# Patient Record
Sex: FEMALE | Race: WHITE | NOT HISPANIC OR LATINO
[De-identification: names, ages, dates, MRNs, and addresses within clinical notes are randomized per-mention and may not be internally consistent; named-entity substitution may affect disease eponyms.]

---

## 2018-09-28 ENCOUNTER — APPOINTMENT (OUTPATIENT)
Dept: GERIATRICS | Facility: CLINIC | Age: 83
End: 2018-09-28

## 2019-09-27 ENCOUNTER — TRANSCRIPTION ENCOUNTER (OUTPATIENT)
Age: 84
End: 2019-09-27

## 2019-09-28 ENCOUNTER — INPATIENT (INPATIENT)
Facility: HOSPITAL | Age: 84
LOS: 3 days | Discharge: NURSING, REHAB OR LONG-TERM CARE | DRG: 661 | End: 2019-10-02
Attending: UROLOGY | Admitting: UROLOGY
Payer: MEDICARE

## 2019-09-28 VITALS
TEMPERATURE: 98 F | RESPIRATION RATE: 19 BRPM | HEIGHT: 59 IN | SYSTOLIC BLOOD PRESSURE: 137 MMHG | OXYGEN SATURATION: 97 % | DIASTOLIC BLOOD PRESSURE: 60 MMHG | WEIGHT: 104.94 LBS | HEART RATE: 82 BPM

## 2019-09-28 DIAGNOSIS — N20.0 CALCULUS OF KIDNEY: ICD-10-CM

## 2019-09-28 DIAGNOSIS — Z90.710 ACQUIRED ABSENCE OF BOTH CERVIX AND UTERUS: Chronic | ICD-10-CM

## 2019-09-28 LAB
ALBUMIN SERPL ELPH-MCNC: 4 G/DL — SIGNIFICANT CHANGE UP (ref 3.3–5)
ALP SERPL-CCNC: 104 U/L — SIGNIFICANT CHANGE UP (ref 40–120)
ALT FLD-CCNC: 11 U/L — SIGNIFICANT CHANGE UP (ref 10–45)
ANION GAP SERPL CALC-SCNC: 15 MMOL/L — SIGNIFICANT CHANGE UP (ref 5–17)
APPEARANCE UR: ABNORMAL
AST SERPL-CCNC: 19 U/L — SIGNIFICANT CHANGE UP (ref 10–40)
BASOPHILS # BLD AUTO: 0 K/UL — SIGNIFICANT CHANGE UP (ref 0–0.2)
BASOPHILS NFR BLD AUTO: 0.1 % — SIGNIFICANT CHANGE UP (ref 0–2)
BILIRUB SERPL-MCNC: 1.3 MG/DL — HIGH (ref 0.2–1.2)
BILIRUB UR-MCNC: NEGATIVE — SIGNIFICANT CHANGE UP
BUN SERPL-MCNC: 20 MG/DL — SIGNIFICANT CHANGE UP (ref 7–23)
CALCIUM SERPL-MCNC: 9.7 MG/DL — SIGNIFICANT CHANGE UP (ref 8.4–10.5)
CHLORIDE SERPL-SCNC: 103 MMOL/L — SIGNIFICANT CHANGE UP (ref 96–108)
CO2 SERPL-SCNC: 23 MMOL/L — SIGNIFICANT CHANGE UP (ref 22–31)
COLOR SPEC: YELLOW — SIGNIFICANT CHANGE UP
CREAT SERPL-MCNC: 1.42 MG/DL — HIGH (ref 0.5–1.3)
DIFF PNL FLD: ABNORMAL
EOSINOPHIL # BLD AUTO: 0.1 K/UL — SIGNIFICANT CHANGE UP (ref 0–0.5)
EOSINOPHIL NFR BLD AUTO: 0.3 % — SIGNIFICANT CHANGE UP (ref 0–6)
GLUCOSE SERPL-MCNC: 126 MG/DL — HIGH (ref 70–99)
GLUCOSE UR QL: NEGATIVE — SIGNIFICANT CHANGE UP
HCT VFR BLD CALC: 39 % — SIGNIFICANT CHANGE UP (ref 34.5–45)
HGB BLD-MCNC: 12 G/DL — SIGNIFICANT CHANGE UP (ref 11.5–15.5)
KETONES UR-MCNC: ABNORMAL
LEUKOCYTE ESTERASE UR-ACNC: ABNORMAL
LYMPHOCYTES # BLD AUTO: 1.1 K/UL — SIGNIFICANT CHANGE UP (ref 1–3.3)
LYMPHOCYTES # BLD AUTO: 5.1 % — LOW (ref 13–44)
MCHC RBC-ENTMCNC: 28.6 PG — SIGNIFICANT CHANGE UP (ref 27–34)
MCHC RBC-ENTMCNC: 30.7 GM/DL — LOW (ref 32–36)
MCV RBC AUTO: 93.1 FL — SIGNIFICANT CHANGE UP (ref 80–100)
MONOCYTES # BLD AUTO: 1.3 K/UL — HIGH (ref 0–0.9)
MONOCYTES NFR BLD AUTO: 5.7 % — SIGNIFICANT CHANGE UP (ref 2–14)
NEUTROPHILS # BLD AUTO: 19.5 K/UL — HIGH (ref 1.8–7.4)
NEUTROPHILS NFR BLD AUTO: 88.8 % — HIGH (ref 43–77)
NITRITE UR-MCNC: NEGATIVE — SIGNIFICANT CHANGE UP
PH UR: 6 — SIGNIFICANT CHANGE UP (ref 5–8)
PLATELET # BLD AUTO: 297 K/UL — SIGNIFICANT CHANGE UP (ref 150–400)
POTASSIUM SERPL-MCNC: 4 MMOL/L — SIGNIFICANT CHANGE UP (ref 3.5–5.3)
POTASSIUM SERPL-SCNC: 4 MMOL/L — SIGNIFICANT CHANGE UP (ref 3.5–5.3)
PROT SERPL-MCNC: 7.5 G/DL — SIGNIFICANT CHANGE UP (ref 6–8.3)
PROT UR-MCNC: ABNORMAL
RBC # BLD: 4.19 M/UL — SIGNIFICANT CHANGE UP (ref 3.8–5.2)
RBC # FLD: 11.9 % — SIGNIFICANT CHANGE UP (ref 10.3–14.5)
SODIUM SERPL-SCNC: 141 MMOL/L — SIGNIFICANT CHANGE UP (ref 135–145)
SP GR SPEC: 1.02 — SIGNIFICANT CHANGE UP (ref 1.01–1.02)
UROBILINOGEN FLD QL: NEGATIVE — SIGNIFICANT CHANGE UP
WBC # BLD: 22 K/UL — HIGH (ref 3.8–10.5)
WBC # FLD AUTO: 22 K/UL — HIGH (ref 3.8–10.5)

## 2019-09-28 PROCEDURE — 99285 EMERGENCY DEPT VISIT HI MDM: CPT | Mod: GC

## 2019-09-28 PROCEDURE — 52332 CYSTOSCOPY AND TREATMENT: CPT | Mod: LT

## 2019-09-28 PROCEDURE — 71046 X-RAY EXAM CHEST 2 VIEWS: CPT | Mod: 26

## 2019-09-28 PROCEDURE — 74177 CT ABD & PELVIS W/CONTRAST: CPT | Mod: 26

## 2019-09-28 RX ORDER — SODIUM CHLORIDE 9 MG/ML
1000 INJECTION INTRAMUSCULAR; INTRAVENOUS; SUBCUTANEOUS
Refills: 0 | Status: DISCONTINUED | OUTPATIENT
Start: 2019-09-28 | End: 2019-09-30

## 2019-09-28 RX ORDER — ACETAMINOPHEN 500 MG
650 TABLET ORAL EVERY 6 HOURS
Refills: 0 | Status: DISCONTINUED | OUTPATIENT
Start: 2019-09-28 | End: 2019-09-28

## 2019-09-28 RX ORDER — SODIUM CHLORIDE 9 MG/ML
1500 INJECTION, SOLUTION INTRAVENOUS ONCE
Refills: 0 | Status: COMPLETED | OUTPATIENT
Start: 2019-09-28 | End: 2019-09-28

## 2019-09-28 RX ORDER — HYDROMORPHONE HYDROCHLORIDE 2 MG/ML
0.25 INJECTION INTRAMUSCULAR; INTRAVENOUS; SUBCUTANEOUS
Refills: 0 | Status: DISCONTINUED | OUTPATIENT
Start: 2019-09-28 | End: 2019-09-29

## 2019-09-28 RX ORDER — LOSARTAN POTASSIUM 100 MG/1
100 TABLET, FILM COATED ORAL DAILY
Refills: 0 | Status: DISCONTINUED | OUTPATIENT
Start: 2019-09-28 | End: 2019-09-29

## 2019-09-28 RX ORDER — MORPHINE SULFATE 50 MG/1
2 CAPSULE, EXTENDED RELEASE ORAL EVERY 6 HOURS
Refills: 0 | Status: DISCONTINUED | OUTPATIENT
Start: 2019-09-28 | End: 2019-09-28

## 2019-09-28 RX ORDER — HEPARIN SODIUM 5000 [USP'U]/ML
5000 INJECTION INTRAVENOUS; SUBCUTANEOUS EVERY 8 HOURS
Refills: 0 | Status: DISCONTINUED | OUTPATIENT
Start: 2019-09-28 | End: 2019-10-02

## 2019-09-28 RX ORDER — TAMSULOSIN HYDROCHLORIDE 0.4 MG/1
0.4 CAPSULE ORAL DAILY
Refills: 0 | Status: DISCONTINUED | OUTPATIENT
Start: 2019-09-28 | End: 2019-10-02

## 2019-09-28 RX ORDER — SERTRALINE 25 MG/1
50 TABLET, FILM COATED ORAL DAILY
Refills: 0 | Status: DISCONTINUED | OUTPATIENT
Start: 2019-09-28 | End: 2019-10-02

## 2019-09-28 RX ORDER — NIFEDIPINE 30 MG
30 TABLET, EXTENDED RELEASE 24 HR ORAL DAILY
Refills: 0 | Status: DISCONTINUED | OUTPATIENT
Start: 2019-09-28 | End: 2019-09-29

## 2019-09-28 RX ORDER — PIPERACILLIN AND TAZOBACTAM 4; .5 G/20ML; G/20ML
3.38 INJECTION, POWDER, LYOPHILIZED, FOR SOLUTION INTRAVENOUS ONCE
Refills: 0 | Status: DISCONTINUED | OUTPATIENT
Start: 2019-09-28 | End: 2019-09-28

## 2019-09-28 RX ORDER — PIPERACILLIN AND TAZOBACTAM 4; .5 G/20ML; G/20ML
3.38 INJECTION, POWDER, LYOPHILIZED, FOR SOLUTION INTRAVENOUS EVERY 8 HOURS
Refills: 0 | Status: DISCONTINUED | OUTPATIENT
Start: 2019-09-28 | End: 2019-10-01

## 2019-09-28 RX ORDER — ACETAMINOPHEN 500 MG
650 TABLET ORAL EVERY 6 HOURS
Refills: 0 | Status: DISCONTINUED | OUTPATIENT
Start: 2019-09-28 | End: 2019-10-02

## 2019-09-28 RX ORDER — SENNA PLUS 8.6 MG/1
2 TABLET ORAL AT BEDTIME
Refills: 0 | Status: DISCONTINUED | OUTPATIENT
Start: 2019-09-28 | End: 2019-10-01

## 2019-09-28 RX ORDER — PIPERACILLIN AND TAZOBACTAM 4; .5 G/20ML; G/20ML
3.38 INJECTION, POWDER, LYOPHILIZED, FOR SOLUTION INTRAVENOUS EVERY 8 HOURS
Refills: 0 | Status: DISCONTINUED | OUTPATIENT
Start: 2019-09-28 | End: 2019-09-28

## 2019-09-28 RX ORDER — SENNA PLUS 8.6 MG/1
2 TABLET ORAL AT BEDTIME
Refills: 0 | Status: DISCONTINUED | OUTPATIENT
Start: 2019-09-28 | End: 2019-09-28

## 2019-09-28 RX ORDER — HEPARIN SODIUM 5000 [USP'U]/ML
5000 INJECTION INTRAVENOUS; SUBCUTANEOUS EVERY 8 HOURS
Refills: 0 | Status: DISCONTINUED | OUTPATIENT
Start: 2019-09-28 | End: 2019-09-28

## 2019-09-28 RX ORDER — PIPERACILLIN AND TAZOBACTAM 4; .5 G/20ML; G/20ML
3.38 INJECTION, POWDER, LYOPHILIZED, FOR SOLUTION INTRAVENOUS ONCE
Refills: 0 | Status: COMPLETED | OUTPATIENT
Start: 2019-09-28 | End: 2019-09-28

## 2019-09-28 RX ORDER — PHENAZOPYRIDINE HCL 100 MG
100 TABLET ORAL EVERY 8 HOURS
Refills: 0 | Status: DISCONTINUED | OUTPATIENT
Start: 2019-09-28 | End: 2019-10-02

## 2019-09-28 RX ORDER — SODIUM CHLORIDE 9 MG/ML
1000 INJECTION INTRAMUSCULAR; INTRAVENOUS; SUBCUTANEOUS
Refills: 0 | Status: DISCONTINUED | OUTPATIENT
Start: 2019-09-28 | End: 2019-09-28

## 2019-09-28 RX ORDER — MORPHINE SULFATE 50 MG/1
4 CAPSULE, EXTENDED RELEASE ORAL ONCE
Refills: 0 | Status: DISCONTINUED | OUTPATIENT
Start: 2019-09-28 | End: 2019-09-28

## 2019-09-28 RX ORDER — ALPRAZOLAM 0.25 MG
0.5 TABLET ORAL
Refills: 0 | Status: DISCONTINUED | OUTPATIENT
Start: 2019-09-28 | End: 2019-10-02

## 2019-09-28 RX ORDER — ONDANSETRON 8 MG/1
4 TABLET, FILM COATED ORAL ONCE
Refills: 0 | Status: DISCONTINUED | OUTPATIENT
Start: 2019-09-28 | End: 2019-09-29

## 2019-09-28 RX ORDER — DOCUSATE SODIUM 100 MG
100 CAPSULE ORAL
Refills: 0 | Status: DISCONTINUED | OUTPATIENT
Start: 2019-09-28 | End: 2019-10-01

## 2019-09-28 RX ORDER — ACETAMINOPHEN 500 MG
750 TABLET ORAL ONCE
Refills: 0 | Status: DISCONTINUED | OUTPATIENT
Start: 2019-09-28 | End: 2019-09-30

## 2019-09-28 RX ORDER — LIDOCAINE 4 G/100G
1 CREAM TOPICAL EVERY 6 HOURS
Refills: 0 | Status: DISCONTINUED | OUTPATIENT
Start: 2019-09-28 | End: 2019-10-02

## 2019-09-28 RX ADMIN — SODIUM CHLORIDE 1500 MILLILITER(S): 9 INJECTION, SOLUTION INTRAVENOUS at 15:45

## 2019-09-28 RX ADMIN — SODIUM CHLORIDE 75 MILLILITER(S): 9 INJECTION INTRAMUSCULAR; INTRAVENOUS; SUBCUTANEOUS at 23:17

## 2019-09-28 RX ADMIN — LIDOCAINE 1 APPLICATION(S): 4 CREAM TOPICAL at 23:54

## 2019-09-28 RX ADMIN — HYDROMORPHONE HYDROCHLORIDE 0.25 MILLIGRAM(S): 2 INJECTION INTRAMUSCULAR; INTRAVENOUS; SUBCUTANEOUS at 22:50

## 2019-09-28 RX ADMIN — PIPERACILLIN AND TAZOBACTAM 25 GRAM(S): 4; .5 INJECTION, POWDER, LYOPHILIZED, FOR SOLUTION INTRAVENOUS at 13:52

## 2019-09-28 RX ADMIN — Medication 100 MILLIGRAM(S): at 23:54

## 2019-09-28 RX ADMIN — HYDROMORPHONE HYDROCHLORIDE 0.25 MILLIGRAM(S): 2 INJECTION INTRAMUSCULAR; INTRAVENOUS; SUBCUTANEOUS at 23:00

## 2019-09-28 RX ADMIN — PIPERACILLIN AND TAZOBACTAM 3.38 GRAM(S): 4; .5 INJECTION, POWDER, LYOPHILIZED, FOR SOLUTION INTRAVENOUS at 14:22

## 2019-09-28 RX ADMIN — MORPHINE SULFATE 4 MILLIGRAM(S): 50 CAPSULE, EXTENDED RELEASE ORAL at 15:32

## 2019-09-28 NOTE — H&P ADULT - ASSESSMENT
83F with hx of HTN and UTIs presenting with pain and dysuria in the setting of a left 4mm and 2mm distal ureteral stones with likely concurrent UTI  WBC 22. Afebrile    - Admit to ; Dr. Ryan Núñez  - NPO  - Continue zosyn  - [place] urine culture  - OR for left ureteral stent placement; added on to schedule

## 2019-09-28 NOTE — H&P ADULT - NSHPPHYSICALEXAM_GEN_ALL_CORE
NAD, awake and alert  Respirations nonlabored  Abdomen soft, nontender, nondistended  Mild left CVAT

## 2019-09-28 NOTE — ED PROVIDER NOTE - ATTENDING CONTRIBUTION TO CARE
83F urinary symptoms, left CVAT. Rectal temp. Concern for infected stone vs pyelo. CT. Labs. Urine. IVF. Abx.

## 2019-09-28 NOTE — ED PROVIDER NOTE - CLINICAL SUMMARY MEDICAL DECISION MAKING FREE TEXT BOX
82yo F w/ recurrent UTIs presenting with left flank pain and hematuria. Concern for possible pyelonephritis vs. nephrolithiasis. Will obtain CT scan and labs and determine treatment.

## 2019-09-28 NOTE — PRE-ANESTHESIA EVALUATION ADULT - NSANTHPMHFT_GEN_ALL_CORE
83 year old female with history of HTN and recurrent UTIs presenting with left flank pain and dysuria.  UTI cipro 2 days ago 83 year old female with history of HTN and recurrent UTIs presenting with left flank pain and dysuria.  UTI cipro 2 days ago. CT abdomen: Moderate left hydroureteronephrosis to the level of the left ureterovesicular junction secondary to 4 mm and 2 mm calculi.

## 2019-09-28 NOTE — ED ADULT NURSE REASSESSMENT NOTE - NS ED NURSE REASSESS COMMENT FT1
Report taken from Rakan DINERO in [facility]. Pt resting in stretcher in NAD and VSS with family at bedside. Urology reportedly saw pt. Pending dispo.

## 2019-09-28 NOTE — H&P ADULT - ATTENDING COMMENTS
Left sided distla ureteral stone with leukocytosis to 22. Plan for L ureteral stent placement, possible URS.

## 2019-09-28 NOTE — ED PROVIDER NOTE - MUSCULOSKELETAL, MLM
Spine appears normal, range of motion is not limited, no muscle or joint tenderness +Left CVAT Spine appears normal, range of motion is not limited, no muscle or joint tenderness

## 2019-09-28 NOTE — ED PROVIDER NOTE - CARE PLAN
Principal Discharge DX:	Nephrolithiasis Principal Discharge DX:	Nephrolithiasis  Secondary Diagnosis:	UTI (urinary tract infection)

## 2019-09-28 NOTE — ED PROVIDER NOTE - OBJECTIVE STATEMENT
82yo F w/ PMHx of HTN, LBBB, and previous UTIs p/w dysuria for several days. She states that the dysuria started a few days ago, but is unable to pinpoint exactly when. 2 days ago she saw her PCP and was started on ciprofloxacin. Shortly afterwards she developed hematuria; which has been improving since started the ciprofloxacin. She also endorses left flank pain. Endorses nausea, denies diarrhea or recent travel/trauma. 82yo F w/ PMHx of HTN, LBBB, and previous UTIs p/w dysuria for several days. She states that the dysuria started a few days ago, but is unable to pinpoint exactly when. 2 days ago she saw her PCP and was started on ciprofloxacin. Shortly afterwards she developed hematuria; which has been improving since started the ciprofloxacin. She also endorses left flank pain x 2d. Endorses nausea, denies diarrhea or recent travel/trauma, fever, vomiting.

## 2019-09-28 NOTE — BRIEF OPERATIVE NOTE - OPERATION/FINDINGS
1. Purulent urine in bladder and proximal to stone  2. 6F x 22 cm DJ stent in place    Dictation: [id number]

## 2019-09-28 NOTE — H&P ADULT - HISTORY OF PRESENT ILLNESS
Ms Ram is an 83 year old female with history of HTN and recurrent UTIs presenting with left flank pain and dysuria. She was recently seen by her PCP and was found to have a UTI. She was started on cipro 2 days ago. She denies any fevers or chills.     She has been seen in the past by urologists for UTIs but has never had any stones.

## 2019-09-28 NOTE — PRE-ANESTHESIA EVALUATION ADULT - NSANTHOSAYNRD_GEN_A_CORE
No. CONCHITA screening performed.  STOP BANG Legend: 0-2 = LOW Risk; 3-4 = INTERMEDIATE Risk; 5-8 = HIGH Risk

## 2019-09-28 NOTE — ED ADULT TRIAGE NOTE - CHIEF COMPLAINT QUOTE
L flank pain (seen by PMD x 3 days ago -> stared on cipro for UTI, +"blood in urine"), +hx chronic UTI

## 2019-09-28 NOTE — H&P ADULT - NSHPLABSRESULTS_GEN_ALL_CORE
CBC Full  -  ( 28 Sep 2019 10:59 )  WBC Count : 22.0 K/uL  RBC Count : 4.19 M/uL  Hemoglobin : 12.0 g/dL  Hematocrit : 39.0 %  Platelet Count - Automated : 297 K/uL  Mean Cell Volume : 93.1 fl  Mean Cell Hemoglobin : 28.6 pg  Mean Cell Hemoglobin Concentration : 30.7 gm/dL  Auto Neutrophil # : 19.5 K/uL  Auto Lymphocyte # : 1.1 K/uL  Auto Monocyte # : 1.3 K/uL  Auto Eosinophil # : 0.1 K/uL  Auto Basophil # : 0.0 K/uL  Auto Neutrophil % : 88.8 %  Auto Lymphocyte % : 5.1 %  Auto Monocyte % : 5.7 %  Auto Eosinophil % : 0.3 %  Auto Basophil % : 0.1 %    09-28    141  |  103  |  20  ----------------------------<  126<H>  4.0   |  23  |  1.42<H>    Ca    9.7      28 Sep 2019 10:59    TPro  7.5  /  Alb  4.0  /  TBili  1.3<H>  /  DBili  x   /  AST  19  /  ALT  11  /  AlkPhos  104  09-28

## 2019-09-28 NOTE — ED ADULT NURSE NOTE - OBJECTIVE STATEMENT
Pt came in c/o left flank pain and dysuria. Pt stated " I have hx: urinary tract infection". No distress Breathing easy and non labored. Pt ambulatory. Pt able to move all extremities. Pt anxious but cooperative. Emotional support offered. Pt's daughter at bedside.

## 2019-09-29 LAB
ANION GAP SERPL CALC-SCNC: 12 MMOL/L — SIGNIFICANT CHANGE UP (ref 5–17)
BUN SERPL-MCNC: 20 MG/DL — SIGNIFICANT CHANGE UP (ref 7–23)
CALCIUM SERPL-MCNC: 9.3 MG/DL — SIGNIFICANT CHANGE UP (ref 8.4–10.5)
CHLORIDE SERPL-SCNC: 103 MMOL/L — SIGNIFICANT CHANGE UP (ref 96–108)
CO2 SERPL-SCNC: 25 MMOL/L — SIGNIFICANT CHANGE UP (ref 22–31)
CREAT SERPL-MCNC: 1.37 MG/DL — HIGH (ref 0.5–1.3)
GLUCOSE SERPL-MCNC: 177 MG/DL — HIGH (ref 70–99)
HCT VFR BLD CALC: 33.6 % — LOW (ref 34.5–45)
HGB BLD-MCNC: 10.9 G/DL — LOW (ref 11.5–15.5)
MCHC RBC-ENTMCNC: 30.2 PG — SIGNIFICANT CHANGE UP (ref 27–34)
MCHC RBC-ENTMCNC: 32.5 GM/DL — SIGNIFICANT CHANGE UP (ref 32–36)
MCV RBC AUTO: 93 FL — SIGNIFICANT CHANGE UP (ref 80–100)
PLATELET # BLD AUTO: 262 K/UL — SIGNIFICANT CHANGE UP (ref 150–400)
POTASSIUM SERPL-MCNC: 4 MMOL/L — SIGNIFICANT CHANGE UP (ref 3.5–5.3)
POTASSIUM SERPL-SCNC: 4 MMOL/L — SIGNIFICANT CHANGE UP (ref 3.5–5.3)
RBC # BLD: 3.61 M/UL — LOW (ref 3.8–5.2)
RBC # FLD: 12.1 % — SIGNIFICANT CHANGE UP (ref 10.3–14.5)
SODIUM SERPL-SCNC: 140 MMOL/L — SIGNIFICANT CHANGE UP (ref 135–145)
WBC # BLD: 21.5 K/UL — HIGH (ref 3.8–10.5)
WBC # FLD AUTO: 21.5 K/UL — HIGH (ref 3.8–10.5)

## 2019-09-29 RX ORDER — SODIUM CHLORIDE 9 MG/ML
500 INJECTION INTRAMUSCULAR; INTRAVENOUS; SUBCUTANEOUS ONCE
Refills: 0 | Status: COMPLETED | OUTPATIENT
Start: 2019-09-29 | End: 2019-09-29

## 2019-09-29 RX ORDER — BENZOCAINE AND MENTHOL 5; 1 G/100ML; G/100ML
1 LIQUID ORAL EVERY 4 HOURS
Refills: 0 | Status: DISCONTINUED | OUTPATIENT
Start: 2019-09-29 | End: 2019-10-02

## 2019-09-29 RX ORDER — SODIUM CHLORIDE 9 MG/ML
500 INJECTION, SOLUTION INTRAVENOUS ONCE
Refills: 0 | Status: COMPLETED | OUTPATIENT
Start: 2019-09-29 | End: 2019-09-29

## 2019-09-29 RX ORDER — LOSARTAN POTASSIUM 100 MG/1
100 TABLET, FILM COATED ORAL DAILY
Refills: 0 | Status: DISCONTINUED | OUTPATIENT
Start: 2019-09-29 | End: 2019-10-02

## 2019-09-29 RX ORDER — INFLUENZA VIRUS VACCINE 15; 15; 15; 15 UG/.5ML; UG/.5ML; UG/.5ML; UG/.5ML
0.5 SUSPENSION INTRAMUSCULAR ONCE
Refills: 0 | Status: DISCONTINUED | OUTPATIENT
Start: 2019-09-29 | End: 2019-10-02

## 2019-09-29 RX ORDER — ATROPA BELLADONNA AND OPIUM 16.2; 6 MG/1; MG/1
1 SUPPOSITORY RECTAL EVERY 8 HOURS
Refills: 0 | Status: DISCONTINUED | OUTPATIENT
Start: 2019-09-29 | End: 2019-10-02

## 2019-09-29 RX ORDER — SODIUM CHLORIDE 9 MG/ML
500 INJECTION, SOLUTION INTRAVENOUS ONCE
Refills: 0 | Status: DISCONTINUED | OUTPATIENT
Start: 2019-09-29 | End: 2019-09-29

## 2019-09-29 RX ORDER — NIFEDIPINE 30 MG
30 TABLET, EXTENDED RELEASE 24 HR ORAL DAILY
Refills: 0 | Status: DISCONTINUED | OUTPATIENT
Start: 2019-09-29 | End: 2019-10-02

## 2019-09-29 RX ADMIN — Medication 30 MILLIGRAM(S): at 05:18

## 2019-09-29 RX ADMIN — TAMSULOSIN HYDROCHLORIDE 0.4 MILLIGRAM(S): 0.4 CAPSULE ORAL at 11:13

## 2019-09-29 RX ADMIN — HEPARIN SODIUM 5000 UNIT(S): 5000 INJECTION INTRAVENOUS; SUBCUTANEOUS at 05:18

## 2019-09-29 RX ADMIN — Medication 100 MILLIGRAM(S): at 13:02

## 2019-09-29 RX ADMIN — Medication 0.5 MILLIGRAM(S): at 05:17

## 2019-09-29 RX ADMIN — SODIUM CHLORIDE 75 MILLILITER(S): 9 INJECTION INTRAMUSCULAR; INTRAVENOUS; SUBCUTANEOUS at 11:13

## 2019-09-29 RX ADMIN — PIPERACILLIN AND TAZOBACTAM 25 GRAM(S): 4; .5 INJECTION, POWDER, LYOPHILIZED, FOR SOLUTION INTRAVENOUS at 13:02

## 2019-09-29 RX ADMIN — HEPARIN SODIUM 5000 UNIT(S): 5000 INJECTION INTRAVENOUS; SUBCUTANEOUS at 13:01

## 2019-09-29 RX ADMIN — PIPERACILLIN AND TAZOBACTAM 25 GRAM(S): 4; .5 INJECTION, POWDER, LYOPHILIZED, FOR SOLUTION INTRAVENOUS at 21:39

## 2019-09-29 RX ADMIN — SODIUM CHLORIDE 75 MILLILITER(S): 9 INJECTION INTRAMUSCULAR; INTRAVENOUS; SUBCUTANEOUS at 17:12

## 2019-09-29 RX ADMIN — SODIUM CHLORIDE 500 MILLILITER(S): 9 INJECTION, SOLUTION INTRAVENOUS at 22:58

## 2019-09-29 RX ADMIN — SERTRALINE 50 MILLIGRAM(S): 25 TABLET, FILM COATED ORAL at 11:13

## 2019-09-29 RX ADMIN — Medication 100 MILLIGRAM(S): at 21:40

## 2019-09-29 RX ADMIN — Medication 0.5 MILLIGRAM(S): at 21:40

## 2019-09-29 RX ADMIN — HEPARIN SODIUM 5000 UNIT(S): 5000 INJECTION INTRAVENOUS; SUBCUTANEOUS at 21:40

## 2019-09-29 RX ADMIN — PIPERACILLIN AND TAZOBACTAM 25 GRAM(S): 4; .5 INJECTION, POWDER, LYOPHILIZED, FOR SOLUTION INTRAVENOUS at 05:18

## 2019-09-29 RX ADMIN — Medication 100 MILLIGRAM(S): at 05:18

## 2019-09-29 RX ADMIN — BENZOCAINE AND MENTHOL 1 LOZENGE: 5; 1 LIQUID ORAL at 13:22

## 2019-09-29 RX ADMIN — LOSARTAN POTASSIUM 100 MILLIGRAM(S): 100 TABLET, FILM COATED ORAL at 05:18

## 2019-09-29 RX ADMIN — SODIUM CHLORIDE 500 MILLILITER(S): 9 INJECTION INTRAMUSCULAR; INTRAVENOUS; SUBCUTANEOUS at 17:15

## 2019-09-29 NOTE — PROGRESS NOTE ADULT - ASSESSMENT
83y Female s/p L stent placement      -DVT prophylaxis/OOB  -Incentive spirometry  -Analgesia as needed  -Follow up cultures  -Continue zosyn 83y Female s/p L stent placement      -DVT prophylaxis/OOB  -Incentive spirometry  -Analgesia as needed  -Follow up cultures and trend labs  -Continue zosyn

## 2019-09-29 NOTE — PROGRESS NOTE ADULT - SUBJECTIVE AND OBJECTIVE BOX
UROLOGY DAILY PROGRESS NOTE:     Subjective: Patient seen and examined at bedside. Complaining of urgency but otherwise feeling well.     Objective:  Vital signs  T(F): , Max: 99.9 (09-28-19 @ 11:11)  HR: 68 (09-29-19 @ 05:20)  BP: 121/67 (09-29-19 @ 05:20)  SpO2: 93% (09-29-19 @ 05:20)  Wt(kg): --    I&O's Summary    28 Sep 2019 07:01  -  29 Sep 2019 07:00  --------------------------------------------------------  IN: 675 mL / OUT: 200 mL / NET: 475 mL    Gen: NAD  Abd: Soft, NT, ND    Labs:  09-29  21.5  / 33.6  /x      09-29  x     / x     /1.37                           10.9   21.5  )-----------( 262      ( 29 Sep 2019 05:48 )             33.6     09-29    140  |  103  |  20  ----------------------------<  177<H>  4.0   |  25  |  1.37<H>    Ca    9.3      29 Sep 2019 05:46    TPro  7.5  /  Alb  4.0  /  TBili  1.3<H>  /  DBili  x   /  AST  19  /  ALT  11  /  AlkPhos  104  09-28    Urine Cx: pending

## 2019-09-29 NOTE — PROGRESS NOTE ADULT - ASSESSMENT
A/P: 83y Female s/p L stent placement     plan  pain control  DVT prophylaxis/OOB  Incentive spirometry  Strict I&O's  Analgesia and antiemetics as needed  regular Diet  AM labs

## 2019-09-29 NOTE — PROVIDER CONTACT NOTE (OTHER) - ASSESSMENT
pt A&Ox4. BP 90/40 manually, other vss. pt complains of no pain at this time, some lightheadedness reported from pt. pt up in chair/ambulating during day returned to bed at this time. ivf in place. pt voiding, with urgency/incont @ times.

## 2019-09-29 NOTE — PROGRESS NOTE ADULT - SUBJECTIVE AND OBJECTIVE BOX
Post op Check    Pt 84y/o F hx of ureter stone, s/p L stent placement. seen and examined c/o some pain around urethra.  Denies SOB/CP/N/V.     Vital Signs Last 24 Hrs  T(C): 36.4 (29 Sep 2019 01:32), Max: 37.7 (28 Sep 2019 11:11)  T(F): 97.5 (29 Sep 2019 01:32), Max: 99.9 (28 Sep 2019 11:11)  HR: 73 (29 Sep 2019 01:32) (71 - 96)  BP: 127/68 (29 Sep 2019 01:32) (119/58 - 156/67)  BP(mean): 88 (29 Sep 2019 00:30) (81 - 101)  RR: 18 (29 Sep 2019 01:32) (15 - 20)  SpO2: 95% (29 Sep 2019 01:32) (95% - 100%)    I&O's Summary    28 Sep 2019 07:01  -  29 Sep 2019 04:42  --------------------------------------------------------  IN: 150 mL / OUT: 200 mL / NET: -50 mL        Physical Exam  Gen: NAD, A&Ox3  Pulm: No respiratory distress, no subcostal retractions  CV: RRR, no JVD  Abd: Soft, NT, ND  : voiding free                          12.0   22.0  )-----------( 297      ( 28 Sep 2019 10:59 )             39.0       09-28    141  |  103  |  20  ----------------------------<  126<H>  4.0   |  23  |  1.42<H>    Ca    9.7      28 Sep 2019 10:59    TPro  7.5  /  Alb  4.0  /  TBili  1.3<H>  /  DBili  x   /  AST  19  /  ALT  11  /  AlkPhos  104  09-28

## 2019-09-30 ENCOUNTER — TRANSCRIPTION ENCOUNTER (OUTPATIENT)
Age: 84
End: 2019-09-30

## 2019-09-30 LAB
-  AMIKACIN: SIGNIFICANT CHANGE UP
-  AMPICILLIN/SULBACTAM: SIGNIFICANT CHANGE UP
-  AMPICILLIN: SIGNIFICANT CHANGE UP
-  AZTREONAM: SIGNIFICANT CHANGE UP
-  CEFAZOLIN: SIGNIFICANT CHANGE UP
-  CEFEPIME: SIGNIFICANT CHANGE UP
-  CEFOXITIN: SIGNIFICANT CHANGE UP
-  CEFTRIAXONE: SIGNIFICANT CHANGE UP
-  CIPROFLOXACIN: SIGNIFICANT CHANGE UP
-  GENTAMICIN: SIGNIFICANT CHANGE UP
-  IMIPENEM: SIGNIFICANT CHANGE UP
-  LEVOFLOXACIN: SIGNIFICANT CHANGE UP
-  MEROPENEM: SIGNIFICANT CHANGE UP
-  NITROFURANTOIN: SIGNIFICANT CHANGE UP
-  PIPERACILLIN/TAZOBACTAM: SIGNIFICANT CHANGE UP
-  TIGECYCLINE: SIGNIFICANT CHANGE UP
-  TOBRAMYCIN: SIGNIFICANT CHANGE UP
-  TRIMETHOPRIM/SULFAMETHOXAZOLE: SIGNIFICANT CHANGE UP
ANION GAP SERPL CALC-SCNC: 12 MMOL/L — SIGNIFICANT CHANGE UP (ref 5–17)
BUN SERPL-MCNC: 29 MG/DL — HIGH (ref 7–23)
CALCIUM SERPL-MCNC: 8.6 MG/DL — SIGNIFICANT CHANGE UP (ref 8.4–10.5)
CHLORIDE SERPL-SCNC: 108 MMOL/L — SIGNIFICANT CHANGE UP (ref 96–108)
CO2 SERPL-SCNC: 23 MMOL/L — SIGNIFICANT CHANGE UP (ref 22–31)
CREAT SERPL-MCNC: 1.28 MG/DL — SIGNIFICANT CHANGE UP (ref 0.5–1.3)
CULTURE RESULTS: SIGNIFICANT CHANGE UP
GLUCOSE SERPL-MCNC: 125 MG/DL — HIGH (ref 70–99)
HCT VFR BLD CALC: 32.9 % — LOW (ref 34.5–45)
HGB BLD-MCNC: 10.2 G/DL — LOW (ref 11.5–15.5)
MCHC RBC-ENTMCNC: 29.1 PG — SIGNIFICANT CHANGE UP (ref 27–34)
MCHC RBC-ENTMCNC: 31 GM/DL — LOW (ref 32–36)
MCV RBC AUTO: 94 FL — SIGNIFICANT CHANGE UP (ref 80–100)
METHOD TYPE: SIGNIFICANT CHANGE UP
ORGANISM # SPEC MICROSCOPIC CNT: SIGNIFICANT CHANGE UP
ORGANISM # SPEC MICROSCOPIC CNT: SIGNIFICANT CHANGE UP
PLATELET # BLD AUTO: 253 K/UL — SIGNIFICANT CHANGE UP (ref 150–400)
POTASSIUM SERPL-MCNC: 3.3 MMOL/L — LOW (ref 3.5–5.3)
POTASSIUM SERPL-SCNC: 3.3 MMOL/L — LOW (ref 3.5–5.3)
RBC # BLD: 3.5 M/UL — LOW (ref 3.8–5.2)
RBC # FLD: 12.2 % — SIGNIFICANT CHANGE UP (ref 10.3–14.5)
SODIUM SERPL-SCNC: 143 MMOL/L — SIGNIFICANT CHANGE UP (ref 135–145)
SPECIMEN SOURCE: SIGNIFICANT CHANGE UP
WBC # BLD: 15.9 K/UL — HIGH (ref 3.8–10.5)
WBC # FLD AUTO: 15.9 K/UL — HIGH (ref 3.8–10.5)

## 2019-09-30 PROCEDURE — 74018 RADEX ABDOMEN 1 VIEW: CPT | Mod: 26

## 2019-09-30 RX ORDER — ACETAMINOPHEN 500 MG
2 TABLET ORAL
Qty: 0 | Refills: 0 | DISCHARGE
Start: 2019-09-30

## 2019-09-30 RX ORDER — DOCUSATE SODIUM 100 MG
1 CAPSULE ORAL
Qty: 0 | Refills: 0 | DISCHARGE
Start: 2019-09-30

## 2019-09-30 RX ORDER — LOPERAMIDE HCL 2 MG
2 TABLET ORAL ONCE
Refills: 0 | Status: COMPLETED | OUTPATIENT
Start: 2019-09-30 | End: 2019-09-30

## 2019-09-30 RX ORDER — POTASSIUM CHLORIDE 20 MEQ
40 PACKET (EA) ORAL ONCE
Refills: 0 | Status: COMPLETED | OUTPATIENT
Start: 2019-09-30 | End: 2019-09-30

## 2019-09-30 RX ORDER — CEPHALEXIN 500 MG
1 CAPSULE ORAL
Qty: 21 | Refills: 0
Start: 2019-09-30 | End: 2019-10-06

## 2019-09-30 RX ORDER — CIPROFLOXACIN LACTATE 400MG/40ML
1 VIAL (ML) INTRAVENOUS
Qty: 0 | Refills: 0 | DISCHARGE

## 2019-09-30 RX ORDER — TAMSULOSIN HYDROCHLORIDE 0.4 MG/1
1 CAPSULE ORAL
Qty: 14 | Refills: 0
Start: 2019-09-30

## 2019-09-30 RX ORDER — LACTOBACILLUS ACIDOPHILUS 100MM CELL
1 CAPSULE ORAL DAILY
Refills: 0 | Status: DISCONTINUED | OUTPATIENT
Start: 2019-09-30 | End: 2019-10-02

## 2019-09-30 RX ORDER — SENNA PLUS 8.6 MG/1
2 TABLET ORAL
Qty: 0 | Refills: 0 | DISCHARGE
Start: 2019-09-30

## 2019-09-30 RX ADMIN — Medication 650 MILLIGRAM(S): at 06:25

## 2019-09-30 RX ADMIN — Medication 0.5 MILLIGRAM(S): at 05:56

## 2019-09-30 RX ADMIN — HEPARIN SODIUM 5000 UNIT(S): 5000 INJECTION INTRAVENOUS; SUBCUTANEOUS at 21:41

## 2019-09-30 RX ADMIN — Medication 100 MILLIGRAM(S): at 05:55

## 2019-09-30 RX ADMIN — HEPARIN SODIUM 5000 UNIT(S): 5000 INJECTION INTRAVENOUS; SUBCUTANEOUS at 13:36

## 2019-09-30 RX ADMIN — Medication 100 MILLIGRAM(S): at 21:41

## 2019-09-30 RX ADMIN — Medication 0.5 MILLIGRAM(S): at 17:58

## 2019-09-30 RX ADMIN — TAMSULOSIN HYDROCHLORIDE 0.4 MILLIGRAM(S): 0.4 CAPSULE ORAL at 11:43

## 2019-09-30 RX ADMIN — Medication 650 MILLIGRAM(S): at 05:55

## 2019-09-30 RX ADMIN — SERTRALINE 50 MILLIGRAM(S): 25 TABLET, FILM COATED ORAL at 11:43

## 2019-09-30 RX ADMIN — PIPERACILLIN AND TAZOBACTAM 25 GRAM(S): 4; .5 INJECTION, POWDER, LYOPHILIZED, FOR SOLUTION INTRAVENOUS at 13:41

## 2019-09-30 RX ADMIN — PIPERACILLIN AND TAZOBACTAM 25 GRAM(S): 4; .5 INJECTION, POWDER, LYOPHILIZED, FOR SOLUTION INTRAVENOUS at 21:41

## 2019-09-30 RX ADMIN — Medication 100 MILLIGRAM(S): at 05:56

## 2019-09-30 RX ADMIN — Medication 2 MILLIGRAM(S): at 21:41

## 2019-09-30 RX ADMIN — Medication 40 MILLIEQUIVALENT(S): at 08:19

## 2019-09-30 RX ADMIN — PIPERACILLIN AND TAZOBACTAM 25 GRAM(S): 4; .5 INJECTION, POWDER, LYOPHILIZED, FOR SOLUTION INTRAVENOUS at 05:55

## 2019-09-30 RX ADMIN — Medication 1 TABLET(S): at 17:58

## 2019-09-30 RX ADMIN — Medication 100 MILLIGRAM(S): at 13:37

## 2019-09-30 RX ADMIN — HEPARIN SODIUM 5000 UNIT(S): 5000 INJECTION INTRAVENOUS; SUBCUTANEOUS at 05:56

## 2019-09-30 NOTE — PROGRESS NOTE ADULT - ASSESSMENT
83y Female s/p L stent placement for UTI, left distal ureteral stones     -Trend WBC  -DVT prophylaxis/OOB  -Incentive spirometry  -Analgesia as needed  -Follow up cultures and trend labs  -Continue zosyn   -KUB to assess stent placement

## 2019-09-30 NOTE — DISCHARGE NOTE PROVIDER - NSDCCPCAREPLAN_GEN_ALL_CORE_FT
PRINCIPAL DISCHARGE DIAGNOSIS  Diagnosis: Nephrolithiasis  Assessment and Plan of Treatment: You had a left ureteral stent placed.  Follow up with Dr Núñez in the office [phone] to set up left ureteroscopy.      SECONDARY DISCHARGE DIAGNOSES  Diagnosis: HTN (hypertension)  Assessment and Plan of Treatment:     Diagnosis: UTI (urinary tract infection)  Assessment and Plan of Treatment: Start taking keflex for 7 days

## 2019-09-30 NOTE — DISCHARGE NOTE PROVIDER - HOSPITAL COURSE
This is an 83 year old female who was admitted for left ureteral calculi and underwent a left ureteral stent that night.  She developed soft blood pressures requiring 500cc boluses, which she tolerated.  Otherwise, she remained afebrile, and the rest of her vitals remained stable.  Patient was also pain controlled and tolerating regular diet.  POD#2 her WBC downtrended, KUB showed left stent in proper position, and she was discharged home with appropriate prescriptions and instructions for follow up. This is an 83 year old female who was admitted for left ureteral calculi and underwent a left ureteral stent that night.  She developed soft blood pressures requiring 500cc boluses, which she tolerated.  Otherwise, she remained afebrile, and the rest of her vitals remained stable.  Patient was also pain controlled and tolerating regular diet.  POD#2 her WBC downtrended, KUB showed left stent in proper position, and she was discharged to Rehab with appropriate prescriptions and instructions for follow up.  She has surgery scheduled for 10/8/19 to remove stones.  She will continue Keflex until surgery.

## 2019-09-30 NOTE — CONSULT NOTE ADULT - SUBJECTIVE AND OBJECTIVE BOX
CHIEF COMPLAINT: abd pain    HISTORY OF PRESENT ILLNESS:  83 year old female with history of HTN, mild AI, moderate TR, moderate pulm HTN,  and recurrent UTIs presenting with left flank pain and dysuria. She was recently seen by her PCP and was found to have a UTI. She was started on cipro 2 days ago. She denies any fevers or chills.     She has been seen in the past by urologists for UTIs but has never had any stones.  s/p ureteral stent. still c/o ongoing flank pain. no cp/sob/palps/dizziness      Allergies  No Known Allergies      	    MEDICATIONS:  heparin  Injectable 5000 Unit(s) SubCutaneous every 8 hours  losartan 100 milliGRAM(s) Oral daily  NIFEdipine XL 30 milliGRAM(s) Oral daily  tamsulosin 0.4 milliGRAM(s) Oral daily  piperacillin/tazobactam IVPB.. 3.375 Gram(s) IV Intermittent every 8 hours  acetaminophen   Tablet .. 650 milliGRAM(s) Oral every 6 hours PRN  acetaminophen  IVPB .. 750 milliGRAM(s) IV Intermittent once PRN  ALPRAZolam 0.5 milliGRAM(s) Oral two times a day  belladonna 16.2 mG/opium 30 mg Suppository 1 Suppository(s) Rectal every 8 hours PRN  sertraline 50 milliGRAM(s) Oral daily  docusate sodium 100 milliGRAM(s) Oral two times a day  senna 2 Tablet(s) Oral at bedtime PRN  benzocaine 15 mG/menthol 3.6 mG Lozenge 1 Lozenge Oral every 4 hours PRN  influenza   Vaccine 0.5 milliLiter(s) IntraMuscular once  lidocaine 2% Gel 1 Application(s) Topical every 6 hours PRN  phenazopyridine 100 milliGRAM(s) Oral every 8 hours  sodium chloride 0.9%. 1000 milliLiter(s) IV Continuous <Continuous>      PAST MEDICAL & SURGICAL HISTORY:  UTI (urinary tract infection)  HTN (hypertension)  H/O abdominal hysterectomy      FAMILY HISTORY:  NC    SOCIAL HISTORY:    non smoker. independent in adl      REVIEW OF SYSTEMS:  See HPI, otherwise complete 10 point review of systems negative    [ ] All others negative	    PHYSICAL EXAM:  T(C): 36.4 (09-30-19 @ 05:11), Max: 36.9 (09-30-19 @ 01:15)  HR: 60 (09-30-19 @ 05:11) (60 - 72)  BP: 101/55 (09-30-19 @ 05:11) (90/40 - 104/60)  RR: 18 (09-30-19 @ 05:11) (16 - 18)  SpO2: 92% (09-30-19 @ 05:11) (92% - 95%)  Wt(kg): --  I&O's Summary    29 Sep 2019 07:01  -  30 Sep 2019 07:00  --------------------------------------------------------  IN: 2830 mL / OUT: 700 mL / NET: 2130 mL        Appearance: No Acute Distress	  HEENT:  Normal oral mucosa, PERRL, EOMI	  Cardiovascular: Normal S1 S2, No JVD, 2/6 sky  Respiratory: Lungs clear to auscultation bilaterally  Gastrointestinal:  Soft, Non-tender, + BS	  Skin: No rashes, No ecchymoses, No cyanosis	  Neurologic: Non-focal  Extremities: No clubbing, cyanosis or edema  Vascular: Peripheral pulses palpable 2+ bilaterally  Psychiatry: A & O x 3, Mood & affect appropriate    Laboratory Data:	 	    CBC Full  -  ( 30 Sep 2019 06:53 )  WBC Count : 15.9 K/uL  Hemoglobin : 10.2 g/dL  Hematocrit : 32.9 %  Platelet Count - Automated : 253 K/uL  Mean Cell Volume : 94.0 fl  Mean Cell Hemoglobin : 29.1 pg  Mean Cell Hemoglobin Concentration : 31.0 gm/dL  Auto Neutrophil # : x  Auto Lymphocyte # : x  Auto Monocyte # : x  Auto Eosinophil # : x  Auto Basophil # : x  Auto Neutrophil % : x  Auto Lymphocyte % : x  Auto Monocyte % : x  Auto Eosinophil % : x  Auto Basophil % : x    09-30    143  |  108  |  29<H>  ----------------------------<  125<H>  3.3<L>   |  23  |  1.28  09-29    140  |  103  |  20  ----------------------------<  177<H>  4.0   |  25  |  1.37<H>    Ca    8.6      30 Sep 2019 06:55  Ca    9.3      29 Sep 2019 05:46    TPro  7.5  /  Alb  4.0  /  TBili  1.3<H>  /  DBili  x   /  AST  19  /  ALT  11  /  AlkPhos  104  09-28        Assessment:  -cardiac clearance  -mod pHTN. mild-mod valvular disease  -recurrent UTI  -obstructing stone s/p stent  -abd pain  -urinary incontinence  -UTI    Recs:  appreciate  care  f/u KUB  will likely need definitive tx giving ongoing abd pain s/p stent  pain control  abd for UTI  IV fluids prn. appears euvolemic  no ischemic or hf sx. adequate ET. can proceed with  procedures without further cardiac workup      Greater than 50 minutes spent on total encounter; more than 50% of the visit was spent counseling and/or coordinating care by the attending physician.   	  Ousmane Sandoval MD   Cardiovascular Diseases  [phone]

## 2019-09-30 NOTE — PROGRESS NOTE ADULT - SUBJECTIVE AND OBJECTIVE BOX
UROLOGY DAILY PROGRESS NOTE:     Subjective:    Patient complaining of lower abdominal pain as well as urinary incontinence. Doing well otherwise. Afebrile.    Objective:    NAD, awake and alert  Respirations nonlabored  Abdomen soft, nontender, nondistended      MEDICATIONS  (STANDING):  ALPRAZolam 0.5 milliGRAM(s) Oral two times a day  docusate sodium 100 milliGRAM(s) Oral two times a day  heparin  Injectable 5000 Unit(s) SubCutaneous every 8 hours  influenza   Vaccine 0.5 milliLiter(s) IntraMuscular once  losartan 100 milliGRAM(s) Oral daily  NIFEdipine XL 30 milliGRAM(s) Oral daily  phenazopyridine 100 milliGRAM(s) Oral every 8 hours  piperacillin/tazobactam IVPB.. 3.375 Gram(s) IV Intermittent every 8 hours  sertraline 50 milliGRAM(s) Oral daily  sodium chloride 0.9%. 1000 milliLiter(s) (75 mL/Hr) IV Continuous <Continuous>  tamsulosin 0.4 milliGRAM(s) Oral daily    MEDICATIONS  (PRN):  acetaminophen   Tablet .. 650 milliGRAM(s) Oral every 6 hours PRN Mild Pain (1 - 3)  acetaminophen  IVPB .. 750 milliGRAM(s) IV Intermittent once PRN Temp greater or equal to 38C (100.4F), Mild Pain (1 - 3)  belladonna 16.2 mG/opium 30 mg Suppository 1 Suppository(s) Rectal every 8 hours PRN bladder spasm and/or urgency  benzocaine 15 mG/menthol 3.6 mG Lozenge 1 Lozenge Oral every 4 hours PRN Sore Throat  lidocaine 2% Gel 1 Application(s) Topical every 6 hours PRN pain  senna 2 Tablet(s) Oral at bedtime PRN Constipation      Vital Signs Last 24 Hrs  T(C): 36.4 (30 Sep 2019 05:11), Max: 36.9 (30 Sep 2019 01:15)  T(F): 97.5 (30 Sep 2019 05:11), Max: 98.5 (30 Sep 2019 01:15)  HR: 60 (30 Sep 2019 05:11) (60 - 72)  BP: 101/55 (30 Sep 2019 05:11) (90/40 - 104/60)  BP(mean): --  RR: 18 (30 Sep 2019 05:11) (16 - 18)  SpO2: 92% (30 Sep 2019 05:11) (92% - 95%)    I&O's Detail    29 Sep 2019 07:01  -  30 Sep 2019 07:00  --------------------------------------------------------  IN:    IV PiggyBack: 100 mL    Oral Fluid: 1080 mL    sodium chloride 0.9%.: 1650 mL  Total IN: 2830 mL    OUT:    Voided: 700 mL  Total OUT: 700 mL    Total NET: 2130 mL          Daily     Daily     LABS:                        10.2   15.9  )-----------( 253      ( 30 Sep 2019 06:53 )             32.9         143  |  108  |  29<H>  ----------------------------<  125<H>  3.3<L>   |  23  |  1.28    Ca    8.6      30 Sep 2019 06:55    TPro  7.5  /  Alb  4.0  /  TBili  1.3<H>  /  DBili  x   /  AST  19  /  ALT  11  /  AlkPhos  104        Urinalysis Basic - ( 28 Sep 2019 10:59 )    Color: Yellow / Appearance: Turbid / S.022 / pH: x  Gluc: x / Ketone: Small  / Bili: Negative / Urobili: Negative   Blood: x / Protein: 30 mg/dL / Nitrite: Negative   Leuk Esterase: Large / RBC: 10 /hpf /  /HPF   Sq Epi: x / Non Sq Epi: 2 /hpf / Bacteria: Many

## 2019-09-30 NOTE — DISCHARGE NOTE PROVIDER - CARE PROVIDER_API CALL
Ryan Núñez)  Administration  [facility]  Dept of Urology, [address] [place] [address]  Phone: [phone]  Fax: [phone]  Follow Up Time:

## 2019-10-01 LAB
-  AMIKACIN: SIGNIFICANT CHANGE UP
-  AMIKACIN: SIGNIFICANT CHANGE UP
-  AMPICILLIN/SULBACTAM: SIGNIFICANT CHANGE UP
-  AMPICILLIN/SULBACTAM: SIGNIFICANT CHANGE UP
-  AMPICILLIN: SIGNIFICANT CHANGE UP
-  AMPICILLIN: SIGNIFICANT CHANGE UP
-  AZTREONAM: SIGNIFICANT CHANGE UP
-  AZTREONAM: SIGNIFICANT CHANGE UP
-  CEFAZOLIN: SIGNIFICANT CHANGE UP
-  CEFAZOLIN: SIGNIFICANT CHANGE UP
-  CEFEPIME: SIGNIFICANT CHANGE UP
-  CEFEPIME: SIGNIFICANT CHANGE UP
-  CEFOXITIN: SIGNIFICANT CHANGE UP
-  CEFOXITIN: SIGNIFICANT CHANGE UP
-  CEFTRIAXONE: SIGNIFICANT CHANGE UP
-  CEFTRIAXONE: SIGNIFICANT CHANGE UP
-  CIPROFLOXACIN: SIGNIFICANT CHANGE UP
-  CIPROFLOXACIN: SIGNIFICANT CHANGE UP
-  GENTAMICIN: SIGNIFICANT CHANGE UP
-  GENTAMICIN: SIGNIFICANT CHANGE UP
-  IMIPENEM: SIGNIFICANT CHANGE UP
-  IMIPENEM: SIGNIFICANT CHANGE UP
-  LEVOFLOXACIN: SIGNIFICANT CHANGE UP
-  LEVOFLOXACIN: SIGNIFICANT CHANGE UP
-  MEROPENEM: SIGNIFICANT CHANGE UP
-  MEROPENEM: SIGNIFICANT CHANGE UP
-  NITROFURANTOIN: SIGNIFICANT CHANGE UP
-  NITROFURANTOIN: SIGNIFICANT CHANGE UP
-  PIPERACILLIN/TAZOBACTAM: SIGNIFICANT CHANGE UP
-  PIPERACILLIN/TAZOBACTAM: SIGNIFICANT CHANGE UP
-  TIGECYCLINE: SIGNIFICANT CHANGE UP
-  TIGECYCLINE: SIGNIFICANT CHANGE UP
-  TOBRAMYCIN: SIGNIFICANT CHANGE UP
-  TOBRAMYCIN: SIGNIFICANT CHANGE UP
-  TRIMETHOPRIM/SULFAMETHOXAZOLE: SIGNIFICANT CHANGE UP
-  TRIMETHOPRIM/SULFAMETHOXAZOLE: SIGNIFICANT CHANGE UP
ANION GAP SERPL CALC-SCNC: 11 MMOL/L — SIGNIFICANT CHANGE UP (ref 5–17)
BUN SERPL-MCNC: 27 MG/DL — HIGH (ref 7–23)
CALCIUM SERPL-MCNC: 8.8 MG/DL — SIGNIFICANT CHANGE UP (ref 8.4–10.5)
CHLORIDE SERPL-SCNC: 110 MMOL/L — HIGH (ref 96–108)
CO2 SERPL-SCNC: 23 MMOL/L — SIGNIFICANT CHANGE UP (ref 22–31)
CREAT SERPL-MCNC: 1.06 MG/DL — SIGNIFICANT CHANGE UP (ref 0.5–1.3)
CULTURE RESULTS: SIGNIFICANT CHANGE UP
CULTURE RESULTS: SIGNIFICANT CHANGE UP
GLUCOSE SERPL-MCNC: 100 MG/DL — HIGH (ref 70–99)
HCT VFR BLD CALC: 33.9 % — LOW (ref 34.5–45)
HGB BLD-MCNC: 10.4 G/DL — LOW (ref 11.5–15.5)
MCHC RBC-ENTMCNC: 29 PG — SIGNIFICANT CHANGE UP (ref 27–34)
MCHC RBC-ENTMCNC: 30.8 GM/DL — LOW (ref 32–36)
MCV RBC AUTO: 94.3 FL — SIGNIFICANT CHANGE UP (ref 80–100)
METHOD TYPE: SIGNIFICANT CHANGE UP
METHOD TYPE: SIGNIFICANT CHANGE UP
ORGANISM # SPEC MICROSCOPIC CNT: SIGNIFICANT CHANGE UP
PLATELET # BLD AUTO: 277 K/UL — SIGNIFICANT CHANGE UP (ref 150–400)
POTASSIUM SERPL-MCNC: 3.7 MMOL/L — SIGNIFICANT CHANGE UP (ref 3.5–5.3)
POTASSIUM SERPL-SCNC: 3.7 MMOL/L — SIGNIFICANT CHANGE UP (ref 3.5–5.3)
RBC # BLD: 3.59 M/UL — LOW (ref 3.8–5.2)
RBC # FLD: 12.1 % — SIGNIFICANT CHANGE UP (ref 10.3–14.5)
SODIUM SERPL-SCNC: 144 MMOL/L — SIGNIFICANT CHANGE UP (ref 135–145)
SPECIMEN SOURCE: SIGNIFICANT CHANGE UP
SPECIMEN SOURCE: SIGNIFICANT CHANGE UP
WBC # BLD: 11.3 K/UL — HIGH (ref 3.8–10.5)
WBC # FLD AUTO: 11.3 K/UL — HIGH (ref 3.8–10.5)

## 2019-10-01 RX ORDER — CEPHALEXIN 500 MG
500 CAPSULE ORAL THREE TIMES A DAY
Refills: 0 | Status: DISCONTINUED | OUTPATIENT
Start: 2019-10-01 | End: 2019-10-02

## 2019-10-01 RX ORDER — DIPHENOXYLATE HCL/ATROPINE 2.5-.025MG
2 TABLET ORAL ONCE
Refills: 0 | Status: DISCONTINUED | OUTPATIENT
Start: 2019-10-01 | End: 2019-10-01

## 2019-10-01 RX ORDER — LACTOBACILLUS ACIDOPHILUS 100MM CELL
0 CAPSULE ORAL
Qty: 0 | Refills: 0 | DISCHARGE
Start: 2019-10-01

## 2019-10-01 RX ADMIN — Medication 100 MILLIGRAM(S): at 06:54

## 2019-10-01 RX ADMIN — Medication 500 MILLIGRAM(S): at 21:16

## 2019-10-01 RX ADMIN — TAMSULOSIN HYDROCHLORIDE 0.4 MILLIGRAM(S): 0.4 CAPSULE ORAL at 13:19

## 2019-10-01 RX ADMIN — HEPARIN SODIUM 5000 UNIT(S): 5000 INJECTION INTRAVENOUS; SUBCUTANEOUS at 06:54

## 2019-10-01 RX ADMIN — Medication 2 TABLET(S): at 00:41

## 2019-10-01 RX ADMIN — Medication 1 TABLET(S): at 13:19

## 2019-10-01 RX ADMIN — Medication 650 MILLIGRAM(S): at 01:10

## 2019-10-01 RX ADMIN — Medication 0.5 MILLIGRAM(S): at 18:01

## 2019-10-01 RX ADMIN — Medication 650 MILLIGRAM(S): at 01:40

## 2019-10-01 RX ADMIN — Medication 0.5 MILLIGRAM(S): at 06:54

## 2019-10-01 RX ADMIN — Medication 500 MILLIGRAM(S): at 13:19

## 2019-10-01 RX ADMIN — Medication 100 MILLIGRAM(S): at 13:19

## 2019-10-01 RX ADMIN — HEPARIN SODIUM 5000 UNIT(S): 5000 INJECTION INTRAVENOUS; SUBCUTANEOUS at 13:19

## 2019-10-01 RX ADMIN — Medication 30 MILLIGRAM(S): at 06:54

## 2019-10-01 RX ADMIN — Medication 100 MILLIGRAM(S): at 21:16

## 2019-10-01 RX ADMIN — Medication 500 MILLIGRAM(S): at 06:54

## 2019-10-01 RX ADMIN — SERTRALINE 50 MILLIGRAM(S): 25 TABLET, FILM COATED ORAL at 13:19

## 2019-10-01 RX ADMIN — HEPARIN SODIUM 5000 UNIT(S): 5000 INJECTION INTRAVENOUS; SUBCUTANEOUS at 21:16

## 2019-10-01 RX ADMIN — LOSARTAN POTASSIUM 100 MILLIGRAM(S): 100 TABLET, FILM COATED ORAL at 06:54

## 2019-10-01 NOTE — PROGRESS NOTE ADULT - SUBJECTIVE AND OBJECTIVE BOX
UROLOGY PA PROGRESS NOTE:     Subjective:  no acute events overnight. complaining of dysuria and urinary incontinence. Had 3 BMs overnight, described as watery.     Objective:  Vital signs  T(F): , Max: 98.2 (09-30-19 @ 23:41)  HR: 70 (10-01-19 @ 05:55)  BP: 132/70 (10-01-19 @ 05:55)  SpO2: 93% (10-01-19 @ 05:55)  Wt(kg): --    Output     void- 300cc overnight       Physical Exam:  Gen: NAD  Abd: soft, NT/ND  : voiding     Labs:  09-30  x     / x     /1.28   09-30  15.9  / 32.9  /x                              10.2   15.9  )-----------( 253      ( 30 Sep 2019 06:53 )             32.9     09-30    143  |  108  |  29<H>  ----------------------------<  125<H>  3.3<L>   |  23  |  1.28    Ca    8.6      30 Sep 2019 06:55            Urine Cx:    Culture - Urine (collected 29 Sep 2019 05:15)  Source: .Urine  Preliminary Report (30 Sep 2019 09:51):    Moderate Gram Negative Rods    Culture - Urine (collected 29 Sep 2019 05:15)  Source: .Urine  Preliminary Report (30 Sep 2019 09:50):    Moderate Gram Negative Rods    Culture - Urine (collected 28 Sep 2019 15:12)  Source: .Urine  Final Report (30 Sep 2019 11:08):    >100,000 CFU/ml Escherichia coli  Organism: Escherichia coli (30 Sep 2019 11:08)  Organism: Escherichia coli (30 Sep 2019 11:08)

## 2019-10-01 NOTE — PHYSICAL THERAPY INITIAL EVALUATION ADULT - ADDITIONAL COMMENTS
9/28/19 CT ABDOMEN AND PELVIS: Moderate left hydroureteronephrosis to the level of the left   ureterovesicular junction secondary to 4 mm and 2 mm calculi.

## 2019-10-01 NOTE — PROGRESS NOTE ADULT - ASSESSMENT
83y Female s/p L stent placement for UTI, left distal ureteral stones     - pain control  - am cbc/bmp  - PT  - keflex  - f/u final cx  results  - d/c planning 83y Female s/p L stent placement for UTI, left distal ureteral stones   - stool softeners stopped  - f/u cdiff  - pain control  - am cbc/bmp  - PT  - keflex  - f/u final cx  results  - d/c planning

## 2019-10-01 NOTE — PHYSICAL THERAPY INITIAL EVALUATION ADULT - CRITERIA FOR SKILLED THERAPEUTIC INTERVENTIONS
risk reduction/prevention/anticipated discharge recommendation/therapy frequency/functional limitations in following categories/predicted duration of therapy intervention/rehab potential/impairments found

## 2019-10-01 NOTE — PROVIDER CONTACT NOTE (OTHER) - ACTION/TREATMENT ORDERED:
as per MARLON Contreras, will order 1x dose of lomotil. no further intervention necessary. will continue to monitor.

## 2019-10-01 NOTE — PROVIDER CONTACT NOTE (OTHER) - ASSESSMENT
pt is A&Ox4, able to verbalize understanding. pt has no complaints of chest pain, SOB, lightheadedness, dizziness, n/v at this time. pt noted to have multiple episodes of diarrhea. immodium given at 22:00 with no relief in symptoms. pt refusing her IV abx (zosyn) because pt states that is the reason why she is having diarrhea.

## 2019-10-01 NOTE — PROVIDER CONTACT NOTE (OTHER) - BACKGROUND
pt admitted on 9/28 w/ left flank pain. dx w/ hydronephrosis and kidney stones and UTI. pt had a cysto and left ureteral stent placed on the 28th.

## 2019-10-01 NOTE — PROGRESS NOTE ADULT - SUBJECTIVE AND OBJECTIVE BOX
Cardiovascular Disease Progress Note    Overnight events: No acute events overnight.  c/o diarrhea and urinary incontinence. no cp/sob/palps/dizziness  Otherwise review of systems negative    Objective Findings:  T(C): 36.4 (10-01-19 @ 05:55), Max: 36.8 (09-30-19 @ 23:41)  HR: 70 (10-01-19 @ 05:55) (63 - 78)  BP: 132/70 (10-01-19 @ 05:55) (113/62 - 133/73)  RR: 16 (10-01-19 @ 05:55) (16 - 18)  SpO2: 93% (10-01-19 @ 05:55) (91% - 96%)  Wt(kg): --  Daily     Daily       Physical Exam:  Gen: NAD  HEENT: EOMI  CV: RRR, normal S1 + S2, no m/r/g  Lungs: CTAB  Abd: soft, non-tender  Ext: No edema      Laboratory Data:                        10.4   11.3  )-----------( 277      ( 01 Oct 2019 07:07 )             33.9     09-30    143  |  108  |  29<H>  ----------------------------<  125<H>  3.3<L>   |  23  |  1.28    Ca    8.6      30 Sep 2019 06:55                Inpatient Medications:  MEDICATIONS  (STANDING):  ALPRAZolam 0.5 milliGRAM(s) Oral two times a day  cephalexin 500 milliGRAM(s) Oral three times a day  docusate sodium 100 milliGRAM(s) Oral two times a day  heparin  Injectable 5000 Unit(s) SubCutaneous every 8 hours  influenza   Vaccine 0.5 milliLiter(s) IntraMuscular once  lactobacillus acidophilus 1 Tablet(s) Oral daily  losartan 100 milliGRAM(s) Oral daily  NIFEdipine XL 30 milliGRAM(s) Oral daily  phenazopyridine 100 milliGRAM(s) Oral every 8 hours  sertraline 50 milliGRAM(s) Oral daily  tamsulosin 0.4 milliGRAM(s) Oral daily      Assessment:  -cardiac clearance  -mod pHTN. mild-mod valvular disease  -recurrent UTI  -obstructing stone s/p stent  -abd pain  -urinary incontinence  -UTI    Recs:  appreciate  care  workup of urinary incontinence per   diarrhea on zosyn noticed. consider sending stool for infectious studies if sx persist  will likely need definitive tx giving ongoing abd pain s/p stent  pain control  abx for UTI. f/u cx  appears euvolemic. c/w antihypertensives          Over 25 minutes spent on total encounter; more than 50% of the visit was spent counseling and/or coordinating care by the attending physician.      Ousmane Sandoval MD   Cardiovascular Disease  [phone]

## 2019-10-02 ENCOUNTER — TRANSCRIPTION ENCOUNTER (OUTPATIENT)
Age: 84
End: 2019-10-02

## 2019-10-02 VITALS
HEART RATE: 77 BPM | DIASTOLIC BLOOD PRESSURE: 68 MMHG | OXYGEN SATURATION: 94 % | TEMPERATURE: 97 F | SYSTOLIC BLOOD PRESSURE: 112 MMHG | RESPIRATION RATE: 18 BRPM

## 2019-10-02 PROBLEM — N39.0 URINARY TRACT INFECTION, SITE NOT SPECIFIED: Chronic | Status: ACTIVE | Noted: 2019-09-28

## 2019-10-02 PROCEDURE — 74018 RADEX ABDOMEN 1 VIEW: CPT

## 2019-10-02 PROCEDURE — 97162 PT EVAL MOD COMPLEX 30 MIN: CPT

## 2019-10-02 PROCEDURE — 74177 CT ABD & PELVIS W/CONTRAST: CPT

## 2019-10-02 PROCEDURE — 87086 URINE CULTURE/COLONY COUNT: CPT

## 2019-10-02 PROCEDURE — 71046 X-RAY EXAM CHEST 2 VIEWS: CPT

## 2019-10-02 PROCEDURE — 80053 COMPREHEN METABOLIC PANEL: CPT

## 2019-10-02 PROCEDURE — 99285 EMERGENCY DEPT VISIT HI MDM: CPT | Mod: 25

## 2019-10-02 PROCEDURE — 85027 COMPLETE CBC AUTOMATED: CPT

## 2019-10-02 PROCEDURE — 76000 FLUOROSCOPY <1 HR PHYS/QHP: CPT

## 2019-10-02 PROCEDURE — C1769: CPT

## 2019-10-02 PROCEDURE — 87186 SC STD MICRODIL/AGAR DIL: CPT

## 2019-10-02 PROCEDURE — 80048 BASIC METABOLIC PNL TOTAL CA: CPT

## 2019-10-02 PROCEDURE — C2617: CPT

## 2019-10-02 PROCEDURE — 96375 TX/PRO/DX INJ NEW DRUG ADDON: CPT | Mod: XU

## 2019-10-02 PROCEDURE — 81001 URINALYSIS AUTO W/SCOPE: CPT

## 2019-10-02 PROCEDURE — 96374 THER/PROPH/DIAG INJ IV PUSH: CPT | Mod: XU

## 2019-10-02 PROCEDURE — C1758: CPT

## 2019-10-02 RX ADMIN — Medication 100 MILLIGRAM(S): at 05:49

## 2019-10-02 RX ADMIN — HEPARIN SODIUM 5000 UNIT(S): 5000 INJECTION INTRAVENOUS; SUBCUTANEOUS at 05:49

## 2019-10-02 RX ADMIN — Medication 1 TABLET(S): at 13:10

## 2019-10-02 RX ADMIN — TAMSULOSIN HYDROCHLORIDE 0.4 MILLIGRAM(S): 0.4 CAPSULE ORAL at 13:10

## 2019-10-02 RX ADMIN — LOSARTAN POTASSIUM 100 MILLIGRAM(S): 100 TABLET, FILM COATED ORAL at 05:49

## 2019-10-02 RX ADMIN — Medication 30 MILLIGRAM(S): at 05:49

## 2019-10-02 RX ADMIN — SERTRALINE 50 MILLIGRAM(S): 25 TABLET, FILM COATED ORAL at 13:11

## 2019-10-02 RX ADMIN — HEPARIN SODIUM 5000 UNIT(S): 5000 INJECTION INTRAVENOUS; SUBCUTANEOUS at 13:11

## 2019-10-02 RX ADMIN — Medication 100 MILLIGRAM(S): at 13:10

## 2019-10-02 RX ADMIN — Medication 650 MILLIGRAM(S): at 03:20

## 2019-10-02 RX ADMIN — Medication 0.5 MILLIGRAM(S): at 05:49

## 2019-10-02 RX ADMIN — Medication 500 MILLIGRAM(S): at 13:10

## 2019-10-02 RX ADMIN — Medication 500 MILLIGRAM(S): at 05:49

## 2019-10-02 RX ADMIN — Medication 650 MILLIGRAM(S): at 02:49

## 2019-10-02 NOTE — PROGRESS NOTE ADULT - ASSESSMENT
83y Female s/p L stent placement for UTI, left distal ureteral stones   - stool softeners stopped diarrhea resolving   - pain control  - PT  - keflex  - f/u final cx  results  - d/c planning [facility]

## 2019-10-02 NOTE — DISCHARGE NOTE NURSING/CASE MANAGEMENT/SOCIAL WORK - NSDCPNINST_GEN_ALL_CORE
NOTIFY YOUR SURGEON/DOCTOR IF: You have any bleeding that does not stop, any fever (over 100.4 F) or chills, persistent nausea/vomiting, persistent diarrhea, or if your pain is not controlled on your discharge pain medications.

## 2019-10-02 NOTE — DISCHARGE NOTE NURSING/CASE MANAGEMENT/SOCIAL WORK - PATIENT PORTAL LINK FT
You can access the FollowMyHealth Patient Portal offered by [facility] by registering at the following website: [email or web address]. By joining [employer]’s FollowMyHealth portal, you will also be able to view your health information using other applications (apps) compatible with our system.

## 2019-10-02 NOTE — PROGRESS NOTE ADULT - SUBJECTIVE AND OBJECTIVE BOX
UROLOGY DAILY PROGRESS NOTE:     Subjective: Patient seen and examined at bedside.   Reports No more diarrhea has  formed stool     Objective:  Vital signs  T(F): , Max: 98.4 (10-01-19 @ 21:41)  HR: 69 (10-02-19 @ 05:48)  BP: 124/74 (10-02-19 @ 05:48)  SpO2: 93% (10-02-19 @ 05:48)  Wt(kg): --    Output     I&O's Detail    01 Oct 2019 07:01  -  02 Oct 2019 07:00  --------------------------------------------------------  IN:    Oral Fluid: 680 mL  Total IN: 680 mL    OUT:    Voided: 1400 mL  Total OUT: 1400 mL    Total NET: -720 mL          Physical Exam:  Gen: NAD  Abd: soft NTND  Back: NO CVAT  : voiding     Labs:  10-01  11.3  / 33.9  /1.06                           10.4   11.3  )-----------( 277      ( 01 Oct 2019 07:07 )             33.9     10-    144  |  110<H>  |  27<H>  ----------------------------<  100<H>  3.7   |  23  |  1.06    Ca    8.8      01 Oct 2019 07:07    LABS/RADIOLOGY RESULTS:                          10.4   11.3  )-----------( 277      ( 01 Oct 2019 07:07 )             33.9   10-    144  |  110<H>  |  27<H>  ----------------------------<  100<H>  3.7   |  23  |  1.06    Ca    8.8      01 Oct 2019 07:07    Blood Cultures    Urinalysis Basic - ( 28 Sep 2019 10:59 )    Color: Yellow / Appearance: Turbid / S.022 / pH:   Gluc:  / Ketone: Small  / Bili: Negative / Urobili: Negative   Blood:  / Protein: 30 mg/dL / Nitrite: Negative   Leuk Esterase: Large / RBC: 10 /hpf /  /HPF   Sq Epi:  / Non Sq Epi: 2 /hpf / Bacteria: Many                Urine Cx:

## 2019-10-02 NOTE — PROGRESS NOTE ADULT - SUBJECTIVE AND OBJECTIVE BOX
Cardiovascular Disease Progress Note    Overnight events: No acute events overnight.  no cp/sob/palps/dizziness. still c/o urinary incontinence. diarrhea resolved  Otherwise review of systems negative    Objective Findings:  T(C): 36.8 (10-02-19 @ 05:48), Max: 36.9 (10-01-19 @ 21:41)  HR: 69 (10-02-19 @ 05:48) (66 - 84)  BP: 124/74 (10-02-19 @ 05:48) (124/74 - 146/84)  RR: 16 (10-02-19 @ 05:48) (16 - 18)  SpO2: 93% (10-02-19 @ 05:48) (90% - 97%)  Wt(kg): --  Daily     Daily       Physical Exam:  Gen: NAD  HEENT: EOMI  CV: RRR, normal S1 + S2, no m/r/g  Lungs: CTAB  Abd: soft, non-tender  Ext: No edema      Laboratory Data:                        10.4   11.3  )-----------( 277      ( 01 Oct 2019 07:07 )             33.9     10-01    144  |  110<H>  |  27<H>  ----------------------------<  100<H>  3.7   |  23  |  1.06    Ca    8.8      01 Oct 2019 07:07                Inpatient Medications:  MEDICATIONS  (STANDING):  ALPRAZolam 0.5 milliGRAM(s) Oral two times a day  cephalexin 500 milliGRAM(s) Oral three times a day  heparin  Injectable 5000 Unit(s) SubCutaneous every 8 hours  influenza   Vaccine 0.5 milliLiter(s) IntraMuscular once  lactobacillus acidophilus 1 Tablet(s) Oral daily  losartan 100 milliGRAM(s) Oral daily  NIFEdipine XL 30 milliGRAM(s) Oral daily  phenazopyridine 100 milliGRAM(s) Oral every 8 hours  sertraline 50 milliGRAM(s) Oral daily  tamsulosin 0.4 milliGRAM(s) Oral daily      Assessment:  -cardiac clearance  -mod pHTN. mild-mod valvular disease  -recurrent UTI  -obstructing stone s/p stent  -abd pain  -urinary incontinence  -UTI    Recs:  appreciate  care  workup of urinary incontinence per   diarrhea on zosyn, resolved. cdiff pending  pain control  abx for UTI. f/u cx  appears euvolemic. c/w antihypertensives    Over 25 minutes spent on total encounter; more than 50% of the visit was spent counseling and/or coordinating care by the attending physician.      Ousmane Sandoval MD   Cardiovascular Disease  [phone]

## 2019-10-07 RX ORDER — CIPROFLOXACIN LACTATE 400MG/40ML
400 VIAL (ML) INTRAVENOUS ONCE
Refills: 0 | Status: DISCONTINUED | OUTPATIENT
Start: 2019-10-11 | End: 2019-10-28

## 2019-10-10 ENCOUNTER — TRANSCRIPTION ENCOUNTER (OUTPATIENT)
Age: 84
End: 2019-10-10

## 2019-10-11 ENCOUNTER — RESULT REVIEW (OUTPATIENT)
Age: 84
End: 2019-10-11

## 2019-10-11 ENCOUNTER — OUTPATIENT (OUTPATIENT)
Dept: OUTPATIENT SERVICES | Facility: HOSPITAL | Age: 84
LOS: 1 days | End: 2019-10-11
Payer: MEDICARE

## 2019-10-11 VITALS
HEART RATE: 82 BPM | RESPIRATION RATE: 18 BRPM | OXYGEN SATURATION: 100 % | SYSTOLIC BLOOD PRESSURE: 162 MMHG | DIASTOLIC BLOOD PRESSURE: 74 MMHG | WEIGHT: 104.94 LBS | HEIGHT: 60 IN | TEMPERATURE: 99 F

## 2019-10-11 VITALS
OXYGEN SATURATION: 100 % | DIASTOLIC BLOOD PRESSURE: 74 MMHG | SYSTOLIC BLOOD PRESSURE: 135 MMHG | HEART RATE: 80 BPM | RESPIRATION RATE: 16 BRPM | TEMPERATURE: 98 F

## 2019-10-11 DIAGNOSIS — N20.0 CALCULUS OF KIDNEY: ICD-10-CM

## 2019-10-11 DIAGNOSIS — Z90.710 ACQUIRED ABSENCE OF BOTH CERVIX AND UTERUS: Chronic | ICD-10-CM

## 2019-10-11 DIAGNOSIS — N20.1 CALCULUS OF URETER: ICD-10-CM

## 2019-10-11 PROCEDURE — 88300 SURGICAL PATH GROSS: CPT | Mod: 26

## 2019-10-11 PROCEDURE — 52332 CYSTOSCOPY AND TREATMENT: CPT | Mod: LT

## 2019-10-11 PROCEDURE — 52352 CYSTOURETERO W/STONE REMOVE: CPT | Mod: LT

## 2019-10-11 RX ORDER — SODIUM CHLORIDE 9 MG/ML
3 INJECTION INTRAMUSCULAR; INTRAVENOUS; SUBCUTANEOUS EVERY 8 HOURS
Refills: 0 | Status: DISCONTINUED | OUTPATIENT
Start: 2019-10-11 | End: 2019-10-11

## 2019-10-11 RX ORDER — LIDOCAINE HCL 20 MG/ML
0.2 VIAL (ML) INJECTION ONCE
Refills: 0 | Status: DISCONTINUED | OUTPATIENT
Start: 2019-10-11 | End: 2019-10-11

## 2019-10-11 RX ORDER — ACETAMINOPHEN 500 MG
1000 TABLET ORAL ONCE
Refills: 0 | Status: COMPLETED | OUTPATIENT
Start: 2019-10-11 | End: 2019-10-11

## 2019-10-11 RX ORDER — AZTREONAM 2 G
1 VIAL (EA) INJECTION
Qty: 10 | Refills: 0
Start: 2019-10-11 | End: 2019-10-15

## 2019-10-11 RX ORDER — DOCUSATE SODIUM 100 MG
1 CAPSULE ORAL
Qty: 10 | Refills: 0
Start: 2019-10-11 | End: 2019-10-15

## 2019-10-11 RX ORDER — SODIUM CHLORIDE 9 MG/ML
1000 INJECTION, SOLUTION INTRAVENOUS
Refills: 0 | Status: DISCONTINUED | OUTPATIENT
Start: 2019-10-11 | End: 2019-10-11

## 2019-10-11 RX ORDER — ACETAMINOPHEN WITH CODEINE 300MG-30MG
1 TABLET ORAL
Qty: 15 | Refills: 0
Start: 2019-10-11 | End: 2019-10-15

## 2019-10-11 RX ADMIN — Medication 1000 MILLIGRAM(S): at 14:40

## 2019-10-11 RX ADMIN — Medication 400 MILLIGRAM(S): at 13:50

## 2019-10-11 NOTE — ASU DISCHARGE PLAN (ADULT/PEDIATRIC) - "IF YOU OR YOUR GUARDIAN/FAMILY IS A SMOKER, IT IS IMPORTANT FOR YOUR HEALTH TO STOP SMOKING. PLEASE BE AWARE THAT SECOND HAND SMOKE IS ALSO HARMFUL."
Called back, left voice message to return call with concerns she may have.   
Called patient and relayed most recent lab results.   A1C of 8.8 much improved than anticipated given POC in clinic was \"undetectable\". Will continue with current plan as discussed yesterday. She has scheduled with diabetic education already.  LFTS, trended down significantly, no follow up anticipated. Kidney function is within normal limits.  CA 19-9 still elevated though improved some. Patient does have appointment scheduled with GI in June. Will attempt to contact them to determine if she needs to be seen sooner based on her CA 19-9 level.  Patient verbalized understanding and all questions answered.    Jodi Moreau DO  Family Medicine Resident, PGY-2  4/27/2017        
Caller would like to discuss an/a Return call for . Writer advised caller of callback within 24-72 hours.    Patient Name: Lyly Jaramillo  Caller Name: Patient   Name of Facility: [facility]  Fax Number: [facility]  Callback Number: [phone]  Additional Info: Patient stated that she was speaking with  a few minutes ago and she would like a call back from her.      Thank you,  Callinegin Wei  
Statement Selected

## 2019-10-11 NOTE — H&P PST ADULT - HISTORY OF PRESENT ILLNESS
This is an 83 year old female who is s/p a left ureteral stent placement 9/28/19 for an obstructing calculus.  She is here today to undergo a left ureteroscopy with stone extraction.

## 2019-10-11 NOTE — BRIEF OPERATIVE NOTE - OPERATION/FINDINGS
1. L sided DJ stent removed, no visual evidence of stones in distal ureter  2. L kidney inspected, mid and lower pole matrix debris noted and extracted; 6F x 22 cm DJ stent in place  3. Stones sent for analysis  Dictation: [id number]

## 2019-10-11 NOTE — ASU DISCHARGE PLAN (ADULT/PEDIATRIC) - CARE PROVIDER_API CALL
Ryan Núñez)  Administration  [facility]  Dept of Urology, [address] [place] [address]  Phone: [phone]  Fax: [phone]  Follow Up Time: 1 week

## 2019-10-11 NOTE — H&P PST ADULT - ASSESSMENT
83 year old female s/p left ureteral stent placement 9/28, here to undergo left ureteroscopy and stone extraction.

## 2019-10-11 NOTE — H&P PST ADULT - ATTENDING COMMENTS
Agree with plan as outlined. L distal ureteral stones, plan for Left ureteroscopy, laser lithotripsy and stone extraction.

## 2019-10-11 NOTE — H&P PST ADULT - NSICDXPROBLEM_GEN_ALL_CORE_FT
PROBLEM DIAGNOSES  Problem: Nephrolithiasis  Assessment and Plan: to undergo left ureteroscopy and stone extraction

## 2019-10-11 NOTE — BRIEF OPERATIVE NOTE - NSICDXBRIEFPROCEDURE_GEN_ALL_CORE_FT
PROCEDURES:  Cystoscopy with left ureteroscopy with extraction of calculus using stone retrieval basket 11-Oct-2019 13:06:02  Ryan Núñez

## 2019-10-13 ENCOUNTER — EMERGENCY (EMERGENCY)
Facility: HOSPITAL | Age: 84
LOS: 1 days | Discharge: HOME | End: 2019-10-13
Attending: EMERGENCY MEDICINE
Payer: MEDICARE

## 2019-10-13 VITALS
HEIGHT: 61 IN | HEART RATE: 72 BPM | DIASTOLIC BLOOD PRESSURE: 69 MMHG | TEMPERATURE: 98 F | OXYGEN SATURATION: 98 % | WEIGHT: 104.94 LBS | RESPIRATION RATE: 18 BRPM | SYSTOLIC BLOOD PRESSURE: 160 MMHG

## 2019-10-13 VITALS
TEMPERATURE: 99 F | DIASTOLIC BLOOD PRESSURE: 75 MMHG | HEART RATE: 65 BPM | SYSTOLIC BLOOD PRESSURE: 169 MMHG | RESPIRATION RATE: 18 BRPM | OXYGEN SATURATION: 96 %

## 2019-10-13 DIAGNOSIS — Z90.710 ACQUIRED ABSENCE OF BOTH CERVIX AND UTERUS: Chronic | ICD-10-CM

## 2019-10-13 LAB
ALBUMIN SERPL ELPH-MCNC: 3.6 G/DL — SIGNIFICANT CHANGE UP (ref 3.3–5)
ALP SERPL-CCNC: 87 U/L — SIGNIFICANT CHANGE UP (ref 40–120)
ALT FLD-CCNC: 14 U/L — SIGNIFICANT CHANGE UP (ref 10–45)
ANION GAP SERPL CALC-SCNC: 11 MMOL/L — SIGNIFICANT CHANGE UP (ref 5–17)
APPEARANCE UR: ABNORMAL
AST SERPL-CCNC: 13 U/L — SIGNIFICANT CHANGE UP (ref 10–40)
BACTERIA # UR AUTO: NEGATIVE — SIGNIFICANT CHANGE UP
BASE EXCESS BLDV CALC-SCNC: 3.9 MMOL/L — HIGH (ref -2–2)
BASOPHILS # BLD AUTO: 0.03 K/UL — SIGNIFICANT CHANGE UP (ref 0–0.2)
BASOPHILS NFR BLD AUTO: 0.5 % — SIGNIFICANT CHANGE UP (ref 0–2)
BILIRUB SERPL-MCNC: 0.2 MG/DL — SIGNIFICANT CHANGE UP (ref 0.2–1.2)
BILIRUB UR-MCNC: NEGATIVE — SIGNIFICANT CHANGE UP
BUN SERPL-MCNC: 14 MG/DL — SIGNIFICANT CHANGE UP (ref 7–23)
CA-I SERPL-SCNC: 1.21 MMOL/L — SIGNIFICANT CHANGE UP (ref 1.12–1.3)
CALCIUM SERPL-MCNC: 9.5 MG/DL — SIGNIFICANT CHANGE UP (ref 8.4–10.5)
CHLORIDE BLDV-SCNC: 109 MMOL/L — HIGH (ref 96–108)
CHLORIDE SERPL-SCNC: 103 MMOL/L — SIGNIFICANT CHANGE UP (ref 96–108)
CO2 BLDV-SCNC: 30 MMOL/L — SIGNIFICANT CHANGE UP (ref 22–30)
CO2 SERPL-SCNC: 25 MMOL/L — SIGNIFICANT CHANGE UP (ref 22–31)
COLOR SPEC: COLORLESS — SIGNIFICANT CHANGE UP
CREAT SERPL-MCNC: 1 MG/DL — SIGNIFICANT CHANGE UP (ref 0.5–1.3)
DIFF PNL FLD: ABNORMAL
EOSINOPHIL # BLD AUTO: 0.08 K/UL — SIGNIFICANT CHANGE UP (ref 0–0.5)
EOSINOPHIL NFR BLD AUTO: 1.2 % — SIGNIFICANT CHANGE UP (ref 0–6)
EPI CELLS # UR: 4 /HPF — SIGNIFICANT CHANGE UP
GAS PNL BLDV: 139 MMOL/L — SIGNIFICANT CHANGE UP (ref 135–145)
GAS PNL BLDV: SIGNIFICANT CHANGE UP
GLUCOSE BLDV-MCNC: 97 MG/DL — SIGNIFICANT CHANGE UP (ref 70–99)
GLUCOSE SERPL-MCNC: 102 MG/DL — HIGH (ref 70–99)
GLUCOSE UR QL: NEGATIVE — SIGNIFICANT CHANGE UP
HCO3 BLDV-SCNC: 28 MMOL/L — SIGNIFICANT CHANGE UP (ref 21–29)
HCT VFR BLD CALC: 35.1 % — SIGNIFICANT CHANGE UP (ref 34.5–45)
HCT VFR BLDA CALC: 35 % — LOW (ref 39–50)
HGB BLD CALC-MCNC: 11.3 G/DL — LOW (ref 11.5–15.5)
HGB BLD-MCNC: 10.6 G/DL — LOW (ref 11.5–15.5)
HYALINE CASTS # UR AUTO: 3 /LPF — HIGH (ref 0–2)
IMM GRANULOCYTES NFR BLD AUTO: 0.5 % — SIGNIFICANT CHANGE UP (ref 0–1.5)
KETONES UR-MCNC: SIGNIFICANT CHANGE UP
LACTATE BLDV-MCNC: 1.5 MMOL/L — SIGNIFICANT CHANGE UP (ref 0.7–2)
LEUKOCYTE ESTERASE UR-ACNC: ABNORMAL
LYMPHOCYTES # BLD AUTO: 0.84 K/UL — LOW (ref 1–3.3)
LYMPHOCYTES # BLD AUTO: 12.7 % — LOW (ref 13–44)
MCHC RBC-ENTMCNC: 28.2 PG — SIGNIFICANT CHANGE UP (ref 27–34)
MCHC RBC-ENTMCNC: 30.2 GM/DL — LOW (ref 32–36)
MCV RBC AUTO: 93.4 FL — SIGNIFICANT CHANGE UP (ref 80–100)
MONOCYTES # BLD AUTO: 0.56 K/UL — SIGNIFICANT CHANGE UP (ref 0–0.9)
MONOCYTES NFR BLD AUTO: 8.5 % — SIGNIFICANT CHANGE UP (ref 2–14)
NEUTROPHILS # BLD AUTO: 5.06 K/UL — SIGNIFICANT CHANGE UP (ref 1.8–7.4)
NEUTROPHILS NFR BLD AUTO: 76.6 % — SIGNIFICANT CHANGE UP (ref 43–77)
NITRITE UR-MCNC: NEGATIVE — SIGNIFICANT CHANGE UP
NRBC # BLD: 0 /100 WBCS — SIGNIFICANT CHANGE UP (ref 0–0)
PCO2 BLDV: 45 MMHG — SIGNIFICANT CHANGE UP (ref 35–50)
PH BLDV: 7.42 — SIGNIFICANT CHANGE UP (ref 7.35–7.45)
PH UR: 8 — SIGNIFICANT CHANGE UP (ref 5–8)
PLATELET # BLD AUTO: 459 K/UL — HIGH (ref 150–400)
PO2 BLDV: <20 MMHG — LOW (ref 25–45)
POTASSIUM BLDV-SCNC: 4 MMOL/L — SIGNIFICANT CHANGE UP (ref 3.5–5.3)
POTASSIUM SERPL-MCNC: 3.8 MMOL/L — SIGNIFICANT CHANGE UP (ref 3.5–5.3)
POTASSIUM SERPL-SCNC: 3.8 MMOL/L — SIGNIFICANT CHANGE UP (ref 3.5–5.3)
PROT SERPL-MCNC: 7.2 G/DL — SIGNIFICANT CHANGE UP (ref 6–8.3)
PROT UR-MCNC: ABNORMAL
RBC # BLD: 3.76 M/UL — LOW (ref 3.8–5.2)
RBC # FLD: 13.2 % — SIGNIFICANT CHANGE UP (ref 10.3–14.5)
RBC CASTS # UR COMP ASSIST: 566 /HPF — HIGH (ref 0–4)
SAO2 % BLDV: 29 % — LOW (ref 67–88)
SODIUM SERPL-SCNC: 139 MMOL/L — SIGNIFICANT CHANGE UP (ref 135–145)
SP GR SPEC: 1.01 — SIGNIFICANT CHANGE UP (ref 1.01–1.02)
UROBILINOGEN FLD QL: NEGATIVE — SIGNIFICANT CHANGE UP
WBC # BLD: 6.6 K/UL — SIGNIFICANT CHANGE UP (ref 3.8–10.5)
WBC # FLD AUTO: 6.6 K/UL — SIGNIFICANT CHANGE UP (ref 3.8–10.5)
WBC UR QL: 13 /HPF — HIGH (ref 0–5)

## 2019-10-13 PROCEDURE — 74018 RADEX ABDOMEN 1 VIEW: CPT | Mod: 26

## 2019-10-13 PROCEDURE — 99284 EMERGENCY DEPT VISIT MOD MDM: CPT

## 2019-10-13 RX ORDER — CEFTRIAXONE 500 MG/1
1000 INJECTION, POWDER, FOR SOLUTION INTRAMUSCULAR; INTRAVENOUS ONCE
Refills: 0 | Status: COMPLETED | OUTPATIENT
Start: 2019-10-13 | End: 2019-10-13

## 2019-10-13 RX ORDER — SODIUM CHLORIDE 9 MG/ML
1000 INJECTION INTRAMUSCULAR; INTRAVENOUS; SUBCUTANEOUS ONCE
Refills: 0 | Status: COMPLETED | OUTPATIENT
Start: 2019-10-13 | End: 2019-10-13

## 2019-10-13 RX ORDER — CEPHALEXIN 500 MG
1 CAPSULE ORAL
Qty: 10 | Refills: 0
Start: 2019-10-13 | End: 2019-10-17

## 2019-10-13 RX ADMIN — SODIUM CHLORIDE 1000 MILLILITER(S): 9 INJECTION INTRAMUSCULAR; INTRAVENOUS; SUBCUTANEOUS at 10:56

## 2019-10-13 RX ADMIN — CEFTRIAXONE 100 MILLIGRAM(S): 500 INJECTION, POWDER, FOR SOLUTION INTRAMUSCULAR; INTRAVENOUS at 11:39

## 2019-10-13 NOTE — ED ADULT NURSE NOTE - OBJECTIVE STATEMENT
84 yo female presents to ED from home c/o increased urinary output and burning with urination. Patient had recent urethral stent placed and had stone removal at the same time. Patient states she woke up "in a pool of urine" and was also sweaty. Patient states she cannot control urine. Patient on Bactrim for UTI, states she has had diarrhea after starting medication. Patient denies SOB, CP, n/v, sick contacts, falls/loc. Patient A&Ox3, breathing spontaneously, airway patent, bl clear lungs, abdomen nontender, +pulses, cap refill <2 seconds. Patient resting in bed, side rails up, plan of care explained.

## 2019-10-13 NOTE — ED PROVIDER NOTE - ATTENDING CONTRIBUTION TO CARE
****ATTENDING**** 82yo f hx listed s/p stone extraction and stent placement on 10/11 presents with urinary leak with incontinence. Denies any abd pain, n/v. No f/c. + mild weak.  on exam, Patient is awake,alert,oriented x 3. Patient is well appearing and in no acute distress. Patient's chest is clear to ausculation, +s1s2. Abdomen is soft nd/nt +BS. Extremity with no swelling or calf tenderness.   Pt well appearing, rectal temp 100.1. Urology consulted and stent readjusted.  Check labs repeat KUB. patient would prefer to go home. Pt stopped her bactrim because of diarrhea, will change abx per urology.

## 2019-10-13 NOTE — CONSULT NOTE ADULT - ASSESSMENT
84yo female with left stent displacement. s/p 10/11 L URS  - stent repositioned at bedside utilizing a wire to guide it back into bladder. Area sterilized prior to repositioning and griffith utilized to assist advancing stent into place. string taped down to pubic area with tegaderm.   - educated pt to avoid wiping area, pat dry as needed  - pt stopped bactrim last night, would give 1 dose ceftriaxone in ED and discharge with additional 3 days of keflex.   - f/u with Dr. Núñez or Dr. Ng in office Wednesday 10/16 as scheduled for stent removal.   - discussed with Dr. Núñez

## 2019-10-13 NOTE — ED PROVIDER NOTE - PATIENT PORTAL LINK FT
You can access the FollowMyHealth Patient Portal offered by [facility] by registering at the following website: [email or web address]. By joining [employer]’s FollowMyHealth portal, you will also be able to view your health information using other applications (apps) compatible with our system.

## 2019-10-13 NOTE — ED PROVIDER NOTE - OBJECTIVE STATEMENT
83 y female PMHx HTN, left ureteral stent placement on 9/28/19 for obstructing calculus, uteroscopy with stone extraction on 10/11 presenting with urinary incontinence described as constant since yesterday, chills and night sweats. Pt states the urine is coming out at a constant drip, with burning. + suprapubic discomfort consistent since the procedure. Pt recently completed a course of bactrim. Denies hematuria, syncope, abdominal pain, SOB, chest pain, nausea, vomiting, diarrhea.

## 2019-10-13 NOTE — CONSULT NOTE ADULT - SUBJECTIVE AND OBJECTIVE BOX
HPI: 84yo female h/o recent 10/11 left ureteroscopy, lithotripsy, stone removal and stent placement, presenting to ED now complaining of constant urinary incontinence. KUB confirmed stent displacement. Pt states she has been feeling well, but early this morning woke with wetness from below. States it is "like a faucet" with constant leakage. urine described as pink in color. Denies fever/chills, abdominal/flank pain, dysuria. Pt was on bactrim post op, but stopped last night because of diarrhea. Last episode of diarrhea was last night.     PAST MEDICAL & SURGICAL HISTORY:  UTI (urinary tract infection)  HTN (hypertension)  H/O abdominal hysterectomy    FAMILY HISTORY:  No pertinent family history in first degree relatives    SOCIAL HISTORY:   Tobacco hx:    MEDICATIONS  (STANDING):    MEDICATIONS  (PRN):    Allergies    No Known Allergies    Intolerances        REVIEW OF SYSTEMS: Pertinent positives and negatives as stated in HPI, otherwise negative    Vital signs  T(C): 37.8 (10-13-19 @ 10:08), Max: 37.8 (10-13-19 @ 10:08)  HR: 72 (10-13-19 @ 09:31)  BP: 160/69 (10-13-19 @ 09:31)  SpO2: 98% (10-13-19 @ 09:31)  Wt(kg): --    Output    UOP    Physical Exam  Gen: NAD  Pulm: No respiratory distress, no subcostal retractions  CV: RRR, no JVD  Abd: Soft, NT, ND, no CVAT  : distal curl of stent visualized outside urethra with string in place, +urine leakage    LABS:          10-13 @ 10:33    WBC 6.60  / Hct 35.1  / SCr 1.00     10-13    139  |  103  |  14  ----------------------------<  102<H>  3.8   |  25  |  1.00    Ca    9.5      13 Oct 2019 10:33    TPro  7.2  /  Alb  3.6  /  TBili  0.2  /  DBili  x   /  AST  13  /  ALT  14  /  AlkPhos  87  10-13      RADIOLOGY:  < from: Xray Kidney Ureter Bladder (10.13.19 @ 10:16) >  FINDINGS:     The left-sided stent is again identified with inferior migration of the   stent compared to prior exam. The superior pigtail is not well formed,   and the inferior pigtail is not seen on the current projection.   Nonobstructive bowel gas pattern without intraperitoneal free air.   Degenerative changes of the spine and hip are again identified.    IMPRESSION:    Inferior migration of a left-sided ureteral stent compared to prior exam   on 9/30/2019 with incomplete formation of the superior pigtail and   nonvisualized inferior pigtail.      < end of copied text >      REPEAT KUB  stent in good position

## 2019-10-13 NOTE — ED PROVIDER NOTE - NSFOLLOWUPINSTRUCTIONS_ED_ALL_ED_FT
1. Please follow up with urology on Wednesday, 10/13/19 for your urinary tract infection.    2. Take cephalexin 500mg every 12hours as prescribed for your urinary tract infection.    3. For continued pain, take tylenol 1000mg every 6 hours as needed.    4. Return to ED for worsening pain, hematuria, syncope, fever, chills, chest pain, shortness of breath, or any other concerning symptoms.

## 2019-10-13 NOTE — ED PROVIDER NOTE - CONSTITUTIONAL, MLM
normal... Well appearing, well nourished, awake, alert, oriented to person, place, time/situation and in no apparent distress. Pt feels warm

## 2019-10-14 LAB
CULTURE RESULTS: NO GROWTH — SIGNIFICANT CHANGE UP
SPECIMEN SOURCE: SIGNIFICANT CHANGE UP

## 2019-10-14 PROCEDURE — C1769: CPT

## 2019-10-14 PROCEDURE — C1758: CPT

## 2019-10-14 PROCEDURE — C1894: CPT

## 2019-10-14 PROCEDURE — 84132 ASSAY OF SERUM POTASSIUM: CPT

## 2019-10-14 PROCEDURE — 82803 BLOOD GASES ANY COMBINATION: CPT

## 2019-10-14 PROCEDURE — 85027 COMPLETE CBC AUTOMATED: CPT

## 2019-10-14 PROCEDURE — 85014 HEMATOCRIT: CPT

## 2019-10-14 PROCEDURE — 74018 RADEX ABDOMEN 1 VIEW: CPT

## 2019-10-14 PROCEDURE — 83605 ASSAY OF LACTIC ACID: CPT

## 2019-10-14 PROCEDURE — C2617: CPT

## 2019-10-14 PROCEDURE — 82947 ASSAY GLUCOSE BLOOD QUANT: CPT

## 2019-10-14 PROCEDURE — 99284 EMERGENCY DEPT VISIT MOD MDM: CPT | Mod: 25

## 2019-10-14 PROCEDURE — 96374 THER/PROPH/DIAG INJ IV PUSH: CPT

## 2019-10-14 PROCEDURE — 82435 ASSAY OF BLOOD CHLORIDE: CPT

## 2019-10-14 PROCEDURE — 81001 URINALYSIS AUTO W/SCOPE: CPT

## 2019-10-14 PROCEDURE — 80053 COMPREHEN METABOLIC PANEL: CPT

## 2019-10-14 PROCEDURE — 84295 ASSAY OF SERUM SODIUM: CPT

## 2019-10-14 PROCEDURE — 87086 URINE CULTURE/COLONY COUNT: CPT

## 2019-10-14 PROCEDURE — 52332 CYSTOSCOPY AND TREATMENT: CPT | Mod: LT

## 2019-10-14 PROCEDURE — 82330 ASSAY OF CALCIUM: CPT

## 2019-10-14 PROCEDURE — 82365 CALCULUS SPECTROSCOPY: CPT

## 2019-10-14 PROCEDURE — 88300 SURGICAL PATH GROSS: CPT

## 2019-10-14 PROCEDURE — 52352 CYSTOURETERO W/STONE REMOVE: CPT | Mod: LT

## 2019-10-14 PROCEDURE — C1889: CPT

## 2019-10-14 PROCEDURE — 87040 BLOOD CULTURE FOR BACTERIA: CPT

## 2019-10-16 ENCOUNTER — APPOINTMENT (OUTPATIENT)
Dept: UROLOGY | Facility: CLINIC | Age: 84
End: 2019-10-16

## 2019-10-17 ENCOUNTER — APPOINTMENT (OUTPATIENT)
Dept: UROLOGY | Facility: CLINIC | Age: 84
End: 2019-10-17
Payer: MEDICARE

## 2019-10-17 VITALS
RESPIRATION RATE: 17 BRPM | BODY MASS INDEX: 20.62 KG/M2 | SYSTOLIC BLOOD PRESSURE: 167 MMHG | TEMPERATURE: 98.1 F | HEIGHT: 60 IN | DIASTOLIC BLOOD PRESSURE: 75 MMHG | WEIGHT: 105 LBS | HEART RATE: 66 BPM

## 2019-10-17 PROCEDURE — 99213 OFFICE O/P EST LOW 20 MIN: CPT

## 2019-10-17 NOTE — HISTORY OF PRESENT ILLNESS
[FreeTextEntry1] : 83 year old female presents for UTI\par Pt developed pain in her flank and burning of her urine. She went to the ER and Dr. Núñez diagnosed her with a UTI. She was given antibiotics and had a stent placed. After the infection cleared, Dr. Núñez removed 3 kidney stones. The stent was moved out of place. It was readjusted on Sunday, but pt is still in pain and experiences hematuria.  \par Pt refuses to take Keflex due to discomfort \par \par Pt's stent was removed today and I have warned them of the possible risks that may arise from stent removal.\par If they develop severe pain or a high fever, they will report to [facility] ER. \par Pt was advised to increase fluid intake. \par Pt will call tomorrow morning and report on how she's feeling \par LL and RTO in 1 month for results.

## 2019-10-17 NOTE — ASSESSMENT
[Urinary Tract Infection (599.0\N39.0)] : qualitative ~C was positive [FreeTextEntry1] : Pt's stent was removed today and I have warned them of the possible risks that may arise from stent removal.\par If they develop severe pain or a high fever, they will report to [facility] ER. \par Pt was advised to increase fluid intake. \par Pt will call tomorrow morning and report on how she's feeling \par LL and RTO in 1 month for results.

## 2019-10-17 NOTE — REVIEW OF SYSTEMS
[Fever] : fever [Feeling Tired] : feeling tired [Chills] : chills [Recent Weight Loss (___ Lbs)] : recent [unfilled] ~Ulb weight loss [Dry Eyes] : dryness of the eyes [Vomiting] : vomiting [Diarrhea] : diarrhea [see HPI] : see HPI [Seen by urologist before (Name)  ___] : Previously seen by a urologist: [unfilled] [Urine Infection (bladder/kidney)] : bladder/kidney infection [Blood in urine that you can see] : blood visible in urine [Told you have blood in urine on a urine test] : told blood was present in a urine test [Leakage of urine with urgency] : leakage of urine with urgency [Dizziness] : dizziness [Limb Weakness] : limb weakness [Difficulty Walking] : difficulty walking [Anxiety] : anxiety [Depression] : depression [Negative] : Heme/Lymph

## 2019-10-17 NOTE — PHYSICAL EXAM
[General Appearance - Well Developed] : well developed [General Appearance - Well Nourished] : well nourished [Normal Appearance] : normal appearance [Well Groomed] : well groomed [General Appearance - In No Acute Distress] : no acute distress [Edema] : no peripheral edema [Abdomen Tenderness] : non-tender [Exaggerated Use Of Accessory Muscles For Inspiration] : no accessory muscle use [Abdomen Soft] : soft [Respiration, Rhythm And Depth] : normal respiratory rhythm and effort [Urinary Bladder Findings] : the bladder was normal on palpation [Normal Station and Gait] : the gait and station were normal for the patient's age [Costovertebral Angle Tenderness] : no ~M costovertebral angle tenderness [No Focal Deficits] : no focal deficits [] : no rash [Affect] : the affect was normal [Oriented To Time, Place, And Person] : oriented to person, place, and time [Not Anxious] : not anxious [Mood] : the mood was normal [No Palpable Adenopathy] : no palpable adenopathy

## 2019-10-17 NOTE — ADDENDUM
[FreeTextEntry1] :  I, Michelle Stuart, acted solely as a scribe for Dr. Vincent Alexander. The documentation recorded by the scribe accurately reflects the service I personally performed and the decision by me.\par

## 2019-10-18 LAB
CULTURE RESULTS: SIGNIFICANT CHANGE UP
NIDUS STONE QN: SIGNIFICANT CHANGE UP
SPECIMEN SOURCE: SIGNIFICANT CHANGE UP
SURGICAL PATHOLOGY STUDY: SIGNIFICANT CHANGE UP

## 2019-10-23 LAB
APPEARANCE: ABNORMAL
BACTERIA UR CULT: ABNORMAL
BACTERIA: NEGATIVE
BILIRUBIN URINE: ABNORMAL
BLOOD URINE: ABNORMAL
COLOR: ABNORMAL
GLUCOSE QUALITATIVE U: NEGATIVE
HYALINE CASTS: 2 /LPF
KETONES URINE: NEGATIVE
LEUKOCYTE ESTERASE URINE: ABNORMAL
MICROSCOPIC-UA: NORMAL
NITRITE URINE: NEGATIVE
PH URINE: 6
PROTEIN URINE: ABNORMAL
RED BLOOD CELLS URINE: >720 /HPF
SPECIFIC GRAVITY URINE: >=1.03
SQUAMOUS EPITHELIAL CELLS: 5 /HPF
UROBILINOGEN URINE: NORMAL
WHITE BLOOD CELLS URINE: 50 /HPF

## 2019-12-05 ENCOUNTER — APPOINTMENT (OUTPATIENT)
Dept: UROLOGY | Facility: CLINIC | Age: 84
End: 2019-12-05
Payer: MEDICARE

## 2019-12-05 PROCEDURE — 99213 OFFICE O/P EST LOW 20 MIN: CPT

## 2019-12-05 NOTE — HISTORY OF PRESENT ILLNESS
[FreeTextEntry1] : 83 year old female presents for stones\par Pt developed pain in her flank and burning of her urine. She went to the ER and Dr. Núñez diagnosed her with a UTI. She was given antibiotics and had a stent placed. After the infection cleared, Dr. Núñez removed 3 kidney stones. The stent was moved out of place. It was readjusted but pt experienced pain afterwards. Her stent was removed during her last appoint and the pain has since cleared. \par \par Pt will provide a urine sample today for culture and analysis. \par Referred pt to Dr. Hair Anguiano\par LL and RTO in 1 month for results.

## 2019-12-05 NOTE — REVIEW OF SYSTEMS
[Fever] : fever [Recent Weight Loss (___ Lbs)] : recent [unfilled] ~Ulb weight loss [Feeling Tired] : feeling tired [Chills] : chills [Dry Eyes] : dryness of the eyes [Vomiting] : vomiting [Diarrhea] : diarrhea [see HPI] : see HPI [Seen by urologist before (Name)  ___] : Previously seen by a urologist: [unfilled] [Urine Infection (bladder/kidney)] : bladder/kidney infection [Blood in urine that you can see] : blood visible in urine [Told you have blood in urine on a urine test] : told blood was present in a urine test [Leakage of urine with urgency] : leakage of urine with urgency [Dizziness] : dizziness [Limb Weakness] : limb weakness [Difficulty Walking] : difficulty walking [Anxiety] : anxiety [Depression] : depression [Negative] : Heme/Lymph

## 2019-12-05 NOTE — ASSESSMENT
[FreeTextEntry1] : Pt will provide a urine sample today for culture and analysis. \par Referred pt to Dr. Hair Anguiano\par LL and RTO in 1 month for results.

## 2019-12-10 LAB
APPEARANCE: CLEAR
BACTERIA: ABNORMAL
BILIRUBIN URINE: NEGATIVE
BLOOD URINE: NORMAL
COLOR: YELLOW
GLUCOSE QUALITATIVE U: NEGATIVE
HYALINE CASTS: 0 /LPF
KETONES URINE: NEGATIVE
LEUKOCYTE ESTERASE URINE: ABNORMAL
MICROSCOPIC-UA: NORMAL
NITRITE URINE: POSITIVE
PH URINE: 6
PROTEIN URINE: ABNORMAL
RED BLOOD CELLS URINE: 2 /HPF
SPECIFIC GRAVITY URINE: >=1.03
SQUAMOUS EPITHELIAL CELLS: 2 /HPF
UROBILINOGEN URINE: NORMAL
WHITE BLOOD CELLS URINE: 12 /HPF

## 2019-12-17 LAB — BACTERIA UR CULT: ABNORMAL

## 2019-12-17 RX ORDER — SULFAMETHOXAZOLE AND TRIMETHOPRIM 800; 160 MG/1; MG/1
800-160 TABLET ORAL TWICE DAILY
Qty: 10 | Refills: 0 | Status: ACTIVE | COMMUNITY
Start: 2019-12-10

## 2020-01-06 ENCOUNTER — APPOINTMENT (OUTPATIENT)
Dept: UROLOGY | Facility: CLINIC | Age: 85
End: 2020-01-06
Payer: MEDICARE

## 2020-01-06 ENCOUNTER — OUTPATIENT (OUTPATIENT)
Dept: OUTPATIENT SERVICES | Facility: HOSPITAL | Age: 85
LOS: 1 days | End: 2020-01-06
Payer: MEDICARE

## 2020-01-06 DIAGNOSIS — Z90.710 ACQUIRED ABSENCE OF BOTH CERVIX AND UTERUS: Chronic | ICD-10-CM

## 2020-01-06 DIAGNOSIS — R35.0 FREQUENCY OF MICTURITION: ICD-10-CM

## 2020-01-06 PROCEDURE — 76775 US EXAM ABDO BACK WALL LIM: CPT

## 2020-01-06 PROCEDURE — 76775 US EXAM ABDO BACK WALL LIM: CPT | Mod: 26

## 2020-01-06 PROCEDURE — 99213 OFFICE O/P EST LOW 20 MIN: CPT | Mod: 25

## 2020-01-06 NOTE — ADDENDUM
[FreeTextEntry1] :  I, Michelle Stuart, acted solely as a scribe for Dr. Vincent Alexander. The documentation recorded by the scribe accurately reflects the service I personally performed and the decision by me.\par   normal...

## 2020-01-06 NOTE — HISTORY OF PRESENT ILLNESS
[FreeTextEntry1] : 84 year old female presents for evaluation of test results. \par Pt had a UTI and was not able to take most of her Sulfamethoxazole- Trimethoprim prescription. She also experiences dizziness, but reports a response to them.\par Her urine not currently burning, but has in the past. \par \par US renal today shows there is a 4.7 mm echogenic focus with twinkle artifact in the right upper parenchyma likely parenchymal calcification. There are scattered cysts in the left kidney including the largest with a septation in the upper pole 1.3 x 2.0 cm all non vascular. Both kidneys are normal in size and echogenicity without stones, hydronephrosis or solid masses visualized. \par Pt was not able to do LL\par Pt will provide a urine sample today for culture and analysis. \par Pt does not have to take Sulfamethoxazole- Trimethoprim \par LL and RTO in 1 month for results.

## 2020-01-06 NOTE — REVIEW OF SYSTEMS
[Fever] : fever [Chills] : chills [Feeling Tired] : feeling tired [Recent Weight Loss (___ Lbs)] : recent [unfilled] ~Ulb weight loss [Dry Eyes] : dryness of the eyes [Vomiting] : vomiting [Diarrhea] : diarrhea [see HPI] : see HPI [Seen by urologist before (Name)  ___] : Previously seen by a urologist: [unfilled] [Urine Infection (bladder/kidney)] : bladder/kidney infection [Blood in urine that you can see] : blood visible in urine [Told you have blood in urine on a urine test] : told blood was present in a urine test [Dizziness] : dizziness [Leakage of urine with urgency] : leakage of urine with urgency [Limb Weakness] : limb weakness [Difficulty Walking] : difficulty walking [Depression] : depression [Anxiety] : anxiety [Negative] : Endocrine

## 2020-01-06 NOTE — PHYSICAL EXAM
[General Appearance - Well Nourished] : well nourished [General Appearance - Well Developed] : well developed [Well Groomed] : well groomed [Normal Appearance] : normal appearance [Abdomen Soft] : soft [General Appearance - In No Acute Distress] : no acute distress [Abdomen Tenderness] : non-tender [Urinary Bladder Findings] : the bladder was normal on palpation [Costovertebral Angle Tenderness] : no ~M costovertebral angle tenderness [] : no respiratory distress [Respiration, Rhythm And Depth] : normal respiratory rhythm and effort [Edema] : no peripheral edema [Exaggerated Use Of Accessory Muscles For Inspiration] : no accessory muscle use [Affect] : the affect was normal [Oriented To Time, Place, And Person] : oriented to person, place, and time [Mood] : the mood was normal [Not Anxious] : not anxious [Normal Station and Gait] : the gait and station were normal for the patient's age [No Palpable Adenopathy] : no palpable adenopathy [No Focal Deficits] : no focal deficits

## 2020-01-06 NOTE — ASSESSMENT
[FreeTextEntry1] : US renal today shows there is a 4.7 mm echogenic focus with twinkle artifact in the right upper parenchyma likely parenchymal calcification. There are scattered cysts in the left kidney including the largest with a septation in the upper pole 1.3 x 2.0 cm all non vascular. Both kidneys are normal in size and echogenicity without stones, hydronephrosis or solid masses visualized. \par Pt was not able to do LL\par Pt will provide a urine sample today for culture and analysis. \par Pt does not have to take Sulfamethoxazole- Trimethoprim \par LL and RTO in 1 month for results.

## 2020-01-08 DIAGNOSIS — N20.0 CALCULUS OF KIDNEY: ICD-10-CM

## 2020-01-08 LAB
APPEARANCE: CLEAR
BACTERIA UR CULT: NORMAL
BACTERIA: NEGATIVE
BILIRUBIN URINE: NEGATIVE
BLOOD URINE: NEGATIVE
COLOR: NORMAL
GLUCOSE QUALITATIVE U: NEGATIVE
HYALINE CASTS: 1 /LPF
KETONES URINE: NEGATIVE
LEUKOCYTE ESTERASE URINE: ABNORMAL
MICROSCOPIC-UA: NORMAL
NITRITE URINE: NEGATIVE
PH URINE: 6
PROTEIN URINE: NEGATIVE
RED BLOOD CELLS URINE: 7 /HPF
SPECIFIC GRAVITY URINE: 1.02
SQUAMOUS EPITHELIAL CELLS: 1 /HPF
UROBILINOGEN URINE: NORMAL
WHITE BLOOD CELLS URINE: 4 /HPF

## 2020-02-12 ENCOUNTER — APPOINTMENT (OUTPATIENT)
Dept: UROLOGY | Facility: CLINIC | Age: 85
End: 2020-02-12

## 2020-06-25 NOTE — BRIEF OPERATIVE NOTE - NSICDXBRIEFPROCEDURE_GEN_ALL_CORE_FT
What Type Of Note Output Would You Prefer (Optional)?: Standard Output
How Severe Is Your Rash?: moderate
Is This A New Presentation, Or A Follow-Up?: Rash
PROCEDURES:  Insertion, stent, ureter, cystoscopic 28-Sep-2019 21:30:57  Ryan Núñez

## 2020-09-18 ENCOUNTER — APPOINTMENT (OUTPATIENT)
Dept: UROLOGY | Facility: CLINIC | Age: 85
End: 2020-09-18
Payer: MEDICARE

## 2020-09-18 ENCOUNTER — OUTPATIENT (OUTPATIENT)
Dept: OUTPATIENT SERVICES | Facility: HOSPITAL | Age: 85
LOS: 1 days | End: 2020-09-18
Payer: MEDICARE

## 2020-09-18 VITALS
DIASTOLIC BLOOD PRESSURE: 76 MMHG | TEMPERATURE: 98 F | SYSTOLIC BLOOD PRESSURE: 142 MMHG | RESPIRATION RATE: 17 BRPM | HEART RATE: 64 BPM

## 2020-09-18 DIAGNOSIS — N20.0 CALCULUS OF KIDNEY: ICD-10-CM

## 2020-09-18 DIAGNOSIS — Z90.710 ACQUIRED ABSENCE OF BOTH CERVIX AND UTERUS: Chronic | ICD-10-CM

## 2020-09-18 PROCEDURE — 51701 INSERT BLADDER CATHETER: CPT

## 2020-09-18 PROCEDURE — 99213 OFFICE O/P EST LOW 20 MIN: CPT | Mod: 25

## 2020-09-18 NOTE — HISTORY OF PRESENT ILLNESS
[FreeTextEntry1] : patient seen by Dr Alexander in the past for renal stones\par now with sxs of uti: left flnak pain and dyusrja\par unable to give speciman but called PCP and wa sgiven Bactrim - taking once/daily for past week\par denies any fever , N/V or hematuria or bowel issuews\par soime INC but doesn't use liners\par lst MATT done ( Jan 2020) : Left UP complex cyst 1.3 cm and right UP parenchymal calcification \par urine with 7 rbc and neg cx \par states ix of uti since OWEN done for fibroids when she was 50

## 2020-09-18 NOTE — PHYSICAL EXAM
[General Appearance - Well Developed] : well developed [General Appearance - Well Nourished] : well nourished [Normal Appearance] : normal appearance [Well Groomed] : well groomed [General Appearance - In No Acute Distress] : no acute distress [Abdomen Soft] : soft [Abdomen Tenderness] : non-tender [] : no hepato-splenomegaly [Abdomen Mass (___ Cm)] : no abdominal mass palpated [Abdomen Hernia] : no hernia was discovered [Costovertebral Angle Tenderness] : no ~M costovertebral angle tenderness [Urethral Meatus] : normal urethra [External Female Genitalia] : normal external genitalia [Vagina] : normal vaginal exam [FreeTextEntry1] : soft uretha, atrophic vaginitis , urethr and bladder not tender , sc after sterile pre with 14 f cath and urine collected, catheter removed intact [Cervical Lymph Nodes Enlarged Posterior Bilaterally] : posterior cervical [Cervical Lymph Nodes Enlarged Anterior Bilaterally] : anterior cervical [Supraclavicular Lymph Nodes Enlarged Bilaterally] : supraclavicular

## 2020-09-18 NOTE — ASSESSMENT
[FreeTextEntry1] : RU for left complex cyst \par bladder sono for PVR check \par sc urine today for ua and culture \par prabha notify of results

## 2020-09-21 LAB
APPEARANCE: CLEAR
BACTERIA UR CULT: ABNORMAL
BACTERIA: NEGATIVE
BILIRUBIN URINE: NEGATIVE
BLOOD URINE: NEGATIVE
COLOR: YELLOW
GLUCOSE QUALITATIVE U: NEGATIVE
HYALINE CASTS: 1 /LPF
KETONES URINE: NEGATIVE
LEUKOCYTE ESTERASE URINE: NEGATIVE
MICROSCOPIC-UA: NORMAL
NITRITE URINE: NEGATIVE
PH URINE: 6
PROTEIN URINE: NORMAL
RED BLOOD CELLS URINE: 1 /HPF
SPECIFIC GRAVITY URINE: 1.02
SQUAMOUS EPITHELIAL CELLS: 0 /HPF
UROBILINOGEN URINE: NORMAL
WHITE BLOOD CELLS URINE: 1 /HPF

## 2020-09-21 RX ORDER — AMOXICILLIN AND CLAVULANATE POTASSIUM 500; 125 MG/1; MG/1
500-125 TABLET, FILM COATED ORAL
Qty: 10 | Refills: 1 | Status: ACTIVE | COMMUNITY
Start: 2020-09-21 | End: 1900-01-01

## 2020-09-28 DIAGNOSIS — N28.1 CYST OF KIDNEY, ACQUIRED: ICD-10-CM

## 2020-09-28 DIAGNOSIS — N20.0 CALCULUS OF KIDNEY: ICD-10-CM

## 2020-09-28 DIAGNOSIS — N39.0 URINARY TRACT INFECTION, SITE NOT SPECIFIED: ICD-10-CM

## 2020-10-16 ENCOUNTER — APPOINTMENT (OUTPATIENT)
Dept: UROLOGY | Facility: CLINIC | Age: 85
End: 2020-10-16
Payer: MEDICARE

## 2020-10-16 VITALS — TEMPERATURE: 97.2 F

## 2020-10-16 DIAGNOSIS — Z87.440 PERSONAL HISTORY OF URINARY (TRACT) INFECTIONS: ICD-10-CM

## 2020-10-16 DIAGNOSIS — N28.1 CYST OF KIDNEY, ACQUIRED: ICD-10-CM

## 2020-10-16 PROCEDURE — 99212 OFFICE O/P EST SF 10 MIN: CPT

## 2020-10-16 NOTE — HISTORY OF PRESENT ILLNESS
[FreeTextEntry1] : patient with revcent uti \par trated wit augmentin\par no sxs currenlty \par hx of Left UP complex cyst on fabiana in Jan 2020\par

## 2020-10-16 NOTE — ASSESSMENT
[FreeTextEntry1] : Devon for complex cystf/u \par bladder sono due to UTI hx \par repeat urine today

## 2020-10-19 LAB
APPEARANCE: ABNORMAL
BACTERIA UR CULT: NORMAL
BACTERIA: NEGATIVE
BILIRUBIN URINE: NEGATIVE
BLOOD URINE: NEGATIVE
COLOR: YELLOW
GLUCOSE QUALITATIVE U: NEGATIVE
HYALINE CASTS: 0 /LPF
KETONES URINE: NORMAL
LEUKOCYTE ESTERASE URINE: ABNORMAL
MICROSCOPIC-UA: NORMAL
NITRITE URINE: NEGATIVE
PH URINE: 6
PROTEIN URINE: NORMAL
RED BLOOD CELLS URINE: 4 /HPF
SPECIFIC GRAVITY URINE: 1.03
SQUAMOUS EPITHELIAL CELLS: 2 /HPF
UROBILINOGEN URINE: NORMAL
WHITE BLOOD CELLS URINE: 50 /HPF

## 2020-12-23 PROBLEM — Z87.440 HISTORY OF URINARY TRACT INFECTION: Status: RESOLVED | Noted: 2019-10-17 | Resolved: 2020-12-23

## 2021-10-14 ENCOUNTER — INPATIENT (INPATIENT)
Facility: HOSPITAL | Age: 86
LOS: 11 days | Discharge: NURSING, REHAB OR LONG-TERM CARE | DRG: 690 | End: 2021-10-26
Attending: INTERNAL MEDICINE | Admitting: INTERNAL MEDICINE
Payer: MEDICARE

## 2021-10-14 VITALS
WEIGHT: 149.91 LBS | HEART RATE: 65 BPM | SYSTOLIC BLOOD PRESSURE: 180 MMHG | HEIGHT: 61 IN | TEMPERATURE: 98 F | RESPIRATION RATE: 17 BRPM | OXYGEN SATURATION: 99 % | DIASTOLIC BLOOD PRESSURE: 80 MMHG

## 2021-10-14 DIAGNOSIS — N39.0 URINARY TRACT INFECTION, SITE NOT SPECIFIED: ICD-10-CM

## 2021-10-14 DIAGNOSIS — Z90.710 ACQUIRED ABSENCE OF BOTH CERVIX AND UTERUS: Chronic | ICD-10-CM

## 2021-10-14 LAB
APPEARANCE UR: ABNORMAL
APTT BLD: 29.7 SEC — SIGNIFICANT CHANGE UP (ref 27.5–35.5)
BACTERIA # UR AUTO: ABNORMAL
BASOPHILS # BLD AUTO: 0.03 K/UL — SIGNIFICANT CHANGE UP (ref 0–0.2)
BASOPHILS NFR BLD AUTO: 0.3 % — SIGNIFICANT CHANGE UP (ref 0–2)
BILIRUB UR-MCNC: NEGATIVE — SIGNIFICANT CHANGE UP
COLOR SPEC: SIGNIFICANT CHANGE UP
DIFF PNL FLD: ABNORMAL
EOSINOPHIL # BLD AUTO: 0.13 K/UL — SIGNIFICANT CHANGE UP (ref 0–0.5)
EOSINOPHIL NFR BLD AUTO: 1.3 % — SIGNIFICANT CHANGE UP (ref 0–6)
EPI CELLS # UR: 0 /HPF — SIGNIFICANT CHANGE UP
GLUCOSE UR QL: NEGATIVE — SIGNIFICANT CHANGE UP
HCT VFR BLD CALC: 42 % — SIGNIFICANT CHANGE UP (ref 34.5–45)
HGB BLD-MCNC: 12.5 G/DL — SIGNIFICANT CHANGE UP (ref 11.5–15.5)
HYALINE CASTS # UR AUTO: 1 /LPF — SIGNIFICANT CHANGE UP (ref 0–2)
IMM GRANULOCYTES NFR BLD AUTO: 0.4 % — SIGNIFICANT CHANGE UP (ref 0–1.5)
INR BLD: 1.04 RATIO — SIGNIFICANT CHANGE UP (ref 0.88–1.16)
KETONES UR-MCNC: ABNORMAL
LEUKOCYTE ESTERASE UR-ACNC: ABNORMAL
LYMPHOCYTES # BLD AUTO: 1.23 K/UL — SIGNIFICANT CHANGE UP (ref 1–3.3)
LYMPHOCYTES # BLD AUTO: 12.7 % — LOW (ref 13–44)
MCHC RBC-ENTMCNC: 27.7 PG — SIGNIFICANT CHANGE UP (ref 27–34)
MCHC RBC-ENTMCNC: 29.8 GM/DL — LOW (ref 32–36)
MCV RBC AUTO: 93.1 FL — SIGNIFICANT CHANGE UP (ref 80–100)
MONOCYTES # BLD AUTO: 0.75 K/UL — SIGNIFICANT CHANGE UP (ref 0–0.9)
MONOCYTES NFR BLD AUTO: 7.8 % — SIGNIFICANT CHANGE UP (ref 2–14)
NEUTROPHILS # BLD AUTO: 7.49 K/UL — HIGH (ref 1.8–7.4)
NEUTROPHILS NFR BLD AUTO: 77.5 % — HIGH (ref 43–77)
NITRITE UR-MCNC: POSITIVE
NRBC # BLD: 0 /100 WBCS — SIGNIFICANT CHANGE UP (ref 0–0)
PH UR: 6.5 — SIGNIFICANT CHANGE UP (ref 5–8)
PLATELET # BLD AUTO: 316 K/UL — SIGNIFICANT CHANGE UP (ref 150–400)
PROT UR-MCNC: ABNORMAL
PROTHROM AB SERPL-ACNC: 12.4 SEC — SIGNIFICANT CHANGE UP (ref 10.6–13.6)
RBC # BLD: 4.51 M/UL — SIGNIFICANT CHANGE UP (ref 3.8–5.2)
RBC # FLD: 13.8 % — SIGNIFICANT CHANGE UP (ref 10.3–14.5)
RBC CASTS # UR COMP ASSIST: 7 /HPF — HIGH (ref 0–4)
SARS-COV-2 RNA SPEC QL NAA+PROBE: SIGNIFICANT CHANGE UP
SP GR SPEC: 1.02 — SIGNIFICANT CHANGE UP (ref 1.01–1.02)
TROPONIN T, HIGH SENSITIVITY RESULT: 17 NG/L — SIGNIFICANT CHANGE UP (ref 0–51)
TROPONIN T, HIGH SENSITIVITY RESULT: 18 NG/L — SIGNIFICANT CHANGE UP (ref 0–51)
UROBILINOGEN FLD QL: NEGATIVE — SIGNIFICANT CHANGE UP
WBC # BLD: 9.67 K/UL — SIGNIFICANT CHANGE UP (ref 3.8–10.5)
WBC # FLD AUTO: 9.67 K/UL — SIGNIFICANT CHANGE UP (ref 3.8–10.5)
WBC UR QL: 163 /HPF — HIGH (ref 0–5)

## 2021-10-14 PROCEDURE — 73110 X-RAY EXAM OF WRIST: CPT | Mod: 26,RT

## 2021-10-14 PROCEDURE — 71045 X-RAY EXAM CHEST 1 VIEW: CPT | Mod: 26

## 2021-10-14 PROCEDURE — 29125 APPL SHORT ARM SPLINT STATIC: CPT | Mod: RT

## 2021-10-14 PROCEDURE — 93010 ELECTROCARDIOGRAM REPORT: CPT | Mod: 59

## 2021-10-14 PROCEDURE — 73562 X-RAY EXAM OF KNEE 3: CPT | Mod: 26,RT

## 2021-10-14 PROCEDURE — 99284 EMERGENCY DEPT VISIT MOD MDM: CPT | Mod: 25

## 2021-10-14 PROCEDURE — 70450 CT HEAD/BRAIN W/O DYE: CPT | Mod: 26,MA

## 2021-10-14 PROCEDURE — 73090 X-RAY EXAM OF FOREARM: CPT | Mod: 26,LT

## 2021-10-14 RX ORDER — ACETAMINOPHEN 500 MG
650 TABLET ORAL EVERY 6 HOURS
Refills: 0 | Status: DISCONTINUED | OUTPATIENT
Start: 2021-10-14 | End: 2021-10-26

## 2021-10-14 RX ORDER — OXYCODONE AND ACETAMINOPHEN 5; 325 MG/1; MG/1
1 TABLET ORAL EVERY 6 HOURS
Refills: 0 | Status: DISCONTINUED | OUTPATIENT
Start: 2021-10-14 | End: 2021-10-14

## 2021-10-14 RX ORDER — HYDRALAZINE HCL 50 MG
10 TABLET ORAL
Refills: 0 | Status: DISCONTINUED | OUTPATIENT
Start: 2021-10-14 | End: 2021-10-26

## 2021-10-14 RX ORDER — ENOXAPARIN SODIUM 100 MG/ML
40 INJECTION SUBCUTANEOUS DAILY
Refills: 0 | Status: DISCONTINUED | OUTPATIENT
Start: 2021-10-14 | End: 2021-10-26

## 2021-10-14 RX ORDER — LOSARTAN POTASSIUM 100 MG/1
1 TABLET, FILM COATED ORAL
Qty: 0 | Refills: 0 | DISCHARGE

## 2021-10-14 RX ORDER — SERTRALINE 25 MG/1
50 TABLET, FILM COATED ORAL DAILY
Refills: 0 | Status: DISCONTINUED | OUTPATIENT
Start: 2021-10-14 | End: 2021-10-26

## 2021-10-14 RX ORDER — SERTRALINE 25 MG/1
1 TABLET, FILM COATED ORAL
Qty: 0 | Refills: 0 | DISCHARGE

## 2021-10-14 RX ORDER — CEFTRIAXONE 500 MG/1
1000 INJECTION, POWDER, FOR SOLUTION INTRAMUSCULAR; INTRAVENOUS ONCE
Refills: 0 | Status: COMPLETED | OUTPATIENT
Start: 2021-10-14 | End: 2021-10-14

## 2021-10-14 RX ORDER — ASPIRIN/CALCIUM CARB/MAGNESIUM 324 MG
2 TABLET ORAL
Qty: 0 | Refills: 0 | DISCHARGE

## 2021-10-14 RX ORDER — NIFEDIPINE 30 MG
1 TABLET, EXTENDED RELEASE 24 HR ORAL
Qty: 0 | Refills: 0 | DISCHARGE

## 2021-10-14 RX ORDER — HYDRALAZINE HCL 50 MG
1 TABLET ORAL
Qty: 0 | Refills: 0 | DISCHARGE

## 2021-10-14 RX ORDER — ASPIRIN/CALCIUM CARB/MAGNESIUM 324 MG
81 TABLET ORAL DAILY
Refills: 0 | Status: DISCONTINUED | OUTPATIENT
Start: 2021-10-14 | End: 2021-10-26

## 2021-10-14 RX ORDER — ACETAMINOPHEN 500 MG
975 TABLET ORAL ONCE
Refills: 0 | Status: COMPLETED | OUTPATIENT
Start: 2021-10-14 | End: 2021-10-14

## 2021-10-14 RX ORDER — ACETAMINOPHEN 500 MG
2 TABLET ORAL
Qty: 0 | Refills: 0 | DISCHARGE

## 2021-10-14 RX ORDER — ALPRAZOLAM 0.25 MG
0.5 TABLET ORAL
Qty: 0 | Refills: 0 | DISCHARGE

## 2021-10-14 RX ORDER — CEFTRIAXONE 500 MG/1
1000 INJECTION, POWDER, FOR SOLUTION INTRAMUSCULAR; INTRAVENOUS EVERY 24 HOURS
Refills: 0 | Status: COMPLETED | OUTPATIENT
Start: 2021-10-14 | End: 2021-10-17

## 2021-10-14 RX ADMIN — CEFTRIAXONE 100 MILLIGRAM(S): 500 INJECTION, POWDER, FOR SOLUTION INTRAMUSCULAR; INTRAVENOUS at 16:00

## 2021-10-14 RX ADMIN — Medication 975 MILLIGRAM(S): at 12:20

## 2021-10-14 NOTE — ED PROVIDER NOTE - ATTENDING CONTRIBUTION TO CARE
Attending MD Mejia: I personally have seen and examined this patient.  Resident note reviewed and agree on plan of care and except where noted.  See below for details.    85F with PMH/PSH including HTN, recurrent UTIs, nephrolithiasis s/p previous L stent and stone extraction (10/2019) presents to the ED with     TO BE COMPLETED Attending MD Mejia: I personally have seen and examined this patient.  Resident note reviewed and agree on plan of care and except where noted.  See below for details.    85F with PMH/PSH including HTN, recurrent UTIs, nephrolithiasis s/p previous L stent and stone extraction (10/2019) presents to the Emergency Department s/p fall two days ago, loss of urinary continence.  Reports was bending to  something and fell forward.  Reports unknown head trauma, unknown LOC.  Reports was helped up by daughter and has had R wrist pain and R knee pain since fall.  Reports had loss of urinary continence yesterday, reports no previous episode.  Denies dysuria, frequency, hematuria. Denies abdominal pain, nausea, vomiting, diarrhea, bloody or black stools. Denies chest pain, shortness of breath, palpitations. Denies recent illness, fevers, chills. A ten (10) point review of systems was negative other than as stated in the HPI or elsewhere in the chart.    Exam:   General: NAD  HENT: head NCAT, airway patent with moist mucous membranes  Eyes: no conjunctival injection, EOMI  Lungs: lungs CTAB with good inspiratory effort, no wheezing, no rhonchi, no rales, no crepitus, no ecchymosis, no tenderness to palpation of chest  Cardiac: +S1S2, no m/r/g  GI: abdomen soft with +BS, NT, ND, chaperoned Dr. Amador' rectal, rectal as per resident documentation  : no CVAT  MSK: FROM at neck, no tenderness to midline palpation, no stepoffs along length of spine, +R snuffbox tenderness, pain with axial loading of R thumb  Neuro: moving all extremities spontaneously, sensory grossly intact, no gross neuro deficits, no saddle anesthesia  Skin: no breaks in skin noted    A/P: 85F with fall, ?syncope, R wrist pain, R knee pain, will obtain CT head for ICH, will obtain CXR, R forearm and R wrist XR, will need thumb spica even if XR neg, XR R knee, EKG, labs, UA, UrCx, ?incontinence etiology, ?occult infection, will needs labs for metabolic derangement, EKG

## 2021-10-14 NOTE — H&P ADULT - NSHPPHYSICALEXAM_GEN_ALL_CORE
PHYSICAL EXAMINATION:  Vital Signs Last 24 Hrs  T(C): 36.7 (14 Oct 2021 17:58), Max: 36.8 (14 Oct 2021 10:52)  T(F): 98.1 (14 Oct 2021 17:58), Max: 98.2 (14 Oct 2021 10:52)  HR: 74 (14 Oct 2021 17:58) (61 - 74)  BP: 169/93 (14 Oct 2021 17:58) (164/68 - 180/80)  BP(mean): --  RR: 18 (14 Oct 2021 17:58) (17 - 19)  SpO2: 95% (14 Oct 2021 17:58) (95% - 99%)  CAPILLARY BLOOD GLUCOSE          GENERAL: NAD, well-groomed, well-developed  HEAD:  atraumatic, normocephalic  EYES: sclera anicteric  ENMT: mucous membranes moist  NECK: supple, No JVD  CHEST/LUNG: clear to auscultation bilaterally; no rales, rhonchi, or wheezing b/l  HEART: normal S1, S2  ABDOMEN: BS+, soft, ND, NT   EXTREMITIES:  pulses palpable; no clubbing, cyanosis, or edema b/l LEs R wrist tender  NEURO: awake, alert, interactive; moves all extremities  SKIN: no rashes or lesions

## 2021-10-14 NOTE — ED ADULT NURSE NOTE - OBJECTIVE STATEMENT
Patient   is  alert  and  oriented x3.  Color is  good and  skin warm to touch.   She  tripped  and  fell  2  days  ago.  She  is  c/o  right  wrist and  knee  pain.  No  swelling,  deformity  or  abrasions  noted.

## 2021-10-14 NOTE — H&P ADULT - NSHPSOCIALHISTORY_GEN_ALL_CORE
Social History:    Marital Status:  (   )    (   ) Single    (   )    ( x )   Occupation:   Lives with: (  ) alone  ( x ) children   (  ) spouse   (  ) parents  (  ) other    Substance Use (street drugs): ( x ) never used  (  ) other:  Tobacco Usage:  ( x  ) never smoked   (   ) former smoker   (   ) current smoker  (     ) pack years  (        ) last cigarette date  Alcohol Usage: denies    (     ) Advanced Directives: (     ) None    (      ) DNR    (     ) DNI    (     ) Health Care Proxy:

## 2021-10-14 NOTE — CONSULT NOTE ADULT - SUBJECTIVE AND OBJECTIVE BOX
HPI: A 83 year female poor historian w/ PMHx HTN, left ureteral stent placement on 19 for obstructing calculus, uteroscopy with stone extraction on 10/11 presenting with urinary incontinence, episode was yesterday, full bladder emptying. Pt fell about 2 days ago in the morning when she bent over to  the garbage, no lightheadedness or dizziness prior to episodes, denies head trauma. Pt could not get up, daughter helped her up.  Admits to R wrist/arm pain and R knee pain due to the fall. Pt denies dysuria, headaches, numbness, tinging, weakness, changes in vision or hearing, fever, chills. No hx of strokes. Pt recently completed a course of bactrim. Denies hematuria, syncope, abdominal pain, SOB, chest pain, nausea, vomiting, diarrhea.    Per daughter, who lives with patient, pt has had about 3 falls in the past year, has had been increasingly worse unsteady, wobbly gait. Daughter denies any other episodes of urinary incontinence for patient. Pt saw Dr. Arceo about 3 weeks ago to rule out a possible "stroke" as the pt was thinking she may have had a stroke. Pt seems oriented to everything but seems to have some memory issues.     Daughter [phone]    ROS: A 10-system ROS was performed and is negative except for those items noted above and/or in the HPI.    PAST MEDICAL & SURGICAL HISTORY:  HTN (hypertension)    UTI (urinary tract infection)    H/O abdominal hysterectomy      FAMILY HISTORY:      SOCIAL HISTORY: SOCIAL HISTORY:     Marital Status: (  )   (  ) Single  (  )   (  )      Occupation:      Lives: (  ) alone  (  ) with children   (  ) with spouse  (  ) with parents  (  ) other     Illicit Drug Use: (  ) never used  (  ) other _____     Tobacco Use:  (  ) never smoked  (  ) former smoker  (  ) current smoker  (  ) pack year  (  ) last cigarette date     Alcohol Use:      Sexual History:        MEDICATIONS  Home Medications:  ALPRAZolam 0.5 mg oral tablet: 0.5 milligram(s) orally 2 times a day (11 Oct 2019 08:29)  docusate sodium 100 mg oral capsule: 1 cap(s) orally 2 times a day (11 Oct 2019 08:29)  lactobacillus acidophilus oral capsule:  (11 Oct 2019 08:29)  losartan 100 mg oral tablet: 1 tab(s) orally once a day (11 Oct 2019 08:29)  NIFEdipine 30 mg oral tablet, extended release: 1 tab(s) orally once a day (11 Oct 2019 08:29)  senna oral tablet: 2 tab(s) orally once a day (at bedtime), As needed, Constipation (11 Oct 2019 08:29)  sertraline 50 mg oral tablet: 1 tab(s) orally once a day (11 Oct 2019 08:29)  Tylenol 325 mg oral tablet: 2 tab(s) orally every 6 hours, As needed, Mild Pain (1 - 3) (11 Oct 2019 08:29)      MEDICATIONS  (STANDING):    MEDICATIONS  (PRN):      ALLERGIES/INTOLERANCES:  Allergies  No Known Allergies    Intolerances      OBJECTIVE:  VITALS   Vital Signs Last 24 Hrs  T(C): 36.6 (14 Oct 2021 14:14), Max: 36.8 (14 Oct 2021 10:52)  T(F): 97.9 (14 Oct 2021 14:14), Max: 98.2 (14 Oct 2021 10:52)  HR: 61 (14 Oct 2021 14:14) (61 - 65)  BP: 164/68 (14 Oct 2021 14:14) (164/68 - 180/80)  BP(mean): --  RR: 18 (14 Oct 2021 14:14) (17 - 18)  SpO2: 97% (14 Oct 2021 14:14) (97% - 99%)    PHYSICAL EXAM:  Neurological Exam:  Mental Status: Orientated to self, date and place.  Attention intact.  No dysarthria, aphasia or neglect.  Knowledge intact.  Registration intact.       Cranial Nerves: PERRL, EOMI, VFF, no nystagmus or diplopia.  CN V1-3 intact to light touch.  No facial asymmetry.  Hearing intact.  Tongue midline.  Sternocleidomastoid and Trapezius intact bilaterally.    Motor:   Tone: normal            Strength:     Upper extremity                             Delt       Bicep    Tricep                                               R         5/5        5/5        5/5       5/5                                            L          5/5        5/5        5/5       5/5  Lower extremity                             HF          KE          KF        DF         PF                                            R        5/5        5/5        5/5       5/5       5/5                                            L         5/5        5/5       5/5       5/5        5/5  Pronator drift: none                 Dysmetria: None to finger-nose-finger or heel-shin-heel     Tremor: No resting, postural or action tremor.  No myoclonus.    Sensation: intact to light touch in all extremities    Deep Tendon Reflexes: 2+ bilateral biceps, triceps, brachioradialis, knee and ankle  Toes flexor bilaterally    Gait: wobbly gait    LABORATORY:  CBC                       12.5   9.67  )-----------( 316      ( 14 Oct 2021 12:00 )             42.0     Chem 10-14    144  |  107  |  20  ----------------------------<  97  5.2   |  23  |  0.76    Ca    10.2      14 Oct 2021 12:00    TPro  7.8  /  Alb  4.2  /  TBili  0.7  /  DBili  x   /  AST  48<H>  /  ALT  17  /  AlkPhos  103  10-14    LFTs LIVER FUNCTIONS - ( 14 Oct 2021 12:00 )  Alb: 4.2 g/dL / Pro: 7.8 g/dL / ALK PHOS: 103 U/L / ALT: 17 U/L / AST: 48 U/L / GGT: x           Coagulopathy PT/INR - ( 14 Oct 2021 12:00 )   PT: 12.4 sec;   INR: 1.04 ratio         PTT - ( 14 Oct 2021 12:00 )  PTT:29.7 sec  Lipid Panel   A1c   Cardiac enzymes     U/A Urinalysis Basic - ( 14 Oct 2021 13:34 )    Color: Light Yellow / Appearance: Slightly Turbid / S.022 / pH: x  Gluc: x / Ketone: Small  / Bili: Negative / Urobili: Negative   Blood: x / Protein: 30 mg/dL / Nitrite: Positive   Leuk Esterase: Large / RBC: 7 /hpf /  /HPF   Sq Epi: x / Non Sq Epi: 0 /hpf / Bacteria: Many      CSF  Immunological  Other    STUDIES & IMAGING:  Studies (EKG, EEG, EMG, etc):     Radiology (XR, CT, MR, U/S, TTE/MARYELLEN):  CT head:   1)  chronic ischemic changes with volume loss. However ventricles are prominent suggesting underlying mild hydrocephalus. Further assessment and follow-up recommended..  2)  no intracerebral hemorrhage, contusion, or extracerebral collections are identified.       HPI: A 83 year female poor historian w/ PMHx HTN, left ureteral stent placement on 19 for obstructing calculus, uteroscopy with stone extraction on 10/11 presenting with urinary incontinence, episode was yesterday, full bladder emptying. Pt fell about 2 days ago in the morning when she bent over to  the garbage, no lightheadedness or dizziness prior to episodes, denies head trauma. Pt could not get up, daughter helped her up.  Admits to R wrist/arm pain and R knee pain due to the fall. Pt denies dysuria, headaches, numbness, tinging, weakness, changes in vision or hearing, fever, chills. No hx of strokes. Pt recently completed a course of bactrim. Denies hematuria, syncope, abdominal pain, SOB, chest pain, nausea, vomiting, diarrhea.    Per daughter, who lives with patient, pt has had about 3 falls in the past year, has had been increasingly worse unsteady, wobbly gait. Daughter denies any other episodes of urinary incontinence for patient. Pt saw Dr. Arceo about 3 weeks ago to rule out a possible "stroke" as the pt was thinking she may have had a stroke. Pt seems oriented to everything but seems to have some memory issues.     Daughter [phone]    ROS: A 10-system ROS was performed and is negative except for those items noted above and/or in the HPI.    PAST MEDICAL & SURGICAL HISTORY:  HTN (hypertension)    UTI (urinary tract infection)    H/O abdominal hysterectomy      FAMILY HISTORY:      SOCIAL HISTORY: SOCIAL HISTORY:     Marital Status: (  )   (  ) Single  (  )   (  )      Occupation:      Lives: (  ) alone  (  ) with children   (  ) with spouse  (  ) with parents  (  ) other     Illicit Drug Use: (  ) never used  (  ) other _____     Tobacco Use:  (  ) never smoked  (  ) former smoker  (  ) current smoker  (  ) pack year  (  ) last cigarette date     Alcohol Use:      Sexual History:        MEDICATIONS  Home Medications:  ALPRAZolam 0.5 mg oral tablet: 0.5 milligram(s) orally 2 times a day (11 Oct 2019 08:29)  docusate sodium 100 mg oral capsule: 1 cap(s) orally 2 times a day (11 Oct 2019 08:29)  lactobacillus acidophilus oral capsule:  (11 Oct 2019 08:29)  losartan 100 mg oral tablet: 1 tab(s) orally once a day (11 Oct 2019 08:29)  NIFEdipine 30 mg oral tablet, extended release: 1 tab(s) orally once a day (11 Oct 2019 08:29)  senna oral tablet: 2 tab(s) orally once a day (at bedtime), As needed, Constipation (11 Oct 2019 08:29)  sertraline 50 mg oral tablet: 1 tab(s) orally once a day (11 Oct 2019 08:29)  Tylenol 325 mg oral tablet: 2 tab(s) orally every 6 hours, As needed, Mild Pain (1 - 3) (11 Oct 2019 08:29)      MEDICATIONS  (STANDING):    MEDICATIONS  (PRN):      ALLERGIES/INTOLERANCES:  Allergies  No Known Allergies    Intolerances      OBJECTIVE:  VITALS   Vital Signs Last 24 Hrs  T(C): 36.6 (14 Oct 2021 14:14), Max: 36.8 (14 Oct 2021 10:52)  T(F): 97.9 (14 Oct 2021 14:14), Max: 98.2 (14 Oct 2021 10:52)  HR: 61 (14 Oct 2021 14:14) (61 - 65)  BP: 164/68 (14 Oct 2021 14:14) (164/68 - 180/80)  BP(mean): --  RR: 18 (14 Oct 2021 14:14) (17 - 18)  SpO2: 97% (14 Oct 2021 14:14) (97% - 99%)    PHYSICAL EXAM:  Neurological Exam:  Mental Status: Orientated to self, date and place.  Attention intact.  No dysarthria, aphasia or neglect.  Knowledge intact.  Registration intact.       Cranial Nerves: PERRL, EOMI, VFF, no nystagmus or diplopia.  CN V1-3 intact to light touch.  No facial asymmetry.  Hearing intact.  Tongue midline.  Sternocleidomastoid and Trapezius intact bilaterally.    Motor:   Tone: normal            Strength:     Upper extremity                             Delt       Bicep    Tricep                                               R         5/5        5/5        5/5       5/5                                            L          5/5        5/5        5/5       5/5  Lower extremity                             HF          KE          KF        DF         PF                                            R        5/5        5/5        5/5       5/5       5/5                                            L         5/5        5/5       5/5       5/5        5/5  Pronator drift: none                 Dysmetria: None to finger-nose-finger or heel-shin-heel     Tremor: No resting, postural or action tremor.  No myoclonus.    Sensation: intact to light touch in all extremities    Deep Tendon Reflexes: +3 left knee jerk, +3 R bicep reflex, 2 beats of clonus on left leg  Toes flexor bilaterally    Gait: wobbly gait    LABORATORY:  CBC                       12.5   9.67  )-----------( 316      ( 14 Oct 2021 12:00 )             42.0     Chem 10-14    144  |  107  |  20  ----------------------------<  97  5.2   |  23  |  0.76    Ca    10.2      14 Oct 2021 12:00    TPro  7.8  /  Alb  4.2  /  TBili  0.7  /  DBili  x   /  AST  48<H>  /  ALT  17  /  AlkPhos  103  10-14    LFTs LIVER FUNCTIONS - ( 14 Oct 2021 12:00 )  Alb: 4.2 g/dL / Pro: 7.8 g/dL / ALK PHOS: 103 U/L / ALT: 17 U/L / AST: 48 U/L / GGT: x           Coagulopathy PT/INR - ( 14 Oct 2021 12:00 )   PT: 12.4 sec;   INR: 1.04 ratio         PTT - ( 14 Oct 2021 12:00 )  PTT:29.7 sec  Lipid Panel   A1c   Cardiac enzymes     U/A Urinalysis Basic - ( 14 Oct 2021 13:34 )    Color: Light Yellow / Appearance: Slightly Turbid / S.022 / pH: x  Gluc: x / Ketone: Small  / Bili: Negative / Urobili: Negative   Blood: x / Protein: 30 mg/dL / Nitrite: Positive   Leuk Esterase: Large / RBC: 7 /hpf /  /HPF   Sq Epi: x / Non Sq Epi: 0 /hpf / Bacteria: Many      CSF  Immunological  Other    STUDIES & IMAGING:  Studies (EKG, EEG, EMG, etc):     Radiology (XR, CT, MR, U/S, TTE/MARYELLEN):  CT head:   1)  chronic ischemic changes with volume loss. However ventricles are prominent suggesting underlying mild hydrocephalus. Further assessment and follow-up recommended..  2)  no intracerebral hemorrhage, contusion, or extracerebral collections are identified.       HPI: A 83 year female poor historian w/ PMHx HTN, left ureteral stent placement on 19 for obstructing calculus, uteroscopy with stone extraction on 10/11 presenting with urinary incontinence, episode was yesterday, full bladder emptying. Pt fell about 2 days ago in the morning when she bent over to  the garbage, no lightheadedness or dizziness prior to episodes, denies head trauma. Pt could not get up, daughter helped her up.  Admits to R wrist/arm pain and R knee pain due to the fall. Pt denies dysuria, headaches, numbness, tinging, weakness, changes in vision or hearing, fever, chills. No hx of strokes. Pt recently completed a course of bactrim. Denies hematuria, syncope, abdominal pain, SOB, chest pain, nausea, vomiting, diarrhea.    Per daughter, who lives with patient, pt has had about 3 falls in the past year, has had been increasingly worse unsteady, wobbly gait. Daughter denies any other episodes of urinary incontinence for patient. Pt saw Dr. Arceo about 3 weeks ago to rule out a possible "stroke" as the pt was thinking she may have had a stroke. Pt seems oriented to everything but seems to have some memory issues.     Daughter [phone]    ROS: A 10-system ROS was performed and is negative except for those items noted above and/or in the HPI.    PAST MEDICAL & SURGICAL HISTORY:  HTN (hypertension)    UTI (urinary tract infection)    H/O abdominal hysterectomy      FAMILY HISTORY:      SOCIAL HISTORY: SOCIAL HISTORY:     Marital Status: (  )   (  ) Single  (  )   (  )      Occupation:      Lives: (  ) alone  (  ) with children   (  ) with spouse  (  ) with parents  (  ) other     Illicit Drug Use: (  ) never used  (  ) other _____     Tobacco Use:  (  ) never smoked  (  ) former smoker  (  ) current smoker  (  ) pack year  (  ) last cigarette date     Alcohol Use:      Sexual History:        MEDICATIONS  Home Medications:  ALPRAZolam 0.5 mg oral tablet: 0.5 milligram(s) orally 2 times a day (11 Oct 2019 08:29)  docusate sodium 100 mg oral capsule: 1 cap(s) orally 2 times a day (11 Oct 2019 08:29)  lactobacillus acidophilus oral capsule:  (11 Oct 2019 08:29)  losartan 100 mg oral tablet: 1 tab(s) orally once a day (11 Oct 2019 08:29)  NIFEdipine 30 mg oral tablet, extended release: 1 tab(s) orally once a day (11 Oct 2019 08:29)  senna oral tablet: 2 tab(s) orally once a day (at bedtime), As needed, Constipation (11 Oct 2019 08:29)  sertraline 50 mg oral tablet: 1 tab(s) orally once a day (11 Oct 2019 08:29)  Tylenol 325 mg oral tablet: 2 tab(s) orally every 6 hours, As needed, Mild Pain (1 - 3) (11 Oct 2019 08:29)      MEDICATIONS  (STANDING):    MEDICATIONS  (PRN):      ALLERGIES/INTOLERANCES:  Allergies  No Known Allergies    Intolerances      OBJECTIVE:  VITALS   Vital Signs Last 24 Hrs  T(C): 36.6 (14 Oct 2021 14:14), Max: 36.8 (14 Oct 2021 10:52)  T(F): 97.9 (14 Oct 2021 14:14), Max: 98.2 (14 Oct 2021 10:52)  HR: 61 (14 Oct 2021 14:14) (61 - 65)  BP: 164/68 (14 Oct 2021 14:14) (164/68 - 180/80)  BP(mean): --  RR: 18 (14 Oct 2021 14:14) (17 - 18)  SpO2: 97% (14 Oct 2021 14:14) (97% - 99%)    PHYSICAL EXAM:  Neurological Exam:  Mental Status: Orientated to self, date and place.  Attention intact.  No dysarthria, aphasia or neglect.  Knowledge intact.  Registration intact.       Cranial Nerves: PERRL, EOMI, VFF, no nystagmus or diplopia.  CN V1-3 intact to light touch.  No facial asymmetry.  Hearing intact.  Tongue midline.  Sternocleidomastoid and Trapezius intact bilaterally.    Motor:   Tone: normal            Strength:     Upper extremity                             Delt       Bicep    Tricep                                               R         5/5        5/5        5/5       5/5                                            L          5/5        5/5        5/5       5/5  Lower extremity                             HF          KE          KF        DF         PF                                            R        5/5        5/5        5/5       5/5       5/5                                            L         5/5        5/5       5/5       5/5        5/5  Pronator drift: none                 Dysmetria: None to finger-nose-finger or heel-shin-heel     Tremor: No resting, postural or action tremor.  No myoclonus.    Sensation: intact to light touch in all extremities    Deep Tendon Reflexes: +3 left knee jerk, +3 R bicep reflex, 2 beats of clonus on left leg  Toes flexor bilaterally    Gait: stab    LABORATORY:  CBC                       12.5   9.67  )-----------( 316      ( 14 Oct 2021 12:00 )             42.0     Chem 10-14    144  |  107  |  20  ----------------------------<  97  5.2   |  23  |  0.76    Ca    10.2      14 Oct 2021 12:00    TPro  7.8  /  Alb  4.2  /  TBili  0.7  /  DBili  x   /  AST  48<H>  /  ALT  17  /  AlkPhos  103  10-14    LFTs LIVER FUNCTIONS - ( 14 Oct 2021 12:00 )  Alb: 4.2 g/dL / Pro: 7.8 g/dL / ALK PHOS: 103 U/L / ALT: 17 U/L / AST: 48 U/L / GGT: x           Coagulopathy PT/INR - ( 14 Oct 2021 12:00 )   PT: 12.4 sec;   INR: 1.04 ratio         PTT - ( 14 Oct 2021 12:00 )  PTT:29.7 sec  Lipid Panel   A1c   Cardiac enzymes     U/A Urinalysis Basic - ( 14 Oct 2021 13:34 )    Color: Light Yellow / Appearance: Slightly Turbid / S.022 / pH: x  Gluc: x / Ketone: Small  / Bili: Negative / Urobili: Negative   Blood: x / Protein: 30 mg/dL / Nitrite: Positive   Leuk Esterase: Large / RBC: 7 /hpf /  /HPF   Sq Epi: x / Non Sq Epi: 0 /hpf / Bacteria: Many      CSF  Immunological  Other    STUDIES & IMAGING:  Studies (EKG, EEG, EMG, etc):     Radiology (XR, CT, MR, U/S, TTE/MARYELLEN):  CT head:   1)  chronic ischemic changes with volume loss. However ventricles are prominent suggesting underlying mild hydrocephalus. Further assessment and follow-up recommended..  2)  no intracerebral hemorrhage, contusion, or extracerebral collections are identified.

## 2021-10-14 NOTE — ED PROVIDER NOTE - PHYSICAL EXAMINATION
GENERAL: Awake. Alert. NAD. Well nourished.  HEENT: NC/AT, PERRL, EOMI, Conjunctiva pink, no scleral icterus. Airway patent. Moist mucous membranes.  LUNGS: CTAB. No wheezes or rales noted.  CARDIAC: Chest non-tender to palpation. RRR. S1 and S2 intact. No murmurs noted.  ABDOMEN: No masses noted. Soft, NT, ND, no rebound, no guarding.  : Normal rectal tone, no masses palpated, brown stool present  BACK: No midline spinal tenderness, no CVA tenderness, sensation intact at inner thigh and groin  EXT: Snuff box TTP on right, R distal radius TTP. Sensation intact. No fibular head TTP. Ecchymosis over R wrist. No edema, no calf tenderness, distal pulses 2+ bilaterally, no deformities.  NEURO: A&Ox3. Moving all extremities. Sensation and strength intact throughout.   SKIN: Warm and dry.   PSYCH: Normal affect.

## 2021-10-14 NOTE — ED ADULT TRIAGE NOTE - HEIGHT IN INCHES
1

## 2021-10-14 NOTE — ED PROVIDER NOTE - PROGRESS NOTE DETAILS
Pt daughter number: [phone] CT head show enlarged ventricles suggestive if hydrocephalus, concern for NPH. Neuro consulted will come see patient. Radha Amador DO (PGY1): XR wrist and forearm negative for fracture. Due to snuff box TTP, pt placed in thumb spica splint. Procedure and post procedure care explain, pt expressed understanding. Pre and post splint exam wnl showing no paresthesias, edema. Cap refill <2s Pt resting comfortably, eating lunch. Spoke to Dr. Olsen regarding admission, will admit to Dr. Weaver. Urine positive with WBC and LE, will order 1gm ceftriaxone. Spoke with Dr. Weaver, will admit to him. Attending MD Mejia: Spoke with neuro, Dr. Mercado (neuro attdg) to see patient in AM and give recs

## 2021-10-14 NOTE — ED PROVIDER NOTE - CLINICAL SUMMARY MEDICAL DECISION MAKING FREE TEXT BOX
85F PMH HTN, HLD on aspirin complaining of R wrist and R knee pain s/p unwitnessed fall 2 days ago (unknown head trauma, unknown LOC). Reports nausea yesterday, no vomiting. Reports episode of urinary incontinence yesterday. Denies urinary frequency, dysuria, hematuria, fever, chills. Denies chest pain, SOB, cough, headache, neck pain, back pain.  Normal rectal tone, no saddle anesthesia, no midline TTP. snuffbox tenderness, distal radius TTP. Sensation and distal pulse intact  Mechanical fall vs presyncope, R/o ICH, concern for scaphoid fx vs distal radius fx. R/o UTI  XR, Labs, UA, CT  Reassess 85F PMH HTN, HLD on aspirin complaining of R wrist and R knee pain s/p unwitnessed fall 2 days ago (unknown head trauma, unknown LOC). Reports nausea yesterday, no vomiting. Reports episode of urinary incontinence yesterday. Denies urinary frequency, dysuria, hematuria, fever, chills. Denies chest pain, SOB, cough, headache, neck pain, back pain.  Normal rectal tone, no saddle anesthesia, no midline TTP. snuffbox tenderness, distal radius TTP. Sensation and distal pulse intact  Mechanical fall vs presyncope, R/o ICH, concern for scaphoid fx vs distal radius fx. R/o UTI  XR, Labs, UA, CT, thumb spica splint  Reassess

## 2021-10-14 NOTE — ED PROVIDER NOTE - OBJECTIVE STATEMENT
85F PMH HTN, HLD on aspirin complaining of R wrist and R knee pain s/p unwitnessed fall 2 days ago (unknown head trauma, unknown LOC). Reports nausea yesterday, no vomiting. Reports episode of urinary incontinence yesterday. Denies urinary frequency, dysuria, hematuria, fever, chills. Denies chest pain, SOB, cough, headache, neck pain, back pain.

## 2021-10-14 NOTE — H&P ADULT - NSHPLABSRESULTS_GEN_ALL_CORE
12.5   9.67  )-----------( 316      ( 14 Oct 2021 12:00 )             42.0       10-    144  |  107  |  20  ----------------------------<  97  5.2   |  23  |  0.76    Ca    10.2      14 Oct 2021 12:00    TPro  7.8  /  Alb  4.2  /  TBili  0.7  /  DBili  x   /  AST  48<H>  /  ALT  17  /  AlkPhos  103  10-14              Urinalysis Basic - ( 14 Oct 2021 13:34 )    Color: Light Yellow / Appearance: Slightly Turbid / S.022 / pH: x  Gluc: x / Ketone: Small  / Bili: Negative / Urobili: Negative   Blood: x / Protein: 30 mg/dL / Nitrite: Positive   Leuk Esterase: Large / RBC: 7 /hpf /  /HPF   Sq Epi: x / Non Sq Epi: 0 /hpf / Bacteria: Many        PT/INR - ( 14 Oct 2021 12:00 )   PT: 12.4 sec;   INR: 1.04 ratio         PTT - ( 14 Oct 2021 12:00 )  PTT:29.7 sec    < from: CT Head No Cont (10.14.21 @ 12:12) >      IMPRESSION:    1)  chronic ischemic changes with volume loss. However ventricles are prominent suggesting underlying mild hydrocephalus. Further assessment and follow-up recommended..  2)  no intracerebral hemorrhage, contusion, or extracerebral collections are identified.    < end of copied text >    < from: Xray Knee 3 Views, Right (10.14.21 @ 12:10) >    IMPRESSION:  No fracture, dislocation, or joint effusion.    Faint amorphous intra-articular tibiofemoral chondrocalcinosis. Otherwise preserved joint spaces and no joint margin erosions.    Unremarkablequadriceps and patellar tendon shadows.    Generalized osteopenia otherwise no discrete lytic or blastic lesions.    < end of copied text >    < from: Xray Forearm, Right (10.14.21 @ 12:10) >    IMPRESSION:  Mild dorsal wrist soft tissue swelling. No tracking soft tissue gas collections or radiopaque foreign bodies.    No fractures, dislocations, or osseous or joint deformities, in particular, intact scaphoid.    Amorphous intra-articular calcific deposits in wrist, most conspicuous in TFC region, consistent with chondrocalcinosis, most commonly seen in association with CPPD. Otherwise preserved elbow and imaged wrist joint spaces and no joint margin erosions.    Generalized osteopenia otherwise no discrete lytic or blastic lesions.    < end of copied text >  EKG SR NSST/T

## 2021-10-14 NOTE — H&P ADULT - HISTORY OF PRESENT ILLNESS
85F PMH HTN, HLD on aspirin complaining of R wrist and R knee pain s/p unwitnessed fall 2 days ago (unknown head trauma, unknown LOC). Mechanical fall after trying to  garbage.  Reports nausea yesterday, no vomiting. Reports episode of urinary incontinence yesterday. Denies urinary frequency, dysuria, hematuria, fever, chills. Denies chest pain, SOB, cough, headache, neck pain, back pain.

## 2021-10-14 NOTE — H&P ADULT - NSICDXPASTMEDICALHX_GEN_ALL_CORE_FT
PAST MEDICAL HISTORY:  Anxiety and depression     HTN (hypertension)     UTI (urinary tract infection)

## 2021-10-14 NOTE — CONSULT NOTE ADULT - ATTENDING COMMENTS
Briefly  outpt of mine  83 year female poor historian w/ HTN, left ureteral stent placement on 19 for obstructing calculus, uteroscopy with stone extraction on 10/11 presenting with urinary incontinence, uncontrolled HTN comes with episode was yesterday, full bladder emptying. P  3 falls in past year   patient also depressed brother recently  at age of 91 and she is having hard time dealing with it  incontinence since hysterectomy- not new  CT shows chronic ischemic changes with volume loss. However ventricles are prominent suggesting underlying mild hydrocephalus.     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   possible pseudodementia given brother , incontinence since hysterectomy  - r/o reversible causes dementia. check b12 rpr tsh  - also was recently on zoloft 0- maybe med side effect  - can consider high volume tap if in agreement with family to r/o NPH  - PT/OT  - pain management   - check orthostatics  - check rEEG  - check FS, glucose control <180  - GI/DVT ppx  - Counseling on diet, exercise, and medication adherence was done  - Counseling on smoking cessation and alcohol consumption offered when appropriate.  - Pain assessed and judicious use of narcotics when appropriate was discussed.    - Stroke education given when appropriate.  - Importance of fall prevention discussed.   - Differential diagnosis and plan of care discussed with patient and/or family and primary team  - Thank you for allowing me to participate in the care of this patient. Call with questions.   Seth Arceo MD  Vascular Neurology  Office: [phone] Briefly  outpt of mine  83 year female poor historian w/ HTN, left ureteral stent placement on 19 for obstructing calculus, uteroscopy with stone extraction on 10/11 presenting with urinary incontinence, uncontrolled HTN comes with episode was yesterday, full bladder emptying. P  3 falls in past year   patient also depressed brother recently  at age of 91 and she is having hard time dealing with it  incontinence since hysterectomy- not new  CT shows chronic ischemic changes with volume loss. However ventricles are prominent suggesting underlying mild hydrocephalus.     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 + neck pain and brisk reflexes on neck. possible cervical disease   - check MRI C spine   - r/o reversible causes dementia. check b12 rpr tsh  - also was recently on zoloft 0- maybe med side effect  - can consider high volume tap if in agreement with family to r/o NPH but low yield.   - PT/OT  - pain management   - check orthostatics  - check rEEG  - check FS, glucose control <180  - GI/DVT ppx  - Counseling on diet, exercise, and medication adherence was done  - Counseling on smoking cessation and alcohol consumption offered when appropriate.  - Pain assessed and judicious use of narcotics when appropriate was discussed.    - Stroke education given when appropriate.  - Importance of fall prevention discussed.   - Differential diagnosis and plan of care discussed with patient and/or family and primary team  - Thank you for allowing me to participate in the care of this patient. Call with questions.   Seth Arceo MD  Vascular Neurology  Office: [phone]

## 2021-10-14 NOTE — ED PROVIDER NOTE - CROS ED CONS ALL NEG
negative... Bilateral Helical Rim Advancement Flap Text: The defect edges were debeveled with a #15 blade scalpel.  Given the location of the defect and the proximity to free margins (helical rim) a bilateral helical rim advancement flap was deemed most appropriate.  Using a sterile surgical marker, the appropriate advancement flaps were drawn incorporating the defect and placing the expected incisions between the helical rim and antihelix where possible.  The area thus outlined was incised through and through with a #15 scalpel blade.  With a skin hook and iris scissors, the flaps were gently and sharply undermined and freed up.

## 2021-10-14 NOTE — CONSULT NOTE ADULT - ASSESSMENT
A 83 year female poor historian w/ PMHx HTN, left ureteral stent placement on 9/28/19 for obstructing calculus, uteroscopy with stone extraction on 10/11 presenting with urinary incontinence, episode was yesterday, full bladder emptying. Pt fell about 2 days ago in the morning when she bent over to  the garbage, no lightheadedness or dizziness prior to episodes, denies head trauma. Pt could not get up, daughter helped her up.  Admits to R wrist/arm pain and R knee pain due to the fall. Pt denies dysuria, headaches, numbness, tinging, weakness, changes in vision or hearing, fever, chills. No hx of strokes. Pt recently completed a course of bactrim. Denies hematuria, syncope, abdominal pain, SOB, chest pain, nausea, vomiting, diarrhea.    Per daughter, who lives with patient, pt has had about 3 falls in the past year, has had been increasingly worse unsteady, wobbly gait. Daughter denies any other episodes of urinary incontinence for patient. Pt saw Dr. Arceo about 3 weeks ago to rule out a possible "stroke" as the pt was thinking she may have had a stroke. Pt seems oriented to everything but seems to have some memory issues.     Neuro exam is normal except for unstable wobbly gait. CT shows chronic ischemic changes with volume loss. However ventricles are prominent suggesting underlying mild hydrocephalus.     Impression: wobbly gait, urinary incontinenence may be 2/2 gradual ventricular enlargement/NPH vs. hydrocephalous ex-vacuo    Recommendations:  []Pt will be seen in the morning by the attending and further recommendations will be given.    Case discussed with neurology attending Dr. Mercado.   A 83 year female poor historian w/ PMHx HTN, left ureteral stent placement on 9/28/19 for obstructing calculus, uteroscopy with stone extraction on 10/11 presenting with urinary incontinence, episode was yesterday, full bladder emptying. Pt fell about 2 days ago in the morning when she bent over to  the garbage, no lightheadedness or dizziness prior to episodes, denies head trauma. Pt could not get up, daughter helped her up.  Admits to R wrist/arm pain and R knee pain due to the fall. Pt denies dysuria, headaches, numbness, tinging, weakness, changes in vision or hearing, fever, chills. No hx of strokes. Pt recently completed a course of bactrim. Denies hematuria, syncope, abdominal pain, SOB, chest pain, nausea, vomiting, diarrhea.    Per daughter, who lives with patient, pt has had about 3 falls in the past year, has had been increasingly worse unsteady, wobbly gait. Daughter denies any other episodes of urinary incontinence for patient. Pt saw Dr. Arceo about 3 weeks ago to rule out a possible "stroke" as the pt was thinking she may have had a stroke. Pt seems oriented to everything but seems to have some memory issues.     Neuro exam is normal except for unstable wobbly gait. CT shows chronic ischemic changes with volume loss. However ventricles are prominent suggesting underlying mild hydrocephalus.     Impression: wobbly gait, urinary incontinenence may be 2/2 gradual ventricular enlargement/NPH vs. hydrocephalous ex-vacuo    Recommendations:  []Pt will be seen in the morning by the attending and further recommendations will be given.  []Neuro checks Q4  []If pt has any acute changes in mental status or any other neurological changes in the mean time, please repeat CT head STAT and inform neurology team.     Case discussed with neurology attending Dr. Mercado.   A 83 year female poor historian w/ PMHx HTN, left ureteral stent placement on 9/28/19 for obstructing calculus, uteroscopy with stone extraction on 10/11 presenting with urinary incontinence, episode was yesterday, full bladder emptying. Pt fell about 2 days ago in the morning when she bent over to  the garbage, no lightheadedness or dizziness prior to episodes, denies head trauma. Pt could not get up, daughter helped her up.  Admits to R wrist/arm pain and R knee pain due to the fall. Pt denies dysuria, headaches, numbness, tinging, weakness, changes in vision or hearing, fever, chills. No hx of strokes. Pt recently completed a course of bactrim. Denies hematuria, syncope, abdominal pain, SOB, chest pain, nausea, vomiting, diarrhea.    Per daughter, who lives with patient, pt has had about 3 falls in the past year, has had been increasingly worse unsteady, wobbly gait. Daughter denies any other episodes of urinary incontinence for patient. Pt saw Dr. Arceo about 3 weeks ago to rule out a possible "stroke" as the pt was thinking she may have had a stroke. Pt seems oriented to everything but seems to have some memory issues.     Neuro exam is normal except for unstable wobbly gait. CT shows chronic ischemic changes with volume loss. However ventricles are prominent suggesting underlying mild hydrocephalus.     Impression: wobbly gait, urinary incontinenence may be 2/2 gradual ventricular enlargement/NPH vs. hydrocephalous ex-vacuo    Recommendations:  []Pt will be seen in the morning by the attending and further recommendations will be given.  []Neuro checks Q4  []If pt has any acute changes in mental status or any other neurological changes in the mean time, please repeat CT head STAT and inform neurology team.     Case discussed with neurology attending Dr. Arceo .   A 83 year female poor historian w/ PMHx HTN, left ureteral stent placement on 9/28/19 for obstructing calculus, uteroscopy with stone extraction on 10/11 presenting with urinary incontinence, episode was yesterday, full bladder emptying. Pt fell about 2 days ago in the morning when she bent over to  the garbage, no lightheadedness or dizziness prior to episodes, denies head trauma. Pt could not get up, daughter helped her up.  Admits to R wrist/arm pain and R knee pain due to the fall. Pt denies dysuria, headaches, numbness, tinging, weakness, changes in vision or hearing, fever, chills. No hx of strokes. Pt recently completed a course of bactrim. Denies hematuria, syncope, abdominal pain, SOB, chest pain, nausea, vomiting, diarrhea.    Per daughter, who lives with patient, pt has had about 3 falls in the past year, has had been increasingly worse unsteady, wobbly gait. Daughter denies any other episodes of urinary incontinence for patient. Pt saw Dr. Arceo about 3 weeks ago to rule out a possible "stroke" as the pt was thinking she may have had a stroke. Pt seems oriented to everything but seems to have some memory issues.     Neuro exam is normal except for unstable wobbly gait. CT shows chronic ischemic changes with volume loss. However ventricles are prominent suggesting underlying mild hydrocephalus.     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 +3 left knee jerk, +3 R bicep reflex, 2 beats of clonus on left leg may be 2/2 cervical pathology      Recommendations:  []MRI c spine w and w/o contrast  []Neuro checks Q4  []If pt has any acute changes in mental status or any other neurological changes in the mean time, please repeat CT head STAT and inform neurology team.     Case discussed with neurology attending Dr. Arceo .   A 83 year female poor historian w/ PMHx HTN, left ureteral stent placement on 9/28/19 for obstructing calculus, uteroscopy with stone extraction on 10/11 presenting with urinary incontinence, episode was yesterday, full bladder emptying. Pt fell about 2 days ago in the morning when she bent over to  the garbage, no lightheadedness or dizziness prior to episodes, denies head trauma. Pt could not get up, daughter helped her up.  Admits to R wrist/arm pain and R knee pain due to the fall. Pt denies dysuria, headaches, numbness, tinging, weakness, changes in vision or hearing, fever, chills. No hx of strokes. Pt recently completed a course of bactrim. Denies hematuria, syncope, abdominal pain, SOB, chest pain, nausea, vomiting, diarrhea.    Per daughter, who lives with patient, pt has had about 3 falls in the past year, has had been increasingly worse unsteady, wobbly gait. Daughter denies any other episodes of urinary incontinence for patient. Pt saw Dr. Arceo about 3 weeks ago to rule out a possible "stroke" as the pt was thinking she may have had a stroke. Pt seems oriented to everything but seems to have some memory issues.     Neuro exam is normal except for abnormal reflexes.  CT head shows chronic ischemic changes with volume loss. However ventricles are prominent suggesting underlying mild hydrocephalus.     Impression: +3 left knee jerk, +3 R bicep reflex, 2 beats of clonus on left leg may be 2/2 cervical pathology. Not concerned about NPH as symptoms are not consistent with it.      Recommendations:  []MRI c spine w and w/o contrast  []Neuro checks Q4  []If pt has any acute changes in mental status or any other neurological changes in the mean time, please repeat CT head STAT and inform neurology team.     Case discussed with neurology attending Dr. Arceo .

## 2021-10-15 LAB
ANION GAP SERPL CALC-SCNC: 14 MMOL/L — SIGNIFICANT CHANGE UP (ref 5–17)
BUN SERPL-MCNC: 21 MG/DL — SIGNIFICANT CHANGE UP (ref 7–23)
CALCIUM SERPL-MCNC: 9.2 MG/DL — SIGNIFICANT CHANGE UP (ref 8.4–10.5)
CHLORIDE SERPL-SCNC: 106 MMOL/L — SIGNIFICANT CHANGE UP (ref 96–108)
CO2 SERPL-SCNC: 24 MMOL/L — SIGNIFICANT CHANGE UP (ref 22–31)
COVID-19 SPIKE DOMAIN AB INTERP: POSITIVE
COVID-19 SPIKE DOMAIN ANTIBODY RESULT: >250 U/ML — HIGH
CREAT SERPL-MCNC: 0.85 MG/DL — SIGNIFICANT CHANGE UP (ref 0.5–1.3)
GLUCOSE SERPL-MCNC: 99 MG/DL — SIGNIFICANT CHANGE UP (ref 70–99)
HCT VFR BLD CALC: 38.1 % — SIGNIFICANT CHANGE UP (ref 34.5–45)
HGB BLD-MCNC: 11.4 G/DL — LOW (ref 11.5–15.5)
MCHC RBC-ENTMCNC: 27.7 PG — SIGNIFICANT CHANGE UP (ref 27–34)
MCHC RBC-ENTMCNC: 29.9 GM/DL — LOW (ref 32–36)
MCV RBC AUTO: 92.7 FL — SIGNIFICANT CHANGE UP (ref 80–100)
NRBC # BLD: 0 /100 WBCS — SIGNIFICANT CHANGE UP (ref 0–0)
PLATELET # BLD AUTO: 279 K/UL — SIGNIFICANT CHANGE UP (ref 150–400)
POTASSIUM SERPL-MCNC: 3.5 MMOL/L — SIGNIFICANT CHANGE UP (ref 3.5–5.3)
POTASSIUM SERPL-SCNC: 3.5 MMOL/L — SIGNIFICANT CHANGE UP (ref 3.5–5.3)
RBC # BLD: 4.11 M/UL — SIGNIFICANT CHANGE UP (ref 3.8–5.2)
RBC # FLD: 13.6 % — SIGNIFICANT CHANGE UP (ref 10.3–14.5)
SARS-COV-2 IGG+IGM SERPL QL IA: >250 U/ML — HIGH
SARS-COV-2 IGG+IGM SERPL QL IA: POSITIVE
SODIUM SERPL-SCNC: 143 MMOL/L — SIGNIFICANT CHANGE UP (ref 135–145)
WBC # BLD: 7.7 K/UL — SIGNIFICANT CHANGE UP (ref 3.8–10.5)
WBC # FLD AUTO: 7.7 K/UL — SIGNIFICANT CHANGE UP (ref 3.8–10.5)

## 2021-10-15 RX ORDER — INFLUENZA VIRUS VACCINE 15; 15; 15; 15 UG/.5ML; UG/.5ML; UG/.5ML; UG/.5ML
0.5 SUSPENSION INTRAMUSCULAR ONCE
Refills: 0 | Status: DISCONTINUED | OUTPATIENT
Start: 2021-10-15 | End: 2021-10-15

## 2021-10-15 RX ADMIN — CEFTRIAXONE 100 MILLIGRAM(S): 500 INJECTION, POWDER, FOR SOLUTION INTRAMUSCULAR; INTRAVENOUS at 16:16

## 2021-10-15 RX ADMIN — Medication 10 MILLIGRAM(S): at 17:50

## 2021-10-15 RX ADMIN — ENOXAPARIN SODIUM 40 MILLIGRAM(S): 100 INJECTION SUBCUTANEOUS at 12:30

## 2021-10-15 RX ADMIN — Medication 81 MILLIGRAM(S): at 12:30

## 2021-10-15 RX ADMIN — SERTRALINE 50 MILLIGRAM(S): 25 TABLET, FILM COATED ORAL at 12:30

## 2021-10-15 RX ADMIN — Medication 10 MILLIGRAM(S): at 05:37

## 2021-10-15 NOTE — OCCUPATIONAL THERAPY INITIAL EVALUATION ADULT - PERTINENT HX OF CURRENT PROBLEM, REHAB EVAL
85F PMH HTN, HLD on aspirin complaining of R wrist and R knee pain s/p unwitnessed fall 2 days ago (unknown head trauma, unknown LOC). Mechanical fall after trying to  garbage.  Reports nausea yesterday, no vomiting. Reports episode of urinary incontinence yesterday.

## 2021-10-15 NOTE — PHYSICAL THERAPY INITIAL EVALUATION ADULT - ADDITIONAL COMMENTS
Pt lives in the upstairs apt of a 2 family home with daughter living in ground floor, + handrail on stairs.

## 2021-10-15 NOTE — PHYSICAL THERAPY INITIAL EVALUATION ADULT - ACTIVE RANGE OF MOTION EXAMINATION, REHAB EVAL
R wrist not assessed 2/2 splint/bilateral upper extremity Active ROM was WFL (within functional limits)/bilateral  lower extremity Active ROM was WFL (within functional limits)

## 2021-10-15 NOTE — OCCUPATIONAL THERAPY INITIAL EVALUATION ADULT - ADDITIONAL COMMENTS
X-Ray Forearm/Wrist/Knee (10/14): No fractures  CT Head (10/14): 1) chronic ischemic changes with volume loss. However ventricles are prominent suggesting underlying mild hydrocephalus. Further assessment and follow-up recommended..  2)  no intracerebral hemorrhage, contusion, or extracerebral collections are identified

## 2021-10-15 NOTE — PHYSICAL THERAPY INITIAL EVALUATION ADULT - PERTINENT HX OF CURRENT PROBLEM, REHAB EVAL
85F PMH HTN, HLD on aspirin s/p unwittnessed mechanical fall 2 days ago p/w c/o R wrist and R knee pain(unknown head trauma, unknown LOC). Pt  eports nausea yesterday and episode of urinary incontinence.  CTH: chronic ischemic changes with volume loss. However ventricles are prominent suggesting underlying mild hydrocephalus

## 2021-10-15 NOTE — PHYSICAL THERAPY INITIAL EVALUATION ADULT - SOCIAL CONCERNS
Name:   :   MR#:   Place patient label inside box (if no patient label, complete above)  PROCEDURAL INFORMED [address] / PROCEDURE  1. I (we), Cesarzain Henderson (Patient Name) authorize DR. Aj Goldstein (Provider / Joe Garcia) and/or such assistants as may be selected by him/her, to perform the following operation/procedure(s):  REMOVE STAPLE RIGHT 1ST INTERPHALANGEAL JOINT       Note: If unable to obtain consent prior to an emergent procedure, document the emergent reason in the medical record. This procedure has been explained to my (our) satisfaction and included in the explanation was:   A) The intended benefit, nature, and extent of the procedure to be performed;  B) The significant risks involved and the probability of success;  C) Alternative procedures and methods of treatment;  D) The dangers and probable consequences of such alternatives (including no procedure or treatment); E) The expected consequences of the procedure on my future health;  F) Whether other qualified individuals would be performing important surgical tasks and/or whether  would be present to advise or support the procedure. I (we) understand that there are other risks of infection and other serious complications in the pre-operative/procedural and postoperative/procedural stages of my (our) care. I (we) have asked all of the questions which I (we) thought were important in deciding whether or not to undergo treatment or diagnosis. These questions have been answered to my (our) satisfaction. I (we) understand that no assurance can be given that the procedure will be a success, and no guarantee or warranty of success has been given to me (us). 2. It has been explained to me (us) that during the course of the operation/procedure, unforeseen conditions may be revealed that necessitate extension of the original procedure(s) or different procedure(s) than those set forth in Paragraph 1. I (we) authorize and request that the above-named physician, his/her assistants or his/her designees, perform procedures as necessary and desirable if deemed to be in my (our) best interest.     3. I acknowledge that health care personnel may be observing this procedure for the purpose of medical education or other specified purposes as may be necessary as requested and/or approved by my (our) physician. 4. I (we) consent to the disposal by the hospital Pathologist of the removed tissue, parts or organs in accordance with hospital policy. 5. I do ____ do not ____ consent to the use of a local infiltration pain blocking agent that will be used by my provider/surgical provider to help alleviate pain during my procedure. 6. I do ____ do not ____ consent to an emergent blood transfusion in the case of a life-threatening situation that requires blood components to be administered. This consent is valid for 24 hours from the beginning of the procedure. 7. This patient does ____ or does not ____ currently have a DNR status/order. If DNR order is in place, obtain Addendum to the Surgical Consent for ALL Patients with a DNR Order to address octaviano-operative status for limited intervention or DNR suspension.      8. I have read and fully understand the above Consent for Operation/Procedure and that all blanks were completed before I signed the consent.     ____________________          _____________________          __________/_________am/pm  Signature of Patient or legal representative               Printed Name / Relationship                      Date / Time      ____________________          _____________________          __________/__________am/pm  Witness to Signature                     Printed Name                      Date / Time     (If patient is unable to sign or is a minor, complete the following)  Patient is a minor, ____ years of age, or unable to sign because: ____________________________________________________________________    [facility] If a phone consent is obtained, consent will be documented by using two health care professionals, each affirming that the consenting party has no questions and gives consent for the procedure discussed with the physician/provider.   _____________________          ____________________       __________/__________am/pm   2nd witness to phone consent   Printed name                Date / Time      Informed Consent:  I have provided the explanation described above in section 1 to the patient and/or legal representative. I have provided the patient and/or legal representative with an opportunity to ask any questions about the proposed operation/procedure.   ____________________          ____________________          __________/__________am/pm  Provider / Proceduralist                     Printed name                   Date / Time        This Provider / Proceduralist performing the surgery is ONLY for Office-based procedures in [facility] or Board eligible by one of the [employer] of [facility], the [employer] of [employer] [place] of the [employer], the [employer] of Podiatric Medicine, the [employer] of Foot and Ankle Surgery, or other board as approved by the [facility] for medical staff appointment. Revised 8. 2.2021          page 2 of 2 None

## 2021-10-15 NOTE — PHYSICAL THERAPY INITIAL EVALUATION ADULT - ASR WT BEARING STATUS EVAL
Otherwise preserved elbow and imaged wrist joint spaces and no joint margin erosions. R Knee X Ray: No fracture, dislocation, or joint effusion.Faint amorphous intra-articular tibiofemoral chondrocalcinosis. Otherwise preserved joint spaces and no joint margin erosions.Unremarkable quadriceps and patellar tendon shadows.Generalized osteopenia otherwise no discrete lytic or blastic lesions.

## 2021-10-15 NOTE — PHYSICAL THERAPY INITIAL EVALUATION ADULT - PLANNED THERAPY INTERVENTIONS, PT EVAL
Stairs Goal: pt will go up/down 1 flight of stairs with + handrail  and supervison in 2 weeks/balance training/bed mobility training/gait training/strengthening/transfer training

## 2021-10-15 NOTE — PHYSICAL THERAPY INITIAL EVALUATION ADULT - GENERAL OBSERVATIONS, REHAB EVAL
Pt received supine in bed in NAD, VSS, + Eu IV lock, R wrist and hand ace wrapped, soft splint intact.

## 2021-10-15 NOTE — PHYSICAL THERAPY INITIAL EVALUATION ADULT - PRECAUTIONS/LIMITATIONS, REHAB EVAL
Further assessment and follow-up recommended. no intracerebral hemorrhage, contusion, or extracerebral collections are identified. Neuro consulted for NPH. R Wrist & Forearm XRay: Mild dorsal wrist soft tissue swelling. No tracking soft tissue gas collections or radiopaque foreign bodies.No fractures, dislocations, or osseous or joint deformities, in particular, intact scaphoid.Amorphous intra-articular calcific deposits in wrist, most conspicuous in TFC region, consistent with chondrocalcinosis, most commonly seen in association with CPPD. Further assessment and follow-up recommended. no intracerebral hemorrhage, contusion, or extracerebral collections are identified. Neuro consulted for NPH. R Wrist & Forearm XRay: Mild dorsal wrist soft tissue swelling. No tracking soft tissue gas collections or radiopaque foreign bodies.No fractures, dislocations, or osseous or joint deformities, in particular, intact scaphoid.Amorphous intra-articular calcific deposits in wrist, most conspicuous in TFC region, consistent with chondrocalcinosis, most commonly seen in association with CPPD./fall precautions

## 2021-10-15 NOTE — PROGRESS NOTE ADULT - ASSESSMENT
85 f with    s/p Fall  - PT  - pain control    NPH  - Neurology follow. recommendations pending     UTI  - UC  - Ceftriaxone    HTN  - control    Depression  - continue Rx    DVT prophylaxis     Marvin Weaver MD pager [phone]  85 f with    s/p Fall  - PT  - pain control    NPH  - Neurology follow. recommendations pending   - MRI C Spine pending     UTI  - UC  - Ceftriaxone    HTN  - control    Depression  - continue Rx    DVT prophylaxis     Marvin Weaver MD pager [phone]

## 2021-10-15 NOTE — CONSULT NOTE ADULT - SUBJECTIVE AND OBJECTIVE BOX
CHIEF COMPLAINT: fall    HISTORY OF PRESENT ILLNESS:  85F PMH HTN, HLD on aspirin complaining of R wrist and R knee pain s/p unwitnessed fall 2 days ago (unknown head trauma, unknown LOC). Mechanical fall after trying to  garbage.  Reports nausea yesterday, no vomiting. Reports episode of urinary incontinence yesterday. Denies urinary frequency, dysuria, hematuria, fever, chills. Denies chest pain, SOB, cough, headache, neck pain, back pain.      Allergies    No Known Allergies    Intolerances    	    MEDICATIONS:  aspirin enteric coated 81 milliGRAM(s) Oral daily  enoxaparin Injectable 40 milliGRAM(s) SubCutaneous daily  hydrALAZINE 10 milliGRAM(s) Oral two times a day    cefTRIAXone   IVPB 1000 milliGRAM(s) IV Intermittent every 24 hours      acetaminophen   Tablet .. 650 milliGRAM(s) Oral every 6 hours PRN  oxycodone    5 mG/acetaminophen 325 mG 1 Tablet(s) Oral every 6 hours PRN  sertraline 50 milliGRAM(s) Oral daily            PAST MEDICAL & SURGICAL HISTORY:  HTN (hypertension)    UTI (urinary tract infection)    Anxiety and depression    H/O abdominal hysterectomy        FAMILY HISTORY:      SOCIAL HISTORY:    non smoker. indep in adl      REVIEW OF SYSTEMS:  See HPI, otherwise complete 10 point review of systems negative    [ ] All others negative	    PHYSICAL EXAM:  T(C): 36.5 (10-15-21 @ 04:38), Max: 36.8 (10-14-21 @ 10:52)  HR: 72 (10-15-21 @ 04:38) (61 - 74)  BP: 128/57 (10-15-21 @ 04:38) (128/57 - 180/80)  RR: 17 (10-15-21 @ 04:38) (17 - 19)  SpO2: 96% (10-15-21 @ 04:38) (95% - 99%)  Wt(kg): --  I&O's Summary    14 Oct 2021 07:01  -  15 Oct 2021 07:00  --------------------------------------------------------  IN: 100 mL / OUT: 300 mL / NET: -200 mL        Appearance: No Acute Distress	  HEENT:  Normal oral mucosa, PERRL, EOMI	  Cardiovascular: Normal S1 S2, No JVD, No murmurs/rubs/gallops  Respiratory: Lungs clear to auscultation bilaterally  Gastrointestinal:  Soft, Non-tender, + BS	  Skin: No rashes, No ecchymoses, No cyanosis	  Neurologic: Non-focal  Extremities: No clubbing, cyanosis or edema  Vascular: Peripheral pulses palpable 2+ bilaterally  Psychiatry: A & O x 3, Mood & affect appropriate    Laboratory Data:	 	    CBC Full  -  ( 15 Oct 2021 06:41 )  WBC Count : 7.70 K/uL  Hemoglobin : 11.4 g/dL  Hematocrit : 38.1 %  Platelet Count - Automated : 279 K/uL  Mean Cell Volume : 92.7 fl  Mean Cell Hemoglobin : 27.7 pg  Mean Cell Hemoglobin Concentration : 29.9 gm/dL  Auto Neutrophil # : x  Auto Lymphocyte # : x  Auto Monocyte # : x  Auto Eosinophil # : x  Auto Basophil # : x  Auto Neutrophil % : x  Auto Lymphocyte % : x  Auto Monocyte % : x  Auto Eosinophil % : x  Auto Basophil % : x    10-14    144  |  107  |  20  ----------------------------<  97  5.2   |  23  |  0.76    Ca    10.2      14 Oct 2021 12:00    TPro  7.8  /  Alb  4.2  /  TBili  0.7  /  DBili  x   /  AST  48<H>  /  ALT  17  /  AlkPhos  103  10-14      proBNP:   Lipid Profile:   HgA1c:   TSH:       CARDIAC MARKERS:            Interpretation of Telemetry: 	    ECG:  	nsr no significant stt changes  RADIOLOGY:  OTHER: 	    PREVIOUS DIAGNOSTIC TESTING:    [ ] Echocardiogram:  [ ] Catheterization:  [ ] Stress Test:  	    Assessment:  fall r/o syncope  possible nph  htn  wrist injury    Recs:  cv stable  fall likely mechanical. doubt arrhythmogenic, no need for tele at present. check 2d echo, orthostatics prn  f/u neuro recs re possible nph  pt/ot  pain control  dvt ppx        Greater than 60 minutes spent on total encounter; more than 50% of the visit was spent counseling and/or coordinating care by the attending physician.   	  Ousmane Sandoval MD   Cardiovascular Diseases  [phone]

## 2021-10-16 LAB
ANION GAP SERPL CALC-SCNC: 12 MMOL/L — SIGNIFICANT CHANGE UP (ref 5–17)
BUN SERPL-MCNC: 18 MG/DL — SIGNIFICANT CHANGE UP (ref 7–23)
CALCIUM SERPL-MCNC: 9.2 MG/DL — SIGNIFICANT CHANGE UP (ref 8.4–10.5)
CHLORIDE SERPL-SCNC: 109 MMOL/L — HIGH (ref 96–108)
CO2 SERPL-SCNC: 22 MMOL/L — SIGNIFICANT CHANGE UP (ref 22–31)
CREAT SERPL-MCNC: 0.84 MG/DL — SIGNIFICANT CHANGE UP (ref 0.5–1.3)
CULTURE RESULTS: SIGNIFICANT CHANGE UP
GLUCOSE SERPL-MCNC: 104 MG/DL — HIGH (ref 70–99)
HCT VFR BLD CALC: 36.9 % — SIGNIFICANT CHANGE UP (ref 34.5–45)
HGB BLD-MCNC: 11.1 G/DL — LOW (ref 11.5–15.5)
MCHC RBC-ENTMCNC: 27.6 PG — SIGNIFICANT CHANGE UP (ref 27–34)
MCHC RBC-ENTMCNC: 30.1 GM/DL — LOW (ref 32–36)
MCV RBC AUTO: 91.8 FL — SIGNIFICANT CHANGE UP (ref 80–100)
NRBC # BLD: 0 /100 WBCS — SIGNIFICANT CHANGE UP (ref 0–0)
PLATELET # BLD AUTO: 286 K/UL — SIGNIFICANT CHANGE UP (ref 150–400)
POTASSIUM SERPL-MCNC: 3.5 MMOL/L — SIGNIFICANT CHANGE UP (ref 3.5–5.3)
POTASSIUM SERPL-SCNC: 3.5 MMOL/L — SIGNIFICANT CHANGE UP (ref 3.5–5.3)
RBC # BLD: 4.02 M/UL — SIGNIFICANT CHANGE UP (ref 3.8–5.2)
RBC # FLD: 13.8 % — SIGNIFICANT CHANGE UP (ref 10.3–14.5)
SODIUM SERPL-SCNC: 143 MMOL/L — SIGNIFICANT CHANGE UP (ref 135–145)
SPECIMEN SOURCE: SIGNIFICANT CHANGE UP
WBC # BLD: 9.19 K/UL — SIGNIFICANT CHANGE UP (ref 3.8–10.5)
WBC # FLD AUTO: 9.19 K/UL — SIGNIFICANT CHANGE UP (ref 3.8–10.5)

## 2021-10-16 PROCEDURE — 72141 MRI NECK SPINE W/O DYE: CPT | Mod: 26

## 2021-10-16 RX ADMIN — CEFTRIAXONE 100 MILLIGRAM(S): 500 INJECTION, POWDER, FOR SOLUTION INTRAMUSCULAR; INTRAVENOUS at 16:00

## 2021-10-16 RX ADMIN — Medication 10 MILLIGRAM(S): at 05:59

## 2021-10-16 RX ADMIN — ENOXAPARIN SODIUM 40 MILLIGRAM(S): 100 INJECTION SUBCUTANEOUS at 12:06

## 2021-10-16 RX ADMIN — Medication 10 MILLIGRAM(S): at 18:35

## 2021-10-16 RX ADMIN — Medication 650 MILLIGRAM(S): at 03:45

## 2021-10-16 RX ADMIN — SERTRALINE 50 MILLIGRAM(S): 25 TABLET, FILM COATED ORAL at 12:06

## 2021-10-16 RX ADMIN — Medication 650 MILLIGRAM(S): at 03:04

## 2021-10-16 RX ADMIN — Medication 81 MILLIGRAM(S): at 12:06

## 2021-10-16 NOTE — PROGRESS NOTE ADULT - ASSESSMENT
85 f with    s/p Fall  - PT  - pain control    NPH  - Neurology follow.   - MRI C Spine pending     UTI  - UC  - Ceftriaxone    HTN  - control    Depression  - continue Rx    DVT prophylaxis     DCP rehab    d/w patient and son    Marvin Weaver MD pager [phone]

## 2021-10-16 NOTE — PROGRESS NOTE ADULT - ASSESSMENT
83 year female poor historian w/ HTN, left ureteral stent placement on 19 for obstructing calculus, uteroscopy with stone extraction on 10/11 presenting with urinary incontinence, uncontrolled HTN comes with episode was yesterday, full bladder emptying. P  3 falls in past year   patient also depressed brother recently  at age of 91 and she is having hard time dealing with it  incontinence since hysterectomy- not new  CT shows chronic ischemic changes with volume loss. However ventricles are prominent suggesting underlying mild hydrocephalus.     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 + neck pain and brisk reflexes on neck. possible cervical disease   - check MRI C spine   - r/o reversible causes dementia. check b12 rpr tsh  - also was recently on zoloft - maybe med side effect  - can consider high volume tap if in agreement with family to r/o NPH but low yield.   - PT/OT  - pain management   - check orthostatics  - check rEEG  - check FS, glucose control <180  - GI/DVT ppx  - Counseling on diet, exercise, and medication adherence was done  - Counseling on smoking cessation and alcohol consumption offered when appropriate.  - Pain assessed and judicious use of narcotics when appropriate was discussed.    - Stroke education given when appropriate.  - Importance of fall prevention discussed.   - Differential diagnosis and plan of care discussed with patient and/or family and primary team  - Thank you for allowing me to participate in the care of this patient. Call with questions.   Seth Arceo MD  Vascular Neurology  Office: [phone].

## 2021-10-17 RX ADMIN — SERTRALINE 50 MILLIGRAM(S): 25 TABLET, FILM COATED ORAL at 11:48

## 2021-10-17 RX ADMIN — CEFTRIAXONE 100 MILLIGRAM(S): 500 INJECTION, POWDER, FOR SOLUTION INTRAMUSCULAR; INTRAVENOUS at 15:20

## 2021-10-17 RX ADMIN — Medication 10 MILLIGRAM(S): at 17:23

## 2021-10-17 RX ADMIN — ENOXAPARIN SODIUM 40 MILLIGRAM(S): 100 INJECTION SUBCUTANEOUS at 11:47

## 2021-10-17 RX ADMIN — Medication 10 MILLIGRAM(S): at 05:17

## 2021-10-17 RX ADMIN — Medication 81 MILLIGRAM(S): at 11:47

## 2021-10-17 NOTE — PROGRESS NOTE ADULT - ASSESSMENT
83 year female poor historian w/ HTN, left ureteral stent placement on 19 for obstructing calculus, uteroscopy with stone extraction on 10/11 presenting with urinary incontinence, uncontrolled HTN comes with episode was yesterday, full bladder emptying. P  3 falls in past year   patient also depressed brother recently  at age of 91 and she is having hard time dealing with it  incontinence since hysterectomy- not new  CT shows chronic ischemic changes with volume loss. However ventricles are prominent suggesting underlying mild hydrocephalus.     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 + neck pain and brisk reflexes on neck. possible cervical disease   - check MRI C spine , done. await results   - on ceftriaxone for UTI   - r/o reversible causes dementia. check b12 rpr tsh  - also was recently on zoloft - maybe med side effect  - can consider high volume tap if in agreement with family to r/o NPH but low yield.   - PT/OT  - pain management   - check orthostatics  - check rEEG  - check FS, glucose control <180  - GI/DVT ppx  - Counseling on diet, exercise, and medication adherence was done  - Counseling on smoking cessation and alcohol consumption offered when appropriate.  - Pain assessed and judicious use of narcotics when appropriate was discussed.    - Stroke education given when appropriate.  - Importance of fall prevention discussed.   - Differential diagnosis and plan of care discussed with patient and/or family and primary team  - Thank you for allowing me to participate in the care of this patient. Call with questions.   - d/c planning if stable   Seth Arceo MD  Vascular Neurology  Office: [phone].

## 2021-10-17 NOTE — PROGRESS NOTE ADULT - ASSESSMENT
85 f with    s/p Fall  - PT  - pain control    NPH  - Neurology follow.   - MRI C Spine result pending     UTI  - UC  - Ceftriaxone    HTN  - control    Depression  - continue Rx    DVT prophylaxis     DCP rehab    d/w patient     Marvin Weaver MD pager [phone]

## 2021-10-18 ENCOUNTER — TRANSCRIPTION ENCOUNTER (OUTPATIENT)
Age: 86
End: 2021-10-18

## 2021-10-18 LAB
ANION GAP SERPL CALC-SCNC: 12 MMOL/L — SIGNIFICANT CHANGE UP (ref 5–17)
BUN SERPL-MCNC: 17 MG/DL — SIGNIFICANT CHANGE UP (ref 7–23)
CALCIUM SERPL-MCNC: 10.2 MG/DL — SIGNIFICANT CHANGE UP (ref 8.4–10.5)
CHLORIDE SERPL-SCNC: 101 MMOL/L — SIGNIFICANT CHANGE UP (ref 96–108)
CO2 SERPL-SCNC: 26 MMOL/L — SIGNIFICANT CHANGE UP (ref 22–31)
CREAT SERPL-MCNC: 0.76 MG/DL — SIGNIFICANT CHANGE UP (ref 0.5–1.3)
GLUCOSE SERPL-MCNC: 108 MG/DL — HIGH (ref 70–99)
HCT VFR BLD CALC: 40.4 % — SIGNIFICANT CHANGE UP (ref 34.5–45)
HGB BLD-MCNC: 12.1 G/DL — SIGNIFICANT CHANGE UP (ref 11.5–15.5)
MCHC RBC-ENTMCNC: 27.6 PG — SIGNIFICANT CHANGE UP (ref 27–34)
MCHC RBC-ENTMCNC: 30 GM/DL — LOW (ref 32–36)
MCV RBC AUTO: 92.2 FL — SIGNIFICANT CHANGE UP (ref 80–100)
NRBC # BLD: 0 /100 WBCS — SIGNIFICANT CHANGE UP (ref 0–0)
PLATELET # BLD AUTO: 307 K/UL — SIGNIFICANT CHANGE UP (ref 150–400)
POTASSIUM SERPL-MCNC: 3.5 MMOL/L — SIGNIFICANT CHANGE UP (ref 3.5–5.3)
POTASSIUM SERPL-SCNC: 3.5 MMOL/L — SIGNIFICANT CHANGE UP (ref 3.5–5.3)
RBC # BLD: 4.38 M/UL — SIGNIFICANT CHANGE UP (ref 3.8–5.2)
RBC # FLD: 13.5 % — SIGNIFICANT CHANGE UP (ref 10.3–14.5)
SODIUM SERPL-SCNC: 139 MMOL/L — SIGNIFICANT CHANGE UP (ref 135–145)
WBC # BLD: 11.27 K/UL — HIGH (ref 3.8–10.5)
WBC # FLD AUTO: 11.27 K/UL — HIGH (ref 3.8–10.5)

## 2021-10-18 RX ADMIN — ENOXAPARIN SODIUM 40 MILLIGRAM(S): 100 INJECTION SUBCUTANEOUS at 11:18

## 2021-10-18 RX ADMIN — Medication 81 MILLIGRAM(S): at 11:18

## 2021-10-18 RX ADMIN — Medication 10 MILLIGRAM(S): at 05:01

## 2021-10-18 RX ADMIN — SERTRALINE 50 MILLIGRAM(S): 25 TABLET, FILM COATED ORAL at 11:18

## 2021-10-18 RX ADMIN — Medication 10 MILLIGRAM(S): at 18:10

## 2021-10-18 NOTE — DISCHARGE NOTE PROVIDER - NSDCMRMEDTOKEN_GEN_ALL_CORE_FT
aspirin 81 mg oral delayed release tablet: 2 tab(s) orally once a day  hydrALAZINE 10 mg oral tablet: 1 tab(s) orally 2 times a day  sertraline 50 mg oral tablet: 1 tab(s) orally once a day  Tylenol 500 mg oral tablet: 2 tab(s) orally every 6 hours, As Needed   aspirin 81 mg oral delayed release tablet: 2 tab(s) orally once a day  enoxaparin: 40 milligram(s) subcutaneous once a day  hydrALAZINE 10 mg oral tablet: 1 tab(s) orally 2 times a day  oxycodone-acetaminophen 5 mg-325 mg oral tablet: 1 tab(s) orally every 6 hours, As needed, Moderate Pain (4 - 6)  sertraline 50 mg oral tablet: 1 tab(s) orally once a day  Tylenol 500 mg oral tablet: 2 tab(s) orally every 6 hours, As Needed   aspirin 81 mg oral delayed release tablet: 2 tab(s) orally once a day  hydrALAZINE 10 mg oral tablet: 1 tab(s) orally 2 times a day  oxycodone-acetaminophen 5 mg-325 mg oral tablet: 1 tab(s) orally every 6 hours, As needed, Moderate Pain (4 - 6)  sertraline 50 mg oral tablet: 1 tab(s) orally once a day  Tylenol 500 mg oral tablet: 2 tab(s) orally every 6 hours, As Needed   aspirin 81 mg oral delayed release tablet: 2 tab(s) orally once a day  hydrALAZINE 10 mg oral tablet: 1 tab(s) orally 2 times a day  ipratropium-albuterol 0.5 mg-2.5 mg/3 mL inhalation solution: 3 milliliter(s) inhaled every 6 hours  D/w Dr. Way  - pulmonary   oxycodone-acetaminophen 5 mg-325 mg oral tablet: 1 tab(s) orally every 6 hours, As needed, Moderate Pain (4 - 6)  sertraline 50 mg oral tablet: 1 tab(s) orally once a day  Tylenol 500 mg oral tablet: 2 tab(s) orally every 6 hours, As Needed   aspirin 81 mg oral delayed release tablet: 2 tab(s) orally once a day  benzonatate 100 mg oral capsule: 2 cap(s) orally 3 times a day  budesonide: 0.5 milligram(s) inhaled every 12 hours  guaifenesin-dextromethorphan 100 mg-10 mg/5 mL oral liquid: 10 milliliter(s) orally 4 times a day  hydrALAZINE 10 mg oral tablet: 1 tab(s) orally 2 times a day  ipratropium-albuterol 0.5 mg-2.5 mg/3 mL inhalation solution: 3 milliliter(s) inhaled every 6 hours  D/w Dr. Way  - pulmonary   predniSONE 50 mg oral tablet: 1 tab(s) orally once a day  x 5 more days   sertraline 50 mg oral tablet: 1 tab(s) orally once a day  Tylenol 500 mg oral tablet: 2 tab(s) orally every 6 hours, As Needed

## 2021-10-18 NOTE — DISCHARGE NOTE PROVIDER - NSDCCPCAREPLAN_GEN_ALL_CORE_FT
PRINCIPAL DISCHARGE DIAGNOSIS  Diagnosis: Acute UTI  Assessment and Plan of Treatment: HOME CARE INSTRUCTIONS  f you were prescribed antibiotics, take them exactly as your caregiver instructs you. Finish the medication even if you feel better after you have only taken some of the medication.  Drink enough water and fluids to keep your urine clear or pale yellow.  Avoid caffeine, tea, and carbonated beverages. They tend to irritate your bladder.  Empty your bladder often. Avoid holding urine for long periods of time.  Empty your bladder before and after sexual intercourse.  After a bowel movement, women should cleanse from front to back. Use each tissue only once.  SEEK MEDICAL CARE IF:  You have back pain.  You develop a fever.  Your symptoms do not begin to resolve within 3 days.  SEEK IMMEDIATE MEDICAL CARE IF:  You have severe back pain or lower abdominal pain.  You develop chills.  You have nausea or vomiting.  You have continued burning or discomfort with urination.        SECONDARY DISCHARGE DIAGNOSES  Diagnosis: Fall  Assessment and Plan of Treatment: XR wrist and forearm negative for fracture. Due to snuff box TTP, pt placed in thumb spica splint. Procedure and post procedure care explain, pt expressed understanding. Pre and post splint exam wnl showing no paresthesias, edema. Cap refill <2s.        PRINCIPAL DISCHARGE DIAGNOSIS  Diagnosis: Acute UTI  Assessment and Plan of Treatment: HOME CARE INSTRUCTIONS  f you were prescribed antibiotics, take them exactly as your caregiver instructs you. Finish the medication even if you feel better after you have only taken some of the medication.  Drink enough water and fluids to keep your urine clear or pale yellow.  Avoid caffeine, tea, and carbonated beverages. They tend to irritate your bladder.  Empty your bladder often. Avoid holding urine for long periods of time.  Empty your bladder before and after sexual intercourse.  After a bowel movement, women should cleanse from front to back. Use each tissue only once.  SEEK MEDICAL CARE IF:  You have back pain.  You develop a fever.  Your symptoms do not begin to resolve within 3 days.  SEEK IMMEDIATE MEDICAL CARE IF:  You have severe back pain or lower abdominal pain.  You develop chills.  You have nausea or vomiting.  You have continued burning or discomfort with urination.        SECONDARY DISCHARGE DIAGNOSES  Diagnosis: Fall  Assessment and Plan of Treatment: XR wrist and forearm negative for fracture. Due to snuff box TTP, pt placed in thumb spica splint. Procedure and post procedure care explain, pt expressed understanding. Pre and post splint exam wnl showing no paresthesias, edema. Cap refill <2s.   Please follow up with neurology for EEG as outpatient

## 2021-10-18 NOTE — DISCHARGE NOTE PROVIDER - CARE PROVIDER_API CALL
Polo Sandoval J  CARDIOVASCULAR DISEASE  [address] [place] [address]  Phone: [phone]  Fax: [phone]  Follow Up Time:     Seth Arceo)  Neurology; Vascular Neurology  [address] [facility], [address]  [place] [address]  Phone: [phone]  Fax: [phone]  Follow Up Time:    Polo Sandoval J  CARDIOVASCULAR DISEASE  [address] [place] [address]  Phone: [phone]  Fax: [phone]  Follow Up Time:     Seth Arceo)  Neurology; Vascular Neurology  [address] [facility], [address]  [place] [address]  Phone: [phone]  Fax: [phone]  Follow Up Time:     Meghan Mejia)  Emergency Medicine  Adult  [address] [place] [address]  Phone: [phone]  Fax: [phone]  Follow Up Time:

## 2021-10-18 NOTE — PROGRESS NOTE ADULT - ASSESSMENT
85 f with    s/p Fall  - PT  - pain control    NPH  - Neurology follow.   - EEG as OTP    UTI  - UC  - Ceftriaxone    HTN  - control    Depression  - continue Rx    DVT prophylaxis     DCP rehab in progress    d/w patient     Marvin Weaver MD pager [phone]

## 2021-10-18 NOTE — PROGRESS NOTE ADULT - ASSESSMENT
83 year female poor historian w/ HTN, left ureteral stent placement on 19 for obstructing calculus, uteroscopy with stone extraction on 10/11 presenting with urinary incontinence, uncontrolled HTN comes with episode was yesterday, full bladder emptying. P  3 falls in past year   patient also depressed brother recently  at age of 91 and she is having hard time dealing with it  incontinence since hysterectomy- not new  CT shows chronic ischemic changes with volume loss. However ventricles are prominent suggesting underlying mild hydrocephalus.   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 + neck pain and brisk reflexes on neck. possible cervical disease   - was on ceftriaxone for UTI   - r/o reversible causes dementia. check b12 rpr tsh  - also was recently on zoloft - maybe med side effect  - can consider high volume tap if in agreement with family to r/o NPH but low yield.   - PT/OT  - pain management   - check orthostatics  - check rEEG can be done outpt if ready for discharge   - check FS, glucose control <180  - GI/DVT ppx  - Counseling on diet, exercise, and medication adherence was done  - Counseling on smoking cessation and alcohol consumption offered when appropriate.  - Pain assessed and judicious use of narcotics when appropriate was discussed.    - Stroke education given when appropriate.  - Importance of fall prevention discussed.   - Differential diagnosis and plan of care discussed with patient and/or family and primary team  - Thank you for allowing me to participate in the care of this patient. Call with questions.   - d/c planning if stable   Seth Arceo MD  Vascular Neurology  Office: [phone].

## 2021-10-18 NOTE — DISCHARGE NOTE PROVIDER - PROVIDER TOKENS
PROVIDER:[TOKEN:[[id number]]],PROVIDER:[TOKEN:[[id number]]] PROVIDER:[TOKEN:[[id number]]],PROVIDER:[TOKEN:[[id number]]],PROVIDER:[TOKEN:[[id number]]]

## 2021-10-18 NOTE — DISCHARGE NOTE PROVIDER - HOSPITAL COURSE
85F PMH HTN, HLD on aspirin complaining of R wrist and R knee pain s/p unwitnessed fall 2 days ago (unknown head trauma, unknown LOC). Mechanical fall after trying to  garbage.  Reports nausea yesterday, no vomiting. Reports episode of urinary incontinence yesterday. Denies urinary frequency, dysuria, hematuria, fever, chills. Denies chest pain, SOB, cough, headache, neck pain, back pain.  cv stable  fall likely mechanical. doubt arrhythmogenic, no need for tele at present.  orthostatics prn  patient also depressed brother recently  at age of 91 and she is having hard time dealing with it  incontinence since hysterectomy- not new  CT shows chronic ischemic changes with volume loss. However ventricles are prominent suggesting underlying mild hydrocephalus.     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 + neck pain and brisk reflexes on neck. possible cervical disease    Mild degenerative changes. No significant spinal stenosis. No cord compression.   XR wrist and forearm negative for fracture. Due to snuff box TTP, pt placed in thumb spica splint. Procedure and post procedure care explain, pt expressed understanding. Pre and post splint exam wnl showing no paresthesias, edema. Cap refill <2s. 85F PMH HTN, HLD on aspirin complaining of R wrist and R knee pain s/p unwitnessed fall 2 days ago (unknown head trauma, unknown LOC). Mechanical fall after trying to  garbage.    cv stable  fall likely mechanical. doubt arrhythmogenic, no need for tele at present.  orthostatics prn  patient also depressed brother recently  at age of 91 and she is having hard time dealing with it  incontinence since hysterectomy- not new  CT shows chronic ischemic changes with volume loss. However ventricles are prominent suggesting underlying mild hydrocephalus.     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 + neck pain and brisk reflexes on neck. possible cervical disease    Mild degenerative changes. No significant spinal stenosis. No cord compression.   XR wrist and forearm negative for fracture. Due to snuff box TTP, pt placed in thumb spica splint. Procedure and post procedure care explain, pt expressed understanding. Pre and post splint exam wnl showing no paresthesias, edema. Cap refill <2s.     Discharge/Dispo/med rec discussed with attending. Patient medically cleared for discharge to [facility] with outpatient follow up with PCP and neurology. 85F PMH HTN, HLD on aspirin complaining of R wrist and R knee pain s/p unwitnessed fall 2 days ago (unknown head trauma, unknown LOC). Mechanical fall after trying to  garbage. Patient was admitted for further medical management. Cardiology and neurology were consulted.   cv stable  Fall likely mechanical. doubt arrhythmogenic, no need for tele at present.  orthostatics prn  patient also depressed brother recently  at age of 91 and she is having hard time dealing with it  incontinence since hysterectomy- not new  CT shows chronic ischemic changes with volume loss. However ventricles are prominent suggesting underlying mild hydrocephalus.     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 + neck pain and brisk reflexes on neck. possible cervical disease    Mild degenerative changes. No significant spinal stenosis. No cord compression.   XR wrist and forearm negative for fracture. Due to snuff box TTP, pt placed in thumb spica splint. Procedure and post procedure care explain, pt expressed understanding. Pre and post splint exam wnl showing no paresthesias, edema. Cap refill <2s.   Per neurologist - EEG can be done in outpatient office.     Discharge/Dispo/med rec discussed with attending. Patient medically cleared for discharge to [facility] with outpatient follow up with PCP and neurology. 85F PMH HTN, HLD on aspirin complaining of R wrist and R knee pain s/p unwitnessed fall 2 days ago (unknown head trauma, unknown LOC). Mechanical fall after trying to  garbage. Patient was admitted for further medical management. Cardiology and neurology were consulted.   cv stable    Fall likely mechanical. doubt arrhythmogenic, no need for tele at present.  orthostatics prn  patient also depressed brother recently  at age of 91 and she is having hard time dealing with it  incontinence since hysterectomy- not new  CT shows chronic ischemic changes with volume loss. However ventricles are prominent suggesting underlying mild hydrocephalus.     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 + neck pain and brisk reflexes on neck. possible cervical disease   Mild degenerative changes. No significant spinal stenosis. No cord compression.  XR wrist and forearm negative for fracture. Due to snuff box TTP, pt placed in thumb spica splint. Procedure and post procedure care explain, pt expressed understanding. Pre and post splint exam wnl showing no paresthesias, edema. Cap refill <2s.   Per neurologist - EEG can be done in outpatient office.     Discharge/Dispo/med rec discussed with attending. Patient medically cleared for discharge to [facility] with outpatient follow up with PCP and neurology.

## 2021-10-18 NOTE — DISCHARGE NOTE PROVIDER - NSDCFUADDAPPT_GEN_ALL_CORE_FT
follow up in [facility] re splint removal follow up in [facility] re splint removal    APPTS ARE READY TO BE MADE: [x ] YES    Best Family or Patient Contact (if needed):    Additional Information about above appointments (if needed):    1:   2:   3:     Other comments or requests:    follow up in [facility] re splint removal    APPTS ARE READY TO BE MADE: [x ] YES    Best Family or Patient Contact (if needed):    Additional Information about above appointments (if needed):    1:   2:   3:     Other comments or requests:       Patient is being discharged to rehab. Caregiver will arrange follow up.

## 2021-10-19 LAB
ANION GAP SERPL CALC-SCNC: 15 MMOL/L — SIGNIFICANT CHANGE UP (ref 5–17)
BUN SERPL-MCNC: 19 MG/DL — SIGNIFICANT CHANGE UP (ref 7–23)
CALCIUM SERPL-MCNC: 9.9 MG/DL — SIGNIFICANT CHANGE UP (ref 8.4–10.5)
CHLORIDE SERPL-SCNC: 103 MMOL/L — SIGNIFICANT CHANGE UP (ref 96–108)
CO2 SERPL-SCNC: 22 MMOL/L — SIGNIFICANT CHANGE UP (ref 22–31)
CREAT SERPL-MCNC: 0.71 MG/DL — SIGNIFICANT CHANGE UP (ref 0.5–1.3)
GLUCOSE BLDC GLUCOMTR-MCNC: 101 MG/DL — HIGH (ref 70–99)
GLUCOSE SERPL-MCNC: 115 MG/DL — HIGH (ref 70–99)
HCT VFR BLD CALC: 39.5 % — SIGNIFICANT CHANGE UP (ref 34.5–45)
HGB BLD-MCNC: 12.6 G/DL — SIGNIFICANT CHANGE UP (ref 11.5–15.5)
MCHC RBC-ENTMCNC: 29 PG — SIGNIFICANT CHANGE UP (ref 27–34)
MCHC RBC-ENTMCNC: 31.9 GM/DL — LOW (ref 32–36)
MCV RBC AUTO: 90.8 FL — SIGNIFICANT CHANGE UP (ref 80–100)
NRBC # BLD: 0 /100 WBCS — SIGNIFICANT CHANGE UP (ref 0–0)
PLATELET # BLD AUTO: 315 K/UL — SIGNIFICANT CHANGE UP (ref 150–400)
POTASSIUM SERPL-MCNC: 3.8 MMOL/L — SIGNIFICANT CHANGE UP (ref 3.5–5.3)
POTASSIUM SERPL-SCNC: 3.8 MMOL/L — SIGNIFICANT CHANGE UP (ref 3.5–5.3)
RBC # BLD: 4.35 M/UL — SIGNIFICANT CHANGE UP (ref 3.8–5.2)
RBC # FLD: 13.4 % — SIGNIFICANT CHANGE UP (ref 10.3–14.5)
SARS-COV-2 RNA SPEC QL NAA+PROBE: SIGNIFICANT CHANGE UP
SODIUM SERPL-SCNC: 140 MMOL/L — SIGNIFICANT CHANGE UP (ref 135–145)
WBC # BLD: 12.37 K/UL — HIGH (ref 3.8–10.5)
WBC # FLD AUTO: 12.37 K/UL — HIGH (ref 3.8–10.5)

## 2021-10-19 RX ORDER — ENOXAPARIN SODIUM 100 MG/ML
40 INJECTION SUBCUTANEOUS
Qty: 0 | Refills: 0 | DISCHARGE
Start: 2021-10-19

## 2021-10-19 RX ORDER — CX-024414 0.2 MG/ML
0.5 INJECTION, SUSPENSION INTRAMUSCULAR ONCE
Refills: 0 | Status: COMPLETED | OUTPATIENT
Start: 2021-10-19 | End: 2021-10-19

## 2021-10-19 RX ADMIN — Medication 81 MILLIGRAM(S): at 12:45

## 2021-10-19 RX ADMIN — Medication 650 MILLIGRAM(S): at 05:29

## 2021-10-19 RX ADMIN — Medication 10 MILLIGRAM(S): at 18:07

## 2021-10-19 RX ADMIN — ENOXAPARIN SODIUM 40 MILLIGRAM(S): 100 INJECTION SUBCUTANEOUS at 12:48

## 2021-10-19 RX ADMIN — Medication 650 MILLIGRAM(S): at 05:35

## 2021-10-19 RX ADMIN — Medication 650 MILLIGRAM(S): at 12:45

## 2021-10-19 RX ADMIN — Medication 650 MILLIGRAM(S): at 13:31

## 2021-10-19 RX ADMIN — Medication 10 MILLIGRAM(S): at 05:27

## 2021-10-19 RX ADMIN — SERTRALINE 50 MILLIGRAM(S): 25 TABLET, FILM COATED ORAL at 12:45

## 2021-10-19 NOTE — PROGRESS NOTE ADULT - ASSESSMENT
85 f with    s/p Fall  - PT  - pain control    NPH  - Neurology follow.   - EEG as OTP    UTI  - UC  - s/p Ceftriaxone    HTN  - control    Depression  - continue Rx    DVT prophylaxis     DCP rehab in progress    d/w patient and daughter at length QA    Marvin Weaver MD pager [phone]

## 2021-10-19 NOTE — PROGRESS NOTE ADULT - ASSESSMENT
83 year female poor historian w/ HTN, left ureteral stent placement on 19 for obstructing calculus, uteroscopy with stone extraction on 10/11 presenting with urinary incontinence, uncontrolled HTN comes with episode was yesterday, full bladder emptying. P  3 falls in past year   patient also depressed brother recently  at age of 91 and she is having hard time dealing with it  incontinence since hysterectomy- not new  CT shows chronic ischemic changes with volume loss. However ventricles are prominent suggesting underlying mild hydrocephalus.   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 + neck pain and brisk reflexes on neck. possible cervical disease   - was on ceftriaxone for UTI   - r/o reversible causes dementia. check b12 rpr tsh  - also was recently on zoloft - maybe med side effect  - can consider high volume tap if in agreement with family to r/o NPH but low yield. incontinence since hysterectomy not new.  depressed brother .  would consider in future if not improved. I would hold off   - PT/OT  - pain management   - check orthostatics  - check rEEG can be done outpt if ready for discharge   - check FS, glucose control <180  - GI/DVT ppx  - Counseling on diet, exercise, and medication adherence was done  - Counseling on smoking cessation and alcohol consumption offered when appropriate.  - Pain assessed and judicious use of narcotics when appropriate was discussed.    - Stroke education given when appropriate.  - Importance of fall prevention discussed.   - Differential diagnosis and plan of care discussed with patient and/or family and primary team  - Thank you for allowing me to participate in the care of this patient. Call with questions.   - d/c planning if stable   - spoke with ACP  Seth Arceo MD  Vascular Neurology  Office: [phone].

## 2021-10-20 RX ADMIN — Medication 81 MILLIGRAM(S): at 11:15

## 2021-10-20 RX ADMIN — Medication 10 MILLIGRAM(S): at 18:52

## 2021-10-20 RX ADMIN — ENOXAPARIN SODIUM 40 MILLIGRAM(S): 100 INJECTION SUBCUTANEOUS at 11:15

## 2021-10-20 RX ADMIN — Medication 650 MILLIGRAM(S): at 07:44

## 2021-10-20 RX ADMIN — Medication 650 MILLIGRAM(S): at 08:42

## 2021-10-20 RX ADMIN — SERTRALINE 50 MILLIGRAM(S): 25 TABLET, FILM COATED ORAL at 11:15

## 2021-10-20 RX ADMIN — Medication 650 MILLIGRAM(S): at 18:10

## 2021-10-20 RX ADMIN — Medication 650 MILLIGRAM(S): at 16:00

## 2021-10-20 RX ADMIN — Medication 10 MILLIGRAM(S): at 05:20

## 2021-10-20 NOTE — PROGRESS NOTE ADULT - ASSESSMENT
83 year female poor historian w/ HTN, left ureteral stent placement on 19 for obstructing calculus, uteroscopy with stone extraction on 10/11 presenting with urinary incontinence, uncontrolled HTN comes with episode was yesterday, full bladder emptying. P  3 falls in past year   patient also depressed brother recently  at age of 91 and she is having hard time dealing with it  incontinence since hysterectomy- not new  CT shows chronic ischemic changes with volume loss. However ventricles are prominent suggesting underlying mild hydrocephalus.   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 + neck pain and brisk reflexes on neck. possible cervical disease   - was on ceftriaxone for UTI   - r/o reversible causes dementia. check b12 rpr tsh  - also was recently on zoloft - maybe med side effect  - can consider high volume tap if in agreement with family to r/o NPH but low yield. incontinence since hysterectomy not new.  depressed brother .  would consider in future if not improved. I would hold off   - PT/OT  - pain management   - check orthostatics - neg   - check rEEG can be done outpt if ready for discharge   - check FS, glucose control <180  - GI/DVT ppx  - Counseling on diet, exercise, and medication adherence was done  - Counseling on smoking cessation and alcohol consumption offered when appropriate.  - Pain assessed and judicious use of narcotics when appropriate was discussed.    - Stroke education given when appropriate.  - Importance of fall prevention discussed.   - Differential diagnosis and plan of care discussed with patient and/or family and primary team  - Thank you for allowing me to participate in the care of this patient. Call with questions.   - d/c planning if stable [facility]   - spoke with ACP  Seth Arceo MD  Vascular Neurology  Office: [phone].

## 2021-10-20 NOTE — PROGRESS NOTE ADULT - ASSESSMENT
85 f with    s/p Fall  - PT  - pain control    NPH  - Neurology follow.   - EEG as OTP  - No LP now    UTI resolved  - UC  - s/p Ceftriaxone    HTN  - control    Depression  - continue Rx    DVT prophylaxis     DCP rehab in progress    Marvin Weaver MD pager [phone]

## 2021-10-21 ENCOUNTER — TRANSCRIPTION ENCOUNTER (OUTPATIENT)
Age: 86
End: 2021-10-21

## 2021-10-21 LAB
BASE EXCESS BLDA CALC-SCNC: 2.4 MMOL/L — SIGNIFICANT CHANGE UP (ref -2–3)
CO2 BLDA-SCNC: 28 MMOL/L — HIGH (ref 19–24)
HCO3 BLDA-SCNC: 26 MMOL/L — SIGNIFICANT CHANGE UP (ref 21–28)
PCO2 BLDA: 38 MMHG — SIGNIFICANT CHANGE UP (ref 32–45)
PH BLDA: 7.45 — SIGNIFICANT CHANGE UP (ref 7.35–7.45)
PO2 BLDA: 99 MMHG — SIGNIFICANT CHANGE UP (ref 83–108)
SAO2 % BLDA: 99.2 % — HIGH (ref 94–98)

## 2021-10-21 PROCEDURE — 71045 X-RAY EXAM CHEST 1 VIEW: CPT | Mod: 26

## 2021-10-21 RX ORDER — CEFTRIAXONE 500 MG/1
1000 INJECTION, POWDER, FOR SOLUTION INTRAMUSCULAR; INTRAVENOUS EVERY 24 HOURS
Refills: 0 | Status: DISCONTINUED | OUTPATIENT
Start: 2021-10-22 | End: 2021-10-23

## 2021-10-21 RX ORDER — IPRATROPIUM/ALBUTEROL SULFATE 18-103MCG
3 AEROSOL WITH ADAPTER (GRAM) INHALATION ONCE
Refills: 0 | Status: COMPLETED | OUTPATIENT
Start: 2021-10-21 | End: 2021-10-21

## 2021-10-21 RX ORDER — CEFTRIAXONE 500 MG/1
INJECTION, POWDER, FOR SOLUTION INTRAMUSCULAR; INTRAVENOUS
Refills: 0 | Status: DISCONTINUED | OUTPATIENT
Start: 2021-10-21 | End: 2021-10-23

## 2021-10-21 RX ORDER — BUDESONIDE, MICRONIZED 100 %
0.5 POWDER (GRAM) MISCELLANEOUS EVERY 12 HOURS
Refills: 0 | Status: DISCONTINUED | OUTPATIENT
Start: 2021-10-21 | End: 2021-10-26

## 2021-10-21 RX ORDER — CEFTRIAXONE 500 MG/1
1000 INJECTION, POWDER, FOR SOLUTION INTRAMUSCULAR; INTRAVENOUS ONCE
Refills: 0 | Status: COMPLETED | OUTPATIENT
Start: 2021-10-21 | End: 2021-10-21

## 2021-10-21 RX ORDER — IPRATROPIUM/ALBUTEROL SULFATE 18-103MCG
3 AEROSOL WITH ADAPTER (GRAM) INHALATION EVERY 6 HOURS
Refills: 0 | Status: DISCONTINUED | OUTPATIENT
Start: 2021-10-21 | End: 2021-10-26

## 2021-10-21 RX ADMIN — Medication 3 MILLILITER(S): at 18:13

## 2021-10-21 RX ADMIN — Medication 81 MILLIGRAM(S): at 11:26

## 2021-10-21 RX ADMIN — Medication 10 MILLIGRAM(S): at 05:19

## 2021-10-21 RX ADMIN — Medication 3 MILLILITER(S): at 14:40

## 2021-10-21 RX ADMIN — Medication 100 MILLIGRAM(S): at 18:13

## 2021-10-21 RX ADMIN — Medication 20 MILLIGRAM(S): at 18:12

## 2021-10-21 RX ADMIN — CEFTRIAXONE 100 MILLIGRAM(S): 500 INJECTION, POWDER, FOR SOLUTION INTRAMUSCULAR; INTRAVENOUS at 18:14

## 2021-10-21 RX ADMIN — ENOXAPARIN SODIUM 40 MILLIGRAM(S): 100 INJECTION SUBCUTANEOUS at 11:25

## 2021-10-21 RX ADMIN — Medication 0.5 MILLIGRAM(S): at 18:13

## 2021-10-21 RX ADMIN — Medication 10 MILLIGRAM(S): at 18:14

## 2021-10-21 RX ADMIN — SERTRALINE 50 MILLIGRAM(S): 25 TABLET, FILM COATED ORAL at 11:26

## 2021-10-21 NOTE — DISCHARGE NOTE NURSING/CASE MANAGEMENT/SOCIAL WORK - NSDCFUADDAPPT_GEN_ALL_CORE_FT
follow up in [facility] re splint removal    APPTS ARE READY TO BE MADE: [x ] YES    Best Family or Patient Contact (if needed):    Additional Information about above appointments (if needed):    1:   2:   3:     Other comments or requests:

## 2021-10-21 NOTE — DISCHARGE NOTE NURSING/CASE MANAGEMENT/SOCIAL WORK - PATIENT PORTAL LINK FT
You can access the FollowMyHealth Patient Portal offered by [facility] by registering at the following website: [email or web address]. By joining [facility]’s FollowMyHealth portal, you will also be able to view your health information using other applications (apps) compatible with our system.

## 2021-10-21 NOTE — CONSULT NOTE ADULT - ASSESSMENT
ASSESSMENT:    83 year old gentlewoman, lifelong non-smoker, with no history of intrinsic lung disease. She has a history of HTN and HLD. She has cognitive impairment and has had recurrent falls. The patient underwent a left ureteral stent placement for an obstructing calculus in 9/2019. Pus was found in the bladder and proximal to the stone. She underwent left ureteroscopy with stone extraction in 10/2019. She was admitted on 10/14 following a fall while trying to  the garbage with right wrist and right knee pain. No significant trauma was seen on radiographs. There was no reported lightheadedness, dizziness or loss of consciousness prior to the fall which is felt to be related to an unsteady gait. She reported episodes of urinary incontinence without dysuria, urinary urgency or urinary frequency and has received ceftriaxone for a possible UTI (urine culture was contaminated). Neurological evaluation has been unremarkable and she is awaiting an EEG in the outpatient setting. She has had no shortness of breath during the admission and saturation was 96% @ 11am. At the time of discharge, the patient was found to have a room air saturation of 82%. She has mild shortness of breath. She has a cough with sputum production. She has chest congestion and wheeze. I suspect that she has developed bronchospasm due to an aspiration pneumonia.     PLAN/RECOMMENDATIONS:    oxygen supplementation to keep saturation greater than 92% using a nasal canula  CTA chest to better evaluate the lung parenchyma and to "rule out" pulmonary embolism  ceftriaxone/doxycycline - blood cultures before antibiotics  solumedrol 20mg IVP q6h  albuterol/atrovent nebs q6h  pulmicort 0.5mg nebs q12h - give after duoneb - rinse mouth after use  cardiology follow-up noted  cardiac meds: ASA/hydralazine  neurology follow-up noted  DVT prophylaxis  physical therapy    Thank you for the courtesy of this referral. Plan of care discussed with the patient and her RN at bedside and with Dr. Meg Way MD, [facility]  [phone]  Pulmonary Medicine

## 2021-10-21 NOTE — PROVIDER CONTACT NOTE (OTHER) - BACKGROUND
pt admitted for Urinary tract infection s/p abx, had episode of hypoxia today needing 2L NC, s/p chest xray

## 2021-10-21 NOTE — PROGRESS NOTE ADULT - ASSESSMENT
85 f with    Possible Pneumonia  - panculture  - Antibiotic Ceftriaxone/ Doxy  - Solumedrol  - Nebs  - Pulmonary evaluation dr. Way  - supplement O2    s/p Fall  - PT  - pain control    NPH  - Neurology follow.   - EEG as OTP  - No LP now    UTI resolved  - UC  - s/p Ceftriaxone    HTN  - control    Depression  - continue Rx    DVT prophylaxis     DCP rehab in progress    Marvin Weaver MD pager [phone]

## 2021-10-21 NOTE — PROVIDER CONTACT NOTE (OTHER) - ACTION/TREATMENT ORDERED:
Pt and RN spoke to daughter Cris over phone pt still refusing, education provided, risks and benefits explained pt still refusing stating she might go tomorrow morning

## 2021-10-21 NOTE — PROGRESS NOTE ADULT - ASSESSMENT
83 year female poor historian w/ HTN, left ureteral stent placement on 19 for obstructing calculus, uteroscopy with stone extraction on 10/11 presenting with urinary incontinence, uncontrolled HTN comes with episode was yesterday, full bladder emptying. P  3 falls in past year   patient also depressed brother recently  at age of 91 and she is having hard time dealing with it  incontinence since hysterectomy- not new  CT shows chronic ischemic changes with volume loss. However ventricles are prominent suggesting underlying mild hydrocephalus.   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 + neck pain and brisk reflexes on neck. possible cervical disease   10/21 more labored breathing today  - monitor resp status , pulmo eval. r/o pe?   - on ceftriaxone   - r/o reversible causes dementia. check b12 rpr tsh  - also was recently on zoloft - maybe med side effect  - can consider high volume tap if in agreement with family to r/o NPH but low yield. incontinence since hysterectomy not new.  depressed brother .  would consider in future if not improved. I would hold off   - PT/OT  - pain management   - check orthostatics - neg   - check rEEG can be done outpt if ready for discharge   - check FS, glucose control <180  - GI/DVT ppx  - Counseling on diet, exercise, and medication adherence was done  - Counseling on smoking cessation and alcohol consumption offered when appropriate.  - Pain assessed and judicious use of narcotics when appropriate was discussed.    - Stroke education given when appropriate.  - Importance of fall prevention discussed.   - Differential diagnosis and plan of care discussed with patient and/or family and primary team  - Thank you for allowing me to participate in the care of this patient. Call with questions.   - d/c planning if stable [facility]   - spoke with ACP  Seth Arceo MD  Vascular Neurology  Office: [phone].

## 2021-10-21 NOTE — CONSULT NOTE ADULT - SUBJECTIVE AND OBJECTIVE BOX
[facility] - [facility] - CONSULT NOTE    HPI: 83 year old gentlewoman, former smoker, with no history of intrinsic lung disease. She has a history of HTN and HLD. She has cognitive impairment and has had recurrent falls. The patient underwent a left ureteral stent placement for an obstructing calculus in 9/2019. Pus was found in the bladder and proximal to the stone. She underwent left ureteroscopy with stone extraction in 10/2019. She was admitted on 10/14 following a fall while trying to  the garbage with right wrist and right knee pain. No significant trauma was seen on radiographs. She reported episodes of urinary incontinence without dysuria, urinary urgency or urinary frequency and has received ceftriaxone for a possible UTI (urine culture was contaminated. Neurological evaluation has been unremarkable and she is awaiting an EEG in the outpatient setting. She had no shortness of breath on room air. She had no cough, sputum production, chest congestion or wheeze. She had no fevers, chills or sweats. She had no chest pain/pressure or palpitations. The patient was found to be hypoxic when walking out of the hospital for discharge.    PMHX:  Dementia  HTN (hypertension)  HLD (hyperlipidemia)  UTI (urinary tract infection)  Anxiety and depression    PSHX:  Abdominal hysterectomy  Ureteral stent placement - left - 2019  Ureteroscopy with stone extraction - left - 2019    FAMILY HISTORY:  no significant past medical history in first degree relatives    SOCIAL HISTORY:    Pulmonary Medications:       Antimicrobials:      Cardiology:  hydrALAZINE 10 milliGRAM(s) Oral two times a day      Other:  aspirin enteric coated 81 milliGRAM(s) Oral daily  coronavirus (EUA) Vaccine (MODERNA) 0.5 milliLiter(s) IntraMuscular once  enoxaparin Injectable 40 milliGRAM(s) SubCutaneous daily  sertraline 50 milliGRAM(s) Oral daily      Prn:  MEDICATIONS  (PRN):  acetaminophen   Tablet .. 650 milliGRAM(s) Oral every 6 hours PRN Temp greater or equal to 38.5C (101.3F), Mild Pain (1 - 3)  oxycodone    5 mG/acetaminophen 325 mG 1 Tablet(s) Oral every 6 hours PRN Moderate Pain (4 - 6)      Allergies    No Known Allergies    HOME MEDICATIONS: see  H & P    REVIEW OF SYSTEMS:  Constitutional: As per HPI  HEENT: Within normal limits  CV: As per HPI  Resp: As per HPI  GI: Within normal limits   : Within normal limits  Musculoskeletal: Within normal limits  Skin: Within normal limits  Neurological: dementia  Psychiatric: Within normal limits  Endocrine: Within normal limits  Hematologic/Lymphatic: Within normal limits  Allergic/Immunologic: Within normal limits    [x] All other systems negative    OBJECTIVE:    I&O's Detail    20 Oct 2021 07:01  -  21 Oct 2021 07:00  --------------------------------------------------------  IN:    Oral Fluid: 840 mL  Total IN: 840 mL    OUT:    Voided (mL): 1400 mL  Total OUT: 1400 mL    Total NET: -560 mL      21 Oct 2021 07:01  -  21 Oct 2021 15:08  --------------------------------------------------------  IN:    Oral Fluid: 240 mL  Total IN: 240 mL    OUT:    Voided (mL): 200 mL  Total OUT: 200 mL    Total NET: 40 mL    PHYSICAL EXAM:  ICU Vital Signs Last 24 Hrs  T(C): 36.4 (21 Oct 2021 11:07), Max: 37.2 (21 Oct 2021 04:52)  T(F): 97.5 (21 Oct 2021 11:07), Max: 98.9 (21 Oct 2021 04:52)  HR: 79 (21 Oct 2021 11:07) (63 - 79)  BP: 155/69 (21 Oct 2021 11:07) (131/71 - 155/69)  BP(mean): --  ABP: --  ABP(mean): --  RR: 18 (21 Oct 2021 11:07) (18 - 18)  SpO2: 95% (21 Oct 2021 11:07) (94% - 96%) on room air -> 85% with ambulation    General: Awake. Alert. Cooperative. No distress. Appears stated age 	  HEENT:   Atraumatic. Normocephalic. Anicteric. Normal oral mucosa. PERRL. EOMI.  Neck: Supple. Trachea midline. Thyroid without enlargement/tenderness/nodules. No carotid bruit. No JVD.	  Cardiovascular: Regular rate and rhythm. S1 S2 normal. No murmurs, rubs or gallops.  Respiratory: Respirations unlabored. Clear to auscultation and percussion bilaterally. No curvature.  Abdomen: Soft. Non-tender. Non-distended. No organomegaly. No masses. Normal bowel sounds.  Extremities: Warm to touch. No clubbing or cyanosis. No pedal edema.  Pulses: 2+ peripheral pulses all extremities.	  Skin: Normal skin color. No rashes or lesions. No ecchymoses. No cyanosis. Warm to touch.  Lymph Nodes: Cervical, supraclavicular and axillary nodes normal  Neurological: Motor and sensory examination equal and normal. A and O x 3  Psychiatry: Appropriate mood and affect.      LABS:      CBC    WBC  12.37 <==, 11.27 <==, 9.19 <==, 7.70 <==    Hemoglobin  12.6 <<==, 12.1 <<==, 11.1 <<==, 11.4 <<==    Hematocrit  39.5 <==, 40.4 <==, 36.9 <==, 38.1 <==    Platelets  315 <==, 307 <==, 286 <==, 279 <==      140  |  103  |  19  ----------------------------<  115<H>    10-19  3.8   |  22  |  0.71    LYTES    sodium  140 <==, 139 <==, 143 <==, 143 <==    potassium   3.8 <==, 3.5 <==, 3.5 <==, 3.5 <==    chloride  103 <==, 101 <==, 109 <==, 106 <==    carbon dioxide  22 <==, 26 <==, 22 <==, 24 <==    =============================================================================================  RENAL FUNCTION:    Creatinine:   0.71  <<==, 0.76  <<==, 0.84  <<==, 0.85  <<==    BUN:   19 <==, 17 <==, 18 <==, 21 <==    ============================================================================================    calcium   9.9 <==, 10.2 <==, 9.2 <==, 9.2 <==    ============================================================================================    MICROBIOLOGY:     COVID-19 PCR . (10.14.21 @ 17:42)   COVID-19 PCR: NotDetec:       Culture - Urine (10.14.21 @ 16:58)   Specimen Source: Clean Catch Clean Catch (Midstream)   Culture Results:   >=3 organisms. Probable collection contamination.   RADIOLOGY:  [x ] Chest radiographs reviewed and interpreted by me    < from: Xray Chest 1 View- PORTABLE-Urgent (10.14.21 @ 12:09) >    EXAM:  XR CHEST PORTABLE URGENT 1V                          PROCEDURE DATE:  10/14/2021      INTERPRETATION:  EXAMINATION: XR CHEST URGENT    CLINICAL INDICATION: Mechanical fall. Concerns for pneumonia and rib fractures.    TECHNIQUE: X-ray single frontal chest.    COMPARISON: Multiple prior chest x-rays most recently dated 9/20/2019..    FINDINGS:  Trachea is midline.  There are no acute bony pathologies.  Cardiomediastinal silhouette cannot be reliably assessed in this projection.  Clear lungs.  There is no pneumothorax or pleural effusion.    IMPRESSION:  Clear lungs.    No visible acute osseous abnormalities.    If clinical concerns for rib fracture is present recommend obtaining dedicated rib series x-rays.    --- End of Report ---    CRISTELA NICK MD; Resident Radiologist  This document has been electronically signed.  LANIE WRIGHT MD; Attending Radiologist  This document has been electronically signed. Oct 15 2021  9:56AM    < end of copied text >  ---------------------------------------------------------------------------------------------------------------  < from: CT Head No Cont (10.14.21 @ 12:12) >    EXAM:  CT BRAIN                          PROCEDURE DATE:  10/14/2021      INTERPRETATION:  INDICATION:  Status post trauma with head injury.   Headache.  TECHNIQUE:  A non contrast 2.5mm axial CT study of the brain was performed from skull base to vertex. Coronal and sagittal reformations were generated from the axial data.  COMPARISON EXAMINATION:  no prior.    FINDINGS:    HEMISPHERES:Chronic ischemic changes are noted in both hemispheres with atrophy. No acute abnormality or hemorrhage noted.  VENTRICLES:  Ventricles are prominent relative to sulcal size and volume loss suggesting an underlying component of hydrocephalus.  POSTERIOR FOSSA:  The brain stem and cerebellum are unremarkable.  No CP angle lesion noted.  EXTRACEREBRAL SPACES:  No subdural or epidural collections are noted.  SKULL BASE AND CALVARIUM:  Appears intact.  No fracture or destructive lesion is identified.  SINUSES AND MASTOIDS:  Clear.  MISCELLANEOUS:  No orbital or suprasellar abnormality noted.    IMPRESSION:    1)  chronic ischemic changes with volume loss. However ventricles are prominent suggesting underlying mild hydrocephalus. Further assessment and follow-up recommended..  2)  no intracerebral hemorrhage, contusion, or extracerebral collections are identified.    --- End of Report ---    DASHA PROCTOR MD; Attending Radiologist  This document has been electronically signed. Oct 14 2021 12:26PM    < end of copied text >  ---------------------------------------------------------------------------------------------------------------  < from: MR Cervical Spine No Cont (10.16.21 @ 21:27) >    EXAM:  MR SPINE CERVICAL                          PROCEDURE DATE:  10/16/2021      INTERPRETATION:  CLINICAL INDICATION: Unsteady gait, fall, urinary incontinence    Magnetic resonance imaging of the cervical spine was carried out with sagittal surface coil imaging from C1 to T6-7 using T1 and fast spin echo T2 weighted images with and without fat saturation technique with axial T1 and fast spin echo T2 weighted imaging on a 1.5 Dyana magnet.    The cervical vertebrae are normal in height and signal intensity. Disc space narrowing is identified at C3-4 through C6-7 consistent with degenerative changes. Small disc osteophyte complexes are present at these levels as well as dorsal ligamentous hypertrophy. There is no significant spinal stenosis. There is no cord compression. There is no marrow edema. There is no acute fracture or dislocation.    IMPRESSION: Mild degenerative changes. No significant spinal stenosis. No cord compression.    --- End of Report ---    CASSIDY LEACH MD; Attending Radiologist  This document has been electronically signed. Oct 17 2021  4:02PM    < end of copied text >  ---------------------------------------------------------------------------------------------------------------           [facility] - [facility] - CONSULT NOTE    HPI: 83 year old gentlewoman, lifelong non-smoker, with no history of intrinsic lung disease. She has a history of HTN and HLD. She has cognitive impairment and has had recurrent falls. The patient underwent a left ureteral stent placement for an obstructing calculus in 9/2019. Pus was found in the bladder and proximal to the stone. She underwent left ureteroscopy with stone extraction in 10/2019. She was admitted on 10/14 following a fall while trying to  the garbage with right wrist and right knee pain. No significant trauma was seen on radiographs. There was no reported lightheadedness, dizziness or loss of consciousness prior to the fall. She reported episodes of urinary incontinence without dysuria, urinary urgency or urinary frequency and has received ceftriaxone for a possible UTI (urine culture was contaminated). Neurological evaluation has been unremarkable and she is awaiting an EEG in the outpatient setting. She has had no shortness of breath during the admission and saturation was 96% @ 11am. Saturation measured by EMS @ 1pm was 82% on room air. She has mild shortness of breath. She has a cough with sputum production. She has chest congestion and wheeze. She has chills. She has no chest pain/pressure or palpitations. Asked to evaluate.    PMHX:  Dementia  HTN (hypertension)  HLD (hyperlipidemia)  UTI (urinary tract infection)  Anxiety and depression    PSHX:  Abdominal hysterectomy  Ureteral stent placement - left - 2019  Ureteroscopy with stone extraction - left - 2019    FAMILY HISTORY:  no significant past medical history in first degree relatives    SOCIAL HISTORY:  lifelong non-smoker;     Pulmonary Medications:       Antimicrobials:      Cardiology:  hydrALAZINE 10 milliGRAM(s) Oral two times a day      Other:  aspirin enteric coated 81 milliGRAM(s) Oral daily  coronavirus (EUA) Vaccine (MODERNA) 0.5 milliLiter(s) IntraMuscular once  enoxaparin Injectable 40 milliGRAM(s) SubCutaneous daily  sertraline 50 milliGRAM(s) Oral daily      Prn:  MEDICATIONS  (PRN):  acetaminophen   Tablet .. 650 milliGRAM(s) Oral every 6 hours PRN Temp greater or equal to 38.5C (101.3F), Mild Pain (1 - 3)  oxycodone    5 mG/acetaminophen 325 mG 1 Tablet(s) Oral every 6 hours PRN Moderate Pain (4 - 6)      Allergies    No Known Allergies    HOME MEDICATIONS: see  H & P    REVIEW OF SYSTEMS:  Constitutional: As per HPI  HEENT: Within normal limits  CV: As per HPI  Resp: As per HPI  GI: Within normal limits   : Within normal limits  Musculoskeletal: Within normal limits  Skin: Within normal limits  Neurological: dementia  Psychiatric: Within normal limits  Endocrine: Within normal limits  Hematologic/Lymphatic: Within normal limits  Allergic/Immunologic: Within normal limits    [x] All other systems negative    OBJECTIVE:    I&O's Detail    20 Oct 2021 07:01  -  21 Oct 2021 07:00  --------------------------------------------------------  IN:    Oral Fluid: 840 mL  Total IN: 840 mL    OUT:    Voided (mL): 1400 mL  Total OUT: 1400 mL    Total NET: -560 mL      21 Oct 2021 07:01  -  21 Oct 2021 15:08  --------------------------------------------------------  IN:    Oral Fluid: 240 mL  Total IN: 240 mL    OUT:    Voided (mL): 200 mL  Total OUT: 200 mL    Total NET: 40 mL    PHYSICAL EXAM:  ICU Vital Signs Last 24 Hrs  T(C): 36.4 (21 Oct 2021 11:07), Max: 37.2 (21 Oct 2021 04:52)  T(F): 97.5 (21 Oct 2021 11:07), Max: 98.9 (21 Oct 2021 04:52)  HR: 79 (21 Oct 2021 11:07) (63 - 79)  BP: 155/69 (21 Oct 2021 11:07) (131/71 - 155/69)  BP(mean): --  ABP: --  ABP(mean): --  RR: 18 (21 Oct 2021 11:07) (18 - 18)  SpO2: 95% (21 Oct 2021 11:07) (94% - 96%) on room air @ 11pm -> 85% on room air @ 1pm    General: Awake. Alert. Confused. Somewhat cooperative. No distress. Appears stated age 	  HEENT: Atraumatic. Bitemporal wasting. Anicteric. Normal oral mucosa. PERRL. EOMI.  Neck: Supple. Trachea midline. Thyroid without enlargement/tenderness/nodules. No carotid bruit. No JVD. Loss of bilateral supraclavicular fat pads.  Cardiovascular: Regular rate and rhythm. S1 S2 normal. No murmurs, rubs or gallops.  Respiratory: Respirations unlabored. Diffuse wheeze. Right lower lung field rales. No curvature.  Abdomen: Soft. Non-tender. Non-distended. No organomegaly. No masses. Normal bowel sounds.  Extremities: Warm to touch. No clubbing or cyanosis. No pedal edema.  Pulses: 2+ peripheral pulses all extremities.	  Skin: Normal skin color. No rashes or lesions. No ecchymoses. No cyanosis. Warm to touch.  Lymph Nodes: Cervical, supraclavicular and axillary nodes normal  Neurological: Moving all extremities. A and O x 1  Psychiatry: Calm      LABS:      CBC    WBC  12.37 <==, 11.27 <==, 9.19 <==, 7.70 <==    Hemoglobin  12.6 <<==, 12.1 <<==, 11.1 <<==, 11.4 <<==    Hematocrit  39.5 <==, 40.4 <==, 36.9 <==, 38.1 <==    Platelets  315 <==, 307 <==, 286 <==, 279 <==      140  |  103  |  19  ----------------------------<  115<H>    10-19  3.8   |  22  |  0.71    LYTES    sodium  140 <==, 139 <==, 143 <==, 143 <==    potassium   3.8 <==, 3.5 <==, 3.5 <==, 3.5 <==    chloride  103 <==, 101 <==, 109 <==, 106 <==    carbon dioxide  22 <==, 26 <==, 22 <==, 24 <==    =============================================================================================  RENAL FUNCTION:    Creatinine:   0.71  <<==, 0.76  <<==, 0.84  <<==, 0.85  <<==    BUN:   19 <==, 17 <==, 18 <==, 21 <==    ============================================================================================    calcium   9.9 <==, 10.2 <==, 9.2 <==, 9.2 <==    ============================================================================================    MICROBIOLOGY:     COVID-19 PCR . (10.14.21 @ 17:42)   COVID-19 PCR: NotDetec:       Culture - Urine (10.14.21 @ 16:58)   Specimen Source: Clean Catch Clean Catch (Midstream)   Culture Results:   >=3 organisms. Probable collection contamination.     RADIOLOGY:  [x ] Chest radiographs reviewed and interpreted by me    CXR 10/21 - "to my eye" - bilateral patchy lower lobe opacities  ---------------------------------------------------------------------------------------------------------------    < from: Xray Chest 1 View- PORTABLE-Urgent (10.14.21 @ 12:09) >    EXAM:  XR CHEST PORTABLE URGENT 1V                          PROCEDURE DATE:  10/14/2021      INTERPRETATION:  EXAMINATION: XR CHEST URGENT    CLINICAL INDICATION: Mechanical fall. Concerns for pneumonia and rib fractures.    TECHNIQUE: X-ray single frontal chest.    COMPARISON: Multiple prior chest x-rays most recently dated 9/20/2019..    FINDINGS:  Trachea is midline.  There are no acute bony pathologies.  Cardiomediastinal silhouette cannot be reliably assessed in this projection.  Clear lungs.  There is no pneumothorax or pleural effusion.    IMPRESSION:  Clear lungs.    No visible acute osseous abnormalities.    If clinical concerns for rib fracture is present recommend obtaining dedicated rib series x-rays.    --- End of Report ---    CRISTELA NICK MD; Resident Radiologist  This document has been electronically signed.  LANIE WRIGHT MD; Attending Radiologist  This document has been electronically signed. Oct 15 2021  9:56AM    < end of copied text >  ---------------------------------------------------------------------------------------------------------------  < from: CT Head No Cont (10.14.21 @ 12:12) >    EXAM:  CT BRAIN                          PROCEDURE DATE:  10/14/2021      INTERPRETATION:  INDICATION:  Status post trauma with head injury.   Headache.  TECHNIQUE:  A non contrast 2.5mm axial CT study of the brain was performed from skull base to vertex. Coronal and sagittal reformations were generated from the axial data.  COMPARISON EXAMINATION:  no prior.    FINDINGS:    HEMISPHERES:Chronic ischemic changes are noted in both hemispheres with atrophy. No acute abnormality or hemorrhage noted.  VENTRICLES:  Ventricles are prominent relative to sulcal size and volume loss suggesting an underlying component of hydrocephalus.  POSTERIOR FOSSA:  The brain stem and cerebellum are unremarkable.  No CP angle lesion noted.  EXTRACEREBRAL SPACES:  No subdural or epidural collections are noted.  SKULL BASE AND CALVARIUM:  Appears intact.  No fracture or destructive lesion is identified.  SINUSES AND MASTOIDS:  Clear.  MISCELLANEOUS:  No orbital or suprasellar abnormality noted.    IMPRESSION:    1)  chronic ischemic changes with volume loss. However ventricles are prominent suggesting underlying mild hydrocephalus. Further assessment and follow-up recommended..  2)  no intracerebral hemorrhage, contusion, or extracerebral collections are identified.    --- End of Report ---    DASHA PROCTOR MD; Attending Radiologist  This document has been electronically signed. Oct 14 2021 12:26PM    < end of copied text >  ---------------------------------------------------------------------------------------------------------------  < from: MR Cervical Spine No Cont (10.16.21 @ 21:27) >    EXAM:  MR SPINE CERVICAL                          PROCEDURE DATE:  10/16/2021      INTERPRETATION:  CLINICAL INDICATION: Unsteady gait, fall, urinary incontinence    Magnetic resonance imaging of the cervical spine was carried out with sagittal surface coil imaging from C1 to T6-7 using T1 and fast spin echo T2 weighted images with and without fat saturation technique with axial T1 and fast spin echo T2 weighted imaging on a 1.5 Dyana magnet.    The cervical vertebrae are normal in height and signal intensity. Disc space narrowing is identified at C3-4 through C6-7 consistent with degenerative changes. Small disc osteophyte complexes are present at these levels as well as dorsal ligamentous hypertrophy. There is no significant spinal stenosis. There is no cord compression. There is no marrow edema. There is no acute fracture or dislocation.    IMPRESSION: Mild degenerative changes. No significant spinal stenosis. No cord compression.    --- End of Report ---    CASSIDY LEACH MD; Attending Radiologist  This document has been electronically signed. Oct 17 2021  4:02PM    < end of copied text >  ---------------------------------------------------------------------------------------------------------------

## 2021-10-22 LAB
ALBUMIN SERPL ELPH-MCNC: 3.4 G/DL — SIGNIFICANT CHANGE UP (ref 3.3–5)
ALP SERPL-CCNC: 85 U/L — SIGNIFICANT CHANGE UP (ref 40–120)
ALT FLD-CCNC: 8 U/L — LOW (ref 10–45)
ANION GAP SERPL CALC-SCNC: 19 MMOL/L — HIGH (ref 5–17)
AST SERPL-CCNC: 12 U/L — SIGNIFICANT CHANGE UP (ref 10–40)
BILIRUB SERPL-MCNC: 0.4 MG/DL — SIGNIFICANT CHANGE UP (ref 0.2–1.2)
BUN SERPL-MCNC: 32 MG/DL — HIGH (ref 7–23)
CALCIUM SERPL-MCNC: 10.3 MG/DL — SIGNIFICANT CHANGE UP (ref 8.4–10.5)
CHLORIDE SERPL-SCNC: 99 MMOL/L — SIGNIFICANT CHANGE UP (ref 96–108)
CO2 SERPL-SCNC: 20 MMOL/L — LOW (ref 22–31)
CREAT SERPL-MCNC: 0.79 MG/DL — SIGNIFICANT CHANGE UP (ref 0.5–1.3)
GLUCOSE SERPL-MCNC: 175 MG/DL — HIGH (ref 70–99)
HCT VFR BLD CALC: 38.9 % — SIGNIFICANT CHANGE UP (ref 34.5–45)
HGB BLD-MCNC: 11.7 G/DL — SIGNIFICANT CHANGE UP (ref 11.5–15.5)
MCHC RBC-ENTMCNC: 27.6 PG — SIGNIFICANT CHANGE UP (ref 27–34)
MCHC RBC-ENTMCNC: 30.1 GM/DL — LOW (ref 32–36)
MCV RBC AUTO: 91.7 FL — SIGNIFICANT CHANGE UP (ref 80–100)
NRBC # BLD: 0 /100 WBCS — SIGNIFICANT CHANGE UP (ref 0–0)
PLATELET # BLD AUTO: 367 K/UL — SIGNIFICANT CHANGE UP (ref 150–400)
POTASSIUM SERPL-MCNC: 3.8 MMOL/L — SIGNIFICANT CHANGE UP (ref 3.5–5.3)
POTASSIUM SERPL-SCNC: 3.8 MMOL/L — SIGNIFICANT CHANGE UP (ref 3.5–5.3)
PROT SERPL-MCNC: 7.3 G/DL — SIGNIFICANT CHANGE UP (ref 6–8.3)
RBC # BLD: 4.24 M/UL — SIGNIFICANT CHANGE UP (ref 3.8–5.2)
RBC # FLD: 13.6 % — SIGNIFICANT CHANGE UP (ref 10.3–14.5)
SODIUM SERPL-SCNC: 138 MMOL/L — SIGNIFICANT CHANGE UP (ref 135–145)
WBC # BLD: 22.9 K/UL — HIGH (ref 3.8–10.5)
WBC # FLD AUTO: 22.9 K/UL — HIGH (ref 3.8–10.5)

## 2021-10-22 PROCEDURE — 71275 CT ANGIOGRAPHY CHEST: CPT | Mod: 26

## 2021-10-22 RX ADMIN — SERTRALINE 50 MILLIGRAM(S): 25 TABLET, FILM COATED ORAL at 11:05

## 2021-10-22 RX ADMIN — Medication 20 MILLIGRAM(S): at 00:51

## 2021-10-22 RX ADMIN — CEFTRIAXONE 100 MILLIGRAM(S): 500 INJECTION, POWDER, FOR SOLUTION INTRAMUSCULAR; INTRAVENOUS at 17:37

## 2021-10-22 RX ADMIN — Medication 100 MILLIGRAM(S): at 06:19

## 2021-10-22 RX ADMIN — Medication 10 MILLIGRAM(S): at 06:18

## 2021-10-22 RX ADMIN — Medication 20 MILLIGRAM(S): at 11:05

## 2021-10-22 RX ADMIN — ENOXAPARIN SODIUM 40 MILLIGRAM(S): 100 INJECTION SUBCUTANEOUS at 11:05

## 2021-10-22 RX ADMIN — Medication 3 MILLILITER(S): at 00:51

## 2021-10-22 RX ADMIN — Medication 50 MILLIGRAM(S): at 17:37

## 2021-10-22 RX ADMIN — Medication 100 MILLIGRAM(S): at 17:37

## 2021-10-22 RX ADMIN — Medication 0.5 MILLIGRAM(S): at 17:37

## 2021-10-22 RX ADMIN — Medication 81 MILLIGRAM(S): at 11:05

## 2021-10-22 RX ADMIN — Medication 0.5 MILLIGRAM(S): at 06:19

## 2021-10-22 RX ADMIN — Medication 3 MILLILITER(S): at 11:05

## 2021-10-22 RX ADMIN — Medication 3 MILLILITER(S): at 23:02

## 2021-10-22 RX ADMIN — Medication 3 MILLILITER(S): at 17:36

## 2021-10-22 RX ADMIN — Medication 3 MILLILITER(S): at 06:18

## 2021-10-22 RX ADMIN — Medication 10 MILLIGRAM(S): at 17:37

## 2021-10-22 RX ADMIN — Medication 20 MILLIGRAM(S): at 06:19

## 2021-10-22 NOTE — PROGRESS NOTE ADULT - ASSESSMENT
85 f with    Possible Pneumonia  - Antibiotic Ceftriaxone/ Doxy  - Solumedrol change to Prednisone  - Nebs  - Pulmonary follow dr. Way  - supplement O2    s/p Fall  - PT  - pain control    NPH  - Neurology follow.   - EEG as OTP  - No LP now    UTI resolved    HTN  - control    Depression  - continue Rx    DVT prophylaxis     DCP rehab in progress    Marvin Weaver MD pager [phone]

## 2021-10-22 NOTE — PROGRESS NOTE ADULT - ASSESSMENT
ASSESSMENT:    83 year old gentlewoman, lifelong non-smoker, with no history of intrinsic lung disease. She has a history of HTN and HLD. She has cognitive impairment and has had recurrent falls. The patient underwent a left ureteral stent placement for an obstructing calculus in 9/2019. Pus was found in the bladder and proximal to the stone. She underwent left ureteroscopy with stone extraction in 10/2019. She was admitted on 10/14 following a fall while trying to  the garbage with right wrist and right knee injuries. No significant trauma was seen on radiographs. There was no reported lightheadedness, dizziness or loss of consciousness prior to the fall which is felt to be related to an unsteady gait. She reported episodes of urinary incontinence without dysuria, urinary urgency or urinary frequency and has received ceftriaxone for a possible UTI (urine culture was contaminated). Neurological evaluation has been unremarkable and she is awaiting an EEG in the outpatient setting. She has had no shortness of breath during the admission and saturation was 96% @ 11am. At the time of discharge, the patient was found to have a room air saturation of 82%. She has mild shortness of breath. She has a cough with sputum production. She has chest congestion and wheeze. I suspect that she has developed bronchospasm due to an aspiration pneumonia.     Leukocytosis likely related to high dose steroids    PLAN/RECOMMENDATIONS:    oxygen supplementation to keep saturation greater than 92% using a nasal canula  CTA chest to better evaluate the lung parenchyma and to "rule out" pulmonary embolism  ceftriaxone/doxycycline - blood cultures before antibiotics  change solumedrol -> prednisone 50mg daily x 5 days  albuterol/atrovent nebs q6h  pulmicort 0.5mg nebs q12h - give after duoneb - rinse mouth after use  cardiology follow-up noted  cardiac meds: ASA/hydralazine  neurology follow-up noted  DVT prophylaxis  physical therapy    Will follow with you. Plan of care discussed with the patient and her RN at bedside and with Dr. Weaver.    Martinez Way MD, [facility]  [phone]  Pulmonary Medicine

## 2021-10-22 NOTE — PROGRESS NOTE ADULT - ASSESSMENT
83 year female poor historian w/ HTN, left ureteral stent placement on 19 for obstructing calculus, uteroscopy with stone extraction on 10/11 presenting with urinary incontinence, uncontrolled HTN comes with episode was yesterday, full bladder emptying. P  3 falls in past year   patient also depressed brother recently  at age of 91 and she is having hard time dealing with it  incontinence since hysterectomy- not new  CT shows chronic ischemic changes with volume loss. However ventricles are prominent suggesting underlying mild hydrocephalus.   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 + neck pain and brisk reflexes on neck. possible cervical disease   10/21 more labored breathing today  10/22 better. dc plan   - monitor resp status , pulmo eval.  recs appreicated   - on ceftriaxone   - r/o reversible causes dementia. check b12 rpr tsh  - also was recently on zoloft - maybe med side effect  - can consider high volume tap if in agreement with family to r/o NPH but low yield. incontinence since hysterectomy not new.  depressed brother .  would consider in future if not improved. I would hold off   - PT/OT  - pain management   - check orthostatics - neg   - check rEEG can be done outpt if ready for discharge   - check FS, glucose control <180  - GI/DVT ppx  - Counseling on diet, exercise, and medication adherence was done  - Counseling on smoking cessation and alcohol consumption offered when appropriate.  - Pain assessed and judicious use of narcotics when appropriate was discussed.    - Stroke education given when appropriate.  - Importance of fall prevention discussed.   - Differential diagnosis and plan of care discussed with patient and/or family and primary team  - Thank you for allowing me to participate in the care of this patient. Call with questions.   - d/c planning if stable ONEAL   - spoke with ACP  Seth Arceo MD  Vascular Neurology  Office: [phone].

## 2021-10-23 LAB
ANION GAP SERPL CALC-SCNC: 18 MMOL/L — HIGH (ref 5–17)
BUN SERPL-MCNC: 55 MG/DL — HIGH (ref 7–23)
CALCIUM SERPL-MCNC: 10.5 MG/DL — SIGNIFICANT CHANGE UP (ref 8.4–10.5)
CHLORIDE SERPL-SCNC: 99 MMOL/L — SIGNIFICANT CHANGE UP (ref 96–108)
CO2 SERPL-SCNC: 20 MMOL/L — LOW (ref 22–31)
CREAT SERPL-MCNC: 1.08 MG/DL — SIGNIFICANT CHANGE UP (ref 0.5–1.3)
GLUCOSE SERPL-MCNC: 177 MG/DL — HIGH (ref 70–99)
HCT VFR BLD CALC: 35.1 % — SIGNIFICANT CHANGE UP (ref 34.5–45)
HGB BLD-MCNC: 11 G/DL — LOW (ref 11.5–15.5)
MCHC RBC-ENTMCNC: 28.5 PG — SIGNIFICANT CHANGE UP (ref 27–34)
MCHC RBC-ENTMCNC: 31.3 GM/DL — LOW (ref 32–36)
MCV RBC AUTO: 90.9 FL — SIGNIFICANT CHANGE UP (ref 80–100)
NRBC # BLD: 0 /100 WBCS — SIGNIFICANT CHANGE UP (ref 0–0)
PLATELET # BLD AUTO: 388 K/UL — SIGNIFICANT CHANGE UP (ref 150–400)
POTASSIUM SERPL-MCNC: 3.7 MMOL/L — SIGNIFICANT CHANGE UP (ref 3.5–5.3)
POTASSIUM SERPL-SCNC: 3.7 MMOL/L — SIGNIFICANT CHANGE UP (ref 3.5–5.3)
RBC # BLD: 3.86 M/UL — SIGNIFICANT CHANGE UP (ref 3.8–5.2)
RBC # FLD: 13.8 % — SIGNIFICANT CHANGE UP (ref 10.3–14.5)
SODIUM SERPL-SCNC: 137 MMOL/L — SIGNIFICANT CHANGE UP (ref 135–145)
WBC # BLD: 23.42 K/UL — HIGH (ref 3.8–10.5)
WBC # FLD AUTO: 23.42 K/UL — HIGH (ref 3.8–10.5)

## 2021-10-23 RX ADMIN — Medication 3 MILLILITER(S): at 11:41

## 2021-10-23 RX ADMIN — Medication 10 MILLIGRAM(S): at 17:36

## 2021-10-23 RX ADMIN — Medication 81 MILLIGRAM(S): at 12:04

## 2021-10-23 RX ADMIN — Medication 3 MILLILITER(S): at 05:12

## 2021-10-23 RX ADMIN — Medication 100 MILLIGRAM(S): at 17:36

## 2021-10-23 RX ADMIN — SERTRALINE 50 MILLIGRAM(S): 25 TABLET, FILM COATED ORAL at 12:04

## 2021-10-23 RX ADMIN — Medication 3 MILLILITER(S): at 17:38

## 2021-10-23 RX ADMIN — Medication 0.5 MILLIGRAM(S): at 05:19

## 2021-10-23 RX ADMIN — Medication 50 MILLIGRAM(S): at 05:12

## 2021-10-23 RX ADMIN — Medication 100 MILLIGRAM(S): at 05:12

## 2021-10-23 RX ADMIN — Medication 650 MILLIGRAM(S): at 12:05

## 2021-10-23 RX ADMIN — Medication 3 MILLILITER(S): at 23:12

## 2021-10-23 RX ADMIN — ENOXAPARIN SODIUM 40 MILLIGRAM(S): 100 INJECTION SUBCUTANEOUS at 12:05

## 2021-10-23 RX ADMIN — Medication 650 MILLIGRAM(S): at 13:00

## 2021-10-23 RX ADMIN — Medication 0.5 MILLIGRAM(S): at 17:38

## 2021-10-23 RX ADMIN — Medication 10 MILLIGRAM(S): at 05:12

## 2021-10-23 NOTE — PROGRESS NOTE ADULT - ASSESSMENT
ASSESSMENT:    83 year old gentlewoman, lifelong non-smoker, with no history of intrinsic lung disease. She has a history of HTN and HLD. She has cognitive impairment and has had recurrent falls. The patient underwent a left ureteral stent placement for an obstructing calculus in 9/2019. Pus was found in the bladder and proximal to the stone. She underwent left ureteroscopy with stone extraction in 10/2019. She was admitted on 10/14 following a fall while trying to  the garbage with right wrist and right knee injuries. No significant trauma was seen on radiographs. There was no reported lightheadedness, dizziness or loss of consciousness prior to the fall which is felt to be related to an unsteady gait. She reported episodes of urinary incontinence without dysuria, urinary urgency or urinary frequency and has received ceftriaxone for a possible UTI (urine culture was contaminated). Neurological evaluation has been unremarkable and she is awaiting an EEG in the outpatient setting. She has had no shortness of breath during the admission and saturation was 96% @ 11am. At the time of discharge, the patient was found to have a room air saturation of 82%. She has mild shortness of breath. She has a cough with sputum production. She has chest congestion and wheeze. I suspect that she has developed bronchospasm due to an aspiration pneumonia. CTA chest is without pulmonary embolism - there is left lower lobe >> right lower lobe and lingula atelectasis - there is no evidence of pneumonia    Leukocytosis likely related to high dose steroids rather than infection    PLAN/RECOMMENDATIONS:    stable oxygenation on room air  incentive spirometry  discontinue ceftriaxone - complete a 5 day course of doxycycline - blood cultures are negative  medrol dose pack  albuterol/atrovent nebs q6h  pulmicort 0.5mg nebs q12h - give after duoneb - rinse mouth after use  cardiology follow-up noted  cardiac meds: ASA/hydralazine  neurology follow-up noted  DVT prophylaxis  physical therapy    Will follow with you. Plan of care discussed with the patient and her RN at bedside and with Dr. Weaver. Discharge planning    Martinez Way MD, [facility]  [phone]  Pulmonary Medicine     ASSESSMENT:    83 year old gentlewoman, lifelong non-smoker, with no history of intrinsic lung disease. She has a history of HTN and HLD. She has cognitive impairment and has had recurrent falls. The patient underwent a left ureteral stent placement for an obstructing calculus in 9/2019. Pus was found in the bladder and proximal to the stone. She underwent left ureteroscopy with stone extraction in 10/2019. She was admitted on 10/14 following a fall while trying to  the garbage with right wrist and right knee injuries. No significant trauma was seen on radiographs. There was no reported lightheadedness, dizziness or loss of consciousness prior to the fall which is felt to be related to an unsteady gait. She reported episodes of urinary incontinence without dysuria, urinary urgency or urinary frequency and has received ceftriaxone for a possible UTI (urine culture was contaminated). Neurological evaluation has been unremarkable and she is awaiting an EEG in the outpatient setting. She has had no shortness of breath during the admission and saturation was 96% @ 11am. At the time of discharge, the patient was found to have a room air saturation of 82%. She has mild shortness of breath. She has a cough with sputum production. She has chest congestion and wheeze. I suspect that she has developed bronchospasm due to an aspiration pneumonia. CTA chest is without pulmonary embolism - there is left lower lobe >> right lower lobe and lingula atelectasis - there is no evidence of pneumonia    Leukocytosis likely related to high dose steroids rather than infection    PLAN/RECOMMENDATIONS:    stable oxygenation on room air  incentive spirometry  discontinue ceftriaxone - complete a 5 day course of doxycycline - blood cultures are negative  prednisone 50mg daily x 5 days  albuterol/atrovent nebs q6h  pulmicort 0.5mg nebs q12h - give after duoneb - rinse mouth after use  cardiology follow-up noted  cardiac meds: ASA/hydralazine  neurology follow-up noted  DVT prophylaxis  physical therapy    Will follow with you. Plan of care discussed with the patient and her RN at bedside and with Dr. Weaver. Discharge planning    Martinez Way MD, [facility]  [phone]  Pulmonary Medicine

## 2021-10-23 NOTE — PROGRESS NOTE ADULT - ASSESSMENT
85 f with    Possible Pneumonia  - Antibiotic DC Ceftriaxone/ Continue Doxy for 5 days  - Solumedrol change to Prednisone  - Nebs  - Pulmonary follow dr. Way  - supplement O2    s/p Fall  - PT  - pain control    NPH  - Neurology follow.   - EEG as OTP  - Daughter interested in LP now    UTI resolved    HTN  - control    Depression  - continue Rx    DVT prophylaxis     DCP rehab in progress    Marvin Weaver MD pager [phone]

## 2021-10-23 NOTE — PROGRESS NOTE ADULT - ASSESSMENT
83 year female poor historian w/ HTN, left ureteral stent placement on 19 for obstructing calculus, uteroscopy with stone extraction on 10/11 presenting with urinary incontinence, uncontrolled HTN comes with episode was yesterday, full bladder emptying. P  3 falls in past year   patient also depressed brother recently  at age of 91 and she is having hard time dealing with it  incontinence since hysterectomy- not new  CT shows chronic ischemic changes with volume loss. However ventricles are prominent suggesting underlying mild hydrocephalus.   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 + neck pain and brisk reflexes on neck. possible cervical disease   10/21 more labored breathing today  10/22 better.  CT chest neg for PE.   - monitor resp status , pulmo eval.  recs appreicated   on NC.  CT chest neg PE       - on ceftriaxone   - r/o reversible causes dementia. check b12 rpr tsh  - also was recently on zoloft - maybe med side effect  - can consider high volume tap if in agreement with family to r/o NPH but low yield. incontinence since hysterectomy not new.  depressed brother .  would consider in future if not improved. I would hold off   - PT/OT  - pain management   - check orthostatics - neg   - check rEEG can be done outpt if ready for discharge   - check FS, glucose control <180  - GI/DVT ppx  - Counseling on diet, exercise, and medication adherence was done  - Counseling on smoking cessation and alcohol consumption offered when appropriate.  - Pain assessed and judicious use of narcotics when appropriate was discussed.    - Stroke education given when appropriate.  - Importance of fall prevention discussed.   - Differential diagnosis and plan of care discussed with patient and/or family and primary team  - Thank you for allowing me to participate in the care of this patient. Call with questions.   - d/c planning if stable ONEAL   - spoke with ACP  Seth Arceo MD  Vascular Neurology  Office: [phone].

## 2021-10-24 LAB
ANION GAP SERPL CALC-SCNC: 14 MMOL/L — SIGNIFICANT CHANGE UP (ref 5–17)
BUN SERPL-MCNC: 58 MG/DL — HIGH (ref 7–23)
CALCIUM SERPL-MCNC: 10.5 MG/DL — SIGNIFICANT CHANGE UP (ref 8.4–10.5)
CHLORIDE SERPL-SCNC: 104 MMOL/L — SIGNIFICANT CHANGE UP (ref 96–108)
CO2 SERPL-SCNC: 22 MMOL/L — SIGNIFICANT CHANGE UP (ref 22–31)
CREAT SERPL-MCNC: 0.97 MG/DL — SIGNIFICANT CHANGE UP (ref 0.5–1.3)
GLUCOSE SERPL-MCNC: 110 MG/DL — HIGH (ref 70–99)
HCT VFR BLD CALC: 34.5 % — SIGNIFICANT CHANGE UP (ref 34.5–45)
HGB BLD-MCNC: 11 G/DL — LOW (ref 11.5–15.5)
MCHC RBC-ENTMCNC: 28.8 PG — SIGNIFICANT CHANGE UP (ref 27–34)
MCHC RBC-ENTMCNC: 31.9 GM/DL — LOW (ref 32–36)
MCV RBC AUTO: 90.3 FL — SIGNIFICANT CHANGE UP (ref 80–100)
NRBC # BLD: 0 /100 WBCS — SIGNIFICANT CHANGE UP (ref 0–0)
PLATELET # BLD AUTO: 399 K/UL — SIGNIFICANT CHANGE UP (ref 150–400)
POTASSIUM SERPL-MCNC: 3.4 MMOL/L — LOW (ref 3.5–5.3)
POTASSIUM SERPL-SCNC: 3.4 MMOL/L — LOW (ref 3.5–5.3)
RBC # BLD: 3.82 M/UL — SIGNIFICANT CHANGE UP (ref 3.8–5.2)
RBC # FLD: 13.7 % — SIGNIFICANT CHANGE UP (ref 10.3–14.5)
SODIUM SERPL-SCNC: 140 MMOL/L — SIGNIFICANT CHANGE UP (ref 135–145)
WBC # BLD: 21.52 K/UL — HIGH (ref 3.8–10.5)
WBC # FLD AUTO: 21.52 K/UL — HIGH (ref 3.8–10.5)

## 2021-10-24 RX ORDER — POTASSIUM CHLORIDE 20 MEQ
20 PACKET (EA) ORAL ONCE
Refills: 0 | Status: COMPLETED | OUTPATIENT
Start: 2021-10-24 | End: 2021-10-24

## 2021-10-24 RX ADMIN — Medication 0.5 MILLIGRAM(S): at 18:01

## 2021-10-24 RX ADMIN — Medication 50 MILLIGRAM(S): at 05:25

## 2021-10-24 RX ADMIN — Medication 10 MILLIGRAM(S): at 18:00

## 2021-10-24 RX ADMIN — Medication 20 MILLIEQUIVALENT(S): at 13:35

## 2021-10-24 RX ADMIN — Medication 100 MILLIGRAM(S): at 18:00

## 2021-10-24 RX ADMIN — Medication 650 MILLIGRAM(S): at 17:59

## 2021-10-24 RX ADMIN — Medication 10 MILLIGRAM(S): at 05:25

## 2021-10-24 RX ADMIN — Medication 3 MILLILITER(S): at 18:01

## 2021-10-24 RX ADMIN — Medication 3 MILLILITER(S): at 13:37

## 2021-10-24 RX ADMIN — Medication 100 MILLIGRAM(S): at 05:25

## 2021-10-24 RX ADMIN — Medication 0.5 MILLIGRAM(S): at 05:25

## 2021-10-24 RX ADMIN — Medication 81 MILLIGRAM(S): at 13:35

## 2021-10-24 RX ADMIN — SERTRALINE 50 MILLIGRAM(S): 25 TABLET, FILM COATED ORAL at 13:34

## 2021-10-24 RX ADMIN — Medication 3 MILLILITER(S): at 05:25

## 2021-10-24 RX ADMIN — ENOXAPARIN SODIUM 40 MILLIGRAM(S): 100 INJECTION SUBCUTANEOUS at 13:36

## 2021-10-24 NOTE — PROGRESS NOTE ADULT - ASSESSMENT
83 year female poor historian w/ HTN, left ureteral stent placement on 19 for obstructing calculus, uteroscopy with stone extraction on 10/11 presenting with urinary incontinence, uncontrolled HTN comes with episode was yesterday, full bladder emptying. P  3 falls in past year   patient also depressed brother recently  at age of 91 and she is having hard time dealing with it  incontinence since hysterectomy- not new  CT shows chronic ischemic changes with volume loss. However ventricles are prominent suggesting underlying mild hydrocephalus.   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 + neck pain and brisk reflexes on neck. possible cervical disease   10/21 more labored breathing today  10/22 better.  10/24 improved. no sob   CT chest neg for PE.   - monitor resp status , pulmo eval.  recs appreicated   on NC.  CT chest neg PE, prednisone   - completed ceftriaxone .  - r/o reversible causes dementia. check b12 rpr tsh  - also was recently on zoloft - maybe med side effect  - can consider high volume tap if in agreement with family to r/o NPH but low yield. incontinence since hysterectomy not new.  depressed brother .  would consider in future if not improved. I would hold off   - PT/OT  - pain management   - check orthostatics - neg   - check rEEG can be done outpt if ready for discharge   - check FS, glucose control <180  - GI/DVT ppx  - Counseling on diet, exercise, and medication adherence was done  - Counseling on smoking cessation and alcohol consumption offered when appropriate.  - Pain assessed and judicious use of narcotics when appropriate was discussed.    - Stroke education given when appropriate.  - Importance of fall prevention discussed.   - Differential diagnosis and plan of care discussed with patient and/or family and primary team  - Thank you for allowing me to participate in the care of this patient. Call with questions.   - d/c planning if stable ONEAL   - spoke with ACP  Seth Arceo MD  Vascular Neurology  Office: [phone].

## 2021-10-24 NOTE — PROGRESS NOTE ADULT - ASSESSMENT
85 f with    Possible Pneumonia  - Antibiotic DC Ceftriaxone/ Continue Doxy for 5 days  - Prednisone  - Nebs  - Pulmonary follow dr. Way  - supplement O2    s/p Fall  - PT  - pain control    NPH  - Neurology follow.   - EEG as OTP  - Daughter interested in LP now but Neuro not recommending it now.    UTI resolved    HTN  - control    Depression  - continue Rx    DVT prophylaxis     DCP rehab in progress    Marvin Weaver MD pager [phone]

## 2021-10-24 NOTE — PROGRESS NOTE ADULT - ASSESSMENT
ASSESSMENT:    83 year old gentlewoman, lifelong non-smoker, with no history of intrinsic lung disease. She has a history of HTN and HLD. She has cognitive impairment and has had recurrent falls. The patient underwent a left ureteral stent placement for an obstructing calculus in 9/2019. Pus was found in the bladder and proximal to the stone. She underwent left ureteroscopy with stone extraction in 10/2019. She was admitted on 10/14 following a fall while trying to  the garbage with right wrist and right knee injuries. No fractures were seen on radiographs. There was no reported lightheadedness, dizziness or loss of consciousness prior to the fall which is felt to be related to an unsteady gait. She reported episodes of urinary incontinence without dysuria, urinary urgency or urinary frequency and has received ceftriaxone for a possible UTI (urine culture was contaminated). Neurological evaluation has been unremarkable and she is awaiting an EEG in the outpatient setting. She had no shortness of breath during the admission and saturation was 96% @ 11am awaiting discharge. At the time of discharge, the patient was found to have a room air saturation of 82%. She has mild shortness of breath. She has a cough with sputum production. She has chest congestion and wheeze. I suspect that she has developed bronchospasm due to an aspiration pneumonia. CTA chest is without pulmonary embolism - there is left lower lobe >> right lower lobe and lingula atelectasis - there is no evidence of pneumonia    Leukocytosis likely related to high dose steroids rather than infection    PLAN/RECOMMENDATIONS:    stable oxygenation on room air  incentive spirometry  completing a 5 day course of doxycycline - blood cultures are negative  prednisone 50mg daily x 5 days  albuterol/atrovent nebs q6h  pulmicort 0.5mg nebs q12h - give after duoneb - rinse mouth after use  cardiology follow-up noted  cardiac meds: ASA/hydralazine  neurology follow-up noted  DVT prophylaxis  physical therapy    Will follow with you. Plan of care discussed with the patient and her RN at bedside and with Dr. Weaver.  Discharge planning to subacute rehab hopefully in the next 24 - 48 hours.    Martinez Way MD, [facility]  [phone]  Pulmonary Medicine

## 2021-10-25 LAB
ANION GAP SERPL CALC-SCNC: 17 MMOL/L — SIGNIFICANT CHANGE UP (ref 5–17)
BUN SERPL-MCNC: 49 MG/DL — HIGH (ref 7–23)
CALCIUM SERPL-MCNC: 10.5 MG/DL — SIGNIFICANT CHANGE UP (ref 8.4–10.5)
CHLORIDE SERPL-SCNC: 104 MMOL/L — SIGNIFICANT CHANGE UP (ref 96–108)
CO2 SERPL-SCNC: 21 MMOL/L — LOW (ref 22–31)
CREAT SERPL-MCNC: 0.92 MG/DL — SIGNIFICANT CHANGE UP (ref 0.5–1.3)
GLUCOSE SERPL-MCNC: 71 MG/DL — SIGNIFICANT CHANGE UP (ref 70–99)
HCT VFR BLD CALC: 34 % — LOW (ref 34.5–45)
HGB BLD-MCNC: 10.5 G/DL — LOW (ref 11.5–15.5)
MCHC RBC-ENTMCNC: 28.2 PG — SIGNIFICANT CHANGE UP (ref 27–34)
MCHC RBC-ENTMCNC: 30.9 GM/DL — LOW (ref 32–36)
MCV RBC AUTO: 91.2 FL — SIGNIFICANT CHANGE UP (ref 80–100)
NRBC # BLD: 0 /100 WBCS — SIGNIFICANT CHANGE UP (ref 0–0)
PLATELET # BLD AUTO: 419 K/UL — HIGH (ref 150–400)
POTASSIUM SERPL-MCNC: 4.1 MMOL/L — SIGNIFICANT CHANGE UP (ref 3.5–5.3)
POTASSIUM SERPL-SCNC: 4.1 MMOL/L — SIGNIFICANT CHANGE UP (ref 3.5–5.3)
RBC # BLD: 3.73 M/UL — LOW (ref 3.8–5.2)
RBC # FLD: 13.9 % — SIGNIFICANT CHANGE UP (ref 10.3–14.5)
SARS-COV-2 RNA SPEC QL NAA+PROBE: SIGNIFICANT CHANGE UP
SODIUM SERPL-SCNC: 142 MMOL/L — SIGNIFICANT CHANGE UP (ref 135–145)
WBC # BLD: 15.35 K/UL — HIGH (ref 3.8–10.5)
WBC # FLD AUTO: 15.35 K/UL — HIGH (ref 3.8–10.5)

## 2021-10-25 RX ADMIN — Medication 3 MILLILITER(S): at 05:15

## 2021-10-25 RX ADMIN — Medication 50 MILLIGRAM(S): at 05:16

## 2021-10-25 RX ADMIN — Medication 10 MILLIGRAM(S): at 17:24

## 2021-10-25 RX ADMIN — Medication 100 MILLIGRAM(S): at 05:16

## 2021-10-25 RX ADMIN — ENOXAPARIN SODIUM 40 MILLIGRAM(S): 100 INJECTION SUBCUTANEOUS at 12:01

## 2021-10-25 RX ADMIN — Medication 81 MILLIGRAM(S): at 12:01

## 2021-10-25 RX ADMIN — Medication 3 MILLILITER(S): at 21:32

## 2021-10-25 RX ADMIN — SERTRALINE 50 MILLIGRAM(S): 25 TABLET, FILM COATED ORAL at 12:02

## 2021-10-25 RX ADMIN — Medication 100 MILLIGRAM(S): at 17:24

## 2021-10-25 RX ADMIN — Medication 10 MILLIGRAM(S): at 05:16

## 2021-10-25 RX ADMIN — Medication 3 MILLILITER(S): at 12:02

## 2021-10-25 RX ADMIN — Medication 0.5 MILLIGRAM(S): at 05:17

## 2021-10-25 RX ADMIN — Medication 3 MILLILITER(S): at 00:38

## 2021-10-25 RX ADMIN — Medication 3 MILLILITER(S): at 17:24

## 2021-10-25 RX ADMIN — Medication 0.5 MILLIGRAM(S): at 17:23

## 2021-10-25 NOTE — PROGRESS NOTE ADULT - ASSESSMENT
83 year female poor historian w/ HTN, left ureteral stent placement on 19 for obstructing calculus, uteroscopy with stone extraction on 10/11 presenting with urinary incontinence, uncontrolled HTN comes with episode was yesterday, full bladder emptying. P  3 falls in past year   patient also depressed brother recently  at age of 91 and she is having hard time dealing with it  incontinence since hysterectomy- not new  CT shows chronic ischemic changes with volume loss. However ventricles are prominent suggesting underlying mild hydrocephalus.   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 + neck pain and brisk reflexes on neck. possible cervical disease   10/21 more labored breathing today  10/22 better.  10/25 improved. no sob   CT chest neg for PE.   - monitor resp status , pulmo eval.  recs appreicated   on NC.  CT chest neg PE, prednisone, doxycycline   - completed ceftriaxone .  - r/o reversible causes dementia. check b12 rpr tsh  - also was recently on zoloft - maybe med side effect  - can consider high volume tap if in agreement with family to r/o NPH but low yield. incontinence since hysterectomy not new.  depressed brother .  would consider in future if not improved. I would hold off   - PT/OT  - pain management   - check orthostatics - neg   - check rEEG can be done outpt if ready for discharge   - check FS, glucose control <180  - GI/DVT ppx  - Counseling on diet, exercise, and medication adherence was done  - Counseling on smoking cessation and alcohol consumption offered when appropriate.  - Pain assessed and judicious use of narcotics when appropriate was discussed.    - Stroke education given when appropriate.  - Importance of fall prevention discussed.   - Differential diagnosis and plan of care discussed with patient and/or family and primary team  - Thank you for allowing me to participate in the care of this patient. Call with questions.   - d/c planning if stable ONEAL   - spoke with ACP  Seth Arceo MD  Vascular Neurology  Office: [phone].

## 2021-10-25 NOTE — PROGRESS NOTE ADULT - ASSESSMENT
85 f with    Possible Pneumonia  - Antibiotic DC Ceftriaxone/ Continue Doxy for 5 days  - Prednisone  - Nebs  - Pulmonary follow dr. Way  - supplement O2    s/p Fall  - PT  - pain control    NPH  - Neurology follow.   - EEG as OTP  - Daughter interested in LP now but Neuro not recommending it now.    UTI resolved    HTN  - control    Depression  - continue Rx    DVT prophylaxis     DCP rehab in progress    Marvin Weaver MD pager [phone]

## 2021-10-25 NOTE — PROGRESS NOTE ADULT - ASSESSMENT
ASSESSMENT:    83 year old gentlewoman, lifelong non-smoker, with no history of intrinsic lung disease. She has a history of HTN and HLD. She has cognitive impairment and has had recurrent falls. The patient underwent a left ureteral stent placement for an obstructing calculus in 9/2019. Pus was found in the bladder and proximal to the stone. She underwent left ureteroscopy with stone extraction in 10/2019. She was admitted on 10/14 following a fall while trying to  the garbage with right wrist and right knee injuries. No fractures were seen on radiographs. There was no reported lightheadedness, dizziness or loss of consciousness prior to the fall which is felt to be related to an unsteady gait. She reported episodes of urinary incontinence without dysuria, urinary urgency or urinary frequency and has received ceftriaxone for a possible UTI (urine culture was contaminated). Neurological evaluation has been unremarkable and she is awaiting an EEG in the outpatient setting. She had no shortness of breath during the admission and saturation was 96% @ 11am awaiting discharge. At the time of discharge, the patient was found to have a room air saturation of 82%. She had mild shortness of breath. She had a cough with sputum production. She had chest congestion and wheeze. She had developed bronchospasm. CTA chest was without pulmonary embolism - there is left lower lobe >> right lower lobe and lingula atelectasis - there is no evidence of pneumonia    Leukocytosis likely related to high dose steroids rather than infection    PLAN/RECOMMENDATIONS:    stable oxygenation on room air  incentive spirometry - patient instructed in the use of the device  completing a 5 day course of doxycycline - blood cultures are negative  prednisone 50mg daily x 5 days  albuterol/atrovent nebs q6h - discontinue when discharged  pulmicort 0.5mg nebs q12h - give after duoneb - rinse mouth after use - discontinue when discharged  cardiology follow-up noted  cardiac meds: ASA/hydralazine  neurology follow-up noted  DVT prophylaxis - SQ lovenox 40mg daily  physical therapy  evaluation of right knee weakness and pain per PCP    Will follow with you. Plan of care discussed with the patient, the dedicated floor NP and with Dr. Weaver.  Discharge planning to subacute rehab hopefully in the next 24 - 48 hours.    Martinez Way MD, [facility]  [phone]  Pulmonary Medicine     ASSESSMENT:    83 year old gentlewoman, lifelong non-smoker, with no history of intrinsic lung disease. She has a history of HTN and HLD. She has cognitive impairment and has had recurrent falls. The patient underwent a left ureteral stent placement for an obstructing calculus in 9/2019. Pus was found in the bladder and proximal to the stone. She underwent left ureteroscopy with stone extraction in 10/2019. She was admitted on 10/14 following a fall while trying to  the garbage with right wrist and right knee injuries. No fractures were seen on radiographs. There was no reported lightheadedness, dizziness or loss of consciousness prior to the fall which is felt to be related to an unsteady gait. She reported episodes of urinary incontinence without dysuria, urinary urgency or urinary frequency and has received ceftriaxone for a possible UTI (urine culture was contaminated). Neurological evaluation has been unremarkable and she is awaiting an EEG in the outpatient setting. She had no shortness of breath during the admission and saturation was 96% @ 11am awaiting discharge. At the time of discharge, the patient was found to have a room air saturation of 82%. She had mild shortness of breath. She had a cough with sputum production. She had chest congestion and wheeze. She had developed bronchospasm. CTA chest was without pulmonary embolism - there is left lower lobe >> right lower lobe and lingula atelectasis - there is no evidence of pneumonia    Leukocytosis likely related to high dose steroids rather than infection    PLAN/RECOMMENDATIONS:    stable oxygenation on room air  incentive spirometry - patient instructed in the use of the device  completing a 5 day course of doxycycline - blood cultures are negative - day 4/5  prednisone 50mg daily x 5 days - day 4/5  albuterol/atrovent nebs q6h - discontinue when discharged  pulmicort 0.5mg nebs q12h - give after duoneb - rinse mouth after use - discontinue when discharged  cardiology follow-up noted  cardiac meds: ASA/hydralazine  neurology follow-up noted  DVT prophylaxis - SQ lovenox 40mg daily  physical therapy  evaluation of right knee weakness and pain per PCP    Will follow with you. Plan of care discussed with the patient, the dedicated floor NP and with Dr. Weaver.  Discharge planning to subacute rehab hopefully in the next 24 - 48 hours.    Martinez Way MD, [facility]  [phone]  Pulmonary Medicine

## 2021-10-26 VITALS
OXYGEN SATURATION: 97 % | DIASTOLIC BLOOD PRESSURE: 70 MMHG | TEMPERATURE: 98 F | RESPIRATION RATE: 18 BRPM | HEART RATE: 86 BPM | SYSTOLIC BLOOD PRESSURE: 105 MMHG

## 2021-10-26 LAB
ANION GAP SERPL CALC-SCNC: 14 MMOL/L — SIGNIFICANT CHANGE UP (ref 5–17)
BUN SERPL-MCNC: 47 MG/DL — HIGH (ref 7–23)
CALCIUM SERPL-MCNC: 10.4 MG/DL — SIGNIFICANT CHANGE UP (ref 8.4–10.5)
CHLORIDE SERPL-SCNC: 106 MMOL/L — SIGNIFICANT CHANGE UP (ref 96–108)
CO2 SERPL-SCNC: 21 MMOL/L — LOW (ref 22–31)
CREAT SERPL-MCNC: 0.88 MG/DL — SIGNIFICANT CHANGE UP (ref 0.5–1.3)
CULTURE RESULTS: SIGNIFICANT CHANGE UP
CULTURE RESULTS: SIGNIFICANT CHANGE UP
GLUCOSE SERPL-MCNC: 77 MG/DL — SIGNIFICANT CHANGE UP (ref 70–99)
HCT VFR BLD CALC: 33.7 % — LOW (ref 34.5–45)
HGB BLD-MCNC: 10.4 G/DL — LOW (ref 11.5–15.5)
MCHC RBC-ENTMCNC: 28.3 PG — SIGNIFICANT CHANGE UP (ref 27–34)
MCHC RBC-ENTMCNC: 30.9 GM/DL — LOW (ref 32–36)
MCV RBC AUTO: 91.6 FL — SIGNIFICANT CHANGE UP (ref 80–100)
NRBC # BLD: 0 /100 WBCS — SIGNIFICANT CHANGE UP (ref 0–0)
PLATELET # BLD AUTO: 391 K/UL — SIGNIFICANT CHANGE UP (ref 150–400)
POTASSIUM SERPL-MCNC: 4.5 MMOL/L — SIGNIFICANT CHANGE UP (ref 3.5–5.3)
POTASSIUM SERPL-SCNC: 4.5 MMOL/L — SIGNIFICANT CHANGE UP (ref 3.5–5.3)
RBC # BLD: 3.68 M/UL — LOW (ref 3.8–5.2)
RBC # FLD: 13.8 % — SIGNIFICANT CHANGE UP (ref 10.3–14.5)
SODIUM SERPL-SCNC: 141 MMOL/L — SIGNIFICANT CHANGE UP (ref 135–145)
SPECIMEN SOURCE: SIGNIFICANT CHANGE UP
SPECIMEN SOURCE: SIGNIFICANT CHANGE UP
WBC # BLD: 13.33 K/UL — HIGH (ref 3.8–10.5)
WBC # FLD AUTO: 13.33 K/UL — HIGH (ref 3.8–10.5)

## 2021-10-26 RX ORDER — GUAIFENESIN/DEXTROMETHORPHAN 600MG-30MG
10 TABLET, EXTENDED RELEASE 12 HR ORAL
Refills: 0 | Status: DISCONTINUED | OUTPATIENT
Start: 2021-10-26 | End: 2021-10-26

## 2021-10-26 RX ORDER — BUDESONIDE, MICRONIZED 100 %
0.5 POWDER (GRAM) MISCELLANEOUS
Qty: 0 | Refills: 0 | DISCHARGE
Start: 2021-10-26

## 2021-10-26 RX ORDER — GUAIFENESIN/DEXTROMETHORPHAN 600MG-30MG
10 TABLET, EXTENDED RELEASE 12 HR ORAL
Qty: 0 | Refills: 0 | DISCHARGE
Start: 2021-10-26

## 2021-10-26 RX ORDER — IPRATROPIUM/ALBUTEROL SULFATE 18-103MCG
3 AEROSOL WITH ADAPTER (GRAM) INHALATION
Qty: 0 | Refills: 0 | DISCHARGE
Start: 2021-10-26

## 2021-10-26 RX ADMIN — SERTRALINE 50 MILLIGRAM(S): 25 TABLET, FILM COATED ORAL at 13:17

## 2021-10-26 RX ADMIN — Medication 200 MILLIGRAM(S): at 15:06

## 2021-10-26 RX ADMIN — Medication 10 MILLIGRAM(S): at 05:11

## 2021-10-26 RX ADMIN — Medication 100 MILLIGRAM(S): at 05:11

## 2021-10-26 RX ADMIN — Medication 3 MILLILITER(S): at 13:16

## 2021-10-26 RX ADMIN — ENOXAPARIN SODIUM 40 MILLIGRAM(S): 100 INJECTION SUBCUTANEOUS at 13:17

## 2021-10-26 RX ADMIN — Medication 50 MILLIGRAM(S): at 05:11

## 2021-10-26 RX ADMIN — Medication 81 MILLIGRAM(S): at 13:17

## 2021-10-26 RX ADMIN — Medication 3 MILLILITER(S): at 05:11

## 2021-10-26 RX ADMIN — Medication 0.5 MILLIGRAM(S): at 05:11

## 2021-10-26 NOTE — PROGRESS NOTE ADULT - SUBJECTIVE AND OBJECTIVE BOX
Cardiovascular Disease Progress Note    Overnight events: No acute events overnight.  feels deconditioned. no cp/sob/palps/dizziness  Otherwise review of systems negative    Objective Findings:  T(C): 36.4 (10-25-21 @ 04:20), Max: 36.4 (10-25-21 @ 04:20)  HR: 66 (10-25-21 @ 04:20) (66 - 78)  BP: 146/70 (10-25-21 @ 04:20) (138/72 - 163/72)  RR: 18 (10-25-21 @ 04:20) (18 - 18)  SpO2: 92% (10-25-21 @ 04:20) (92% - 94%)  Wt(kg): --  Daily     Daily       Physical Exam:  Gen: NAD  HEENT: EOMI  CV: RRR, normal S1 + S2, no m/r/g  Lungs: CTAB  Abd: soft, non-tender  Ext: No edema    Telemetry:    Laboratory Data:                        11.0   21.52 )-----------( 399      ( 24 Oct 2021 06:57 )             34.5     10-24    140  |  104  |  58<H>  ----------------------------<  110<H>  3.4<L>   |  22  |  0.97    Ca    10.5      24 Oct 2021 06:57                Inpatient Medications:  MEDICATIONS  (STANDING):  albuterol/ipratropium for Nebulization 3 milliLiter(s) Nebulizer every 6 hours  aspirin enteric coated 81 milliGRAM(s) Oral daily  buDESOnide    Inhalation Suspension 0.5 milliGRAM(s) Inhalation every 12 hours  coronavirus (EUA) Vaccine (MODERNA) 0.5 milliLiter(s) IntraMuscular once  doxycycline hyclate Capsule 100 milliGRAM(s) Oral every 12 hours  enoxaparin Injectable 40 milliGRAM(s) SubCutaneous daily  hydrALAZINE 10 milliGRAM(s) Oral two times a day  predniSONE   Tablet 50 milliGRAM(s) Oral daily  sertraline 50 milliGRAM(s) Oral daily      Assessment:  fall r/o syncope  possible nph  htn  wrist injury    Recs:  cv stable  fall likely mechanical. doubt arrhythmogenic, no need for tele at present. check 2d echo, orthostatics prn  f/u neuro recs. ct head possible nph. s/p cspine mri without acute findings. further workup as OP  hypoxic episode, cxr results noted. pulm recs appreciated. cta neg for pe. on abx for possible pna. steroids per pulm for possible copd. suspect atelctasis/kyphosis/restrictive physiology also playing a role. consider incentive nava. monitor off diuretics. elevated bun likely in setting of steroids  coronary art calcifications cw asa. statin if not contraindicated  bp stable on hydral  pt/ot  pain control  dvt ppx        Over 25 minutes spent on total encounter; more than 50% of the visit was spent counseling and/or coordinating care by the attending physician.      Ousmane Sandoval MD   Cardiovascular Disease  [phone]
Cardiovascular Disease Progress Note    Overnight events: No acute events overnight.  s/p cspine mri. no new cardiac sx  Otherwise review of systems negative    Objective Findings:  T(C): 36.8 (10-18-21 @ 04:04), Max: 37.1 (10-17-21 @ 23:43)  HR: 65 (10-18-21 @ 04:04) (65 - 77)  BP: 160/76 (10-18-21 @ 04:04) (138/75 - 167/76)  RR: 18 (10-18-21 @ 04:04) (18 - 18)  SpO2: 95% (10-18-21 @ 04:04) (95% - 97%)  Wt(kg): --  Daily     Daily       Physical Exam:  Gen: NAD  HEENT: EOMI  CV: RRR, normal S1 + S2, no m/r/g  Lungs: CTAB  Abd: soft, non-tender  Ext: No edema    Telemetry:    Laboratory Data:                        12.1   11.27 )-----------( 307      ( 18 Oct 2021 06:45 )             40.4     10-18    139  |  101  |  17  ----------------------------<  108<H>  3.5   |  26  |  0.76    Ca    10.2      18 Oct 2021 06:44                Inpatient Medications:  MEDICATIONS  (STANDING):  aspirin enteric coated 81 milliGRAM(s) Oral daily  enoxaparin Injectable 40 milliGRAM(s) SubCutaneous daily  hydrALAZINE 10 milliGRAM(s) Oral two times a day  sertraline 50 milliGRAM(s) Oral daily      Assessment:  fall r/o syncope  possible nph  htn  wrist injury    Recs:  cv stable  fall likely mechanical. doubt arrhythmogenic, no need for tele at present. check 2d echo, orthostatics prn  f/u neuro recs. ct head possible nph. s/p cspine mri without acute findings  pt/ot  pain control  dvt ppx          Over 25 minutes spent on total encounter; more than 50% of the visit was spent counseling and/or coordinating care by the attending physician.      Ousmane Sandoval MD   Cardiovascular Disease  [phone]
Neurology Progress Note    S: Patient seen and examined. No new events overnight. patient denied CP, SOB, HA or pain.      Medication:  MEDICATIONS  (STANDING):  aspirin enteric coated 81 milliGRAM(s) Oral daily  enoxaparin Injectable 40 milliGRAM(s) SubCutaneous daily  hydrALAZINE 10 milliGRAM(s) Oral two times a day  sertraline 50 milliGRAM(s) Oral daily    MEDICATIONS  (PRN):  acetaminophen   Tablet .. 650 milliGRAM(s) Oral every 6 hours PRN Temp greater or equal to 38.5C (101.3F), Mild Pain (1 - 3)  oxycodone    5 mG/acetaminophen 325 mG 1 Tablet(s) Oral every 6 hours PRN Moderate Pain (4 - 6)      Vitals:  Vital Signs Last 24 Hrs  T(C): 36.7 (10-18-21 @ 11:06), Max: 37.1 (10-17-21 @ 23:43)  T(F): 98.1 (10-18-21 @ 11:06), Max: 98.7 (10-17-21 @ 23:43)  HR: 65 (10-18-21 @ 11:06) (65 - 77)  BP: 149/71 (10-18-21 @ 11:06) (138/75 - 160/76)  BP(mean): --  RR: 18 (10-18-21 @ 11:06) (18 - 18)  SpO2: 94% (10-18-21 @ 11:06) (94% - 96%)        Orthostatic VS  10-15-21 @ 19:11  Lying BP: 169/72 HR: 66  Sitting BP: 175/74 HR: 67  Standing BP: 190/74 HR: 85  Site: upper left arm  Mode: electronic    General Exam:   General Appearance: Appropriately dressed and in no acute distress       Head: Normocephalic, atraumatic and no dysmorphic features  Ear, Nose, and Throat: Moist mucous membranes  CVS: S1S2+  Resp: No SOB, no wheeze or rhonchi  Abd: soft NTND  Extremities: No edema, no cyanosis  Skin: No bruises, no rashes     Neurological Exam:  Mental Status: Awake, alert and oriented x 3.  Able to follow simple and complex verbal commands. Able to name and repeat. fluent speech. No obvious aphasia or dysarthria noted.   Cranial Nerves: PERRL, EOMI, VFFC, sensation V1-V3 intact,  no obvious facial asymmetry , equal elevation of palate, scm/trap 5/5, tongue is midline on protrusion. no obvious papilledema on fundoscopic exam. Hearing is grossly intact.   Motor: Normal bulk, tone and strength throughout. Fine finger movements were intact and symmetric. no tremors or drift noted.    Sensation: Intact to light touch and pinprick throughout. no right/left confusion. no extinction to tactile on DSS.   Deep Tendon Reflexes: +3 left knee jerk, +3 R bicep reflex, 2 beats of clonus on left leg  Toes flexor bilaterally  Coordination: No dysmetria on FNF o   Gait: deferred     I personally reviewed the below data/images/labs:    CBC Full  -  ( 18 Oct 2021 06:45 )  WBC Count : 11.27 K/uL  RBC Count : 4.38 M/uL  Hemoglobin : 12.1 g/dL  Hematocrit : 40.4 %  Platelet Count - Automated : 307 K/uL  Mean Cell Volume : 92.2 fl  Mean Cell Hemoglobin : 27.6 pg  Mean Cell Hemoglobin Concentration : 30.0 gm/dL  Auto Neutrophil # : x  Auto Lymphocyte # : x  Auto Monocyte # : x  Auto Eosinophil # : x  Auto Basophil # : x  Auto Neutrophil % : x  Auto Lymphocyte % : x  Auto Monocyte % : x  Auto Eosinophil % : x  Auto Basophil % : x    10-18    139  |  101  |  17  ----------------------------<  108<H>  3.5   |  26  |  0.76    Ca    10.2      18 Oct 2021 06:44            Urinalysis Basic - ( 14 Oct 2021 13:34 )    Color: Light Yellow / Appearance: Slightly Turbid / S.022 / pH: x  Gluc: x / Ketone: Small  / Bili: Negative / Urobili: Negative   Blood: x / Protein: 30 mg/dL / Nitrite: Positive   Leuk Esterase: Large / RBC: 7 /hpf /  /HPF   Sq Epi: x / Non Sq Epi: 0 /hpf / Bacteria: Many    Orthostatic VS    CT head:   1)  chronic ischemic changes with volume loss. However ventricles are prominent suggesting underlying mild hydrocephalus. Further assessment and follow-up recommended..  2)  no intracerebral hemorrhage, contusion, or extracerebral collections are identified.    < from: MR Cervical Spine No Cont (10. @ 21:27) >    EXAM:  MR SPINE CERVICAL                            PROCEDURE DATE:  10/16/2021            INTERPRETATION:  CLINICAL INDICATION: Unsteady gait, fall, urinary incontinence    Magnetic resonance imaging of the cervical spine was carried out with sagittal surface coil imaging from C1 to T6-7 using T1 and fast spin echo T2 weighted images with and without fat saturation technique with axial T1 and fast spin echo T2 weighted imaging on a 1.5 Dyana magnet.    The cervical vertebrae are normal in height and signal intensity. Disc space narrowing is identified at C3-4 through C6-7 consistent with degenerative changes. Small disc osteophyte complexes are present at these levels as well as dorsal ligamentous hypertrophy. There is no significant spinal stenosis. There is no cord compression. There is no marrow edema. There is no acute fracture or dislocation.        IMPRESSION: Mild degenerative changes. No significant spinal stenosis. No cord compression.                            --- End of Report ---                CASSIDY LEACH MD; Attending Radiologist  This document has been electronically signed. Oct 17 2021  4:02PM    < end of copied text >  
Neurology Progress Note    S: Patient seen and examined. No new events overnight. patient denied CP, SOB, HA or pain.     Medication:  acetaminophen   Tablet .. 650 milliGRAM(s) Oral every 6 hours PRN  aspirin enteric coated 81 milliGRAM(s) Oral daily  cefTRIAXone   IVPB 1000 milliGRAM(s) IV Intermittent every 24 hours  enoxaparin Injectable 40 milliGRAM(s) SubCutaneous daily  hydrALAZINE 10 milliGRAM(s) Oral two times a day  oxycodone    5 mG/acetaminophen 325 mG 1 Tablet(s) Oral every 6 hours PRN  sertraline 50 milliGRAM(s) Oral daily      Vitals:  Vital Signs Last 24 Hrs  T(C): 36.4 (16 Oct 2021 04:50), Max: 37 (16 Oct 2021 00:26)  T(F): 97.6 (16 Oct 2021 04:50), Max: 98.6 (16 Oct 2021 00:26)  HR: 68 (16 Oct 2021 04:50) (64 - 77)  BP: 145/67 (16 Oct 2021 04:50) (145/67 - 166/76)  BP(mean): --  RR: 18 (16 Oct 2021 04:50) (18 - 18)  SpO2: 96% (16 Oct 2021 04:50) (93% - 97%)    General Exam:   General Appearance: Appropriately dressed and in no acute distress       Head: Normocephalic, atraumatic and no dysmorphic features  Ear, Nose, and Throat: Moist mucous membranes  CVS: S1S2+  Resp: No SOB, no wheeze or rhonchi  Abd: soft NTND  Extremities: No edema, no cyanosis  Skin: No bruises, no rashes     Neurological Exam:  Mental Status: Awake, alert and oriented x 3.  Able to follow simple and complex verbal commands. Able to name and repeat. fluent speech. No obvious aphasia or dysarthria noted.   Cranial Nerves: PERRL, EOMI, VFFC, sensation V1-V3 intact,  no obvious facial asymmetry , equal elevation of palate, scm/trap 5/5, tongue is midline on protrusion. no obvious papilledema on fundoscopic exam. Hearing is grossly intact.   Motor: Normal bulk, tone and strength throughout. Fine finger movements were intact and symmetric. no tremors or drift noted.    Sensation: Intact to light touch and pinprick throughout. no right/left confusion. no extinction to tactile on DSS.   Deep Tendon Reflexes: +3 left knee jerk, +3 R bicep reflex, 2 beats of clonus on left leg  Toes flexor bilaterally  Coordination: No dysmetria on FNF o   Gait: deferred     I personally reviewed the below data/images/labs:      CBC Full  -  ( 16 Oct 2021 07:20 )  WBC Count : 9.19 K/uL  RBC Count : 4.02 M/uL  Hemoglobin : 11.1 g/dL  Hematocrit : 36.9 %  Platelet Count - Automated : 286 K/uL  Mean Cell Volume : 91.8 fl  Mean Cell Hemoglobin : 27.6 pg  Mean Cell Hemoglobin Concentration : 30.1 gm/dL  Auto Neutrophil # : x  Auto Lymphocyte # : x  Auto Monocyte # : x  Auto Eosinophil # : x  Auto Basophil # : x  Auto Neutrophil % : x  Auto Lymphocyte % : x  Auto Monocyte % : x  Auto Eosinophil % : x  Auto Basophil % : x    10-16    143  |  109<H>  |  18  ----------------------------<  104<H>  3.5   |  22  |  0.84    Ca    9.2      16 Oct 2021 07:20    TPro  7.8  /  Alb  4.2  /  TBili  0.7  /  DBili  x   /  AST  48<H>  /  ALT  17  /  AlkPhos  103  10-14    LIVER FUNCTIONS - ( 14 Oct 2021 12:00 )  Alb: 4.2 g/dL / Pro: 7.8 g/dL / ALK PHOS: 103 U/L / ALT: 17 U/L / AST: 48 U/L / GGT: x           PT/INR - ( 14 Oct 2021 12:00 )   PT: 12.4 sec;   INR: 1.04 ratio         PTT - ( 14 Oct 2021 12:00 )  PTT:29.7 sec  Urinalysis Basic - ( 14 Oct 2021 13:34 )    Color: Light Yellow / Appearance: Slightly Turbid / S.022 / pH: x  Gluc: x / Ketone: Small  / Bili: Negative / Urobili: Negative   Blood: x / Protein: 30 mg/dL / Nitrite: Positive   Leuk Esterase: Large / RBC: 7 /hpf /  /HPF   Sq Epi: x / Non Sq Epi: 0 /hpf / Bacteria: Many      CT head:   1)  chronic ischemic changes with volume loss. However ventricles are prominent suggesting underlying mild hydrocephalus. Further assessment and follow-up recommended..  2)  no intracerebral hemorrhage, contusion, or extracerebral collections are identified.    
Neurology Progress Note    S: Patient seen and examined. No new events overnight. patient denied CP, SOB, HA or pain. on NC. CT chest neg for pe. breasthing better     Medication:  MEDICATIONS  (STANDING):  albuterol/ipratropium for Nebulization 3 milliLiter(s) Nebulizer every 6 hours  aspirin enteric coated 81 milliGRAM(s) Oral daily  buDESOnide    Inhalation Suspension 0.5 milliGRAM(s) Inhalation every 12 hours  coronavirus (EUA) Vaccine (MODERNA) 0.5 milliLiter(s) IntraMuscular once  doxycycline hyclate Capsule 100 milliGRAM(s) Oral every 12 hours  enoxaparin Injectable 40 milliGRAM(s) SubCutaneous daily  hydrALAZINE 10 milliGRAM(s) Oral two times a day  predniSONE   Tablet 50 milliGRAM(s) Oral daily  sertraline 50 milliGRAM(s) Oral daily    MEDICATIONS  (PRN):  acetaminophen   Tablet .. 650 milliGRAM(s) Oral every 6 hours PRN Temp greater or equal to 38.5C (101.3F), Mild Pain (1 - 3)    Vitals:  Vital Signs Last 24 Hrs  T(C): 36.3 (10-24-21 @ 11:02), Max: 36.8 (10-23-21 @ 17:14)  T(F): 97.4 (10-24-21 @ 11:02), Max: 98.3 (10-23-21 @ 17:14)  HR: 75 (10-24-21 @ 11:02) (75 - 87)  BP: 163/72 (10-24-21 @ 11:02) (113/61 - 163/72)  BP(mean): --  RR: 18 (10-24-21 @ 11:02) (18 - 18)  SpO2: 93% (10-24-21 @ 11:02) (92% - 93%)          Orthostatic VS  10-21-21 @ 04:52  Lying BP: 146/71 HR: 70  Sitting BP: 135/68 HR: 75  Standing BP: 137/72 HR: --  Site: upper left arm  Mode: electronic      Orthostatic VS  10-21-21 @ 04:52  Lying BP: 146/71 HR: 70  Sitting BP: 135/68 HR: 75  Standing BP: 137/72 HR: --  Site: upper left arm  Mode: electronic  Orthostatic VS  10-20-21 @ 10:31  Lying BP: 130/70 HR: 79  Sitting BP: 139/79 HR: 86  Standing BP: 152/73 HR: 90  Site: 134/69 after ambulating a few steps  Mode: --    Orthostatic VS  10-20-21 @ 10:31  Lying BP: 130/70 HR: 79  Sitting BP: 139/79 HR: 86  Standing BP: 152/73 HR: 90  Site: 134/69 after ambulating a few steps  Mode: --          Orthostatic VS  10-15-21 @ 19:11  Lying BP: 169/72 HR: 66  Sitting BP: 175/74 HR: 67  Standing BP: 190/74 HR: 85  Site: upper left arm  Mode: electronic    General Exam:   General Appearance: Appropriately dressed and in no acute distress       Head: Normocephalic, atraumatic and no dysmorphic features  Ear, Nose, and Throat: Moist mucous membranes  CVS: S1S2+  Resp: No SOB, no wheeze or rhonchi  Abd: soft NTND  Extremities: No edema, no cyanosis  Skin: No bruises, no rashes     Neurological Exam:  Mental Status: Awake, alert and oriented x 3.  Able to follow simple and complex verbal commands. Able to name and repeat. fluent speech. No obvious aphasia or dysarthria noted.   Cranial Nerves: PERRL, EOMI, VFFC, sensation V1-V3 intact,  no obvious facial asymmetry , equal elevation of palate, scm/trap 5/5, tongue is midline on protrusion. no obvious papilledema on fundoscopic exam. Hearing is grossly intact.   Motor: Normal bulk, tone and strength throughout. Fine finger movements were intact and symmetric. no tremors or drift noted.    Sensation: Intact to light touch and pinprick throughout. no right/left confusion. no extinction to tactile on DSS.   Deep Tendon Reflexes: +3 left knee jerk, +3 R bicep reflex, 2 beats of clonus on left leg  Toes flexor bilaterally  Coordination: No dysmetria on FNF o   Gait: deferred     I personally reviewed the below data/images/labs:      CBC Full  -  ( 24 Oct 2021 06:57 )  WBC Count : 21.52 K/uL  RBC Count : 3.82 M/uL  Hemoglobin : 11.0 g/dL  Hematocrit : 34.5 %  Platelet Count - Automated : 399 K/uL  Mean Cell Volume : 90.3 fl  Mean Cell Hemoglobin : 28.8 pg  Mean Cell Hemoglobin Concentration : 31.9 gm/dL  Auto Neutrophil # : x  Auto Lymphocyte # : x  Auto Monocyte # : x  Auto Eosinophil # : x  Auto Basophil # : x  Auto Neutrophil % : x  Auto Lymphocyte % : x  Auto Monocyte % : x  Auto Eosinophil % : x  Auto Basophil % : x    10-24    140  |  104  |  58<H>  ----------------------------<  110<H>  3.4<L>   |  22  |  0.97    Ca    10.5      24 Oct 2021 06:57        Orthostatic VS    CT head:   1)  chronic ischemic changes with volume loss. However ventricles are prominent suggesting underlying mild hydrocephalus. Further assessment and follow-up recommended..  2)  no intracerebral hemorrhage, contusion, or extracerebral collections are identified.    < from: MR Cervical Spine No Cont (10.16.21 @ 21:27) >    EXAM:  MR SPINE CERVICAL                            PROCEDURE DATE:  10/16/2021            INTERPRETATION:  CLINICAL INDICATION: Unsteady gait, fall, urinary incontinence    Magnetic resonance imaging of the cervical spine was carried out with sagittal surface coil imaging from C1 to T6-7 using T1 and fast spin echo T2 weighted images with and without fat saturation technique with axial T1 and fast spin echo T2 weighted imaging on a 1.5 Dyana magnet.    The cervical vertebrae are normal in height and signal intensity. Disc space narrowing is identified at C3-4 through C6-7 consistent with degenerative changes. Small disc osteophyte complexes are present at these levels as well as dorsal ligamentous hypertrophy. There is no significant spinal stenosis. There is no cord compression. There is no marrow edema. There is no acute fracture or dislocation.        IMPRESSION: Mild degenerative changes. No significant spinal stenosis. No cord compression.      --- End of Report ---        CASSIDY LEACH MD; Attending Radiologist  This document has been electronically signed. Oct 17 2021  4:02PM    < end of copied text >  
Patient is a 85y old  Female who presents with a chief complaint of     SUBJECTIVE / OVERNIGHT EVENTS: Comfortable without new complaints.   Review of Systems  chest pain no  palpitations no  sob no  nausea no  headache no    MEDICATIONS  (STANDING):  albuterol/ipratropium for Nebulization 3 milliLiter(s) Nebulizer every 6 hours  aspirin enteric coated 81 milliGRAM(s) Oral daily  buDESOnide    Inhalation Suspension 0.5 milliGRAM(s) Inhalation every 12 hours  coronavirus (EUA) Vaccine (MODERNA) 0.5 milliLiter(s) IntraMuscular once  doxycycline hyclate Capsule 100 milliGRAM(s) Oral every 12 hours  enoxaparin Injectable 40 milliGRAM(s) SubCutaneous daily  hydrALAZINE 10 milliGRAM(s) Oral two times a day  predniSONE   Tablet 50 milliGRAM(s) Oral daily  sertraline 50 milliGRAM(s) Oral daily    MEDICATIONS  (PRN):  acetaminophen   Tablet .. 650 milliGRAM(s) Oral every 6 hours PRN Temp greater or equal to 38.5C (101.3F), Mild Pain (1 - 3)      Vital Signs Last 24 Hrs  T(C): 36.3 (24 Oct 2021 11:02), Max: 36.8 (23 Oct 2021 17:14)  T(F): 97.4 (24 Oct 2021 11:02), Max: 98.3 (23 Oct 2021 17:14)  HR: 75 (24 Oct 2021 11:02) (75 - 87)  BP: 163/72 (24 Oct 2021 11:02) (113/61 - 163/72)  BP(mean): --  RR: 18 (24 Oct 2021 11:02) (18 - 18)  SpO2: 93% (24 Oct 2021 11:02) (92% - 93%)    PHYSICAL EXAM:  GENERAL: NAD, frail  HEAD:  Atraumatic, Normocephalic  EYES: EOMI, PERRLA, conjunctiva and sclera clear  NECK: Supple, No JVD  CHEST/LUNG: Clear to auscultation bilaterally; No wheeze  HEART: Regular rate and rhythm; No murmurs, rubs, or gallops  ABDOMEN: Soft, Nontender, Nondistended; Bowel sounds present  EXTREMITIES:  2+ Peripheral Pulses, No clubbing, cyanosis, or edema  PSYCH: AAOx3  NEUROLOGY: non-focal  SKIN: No rashes or lesions    LABS:                        11.0   21.52 )-----------( 399      ( 24 Oct 2021 06:57 )             34.5     10-24    140  |  104  |  58<H>  ----------------------------<  110<H>  3.4<L>   |  22  |  0.97    Ca    10.5      24 Oct 2021 06:57                Culture - Blood (collected 21 Oct 2021 21:15)  Source: .Blood Blood-Venous  Preliminary Report (22 Oct 2021 22:02):    No growth to date.    Culture - Blood (collected 21 Oct 2021 21:15)  Source: .Blood Blood-Venous  Preliminary Report (22 Oct 2021 22:02):    No growth to date.        RADIOLOGY & ADDITIONAL TESTS:    Imaging Personally Reviewed:    Consultant(s) Notes Reviewed:      Care Discussed with Consultants/Other Providers:  
Patient is a 85y old  Female who presents with a chief complaint of     SUBJECTIVE / OVERNIGHT EVENTS: Comfortable without new complaints.   Review of Systems  chest pain no  palpitations no  sob no  nausea no  headache no    MEDICATIONS  (STANDING):  albuterol/ipratropium for Nebulization 3 milliLiter(s) Nebulizer every 6 hours  aspirin enteric coated 81 milliGRAM(s) Oral daily  buDESOnide    Inhalation Suspension 0.5 milliGRAM(s) Inhalation every 12 hours  coronavirus (EUA) Vaccine (MODERNA) 0.5 milliLiter(s) IntraMuscular once  doxycycline hyclate Capsule 100 milliGRAM(s) Oral every 12 hours  enoxaparin Injectable 40 milliGRAM(s) SubCutaneous daily  hydrALAZINE 10 milliGRAM(s) Oral two times a day  predniSONE   Tablet 50 milliGRAM(s) Oral daily  sertraline 50 milliGRAM(s) Oral daily    MEDICATIONS  (PRN):  acetaminophen   Tablet .. 650 milliGRAM(s) Oral every 6 hours PRN Temp greater or equal to 38.5C (101.3F), Mild Pain (1 - 3)      Vital Signs Last 24 Hrs  T(C): 36.7 (25 Oct 2021 20:14), Max: 36.7 (25 Oct 2021 11:27)  T(F): 98.1 (25 Oct 2021 20:14), Max: 98.1 (25 Oct 2021 11:27)  HR: 67 (25 Oct 2021 20:14) (66 - 79)  BP: 133/68 (25 Oct 2021 20:14) (127/71 - 146/70)  BP(mean): --  RR: 18 (25 Oct 2021 20:14) (18 - 18)  SpO2: 92% (25 Oct 2021 20:14) (88% - 93%)    PHYSICAL EXAM:  GENERAL: NAD, frail  HEAD:  Atraumatic, Normocephalic  EYES: EOMI, PERRLA, conjunctiva and sclera clear  NECK: Supple, No JVD  CHEST/LUNG: Clear to auscultation bilaterally; No wheeze  HEART: Regular rate and rhythm; No murmurs, rubs, or gallops  ABDOMEN: Soft, Nontender, Nondistended; Bowel sounds present  EXTREMITIES:  2+ Peripheral Pulses, No clubbing, cyanosis, or edema  PSYCH: AAOx3  NEUROLOGY: non-focal  SKIN: No rashes or lesions    LABS:                        10.5   15.35 )-----------( 419      ( 25 Oct 2021 07:59 )             34.0     10-25    142  |  104  |  49<H>  ----------------------------<  71  4.1   |  21<L>  |  0.92    Ca    10.5      25 Oct 2021 07:59                  RADIOLOGY & ADDITIONAL TESTS:    Imaging Personally Reviewed:    Consultant(s) Notes Reviewed:      Care Discussed with Consultants/Other Providers:  
Patient is a 85y old  Female who presents with a chief complaint of     SUBJECTIVE / OVERNIGHT EVENTS: Comfortable without new complaints. Found by EMS with desat to 82%?  Review of Systems  chest pain no  palpitations no  sob no  nausea no  headache no    MEDICATIONS  (STANDING):  albuterol/ipratropium for Nebulization 3 milliLiter(s) Nebulizer every 6 hours  aspirin enteric coated 81 milliGRAM(s) Oral daily  buDESOnide    Inhalation Suspension 0.5 milliGRAM(s) Inhalation every 12 hours  cefTRIAXone   IVPB      cefTRIAXone   IVPB 1000 milliGRAM(s) IV Intermittent once  coronavirus (EUA) Vaccine (MODERNA) 0.5 milliLiter(s) IntraMuscular once  doxycycline hyclate Capsule 100 milliGRAM(s) Oral every 12 hours  enoxaparin Injectable 40 milliGRAM(s) SubCutaneous daily  hydrALAZINE 10 milliGRAM(s) Oral two times a day  methylPREDNISolone sodium succinate Injectable 20 milliGRAM(s) IV Push every 6 hours  sertraline 50 milliGRAM(s) Oral daily    MEDICATIONS  (PRN):  acetaminophen   Tablet .. 650 milliGRAM(s) Oral every 6 hours PRN Temp greater or equal to 38.5C (101.3F), Mild Pain (1 - 3)  oxycodone    5 mG/acetaminophen 325 mG 1 Tablet(s) Oral every 6 hours PRN Moderate Pain (4 - 6)      Vital Signs Last 24 Hrs  T(C): 36.4 (21 Oct 2021 11:07), Max: 37.2 (21 Oct 2021 04:52)  T(F): 97.5 (21 Oct 2021 11:07), Max: 98.9 (21 Oct 2021 04:52)  HR: 79 (21 Oct 2021 11:07) (63 - 79)  BP: 155/69 (21 Oct 2021 11:07) (131/71 - 155/69)  BP(mean): --  RR: 18 (21 Oct 2021 11:07) (18 - 18)  SpO2: 95% (21 Oct 2021 11:07) (94% - 96%)    PHYSICAL EXAM:  GENERAL: NAD, well-developed  HEAD:  Atraumatic, Normocephalic  EYES: EOMI, PERRLA, conjunctiva and sclera clear  NECK: Supple, No JVD  CHEST/LUNG: Clear to auscultation bilaterally; No wheeze  HEART: Regular rate and rhythm; No murmurs, rubs, or gallops  ABDOMEN: Soft, Nontender, Nondistended; Bowel sounds present  EXTREMITIES:  2+ Peripheral Pulses, No clubbing, cyanosis, or edema  PSYCH: AAOx3  NEUROLOGY: non-focal  SKIN: No rashes or lesions    LABS:                      RADIOLOGY & ADDITIONAL TESTS:    Imaging Personally Reviewed:  < from: Xray Chest 1 View- PORTABLE-Urgent (Xray Chest 1 View- PORTABLE-Urgent .) (10.21.21 @ 14:36) >  IMPRESSION:  Right lower lung linear atelectasis.    New patchy left lower lung opacities which could be due to subsegmental atelectasis or pneumonia in the appropriate clinical context.    < end of copied text >    Consultant(s) Notes Reviewed:      Care Discussed with Consultants/Other Providers:  
Patient is a 85y old  Female who presents with a chief complaint of     SUBJECTIVE / OVERNIGHT EVENTS: Comfortable without new complaints. daughter at bedside.  Review of Systems  chest pain no  palpitations no  sob no  nausea no  headache no    MEDICATIONS  (STANDING):  albuterol/ipratropium for Nebulization 3 milliLiter(s) Nebulizer every 6 hours  aspirin enteric coated 81 milliGRAM(s) Oral daily  buDESOnide    Inhalation Suspension 0.5 milliGRAM(s) Inhalation every 12 hours  coronavirus (EUA) Vaccine (MODERNA) 0.5 milliLiter(s) IntraMuscular once  doxycycline hyclate Capsule 100 milliGRAM(s) Oral every 12 hours  enoxaparin Injectable 40 milliGRAM(s) SubCutaneous daily  hydrALAZINE 10 milliGRAM(s) Oral two times a day  predniSONE   Tablet 50 milliGRAM(s) Oral daily  sertraline 50 milliGRAM(s) Oral daily    MEDICATIONS  (PRN):  acetaminophen   Tablet .. 650 milliGRAM(s) Oral every 6 hours PRN Temp greater or equal to 38.5C (101.3F), Mild Pain (1 - 3)      Vital Signs Last 24 Hrs  T(C): 36.8 (23 Oct 2021 17:14), Max: 36.8 (23 Oct 2021 17:14)  T(F): 98.3 (23 Oct 2021 17:14), Max: 98.3 (23 Oct 2021 17:14)  HR: 82 (23 Oct 2021 17:14) (75 - 91)  BP: 139/66 (23 Oct 2021 17:14) (133/63 - 146/64)  BP(mean): --  RR: 18 (23 Oct 2021 17:14) (17 - 18)  SpO2: 93% (23 Oct 2021 17:14) (92% - 94%)    PHYSICAL EXAM:  GENERAL: NAD  HEAD:  Atraumatic, Normocephalic  EYES: EOMI, PERRLA, conjunctiva and sclera clear  NECK: Supple, No JVD  CHEST/LUNG: Clear to auscultation bilaterally; No wheeze  HEART: Regular rate and rhythm; No murmurs, rubs, or gallops  ABDOMEN: Soft, Nontender, Nondistended; Bowel sounds present  EXTREMITIES:  2+ Peripheral Pulses, No clubbing, cyanosis, or edema  PSYCH: AAOx3  NEUROLOGY: non-focal  SKIN: No rashes or lesions    LABS:                        11.0   23.42 )-----------( 388      ( 23 Oct 2021 06:33 )             35.1     10-23    137  |  99  |  55<H>  ----------------------------<  177<H>  3.7   |  20<L>  |  1.08    Ca    10.5      23 Oct 2021 06:32    TPro  7.3  /  Alb  3.4  /  TBili  0.4  /  DBili  x   /  AST  12  /  ALT  8<L>  /  AlkPhos  85  10-22              Culture - Blood (collected 21 Oct 2021 21:15)  Source: .Blood Blood-Venous  Preliminary Report (22 Oct 2021 22:02):    No growth to date.    Culture - Blood (collected 21 Oct 2021 21:15)  Source: .Blood Blood-Venous  Preliminary Report (22 Oct 2021 22:02):    No growth to date.        RADIOLOGY & ADDITIONAL TESTS:    Imaging Personally Reviewed:    Consultant(s) Notes Reviewed:      Care Discussed with Consultants/Other Providers:  
Cardiovascular Disease Progress Note    Overnight events: No acute events overnight.  no co/sob/palps/dizziness  Otherwise review of systems negative    Objective Findings:  T(C): 36.3 (10-23-21 @ 04:58), Max: 36.8 (10-22-21 @ 16:18)  HR: 75 (10-23-21 @ 04:58) (75 - 85)  BP: 133/63 (10-23-21 @ 04:58) (129/69 - 135/68)  RR: 18 (10-23-21 @ 04:58) (18 - 18)  SpO2: 93% (10-23-21 @ 04:58) (91% - 93%)  Wt(kg): --  Daily     Daily       Physical Exam:  Gen: NAD  HEENT: EOMI  CV: RRR, normal S1 + S2, no m/r/g  Lungs: CTAB  Abd: soft, non-tender  Ext: No edema    Telemetry:    Laboratory Data:                        11.0   23.42 )-----------( 388      ( 23 Oct 2021 06:33 )             35.1     10-23    137  |  99  |  55<H>  ----------------------------<  177<H>  3.7   |  20<L>  |  1.08    Ca    10.5      23 Oct 2021 06:32    TPro  7.3  /  Alb  3.4  /  TBili  0.4  /  DBili  x   /  AST  12  /  ALT  8<L>  /  AlkPhos  85  10-22              Inpatient Medications:  MEDICATIONS  (STANDING):  albuterol/ipratropium for Nebulization 3 milliLiter(s) Nebulizer every 6 hours  aspirin enteric coated 81 milliGRAM(s) Oral daily  buDESOnide    Inhalation Suspension 0.5 milliGRAM(s) Inhalation every 12 hours  cefTRIAXone   IVPB      cefTRIAXone   IVPB 1000 milliGRAM(s) IV Intermittent every 24 hours  coronavirus (EUA) Vaccine (MODERNA) 0.5 milliLiter(s) IntraMuscular once  doxycycline hyclate Capsule 100 milliGRAM(s) Oral every 12 hours  enoxaparin Injectable 40 milliGRAM(s) SubCutaneous daily  hydrALAZINE 10 milliGRAM(s) Oral two times a day  predniSONE   Tablet 50 milliGRAM(s) Oral daily  sertraline 50 milliGRAM(s) Oral daily      Assessment:  fall r/o syncope  possible nph  htn  wrist injury    Recs:  cv stable  fall likely mechanical. doubt arrhythmogenic, no need for tele at present. check 2d echo, orthostatics prn  f/u neuro recs. ct head possible nph. s/p cspine mri without acute findings. further workup as OP  hypoxic episode, cxr results noted. pulm recs appreciated. cta neg for pe. on abx for possible pna. steroids per pulm for possible copd. suspect atelctasis/kyphosis/restrictive physiology also playing a role. consider incentive nava. monitor off diuretics  coronary art calcifications cw asa. statin if not contraindicated  bp stable on hydral  pt/ot  pain control  dvt ppx        Over 25 minutes spent on total encounter; more than 50% of the visit was spent counseling and/or coordinating care by the attending physician.      Ousmane Sandoval MD   Cardiovascular Disease  [phone]
Cardiovascular Disease Progress Note    Overnight events: No acute events overnight.  no cp/sob/palps  Otherwise review of systems negative    Objective Findings:  T(C): 37 (10-20-21 @ 04:21), Max: 37 (10-20-21 @ 04:21)  HR: 68 (10-20-21 @ 04:21) (60 - 68)  BP: 142/69 (10-20-21 @ 04:21) (140/68 - 148/73)  RR: 18 (10-20-21 @ 04:21) (17 - 18)  SpO2: 95% (10-20-21 @ 04:21) (94% - 95%)  Wt(kg): --  Daily     Daily       Physical Exam:  Gen: NAD  HEENT: EOMI  CV: RRR, normal S1 + S2, no m/r/g  Lungs: CTAB  Abd: soft, non-tender  Ext: No edema    Telemetry:    Laboratory Data:                        12.6   12.37 )-----------( 315      ( 19 Oct 2021 07:23 )             39.5     10-19    140  |  103  |  19  ----------------------------<  115<H>  3.8   |  22  |  0.71    Ca    9.9      19 Oct 2021 07:23                Inpatient Medications:  MEDICATIONS  (STANDING):  aspirin enteric coated 81 milliGRAM(s) Oral daily  coronavirus (EUA) Vaccine (MODERNA) 0.5 milliLiter(s) IntraMuscular once  enoxaparin Injectable 40 milliGRAM(s) SubCutaneous daily  hydrALAZINE 10 milliGRAM(s) Oral two times a day  sertraline 50 milliGRAM(s) Oral daily      Assessment:  fall r/o syncope  possible nph  htn  wrist injury    Recs:  cv stable  fall likely mechanical. doubt arrhythmogenic, no need for tele at present. check 2d echo, orthostatics prn  f/u neuro recs. ct head possible nph. s/p cspine mri without acute findings  pt/ot  pain control  dvt ppx  dispo planning to [facility]        Over 25 minutes spent on total encounter; more than 50% of the visit was spent counseling and/or coordinating care by the attending physician.      Ousmane Sandoval MD   Cardiovascular Disease  [phone]
Cardiovascular Disease Progress Note    Overnight events: No acute events overnight.  no cp/sob/palps/dizziness  Otherwise review of systems negative    Objective Findings:  T(C): 36.4 (10-16-21 @ 04:50), Max: 37 (10-16-21 @ 00:26)  HR: 68 (10-16-21 @ 04:50) (64 - 77)  BP: 145/67 (10-16-21 @ 04:50) (145/67 - 166/76)  RR: 18 (10-16-21 @ 04:50) (18 - 18)  SpO2: 96% (10-16-21 @ 04:50) (93% - 97%)  Wt(kg): --  Daily     Daily       Physical Exam:  Gen: NAD  HEENT: EOMI  CV: RRR, normal S1 + S2, no m/r/g  Lungs: CTAB  Abd: soft, non-tender  Ext: No edema    Telemetry:    Laboratory Data:                        11.1   9.19  )-----------( 286      ( 16 Oct 2021 07:20 )             36.9     10-16    143  |  109<H>  |  18  ----------------------------<  104<H>  3.5   |  22  |  0.84    Ca    9.2      16 Oct 2021 07:20    TPro  7.8  /  Alb  4.2  /  TBili  0.7  /  DBili  x   /  AST  48<H>  /  ALT  17  /  AlkPhos  103  10-14    PT/INR - ( 14 Oct 2021 12:00 )   PT: 12.4 sec;   INR: 1.04 ratio         PTT - ( 14 Oct 2021 12:00 )  PTT:29.7 sec          Inpatient Medications:  MEDICATIONS  (STANDING):  aspirin enteric coated 81 milliGRAM(s) Oral daily  cefTRIAXone   IVPB 1000 milliGRAM(s) IV Intermittent every 24 hours  enoxaparin Injectable 40 milliGRAM(s) SubCutaneous daily  hydrALAZINE 10 milliGRAM(s) Oral two times a day  sertraline 50 milliGRAM(s) Oral daily      Assessment:  fall r/o syncope  possible nph  htn  wrist injury    Recs:  cv stable  fall likely mechanical. doubt arrhythmogenic, no need for tele at present. check 2d echo, orthostatics prn  f/u neuro recs re possible nph. awaiting brain mri  pt/ot  pain control  dvt ppx        Over 25 minutes spent on total encounter; more than 50% of the visit was spent counseling and/or coordinating care by the attending physician.      Ousmane Sandoval MD   Cardiovascular Disease  [phone]
Cardiovascular Disease Progress Note    Overnight events: No acute events overnight.  no cp/sob/palps/dizziness  Otherwise review of systems negative    Objective Findings:  T(C): 36.7 (10-17-21 @ 11:52), Max: 37.3 (10-16-21 @ 20:14)  HR: 71 (10-17-21 @ 11:52) (69 - 72)  BP: 167/76 (10-17-21 @ 11:52) (136/70 - 170/79)  RR: 18 (10-17-21 @ 11:52) (18 - 18)  SpO2: 96% (10-17-21 @ 11:52) (94% - 96%)  Wt(kg): --  Daily     Daily       Physical Exam:  Gen: NAD  HEENT: EOMI  CV: RRR, normal S1 + S2, no m/r/g  Lungs: CTAB  Abd: soft, non-tender  Ext: No edema    Telemetry:    Laboratory Data:                        11.1   9.19  )-----------( 286      ( 16 Oct 2021 07:20 )             36.9     10-16    143  |  109<H>  |  18  ----------------------------<  104<H>  3.5   |  22  |  0.84    Ca    9.2      16 Oct 2021 07:20                Inpatient Medications:  MEDICATIONS  (STANDING):  aspirin enteric coated 81 milliGRAM(s) Oral daily  cefTRIAXone   IVPB 1000 milliGRAM(s) IV Intermittent every 24 hours  enoxaparin Injectable 40 milliGRAM(s) SubCutaneous daily  hydrALAZINE 10 milliGRAM(s) Oral two times a day  sertraline 50 milliGRAM(s) Oral daily      Assessment:  fall r/o syncope  possible nph  htn  wrist injury    Recs:  cv stable  fall likely mechanical. doubt arrhythmogenic, no need for tele at present. check 2d echo, orthostatics prn  f/u neuro recs. ct head possible nph. s/p cspine mri, results pending  pt/ot  pain control  dvt ppx        Over 25 minutes spent on total encounter; more than 50% of the visit was spent counseling and/or coordinating care by the attending physician.      Ousmane Sandoval MD   Cardiovascular Disease  [phone]
Cardiovascular Disease Progress Note    Overnight events: No acute events overnight.  no cp/sob/palps/dizziness  Otherwise review of systems negative    Objective Findings:  T(C): 36.8 (10-19-21 @ 05:00), Max: 37.1 (10-18-21 @ 22:21)  HR: 70 (10-19-21 @ 05:00) (65 - 77)  BP: 151/65 (10-19-21 @ 05:00) (147/78 - 151/65)  RR: 17 (10-19-21 @ 05:00) (17 - 18)  SpO2: 94% (10-19-21 @ 05:00) (94% - 94%)  Wt(kg): --  Daily     Daily       Physical Exam:  Gen: NAD  HEENT: EOMI  CV: RRR, normal S1 + S2, no m/r/g  Lungs: CTAB  Abd: soft, non-tender  Ext: No edema    Telemetry:    Laboratory Data:                        12.1   11.27 )-----------( 307      ( 18 Oct 2021 06:45 )             40no cp/sob/pa.4     10-18    139  |  101  |  17  ----------------------------<  108<H>  3.5   |  26  |  0.76    Ca    10.2      18 Oct 2021 06:44                Inpatient Medications:  MEDICATIONS  (STANDING):  aspirin enteric coated 81 milliGRAM(s) Oral daily  enoxaparin Injectable 40 milliGRAM(s) SubCutaneous daily  hydrALAZINE 10 milliGRAM(s) Oral two times a day  sertraline 50 milliGRAM(s) Oral daily      Assessment:  fall r/o syncope  possible nph  htn  wrist injury    Recs:  cv stable  fall likely mechanical. doubt arrhythmogenic, no need for tele at present. check 2d echo, orthostatics prn  f/u neuro recs. ct head possible nph. s/p cspine mri without acute findings  pt/ot  pain control  dvt ppx  dispo planning      Over 25 minutes spent on total encounter; more than 50% of the visit was spent counseling and/or coordinating care by the attending physician.      Ousmane Sandoval MD   Cardiovascular Disease  [phone]
Cardiovascular Disease Progress Note    Overnight events: No acute events overnight.  no cp/sob/palps/dizziness  Otherwise review of systems negative    Objective Findings:  T(C): 37 (10-22-21 @ 04:45), Max: 37 (10-22-21 @ 04:45)  HR: 86 (10-22-21 @ 04:45) (79 - 86)  BP: 135/78 (10-22-21 @ 04:45) (133/68 - 155/69)  RR: 18 (10-22-21 @ 04:45) (18 - 18)  SpO2: 94% (10-22-21 @ 04:45) (94% - 95%)  Wt(kg): --  Daily     Daily       Physical Exam:  Gen: NAD  HEENT: EOMI  CV: RRR, normal S1 + S2, no m/r/g  Lungs: CTAB  Abd: soft, non-tender  Ext: No edema    Telemetry:    Laboratory Data:                    Inpatient Medications:  MEDICATIONS  (STANDING):  albuterol/ipratropium for Nebulization 3 milliLiter(s) Nebulizer every 6 hours  aspirin enteric coated 81 milliGRAM(s) Oral daily  buDESOnide    Inhalation Suspension 0.5 milliGRAM(s) Inhalation every 12 hours  cefTRIAXone   IVPB      cefTRIAXone   IVPB 1000 milliGRAM(s) IV Intermittent every 24 hours  coronavirus (EUA) Vaccine (MODERNA) 0.5 milliLiter(s) IntraMuscular once  doxycycline hyclate Capsule 100 milliGRAM(s) Oral every 12 hours  enoxaparin Injectable 40 milliGRAM(s) SubCutaneous daily  hydrALAZINE 10 milliGRAM(s) Oral two times a day  methylPREDNISolone sodium succinate Injectable 20 milliGRAM(s) IV Push every 6 hours  sertraline 50 milliGRAM(s) Oral daily      Assessment:  fall r/o syncope  possible nph  htn  wrist injury    Recs:  cv stable  fall likely mechanical. doubt arrhythmogenic, no need for tele at present. check 2d echo, orthostatics prn  f/u neuro recs. ct head possible nph. s/p cspine mri without acute findings. further workup as OP  hypoxic episode, cxr results noted. pulm recs appreciated. awaiting cta. on abx for possible pna. steroids per pulm  pt/ot  pain control  dvt ppx  dispo planning to [facility] today           Over 25 minutes spent on total encounter; more than 50% of the visit was spent counseling and/or coordinating care by the attending physician.      Ousmane Sandoval MD   Cardiovascular Disease  [phone]
Cardiovascular Disease Progress Note    Overnight events: No acute events overnight.  no cp/sob/palps/dizziness  Otherwise review of systems negative    Objective Findings:  T(C): 37.2 (10-21-21 @ 04:52), Max: 37.2 (10-21-21 @ 04:52)  HR: 70 (10-21-21 @ 04:52) (63 - 75)  BP: 146/71 (10-21-21 @ 04:52) (131/71 - 146/71)  RR: 18 (10-21-21 @ 04:52) (18 - 18)  SpO2: 94% (10-21-21 @ 04:52) (94% - 96%)  Wt(kg): --  Daily     Daily       Physical Exam:  Gen: NAD  HEENT: EOMI  CV: RRR, normal S1 + S2, no m/r/g  Lungs: CTAB  Abd: soft, non-tender  Ext: No edema    Telemetry:    Laboratory Data:                    Inpatient Medications:  MEDICATIONS  (STANDING):  aspirin enteric coated 81 milliGRAM(s) Oral daily  coronavirus (EUA) Vaccine (MODERNA) 0.5 milliLiter(s) IntraMuscular once  enoxaparin Injectable 40 milliGRAM(s) SubCutaneous daily  hydrALAZINE 10 milliGRAM(s) Oral two times a day  sertraline 50 milliGRAM(s) Oral daily      Assessment:  fall r/o syncope  possible nph  htn  wrist injury    Recs:  cv stable  fall likely mechanical. doubt arrhythmogenic, no need for tele at present. check 2d echo, orthostatics prn  f/u neuro recs. ct head possible nph. s/p cspine mri without acute findings. further workup as OP  pt/ot  pain control  dvt ppx  dispo planning to [facility] today         Over 25 minutes spent on total encounter; more than 50% of the visit was spent counseling and/or coordinating care by the attending physician.      Ousmane Sandoval MD   Cardiovascular Disease  [phone]
[facility] - [facility] - PROGRESS NOTE    CHIEF COMPLAINT: hypoxemia; COPD exacerbation; pneumonia    INTERVAL HISTORY: shortness of breath much improved; decreased cough and sputum production; ongoing chest congestion and wheeze; no fevers, chills or sweats; no chest pain/pressure or palpitations; refused CT scan last night -> going now for the radiograph    REVIEW OF SYSTEMS:  Constitutional: As per interval history  HEENT: Within normal limits  CV: As per interval history  Resp: As per interval history  GI: Within normal limits   : Within normal limits  Musculoskeletal: right wrist bruise on soft cast  Skin: Within normal limits  Neurological: dementia  Psychiatric: Within normal limits  Endocrine: Within normal limits  Hematologic/Lymphatic: Within normal limits  Allergic/Immunologic: Within normal limits    MEDICATIONS:     Pulmonary "  albuterol/ipratropium for Nebulization 3 milliLiter(s) Nebulizer every 6 hours  buDESOnide    Inhalation Suspension 0.5 milliGRAM(s) Inhalation every 12 hours    Anti-microbials:  cefTRIAXone   IVPB      cefTRIAXone   IVPB 1000 milliGRAM(s) IV Intermittent every 24 hours  doxycycline hyclate Capsule 100 milliGRAM(s) Oral every 12 hours    Cardiovascular:  hydrALAZINE 10 milliGRAM(s) Oral two times a day    Other:  aspirin enteric coated 81 milliGRAM(s) Oral daily  coronavirus (EUA) Vaccine (MODERNA) 0.5 milliLiter(s) IntraMuscular once  enoxaparin Injectable 40 milliGRAM(s) SubCutaneous daily  methylPREDNISolone sodium succinate Injectable 20 milliGRAM(s) IV Push every 6 hours  sertraline 50 milliGRAM(s) Oral daily    MEDICATIONS  (PRN):  acetaminophen   Tablet .. 650 milliGRAM(s) Oral every 6 hours PRN Temp greater or equal to 38.5C (101.3F), Mild Pain (1 - 3)        OBJECTIVE:    I&O's Detail    21 Oct 2021 07:01  -  22 Oct 2021 07:00  --------------------------------------------------------  IN:    Oral Fluid: 580 mL  Total IN: 580 mL    OUT:    Voided (mL): 1000 mL  Total OUT: 1000 mL    Total NET: -420 mL    PHYSICAL EXAM:       ICU Vital Signs Last 24 Hrs  T(C): 36.3 (22 Oct 2021 11:33), Max: 37 (22 Oct 2021 04:45)  T(F): 97.3 (22 Oct 2021 11:33), Max: 98.6 (22 Oct 2021 04:45)  HR: 84 (22 Oct 2021 11:33) (80 - 86)  BP: 129/69 (22 Oct 2021 11:33) (129/69 - 135/78)  BP(mean): --  ABP: --  ABP(mean): --  RR: 18 (22 Oct 2021 11:33) (18 - 18)  SpO2: 91% (22 Oct 2021 11:33) (91% - 95%) on 2lpm nasal canula     General: Awake. Alert. Confused. Somewhat cooperative. No distress. Appears stated age 	  HEENT: Atraumatic. Bitemporal wasting. Anicteric. Normal oral mucosa. PERRL. EOMI.  Neck: Supple. Trachea midline. Thyroid without enlargement/tenderness/nodules. No carotid bruit. No JVD. Loss of bilateral supraclavicular fat pads.  Cardiovascular: Regular rate and rhythm. S1 S2 normal. No murmurs, rubs or gallops.  Respiratory: Respirations unlabored. Diffuse wheeze. Right lower lung field rales. Severe kyphosis.  Abdomen: Soft. Non-tender. Non-distended. No organomegaly. No masses. Normal bowel sounds.  Extremities: Warm to touch. No clubbing or cyanosis. No pedal edema.  Pulses: 2+ peripheral pulses all extremities.	  Skin: Normal skin color. No rashes or lesions. No ecchymoses. No cyanosis. Warm to touch.  Lymph Nodes: Cervical, supraclavicular and axillary nodes normal  Neurological: Moving all extremities. A and O x 1  Psychiatry: Calm    LABS:                          11.7   22.90 )-----------( 367      ( 22 Oct 2021 07:03 )             38.9     CBC    WBC  22.90 <==, 12.37 <==, 11.27 <==, 9.19 <==    Hemoglobin  11.7 <<==, 12.6 <<==, 12.1 <<==, 11.1 <<==    Hematocrit  38.9 <==, 39.5 <==, 40.4 <==, 36.9 <==    Platelets  367 <==, 315 <==, 307 <==, 286 <==      138  |  99  |  32<H>  ----------------------------<  175<H>    10-22  3.8   |  20<L>  |  0.79      LYTES    sodium  138 <==, 140 <==, 139 <==, 143 <==    potassium   3.8 <==, 3.8 <==, 3.5 <==, 3.5 <==    chloride  99 <==, 103 <==, 101 <==, 109 <==    carbon dioxide  20 <==, 22 <==, 26 <==, 22 <==    =============================================================================================  RENAL FUNCTION:    Creatinine:   0.79  <<==, 0.71  <<==, 0.76  <<==, 0.84  <<==    BUN:   32 <==, 19 <==, 17 <==, 18 <==    ============================================================================================    calcium   10.3 <==, 9.9 <==, 10.2 <==, 9.2 <==  ============================================================================================  LFTs    AST:   12 <==     ALT:  8  <==     AP:  85  <=    Bili:  0.4  <=    ABG - ( 21 Oct 2021 22:34 )  pH: 7.45  /  pCO2: 38    /  pO2: 99    / HCO3: 26    / Base Excess: 2.4   /  SaO2: 99.2      MICROBIOLOGY:     COVID-19 PCR . (10.14.21 @ 17:42)   COVID-19 PCR: NotDetec:       Culture - Urine (10.14.21 @ 16:58)   Specimen Source: Clean Catch Clean Catch (Midstream)   Culture Results:   >=3 organisms. Probable collection contamination.     RADIOLOGY:  [x ] Chest radiographs reviewed and interpreted by me    < from: Xray Chest 1 View- PORTABLE-Urgent (Xray Chest 1 View- PORTABLE-Urgent .) (10.21.21 @ 14:36) >    EXAM:  XR CHEST PORTABLE URGENT 1V                          PROCEDURE DATE:  10/21/2021      INTERPRETATION:  EXAMINATION: XR CHEST URGENT    CLINICAL INDICATION: Hypoxia.    TECHNIQUE: Single frontal, portable view of the chest was obtained.    COMPARISON: Chest radiograph 10/14/2021.    FINDINGS: Heart size and the mediastinum cannot be accurately evaluated on this projection.  There is right lower lung linear atelectasis.  There are new patchy left lower lung opacities.  There is no pneumothorax or pleural effusion.  There is scoliosis of the spine.    IMPRESSION:  Right lower lung linear atelectasis.    New patchy left lower lung opacities which could be due to subsegmental atelectasis or pneumonia in the appropriate clinical context.    --- End of Report ---    GARRET DOYLE DO; Resident Radiologist  This document has been electronically signed.  LOBO EATON MD; Attending Radiologist  This document has been electronically signed. Oct 21 2021  5:22PM    < end of copied text >  ---------------------------------------------------------------------------------------------------------------    < from: Xray Chest 1 View- PORTABLE-Urgent (10.14.21 @ 12:09) >    EXAM:  XR CHEST PORTABLE URGENT 1V                          PROCEDURE DATE:  10/14/2021      INTERPRETATION:  EXAMINATION: XR CHEST URGENT    CLINICAL INDICATION: Mechanical fall. Concerns for pneumonia and rib fractures.    TECHNIQUE: X-ray single frontal chest.    COMPARISON: Multiple prior chest x-rays most recently dated 9/20/2019..    FINDINGS:  Trachea is midline.  There are no acute bony pathologies.  Cardiomediastinal silhouette cannot be reliably assessed in this projection.  Clear lungs.  There is no pneumothorax or pleural effusion.    IMPRESSION:  Clear lungs.    No visible acute osseous abnormalities.    If clinical concerns for rib fracture is present recommend obtaining dedicated rib series x-rays.    --- End of Report ---    CRISTELA NICK MD; Resident Radiologist  This document has been electronically signed.  LANIE WRIGHT MD; Attending Radiologist  This document has been electronically signed. Oct 15 2021  9:56AM    < end of copied text >  ---------------------------------------------------------------------------------------------------------------  < from: CT Head No Cont (10.14.21 @ 12:12) >    EXAM:  CT BRAIN                          PROCEDURE DATE:  10/14/2021      INTERPRETATION:  INDICATION:  Status post trauma with head injury.   Headache.  TECHNIQUE:  A non contrast 2.5mm axial CT study of the brain was performed from skull base to vertex. Coronal and sagittal reformations were generated from the axial data.  COMPARISON EXAMINATION:  no prior.    FINDINGS:    HEMISPHERES:Chronic ischemic changes are noted in both hemispheres with atrophy. No acute abnormality or hemorrhage noted.  VENTRICLES:  Ventricles are prominent relative to sulcal size and volume loss suggesting an underlying component of hydrocephalus.  POSTERIOR FOSSA:  The brain stem and cerebellum are unremarkable.  No CP angle lesion noted.  EXTRACEREBRAL SPACES:  No subdural or epidural collections are noted.  SKULL BASE AND CALVARIUM:  Appears intact.  No fracture or destructive lesion is identified.  SINUSES AND MASTOIDS:  Clear.  MISCELLANEOUS:  No orbital or suprasellar abnormality noted.    IMPRESSION:    1)  chronic ischemic changes with volume loss. However ventricles are prominent suggesting underlying mild hydrocephalus. Further assessment and follow-up recommended..  2)  no intracerebral hemorrhage, contusion, or extracerebral collections are identified.    --- End of Report ---    DASHA PROCTOR MD; Attending Radiologist  This document has been electronically signed. Oct 14 2021 12:26PM    < end of copied text >  ---------------------------------------------------------------------------------------------------------------  < from: MR Cervical Spine No Cont (10.16.21 @ 21:27) >    EXAM:  MR SPINE CERVICAL                          PROCEDURE DATE:  10/16/2021      INTERPRETATION:  CLINICAL INDICATION: Unsteady gait, fall, urinary incontinence    Magnetic resonance imaging of the cervical spine was carried out with sagittal surface coil imaging from C1 to T6-7 using T1 and fast spin echo T2 weighted images with and without fat saturation technique with axial T1 and fast spin echo T2 weighted imaging on a 1.5 Dyana magnet.    The cervical vertebrae are normal in height and signal intensity. Disc space narrowing is identified at C3-4 through C6-7 consistent with degenerative changes. Small disc osteophyte complexes are present at these levels as well as dorsal ligamentous hypertrophy. There is no significant spinal stenosis. There is no cord compression. There is no marrow edema. There is no acute fracture or dislocation.    IMPRESSION: Mild degenerative changes. No significant spinal stenosis. No cord compression.    --- End of Report ---    CASSIDY LEACH MD; Attending Radiologist  This document has been electronically signed. Oct 17 2021  4:02PM    < end of copied text >  ---------------------------------------------------------------------------------------------------------------      
Neurology Progress Note    S: Patient seen and examined. No new events overnight. patient denied CP, SOB, HA or pain. await MRI resutls     Medication:  MEDICATIONS  (STANDING):  aspirin enteric coated 81 milliGRAM(s) Oral daily  cefTRIAXone   IVPB 1000 milliGRAM(s) IV Intermittent every 24 hours  enoxaparin Injectable 40 milliGRAM(s) SubCutaneous daily  hydrALAZINE 10 milliGRAM(s) Oral two times a day  sertraline 50 milliGRAM(s) Oral daily    MEDICATIONS  (PRN):  acetaminophen   Tablet .. 650 milliGRAM(s) Oral every 6 hours PRN Temp greater or equal to 38.5C (101.3F), Mild Pain (1 - 3)  oxycodone    5 mG/acetaminophen 325 mG 1 Tablet(s) Oral every 6 hours PRN Moderate Pain (4 - 6)      Vitals:  Vital Signs Last 24 Hrs  T(C): 36.7 (10-17-21 @ 11:52), Max: 37.3 (10-16-21 @ 20:14)  T(F): 98 (10-17-21 @ 11:52), Max: 99.1 (10-16-21 @ 20:14)  HR: 71 (10-17-21 @ 11:52) (69 - 72)  BP: 167/76 (10-17-21 @ 11:52) (136/70 - 170/79)  BP(mean): --  RR: 18 (10-17-21 @ 11:52) (18 - 18)  SpO2: 96% (10-17-21 @ 11:52) (94% - 96%)    Orthostatic VS  10-15-21 @ 19:11  Lying BP: 169/72 HR: 66  Sitting BP: 175/74 HR: 67  Standing BP: 190/74 HR: 85  Site: upper left arm  Mode: electronic    General Exam:   General Appearance: Appropriately dressed and in no acute distress       Head: Normocephalic, atraumatic and no dysmorphic features  Ear, Nose, and Throat: Moist mucous membranes  CVS: S1S2+  Resp: No SOB, no wheeze or rhonchi  Abd: soft NTND  Extremities: No edema, no cyanosis  Skin: No bruises, no rashes     Neurological Exam:  Mental Status: Awake, alert and oriented x 3.  Able to follow simple and complex verbal commands. Able to name and repeat. fluent speech. No obvious aphasia or dysarthria noted.   Cranial Nerves: PERRL, EOMI, VFFC, sensation V1-V3 intact,  no obvious facial asymmetry , equal elevation of palate, scm/trap 5/5, tongue is midline on protrusion. no obvious papilledema on fundoscopic exam. Hearing is grossly intact.   Motor: Normal bulk, tone and strength throughout. Fine finger movements were intact and symmetric. no tremors or drift noted.    Sensation: Intact to light touch and pinprick throughout. no right/left confusion. no extinction to tactile on DSS.   Deep Tendon Reflexes: +3 left knee jerk, +3 R bicep reflex, 2 beats of clonus on left leg  Toes flexor bilaterally  Coordination: No dysmetria on FNF o   Gait: deferred     I personally reviewed the below data/images/labs:    CBC Full  -  ( 16 Oct 2021 07:20 )  WBC Count : 9.19 K/uL  RBC Count : 4.02 M/uL  Hemoglobin : 11.1 g/dL  Hematocrit : 36.9 %  Platelet Count - Automated : 286 K/uL  Mean Cell Volume : 91.8 fl  Mean Cell Hemoglobin : 27.6 pg  Mean Cell Hemoglobin Concentration : 30.1 gm/dL  Auto Neutrophil # : x  Auto Lymphocyte # : x  Auto Monocyte # : x  Auto Eosinophil # : x  Auto Basophil # : x  Auto Neutrophil % : x  Auto Lymphocyte % : x  Auto Monocyte % : x  Auto Eosinophil % : x  Auto Basophil % : x    10-16    143  |  109<H>  |  18  ----------------------------<  104<H>  3.5   |  22  |  0.84    Ca    9.2      16 Oct 2021 07:20      Urinalysis Basic - ( 14 Oct 2021 13:34 )    Color: Light Yellow / Appearance: Slightly Turbid / S.022 / pH: x  Gluc: x / Ketone: Small  / Bili: Negative / Urobili: Negative   Blood: x / Protein: 30 mg/dL / Nitrite: Positive   Leuk Esterase: Large / RBC: 7 /hpf /  /HPF   Sq Epi: x / Non Sq Epi: 0 /hpf / Bacteria: Many      CT head:   1)  chronic ischemic changes with volume loss. However ventricles are prominent suggesting underlying mild hydrocephalus. Further assessment and follow-up recommended..  2)  no intracerebral hemorrhage, contusion, or extracerebral collections are identified.    
Patient is a 85y old  Female who presents with a chief complaint of     SUBJECTIVE / OVERNIGHT EVENTS: Comfortable without new complaints.   Review of Systems  chest pain no  palpitations no  sob no  nausea no  headache no    MEDICATIONS  (STANDING):  aspirin enteric coated 81 milliGRAM(s) Oral daily  enoxaparin Injectable 40 milliGRAM(s) SubCutaneous daily  hydrALAZINE 10 milliGRAM(s) Oral two times a day  sertraline 50 milliGRAM(s) Oral daily    MEDICATIONS  (PRN):  acetaminophen   Tablet .. 650 milliGRAM(s) Oral every 6 hours PRN Temp greater or equal to 38.5C (101.3F), Mild Pain (1 - 3)  oxycodone    5 mG/acetaminophen 325 mG 1 Tablet(s) Oral every 6 hours PRN Moderate Pain (4 - 6)      Vital Signs Last 24 Hrs  T(C): 36.7 (18 Oct 2021 11:06), Max: 37.1 (17 Oct 2021 23:43)  T(F): 98.1 (18 Oct 2021 11:06), Max: 98.7 (17 Oct 2021 23:43)  HR: 65 (18 Oct 2021 11:06) (65 - 77)  BP: 149/71 (18 Oct 2021 11:06) (138/75 - 160/76)  BP(mean): --  RR: 18 (18 Oct 2021 11:06) (18 - 18)  SpO2: 94% (18 Oct 2021 11:06) (94% - 96%)    PHYSICAL EXAM:  GENERAL: NAD, well-developed  HEAD:  Atraumatic, Normocephalic  EYES: EOMI, PERRLA, conjunctiva and sclera clear  NECK: Supple, No JVD  CHEST/LUNG: Clear to auscultation bilaterally; No wheeze  HEART: Regular rate and rhythm; No murmurs, rubs, or gallops  ABDOMEN: Soft, Nontender, Nondistended; Bowel sounds present  EXTREMITIES:  2+ Peripheral Pulses, No clubbing, cyanosis, or edema  PSYCH: AAOx3  NEUROLOGY: non-focal  SKIN: No rashes or lesions    LABS:                        12.1   11.27 )-----------( 307      ( 18 Oct 2021 06:45 )             40.4     10-18    139  |  101  |  17  ----------------------------<  108<H>  3.5   |  26  |  0.76    Ca    10.2      18 Oct 2021 06:44                  RADIOLOGY & ADDITIONAL TESTS:    Imaging Personally Reviewed:    < from: MR Cervical Spine No Cont (10.16.21 @ 21:27) >    IMPRESSION: Mild degenerative changes. No significant spinal stenosis. No cord compression.      < end of copied text >  Consultant(s) Notes Reviewed:      Care Discussed with Consultants/Other Providers:  
[facility] - [facility] - PROGRESS NOTE    CHIEF COMPLAINT: hypoxemia; COPD exacerbation; atelectasis +/- pneumonia    INTERVAL HISTORY: still with difficulty ambulating due to right knee discomfort and an unsteady gait; no shortness of breath or hypoxemia but back on a nasal canula @ 2lpm recurrent cough with scant sputum production; mild chest congestion and wheeze; no fevers, chills or sweats; no chest pain/pressure or palpitations; CT scan -> no pulmonary embolism - atelectasis of the left lower lobe > right lower lobe and lingula - no pneumonia    REVIEW OF SYSTEMS:  Constitutional: As per interval history  HEENT: Within normal limits  CV: As per interval history  Resp: As per interval history  GI: Within normal limits   : Within normal limits  Musculoskeletal: right wrist bruise - right knee pain and buckling with weight bearing  Skin: Within normal limits  Neurological: dementia  Psychiatric: Within normal limits  Endocrine: Within normal limits  Hematologic/Lymphatic: Within normal limits  Allergic/Immunologic: Within normal limits    MEDICATIONS:     Pulmonary "  albuterol/ipratropium for Nebulization 3 milliLiter(s) Nebulizer every 6 hours  buDESOnide    Inhalation Suspension 0.5 milliGRAM(s) Inhalation every 12 hours    Anti-microbials:  doxycycline hyclate Capsule 100 milliGRAM(s) Oral every 12 hours    Cardiovascular:  hydrALAZINE 10 milliGRAM(s) Oral two times a day    Other:  aspirin enteric coated 81 milliGRAM(s) Oral daily  coronavirus (EUA) Vaccine (MODERNA) 0.5 milliLiter(s) IntraMuscular once  enoxaparin Injectable 40 milliGRAM(s) SubCutaneous daily  predniSONE   Tablet 50 milliGRAM(s) Oral daily  sertraline 50 milliGRAM(s) Oral daily    MEDICATIONS  (PRN):  acetaminophen   Tablet .. 650 milliGRAM(s) Oral every 6 hours PRN Temp greater or equal to 38.5C (101.3F), Mild Pain (1 - 3)        OBJECTIVE:    I&O's Detail    25 Oct 2021 07:01  -  26 Oct 2021 07:00  --------------------------------------------------------  IN:    Oral Fluid: 1320 mL  Total IN: 1320 mL    OUT:  Total OUT: 0 mL    Total NET: 1320 mL      PHYSICAL EXAM:       ICU Vital Signs Last 24 Hrs  T(C): 36.4 (26 Oct 2021 05:05), Max: 36.7 (25 Oct 2021 11:27)  T(F): 97.5 (26 Oct 2021 05:05), Max: 98.1 (25 Oct 2021 11:27)  HR: 69 (26 Oct 2021 05:05) (65 - 79)  BP: 136/72 (26 Oct 2021 05:05) (127/71 - 137/67)  BP(mean): --  ABP: --  ABP(mean): --  RR: 18 (26 Oct 2021 05:05) (18 - 18)  SpO2: 98% (26 Oct 2021 05:05) (88% on room air - 99% on 2lpm nasal canula     General: Awake. Alert. Confused. Cooperative. Hacking cough. Appears stated age.  HEENT: Atraumatic. Bitemporal wasting. Anicteric. Normal oral mucosa. PERRL. EOMI.  Neck: Supple. Trachea midline. Thyroid without enlargement/tenderness/nodules. No carotid bruit. No JVD. Loss of bilateral supraclavicular fat pads.  Cardiovascular: Regular rate and rhythm. S1 S2 normal. No murmurs, rubs or gallops.  Respiratory: Respirations unlabored. Bilateral wheeze and rales. Severe kyphosis.  Abdomen: Soft. Non-tender. Non-distended. No organomegaly. No masses. Normal bowel sounds.  Extremities: Warm to touch. No clubbing or cyanosis. No pedal edema. Bilateral knee arthritic changes with crepitation  Pulses: 2+ peripheral pulses all extremities.	  Skin: Normal skin color. No rashes or lesions. No ecchymoses. No cyanosis. Warm to touch.  Lymph Nodes: Cervical, supraclavicular and axillary nodes normal  Neurological: Moving all extremities. A and O x 1 - 2  Psychiatry: Calm    LABS:                          10.4   13.33 )-----------( 391      ( 26 Oct 2021 06:47 )             33.7     CBC    WBC  13.33 <==, 15.35 <==, 21.52 <==, 23.42 <==, 22.90 <==    Hemoglobin  10.4 <<==, 10.5 <<==, 11.0 <<==, 11.0 <<==, 11.7 <<==    Hematocrit  33.7 <==, 34.0 <==, 34.5 <==, 35.1 <==, 38.9 <==    Platelets  391 <==, 419 <==, 399 <==, 388 <==, 367 <==      141  |  106  |  47<H>  ----------------------------<  77    10-26  4.5   |  21<L>  |  0.88      LYTES    sodium  141 <==, 142 <==, 140 <==, 137 <==, 138 <==    potassium   4.5 <==, 4.1 <==, 3.4 <==, 3.7 <==, 3.8 <==    chloride  106 <==, 104 <==, 104 <==, 99 <==, 99 <==    carbon dioxide  21 <==, 21 <==, 22 <==, 20 <==, 20 <==    =============================================================================================  RENAL FUNCTION:    Creatinine:   0.88  <<==, 0.92  <<==, 0.97  <<==, 1.08  <<==, 0.79  <<==    BUN:   47 <==, 49 <==, 58 <==, 55 <==, 32 <==    ============================================================================================    calcium   10.4 <==, 10.5 <==, 10.5 <==, 10.5 <==, 10.3 <==    ============================================================================================  LFTs    AST:   12 <==     ALT:  8  <==     AP:  85  <=    Bili:  0.4  <=    ABG - ( 21 Oct 2021 22:34 )  pH: 7.45  /  pCO2: 38    /  pO2: 99    / HCO3: 26    / Base Excess: 2.4   /  SaO2: 99.2      MICROBIOLOGY:     COVID-19 PCR . (10.14.21 @ 17:42)   COVID-19 PCR: NotDetec:     Culture - Urine (10.14.21 @ 16:58)   Specimen Source: Clean Catch Clean Catch (Midstream)   Culture Results:   >=3 organisms. Probable collection contamination.     Culture - Blood (10.21.21 @ 21:15)   Specimen Source: .Blood Blood-Venous   Culture Results:   No growth to date.     Culture - Blood (10.21.21 @ 21:15)   Specimen Source: .Blood Blood-Venous   Culture Results:   No growth to date.     RADIOLOGY:  [x] Chest radiographs reviewed and interpreted by me    < from: CT Angio Chest PE Protocol w/ IV Cont (10.22.21 @ 14:22) >    EXAM:  CT ANGIO CHEST PULM ART [facility]                          PROCEDURE DATE:  10/22/2021      INTERPRETATION:  Clinical information: Cough. Evaluate for pulmonary embolus. Exam is compared to previous study of 4/4/2011.    CT angiogram of the chest was obtained following the administration of intravenous contrast. Approximately 66 cc of Omnipaque 350 was administered and 34 cc was discarded. Coronal, sagittal and MIP images were submitted for review.    No hilar and/or mediastinal adenopathy is noted.    Heart is normal in size. Calcification of the coronary arteries is noted. No pericardial effusion is noted. Pulmonary arteries are normal in caliber. No filling defects are noted.    No endobronchial lesions are noted. Thick linear opacities representing atelectasis are noted within the lingula and both lower lobes. No pleural effusions are noted.    Below the diaphragm, visualized portions of the abdomen are unremarkable.    Degenerative changes of the spine are noted.    IMPRESSION: No pulmonary embolus is noted.    --- End of Report ---    DARRIUS CHERY MD; Attending Radiologist  This document has been electronically signed. Oct 22 2021  2:48PM    < end of copied text >  ---------------------------------------------------------------------------------------------------------------  < from: Xray Chest 1 View- PORTABLE-Urgent (Xray Chest 1 View- PORTABLE-Urgent .) (10.21.21 @ 14:36) >    EXAM:  XR CHEST PORTABLE URGENT 1V                          PROCEDURE DATE:  10/21/2021      INTERPRETATION:  EXAMINATION: XR CHEST URGENT    CLINICAL INDICATION: Hypoxia.    TECHNIQUE: Single frontal, portable view of the chest was obtained.    COMPARISON: Chest radiograph 10/14/2021.    FINDINGS: Heart size and the mediastinum cannot be accurately evaluated on this projection.  There is right lower lung linear atelectasis.  There are new patchy left lower lung opacities.  There is no pneumothorax or pleural effusion.  There is scoliosis of the spine.    IMPRESSION:  Right lower lung linear atelectasis.    New patchy left lower lung opacities which could be due to subsegmental atelectasis or pneumonia in the appropriate clinical context.    --- End of Report ---    GARRET DOYLE DO; Resident Radiologist  This document has been electronically signed.  LOBO EATON MD; Attending Radiologist  This document has been electronically signed. Oct 21 2021  5:22PM    < end of copied text >  ---------------------------------------------------------------------------------------------------------------    < from: Xray Chest 1 View- PORTABLE-Urgent (10.14.21 @ 12:09) >    EXAM:  XR CHEST PORTABLE URGENT 1V                          PROCEDURE DATE:  10/14/2021      INTERPRETATION:  EXAMINATION: XR CHEST URGENT    CLINICAL INDICATION: Mechanical fall. Concerns for pneumonia and rib fractures.    TECHNIQUE: X-ray single frontal chest.    COMPARISON: Multiple prior chest x-rays most recently dated 9/20/2019..    FINDINGS:  Trachea is midline.  There are no acute bony pathologies.  Cardiomediastinal silhouette cannot be reliably assessed in this projection.  Clear lungs.  There is no pneumothorax or pleural effusion.    IMPRESSION:  Clear lungs.    No visible acute osseous abnormalities.    If clinical concerns for rib fracture is present recommend obtaining dedicated rib series x-rays.    --- End of Report ---    CRISTELA NICK MD; Resident Radiologist  This document has been electronically signed.  LANIE WRIGHT MD; Attending Radiologist  This document has been electronically signed. Oct 15 2021  9:56AM    < end of copied text >  ---------------------------------------------------------------------------------------------------------------  < from: CT Head No Cont (10.14.21 @ 12:12) >    EXAM:  CT BRAIN                          PROCEDURE DATE:  10/14/2021      INTERPRETATION:  INDICATION:  Status post trauma with head injury.   Headache.  TECHNIQUE:  A non contrast 2.5mm axial CT study of the brain was performed from skull base to vertex. Coronal and sagittal reformations were generated from the axial data.  COMPARISON EXAMINATION:  no prior.    FINDINGS:    HEMISPHERES: Chronic ischemic changes are noted in both hemispheres with atrophy. No acute abnormality or hemorrhage noted.  VENTRICLES:  Ventricles are prominent relative to sulcal size and volume loss suggesting an underlying component of hydrocephalus.  POSTERIOR FOSSA:  The brain stem and cerebellum are unremarkable.  No CP angle lesion noted.  EXTRACEREBRAL SPACES:  No subdural or epidural collections are noted.  SKULL BASE AND CALVARIUM:  Appears intact.  No fracture or destructive lesion is identified.  SINUSES AND MASTOIDS:  Clear.  MISCELLANEOUS:  No orbital or suprasellar abnormality noted.    IMPRESSION:    1)  chronic ischemic changes with volume loss. However ventricles are prominent suggesting underlying mild hydrocephalus. Further assessment and follow-up recommended..  2)  no intracerebral hemorrhage, contusion, or extracerebral collections are identified.    --- End of Report ---    DASHA PROCTOR MD; Attending Radiologist  This document has been electronically signed. Oct 14 2021 12:26PM    < end of copied text >  ---------------------------------------------------------------------------------------------------------------  < from: MR Cervical Spine No Cont (10.16.21 @ 21:27) >    EXAM:  MR SPINE CERVICAL                          PROCEDURE DATE:  10/16/2021      INTERPRETATION:  CLINICAL INDICATION: Unsteady gait, fall, urinary incontinence    Magnetic resonance imaging of the cervical spine was carried out with sagittal surface coil imaging from C1 to T6-7 using T1 and fast spin echo T2 weighted images with and without fat saturation technique with axial T1 and fast spin echo T2 weighted imaging on a 1.5 Dyana magnet.    The cervical vertebrae are normal in height and signal intensity. Disc space narrowing is identified at C3-4 through C6-7 consistent with degenerative changes. Small disc osteophyte complexes are present at these levels as well as dorsal ligamentous hypertrophy. There is no significant spinal stenosis. There is no cord compression. There is no marrow edema. There is no acute fracture or dislocation.    IMPRESSION: Mild degenerative changes. No significant spinal stenosis. No cord compression.    --- End of Report ---    CASSIDY LEACH MD; Attending Radiologist  This document has been electronically signed. Oct 17 2021  4:02PM    < end of copied text >  ---------------------------------------------------------------------------------------------------------------      
[facility] - PROGRESS NOTE    CHIEF COMPLAINT: hypoxemia; COPD exacerbation; atelectasis +/- pneumonia    INTERVAL HISTORY: no shortness of breath or hypoxemia on room air; decreased cough and sputum production; mild chest congestion and wheeze; no fevers, chills or sweats; no chest pain/pressure or palpitations; CT scan -> no pulmonary embolism - atelectasis of the left lower lobe > right lower lobe and lingula    REVIEW OF SYSTEMS:  Constitutional: As per interval history  HEENT: Within normal limits  CV: As per interval history  Resp: As per interval history  GI: Within normal limits   : Within normal limits  Musculoskeletal: right wrist bruise on soft cast  Skin: Within normal limits  Neurological: dementia  Psychiatric: Within normal limits  Endocrine: Within normal limits  Hematologic/Lymphatic: Within normal limits  Allergic/Immunologic: Within normal limits    MEDICATIONS:     Pulmonary "  albuterol/ipratropium for Nebulization 3 milliLiter(s) Nebulizer every 6 hours  buDESOnide    Inhalation Suspension 0.5 milliGRAM(s) Inhalation every 12 hours    Anti-microbials:  cefTRIAXone   IVPB      cefTRIAXone   IVPB 1000 milliGRAM(s) IV Intermittent every 24 hours  doxycycline hyclate Capsule 100 milliGRAM(s) Oral every 12 hours    Cardiovascular:  hydrALAZINE 10 milliGRAM(s) Oral two times a day    Other:  aspirin enteric coated 81 milliGRAM(s) Oral daily  coronavirus (EUA) Vaccine (MODERNA) 0.5 milliLiter(s) IntraMuscular once  enoxaparin Injectable 40 milliGRAM(s) SubCutaneous daily  predniSONE   Tablet 50 milliGRAM(s) Oral daily  sertraline 50 milliGRAM(s) Oral daily    MEDICATIONS  (PRN):  acetaminophen   Tablet .. 650 milliGRAM(s) Oral every 6 hours PRN Temp greater or equal to 38.5C (101.3F), Mild Pain (1 - 3)    OBJECTIVE:    I&O's Detail    22 Oct 2021 07:01  -  23 Oct 2021 07:00  --------------------------------------------------------  IN:    Oral Fluid: 240 mL  Total IN: 240 mL    OUT:    Voided (mL): 500 mL  Total OUT: 500 mL    Total NET: -260 mL      23 Oct 2021 07:01  -  23 Oct 2021 13:14  --------------------------------------------------------  IN:    Oral Fluid: 240 mL  Total IN: 240 mL    OUT:  Total OUT: 0 mL    Total NET: 240 mL    PHYSICAL EXAM:       ICU Vital Signs Last 24 Hrs  T(C): 36.7 (23 Oct 2021 11:40), Max: 36.8 (22 Oct 2021 16:18)  T(F): 98.1 (23 Oct 2021 11:40), Max: 98.3 (22 Oct 2021 16:18)  HR: 91 (23 Oct 2021 11:40) (75 - 91)  BP: 146/64 (23 Oct 2021 11:40) (133/63 - 146/64)  BP(mean): --  ABP: --  ABP(mean): --  RR: 18 (23 Oct 2021 11:40) (18 - 18)  SpO2: 92% (23 Oct 2021 11:46) (91% - 93%) on room air     General: Awake. Alert. Confused. Cooperative. No distress. Appears stated age. Attempting to feed herself without much success.	  HEENT: Atraumatic. Bitemporal wasting. Anicteric. Normal oral mucosa. PERRL. EOMI.  Neck: Supple. Trachea midline. Thyroid without enlargement/tenderness/nodules. No carotid bruit. No JVD. Loss of bilateral supraclavicular fat pads.  Cardiovascular: Regular rate and rhythm. S1 S2 normal. No murmurs, rubs or gallops.  Respiratory: Respirations unlabored. Mild wheeze. Left lower lung field rales. Severe kyphosis.  Abdomen: Soft. Non-tender. Non-distended. No organomegaly. No masses. Normal bowel sounds.  Extremities: Warm to touch. No clubbing or cyanosis. No pedal edema.  Pulses: 2+ peripheral pulses all extremities.	  Skin: Normal skin color. No rashes or lesions. No ecchymoses. No cyanosis. Warm to touch.  Lymph Nodes: Cervical, supraclavicular and axillary nodes normal  Neurological: Moving all extremities. A and O x 1  Psychiatry: Calm    LABS:                          11.0   23.42 )-----------( 388      ( 23 Oct 2021 06:33 )             35.1     CBC    WBC  23.42 <==, 22.90 <==, 12.37 <==, 11.27 <==    Hemoglobin  11.0 <<==, 11.7 <<==, 12.6 <<==, 12.1 <<==    Hematocrit  35.1 <==, 38.9 <==, 39.5 <==, 40.4 <==    Platelets  388 <==, 367 <==, 315 <==, 307 <==      137  |  99  |  55<H>  ----------------------------<  177<H>    10-23  3.7   |  20<L>  |  1.08      LYTES    sodium  137 <==, 138 <==, 140 <==, 139 <==    potassium   3.7 <==, 3.8 <==, 3.8 <==, 3.5 <==    chloride  99 <==, 99 <==, 103 <==, 101 <==    carbon dioxide  20 <==, 20 <==, 22 <==, 26 <==    =============================================================================================  RENAL FUNCTION:    Creatinine:   1.08  <<==, 0.79  <<==, 0.71  <<==, 0.76  <<==    BUN:   55 <==, 32 <==, 19 <==, 17 <==    ============================================================================================    calcium   10.5 <==, 10.3 <==, 9.9 <==, 10.2 <==  ============================================================================================  LFTs    AST:   12 <==     ALT:  8  <==     AP:  85  <=    Bili:  0.4  <=    ABG - ( 21 Oct 2021 22:34 )  pH: 7.45  /  pCO2: 38    /  pO2: 99    / HCO3: 26    / Base Excess: 2.4   /  SaO2: 99.2      MICROBIOLOGY:     COVID-19 PCR . (10.14.21 @ 17:42)   COVID-19 PCR: NotDetec:       Culture - Urine (10.14.21 @ 16:58)   Specimen Source: Clean Catch Clean Catch (Midstream)   Culture Results:   >=3 organisms. Probable collection contamination.     Culture - Blood (10.21.21 @ 21:15)   Specimen Source: .Blood Blood-Venous   Culture Results:   No growth to date.     Culture - Blood (10.21.21 @ 21:15)   Specimen Source: .Blood Blood-Venous   Culture Results:   No growth to date.     RADIOLOGY:  [x] Chest radiographs reviewed and interpreted by me    < from: CT Angio Chest PE Protocol w/ IV Cont (10.22.21 @ 14:22) >    EXAM:  CT ANGIO CHEST PULM [facility]                          PROCEDURE DATE:  10/22/2021      INTERPRETATION:  Clinical information: Cough. Evaluate for pulmonary embolus. Exam is compared to previous study of 4/4/2011.    CT angiogram of the chest was obtained following the administration of intravenous contrast. Approximately 66 cc of Omnipaque 350 was administered and 34 cc was discarded. Coronal, sagittal and MIP images were submitted for review.    No hilar and/or mediastinal adenopathy is noted.    Heart is normal in size. Calcification of the coronary arteries is noted. No pericardial effusion is noted. Pulmonary arteries are normal in caliber. No filling defects are noted.    No endobronchial lesions are noted. Thick linear opacities representing atelectasis are noted within the lingula and both lower lobes. No pleural effusions are noted.    Below the diaphragm, visualized portions of the abdomen are unremarkable.    Degenerative changes of the spine are noted.    IMPRESSION: No pulmonary embolus is noted.    --- End of Report ---    DARRIUS CHERY MD; Attending Radiologist  This document has been electronically signed. Oct 22 2021  2:48PM    < end of copied text >  ---------------------------------------------------------------------------------------------------------------  < from: Xray Chest 1 View- PORTABLE-Urgent (Xray Chest 1 View- PORTABLE-Urgent .) (10.21.21 @ 14:36) >    EXAM:  XR CHEST PORTABLE URGENT 1V                          PROCEDURE DATE:  10/21/2021      INTERPRETATION:  EXAMINATION: XR CHEST URGENT    CLINICAL INDICATION: Hypoxia.    TECHNIQUE: Single frontal, portable view of the chest was obtained.    COMPARISON: Chest radiograph 10/14/2021.    FINDINGS: Heart size and the mediastinum cannot be accurately evaluated on this projection.  There is right lower lung linear atelectasis.  There are new patchy left lower lung opacities.  There is no pneumothorax or pleural effusion.  There is scoliosis of the spine.    IMPRESSION:  Right lower lung linear atelectasis.    New patchy left lower lung opacities which could be due to subsegmental atelectasis or pneumonia in the appropriate clinical context.    --- End of Report ---    GARRET DOYLE DO; Resident Radiologist  This document has been electronically signed.  LOBO EATON MD; Attending Radiologist  This document has been electronically signed. Oct 21 2021  5:22PM    < end of copied text >  ---------------------------------------------------------------------------------------------------------------    < from: Xray Chest 1 View- PORTABLE-Urgent (10.14.21 @ 12:09) >    EXAM:  XR CHEST PORTABLE URGENT 1V                          PROCEDURE DATE:  10/14/2021      INTERPRETATION:  EXAMINATION: XR CHEST URGENT    CLINICAL INDICATION: Mechanical fall. Concerns for pneumonia and rib fractures.    TECHNIQUE: X-ray single frontal chest.    COMPARISON: Multiple prior chest x-rays most recently dated 9/20/2019..    FINDINGS:  Trachea is midline.  There are no acute bony pathologies.  Cardiomediastinal silhouette cannot be reliably assessed in this projection.  Clear lungs.  There is no pneumothorax or pleural effusion.    IMPRESSION:  Clear lungs.    No visible acute osseous abnormalities.    If clinical concerns for rib fracture is present recommend obtaining dedicated rib series x-rays.    --- End of Report ---    CRISTELA NICK MD; Resident Radiologist  This document has been electronically signed.  LANIE WRIGHT MD; Attending Radiologist  This document has been electronically signed. Oct 15 2021  9:56AM    < end of copied text >  ---------------------------------------------------------------------------------------------------------------  < from: CT Head No Cont (10.14.21 @ 12:12) >    EXAM:  CT BRAIN                          PROCEDURE DATE:  10/14/2021      INTERPRETATION:  INDICATION:  Status post trauma with head injury.   Headache.  TECHNIQUE:  A non contrast 2.5mm axial CT study of the brain was performed from skull base to vertex. Coronal and sagittal reformations were generated from the axial data.  COMPARISON EXAMINATION:  no prior.    FINDINGS:    HEMISPHERES: Chronic ischemic changes are noted in both hemispheres with atrophy. No acute abnormality or hemorrhage noted.  VENTRICLES:  Ventricles are prominent relative to sulcal size and volume loss suggesting an underlying component of hydrocephalus.  POSTERIOR FOSSA:  The brain stem and cerebellum are unremarkable.  No CP angle lesion noted.  EXTRACEREBRAL SPACES:  No subdural or epidural collections are noted.  SKULL BASE AND CALVARIUM:  Appears intact.  No fracture or destructive lesion is identified.  SINUSES AND MASTOIDS:  Clear.  MISCELLANEOUS:  No orbital or suprasellar abnormality noted.    IMPRESSION:    1)  chronic ischemic changes with volume loss. However ventricles are prominent suggesting underlying mild hydrocephalus. Further assessment and follow-up recommended..  2)  no intracerebral hemorrhage, contusion, or extracerebral collections are identified.    --- End of Report ---    DASHA PROCTOR MD; Attending Radiologist  This document has been electronically signed. Oct 14 2021 12:26PM    < end of copied text >  ---------------------------------------------------------------------------------------------------------------  < from: MR Cervical Spine No Cont (10.16.21 @ 21:27) >    EXAM:  MR SPINE CERVICAL                          PROCEDURE DATE:  10/16/2021      INTERPRETATION:  CLINICAL INDICATION: Unsteady gait, fall, urinary incontinence    Magnetic resonance imaging of the cervical spine was carried out with sagittal surface coil imaging from C1 to T6-7 using T1 and fast spin echo T2 weighted images with and without fat saturation technique with axial T1 and fast spin echo T2 weighted imaging on a 1.5 Dyana magnet.    The cervical vertebrae are normal in height and signal intensity. Disc space narrowing is identified at C3-4 through C6-7 consistent with degenerative changes. Small disc osteophyte complexes are present at these levels as well as dorsal ligamentous hypertrophy. There is no significant spinal stenosis. There is no cord compression. There is no marrow edema. There is no acute fracture or dislocation.    IMPRESSION: Mild degenerative changes. No significant spinal stenosis. No cord compression.    --- End of Report ---    CASSIDY LEACH MD; Attending Radiologist  This document has been electronically signed. Oct 17 2021  4:02PM    < end of copied text >  ---------------------------------------------------------------------------------------------------------------          
[facility] - PROGRESS NOTE    CHIEF COMPLAINT: hypoxemia; COPD exacerbation; atelectasis +/- pneumonia    INTERVAL HISTORY: unable to get up from the toilet due to right knee weakness and pain resulting in "buckling" of the joint when attempting to bear weight; no shortness of breath or hypoxemia on room air; resolved cough and sputum production; no chest congestion and wheeze; no fevers, chills or sweats; no chest pain/pressure or palpitations; CT scan -> no pulmonary embolism - atelectasis of the left lower lobe > right lower lobe and lingula - no pneumonia    REVIEW OF SYSTEMS:  Constitutional: As per interval history  HEENT: Within normal limits  CV: As per interval history  Resp: As per interval history  GI: Within normal limits   : Within normal limits  Musculoskeletal: right wrist bruise/laceration bandaged - right knee pain and buckling with weight bearing  Skin: Within normal limits  Neurological: dementia  Psychiatric: Within normal limits  Endocrine: Within normal limits  Hematologic/Lymphatic: Within normal limits  Allergic/Immunologic: Within normal limits    MEDICATIONS:     Pulmonary "  albuterol/ipratropium for Nebulization 3 milliLiter(s) Nebulizer every 6 hours  buDESOnide    Inhalation Suspension 0.5 milliGRAM(s) Inhalation every 12 hours    Anti-microbials:  doxycycline hyclate Capsule 100 milliGRAM(s) Oral every 12 hours    Cardiovascular:  hydrALAZINE 10 milliGRAM(s) Oral two times a day    Other:  aspirin enteric coated 81 milliGRAM(s) Oral daily  coronavirus (EUA) Vaccine (MODERNA) 0.5 milliLiter(s) IntraMuscular once  enoxaparin Injectable 40 milliGRAM(s) SubCutaneous daily  predniSONE   Tablet 50 milliGRAM(s) Oral daily  sertraline 50 milliGRAM(s) Oral daily    MEDICATIONS  (PRN):  acetaminophen   Tablet .. 650 milliGRAM(s) Oral every 6 hours PRN Temp greater or equal to 38.5C (101.3F), Mild Pain (1 - 3)        OBJECTIVE:    I&O's Detail    24 Oct 2021 07:01  -  25 Oct 2021 07:00  --------------------------------------------------------  IN:    Oral Fluid: 440 mL  Total IN: 440 mL    OUT:  Total OUT: 0 mL    Total NET: 440 mL    PHYSICAL EXAM:       ICU Vital Signs Last 24 Hrs  T(C): 36.4 (25 Oct 2021 04:20), Max: 36.4 (25 Oct 2021 04:20)  T(F): 97.6 (25 Oct 2021 04:20), Max: 97.6 (25 Oct 2021 04:20)  HR: 66 (25 Oct 2021 04:20) (66 - 78)  BP: 146/70 (25 Oct 2021 04:20) (138/72 - 163/72)  BP(mean): --  ABP: --  ABP(mean): --  RR: 18 (25 Oct 2021 04:20) (18 - 18)  SpO2: 92% (25 Oct 2021 04:20) (92% - 94%) on room air     General: Awake. Alert. Confused. Cooperative. No distress. Appears stated age.  HEENT: Atraumatic. Bitemporal wasting. Anicteric. Normal oral mucosa. PERRL. EOMI.  Neck: Supple. Trachea midline. Thyroid without enlargement/tenderness/nodules. No carotid bruit. No JVD. Loss of bilateral supraclavicular fat pads.  Cardiovascular: Regular rate and rhythm. S1 S2 normal. No murmurs, rubs or gallops.  Respiratory: Respirations unlabored. Resolved wheeze and rales. Severe kyphosis.  Abdomen: Soft. Non-tender. Non-distended. No organomegaly. No masses. Normal bowel sounds.  Extremities: Warm to touch. No clubbing or cyanosis. No pedal edema. Right knee pain with bending.  Pulses: 2+ peripheral pulses all extremities.	  Skin: Normal skin color. No rashes or lesions. No ecchymoses. No cyanosis. Warm to touch.  Lymph Nodes: Cervical, supraclavicular and axillary nodes normal  Neurological: Moving all extremities. A and O x 1 - 2  Psychiatry: Calm    LABS:                          11.0   21.52 )-----------( 399      ( 24 Oct 2021 06:57 )             34.5     CBC    WBC  21.52 <==, 23.42 <==, 22.90 <==, 12.37 <==    Hemoglobin  11.0 <<==, 11.0 <<==, 11.7 <<==, 12.6 <<==    Hematocrit  34.5 <==, 35.1 <==, 38.9 <==, 39.5 <==    Platelets  399 <==, 388 <==, 367 <==, 315 <==      140  |  104  |  58<H>  ----------------------------<  110<H>    10-24  3.4<L>   |  22  |  0.97      LYTES    sodium  140 <==, 137 <==, 138 <==, 140 <==    potassium   3.4 <==, 3.7 <==, 3.8 <==, 3.8 <==    chloride  104 <==, 99 <==, 99 <==, 103 <==    carbon dioxide  22 <==, 20 <==, 20 <==, 22 <==    =============================================================================================  RENAL FUNCTION:    Creatinine:   0.97  <<==, 1.08  <<==, 0.79  <<==, 0.71  <<==    BUN:   58 <==, 55 <==, 32 <==, 19 <==    ============================================================================================    calcium   10.5 <==, 10.5 <==, 10.3 <==, 9.9 <==  ============================================================================================  LFTs    AST:   12 <==     ALT:  8  <==     AP:  85  <=    Bili:  0.4  <=    ABG - ( 21 Oct 2021 22:34 )  pH: 7.45  /  pCO2: 38    /  pO2: 99    / HCO3: 26    / Base Excess: 2.4   /  SaO2: 99.2      MICROBIOLOGY:     COVID-19 PCR . (10.14.21 @ 17:42)   COVID-19 PCR: NotDetec:     Culture - Urine (10.14.21 @ 16:58)   Specimen Source: Clean Catch Clean Catch (Midstream)   Culture Results:   >=3 organisms. Probable collection contamination.     Culture - Blood (10.21.21 @ 21:15)   Specimen Source: .Blood Blood-Venous   Culture Results:   No growth to date.     Culture - Blood (10.21.21 @ 21:15)   Specimen Source: .Blood Blood-Venous   Culture Results:   No growth to date.     RADIOLOGY:  [x] Chest radiographs reviewed and interpreted by me    < from: CT Angio Chest PE Protocol w/ IV Cont (10.22.21 @ 14:22) >    EXAM:  CT ANGIO CHEST PULM [facility]                          PROCEDURE DATE:  10/22/2021      INTERPRETATION:  Clinical information: Cough. Evaluate for pulmonary embolus. Exam is compared to previous study of 4/4/2011.    CT angiogram of the chest was obtained following the administration of intravenous contrast. Approximately 66 cc of Omnipaque 350 was administered and 34 cc was discarded. Coronal, sagittal and MIP images were submitted for review.    No hilar and/or mediastinal adenopathy is noted.    Heart is normal in size. Calcification of the coronary arteries is noted. No pericardial effusion is noted. Pulmonary arteries are normal in caliber. No filling defects are noted.    No endobronchial lesions are noted. Thick linear opacities representing atelectasis are noted within the lingula and both lower lobes. No pleural effusions are noted.    Below the diaphragm, visualized portions of the abdomen are unremarkable.    Degenerative changes of the spine are noted.    IMPRESSION: No pulmonary embolus is noted.    --- End of Report ---    DARRIUS CHERY MD; Attending Radiologist  This document has been electronically signed. Oct 22 2021  2:48PM    < end of copied text >  ---------------------------------------------------------------------------------------------------------------  < from: Xray Chest 1 View- PORTABLE-Urgent (Xray Chest 1 View- PORTABLE-Urgent .) (10.21.21 @ 14:36) >    EXAM:  XR CHEST PORTABLE URGENT 1V                          PROCEDURE DATE:  10/21/2021      INTERPRETATION:  EXAMINATION: XR CHEST URGENT    CLINICAL INDICATION: Hypoxia.    TECHNIQUE: Single frontal, portable view of the chest was obtained.    COMPARISON: Chest radiograph 10/14/2021.    FINDINGS: Heart size and the mediastinum cannot be accurately evaluated on this projection.  There is right lower lung linear atelectasis.  There are new patchy left lower lung opacities.  There is no pneumothorax or pleural effusion.  There is scoliosis of the spine.    IMPRESSION:  Right lower lung linear atelectasis.    New patchy left lower lung opacities which could be due to subsegmental atelectasis or pneumonia in the appropriate clinical context.    --- End of Report ---    GARRET DOYLE DO; Resident Radiologist  This document has been electronically signed.  LOBO EATON MD; Attending Radiologist  This document has been electronically signed. Oct 21 2021  5:22PM    < end of copied text >  ---------------------------------------------------------------------------------------------------------------    < from: Xray Chest 1 View- PORTABLE-Urgent (10.14.21 @ 12:09) >    EXAM:  XR CHEST PORTABLE URGENT 1V                          PROCEDURE DATE:  10/14/2021      INTERPRETATION:  EXAMINATION: XR CHEST URGENT    CLINICAL INDICATION: Mechanical fall. Concerns for pneumonia and rib fractures.    TECHNIQUE: X-ray single frontal chest.    COMPARISON: Multiple prior chest x-rays most recently dated 9/20/2019..    FINDINGS:  Trachea is midline.  There are no acute bony pathologies.  Cardiomediastinal silhouette cannot be reliably assessed in this projection.  Clear lungs.  There is no pneumothorax or pleural effusion.    IMPRESSION:  Clear lungs.    No visible acute osseous abnormalities.    If clinical concerns for rib fracture is present recommend obtaining dedicated rib series x-rays.    --- End of Report ---    CRISTELA NICK MD; Resident Radiologist  This document has been electronically signed.  LANIE WRIGHT MD; Attending Radiologist  This document has been electronically signed. Oct 15 2021  9:56AM    < end of copied text >  ---------------------------------------------------------------------------------------------------------------  < from: CT Head No Cont (10.14.21 @ 12:12) >    EXAM:  CT BRAIN                          PROCEDURE DATE:  10/14/2021      INTERPRETATION:  INDICATION:  Status post trauma with head injury.   Headache.  TECHNIQUE:  A non contrast 2.5mm axial CT study of the brain was performed from skull base to vertex. Coronal and sagittal reformations were generated from the axial data.  COMPARISON EXAMINATION:  no prior.    FINDINGS:    HEMISPHERES: Chronic ischemic changes are noted in both hemispheres with atrophy. No acute abnormality or hemorrhage noted.  VENTRICLES:  Ventricles are prominent relative to sulcal size and volume loss suggesting an underlying component of hydrocephalus.  POSTERIOR FOSSA:  The brain stem and cerebellum are unremarkable.  No CP angle lesion noted.  EXTRACEREBRAL SPACES:  No subdural or epidural collections are noted.  SKULL BASE AND CALVARIUM:  Appears intact.  No fracture or destructive lesion is identified.  SINUSES AND MASTOIDS:  Clear.  MISCELLANEOUS:  No orbital or suprasellar abnormality noted.    IMPRESSION:    1)  chronic ischemic changes with volume loss. However ventricles are prominent suggesting underlying mild hydrocephalus. Further assessment and follow-up recommended..  2)  no intracerebral hemorrhage, contusion, or extracerebral collections are identified.    --- End of Report ---    DASHA PROCTOR MD; Attending Radiologist  This document has been electronically signed. Oct 14 2021 12:26PM    < end of copied text >  ---------------------------------------------------------------------------------------------------------------  < from: MR Cervical Spine No Cont (10.16.21 @ 21:27) >    EXAM:  MR SPINE CERVICAL                          PROCEDURE DATE:  10/16/2021      INTERPRETATION:  CLINICAL INDICATION: Unsteady gait, fall, urinary incontinence    Magnetic resonance imaging of the cervical spine was carried out with sagittal surface coil imaging from C1 to T6-7 using T1 and fast spin echo T2 weighted images with and without fat saturation technique with axial T1 and fast spin echo T2 weighted imaging on a 1.5 Dyana magnet.    The cervical vertebrae are normal in height and signal intensity. Disc space narrowing is identified at C3-4 through C6-7 consistent with degenerative changes. Small disc osteophyte complexes are present at these levels as well as dorsal ligamentous hypertrophy. There is no significant spinal stenosis. There is no cord compression. There is no marrow edema. There is no acute fracture or dislocation.    IMPRESSION: Mild degenerative changes. No significant spinal stenosis. No cord compression.    --- End of Report ---    CASSIDY LEACH MD; Attending Radiologist  This document has been electronically signed. Oct 17 2021  4:02PM    < end of copied text >  ---------------------------------------------------------------------------------------------------------------    
[facility] - PROGRESS NOTE    CHIEF COMPLAINT: hypoxemia; COPD exacerbation; atelectasis +/- pneumonia    INTERVAL HISTORY: no shortness of breath or hypoxemia on room air; decreased cough and sputum production; minimal chest congestion and wheeze; no fevers, chills or sweats; no chest pain/pressure or palpitations; CT scan -> no pulmonary embolism - atelectasis of the left lower lobe > right lower lobe and lingula - no pneumonia    REVIEW OF SYSTEMS:  Constitutional: As per interval history  HEENT: Within normal limits  CV: As per interval history  Resp: As per interval history  GI: Within normal limits   : Within normal limits  Musculoskeletal: right wrist bruise/laceration bandaged  Skin: Within normal limits  Neurological: dementia  Psychiatric: Within normal limits  Endocrine: Within normal limits  Hematologic/Lymphatic: Within normal limits  Allergic/Immunologic: Within normal limits    MEDICATIONS:     Pulmonary "  albuterol/ipratropium for Nebulization 3 milliLiter(s) Nebulizer every 6 hours  buDESOnide    Inhalation Suspension 0.5 milliGRAM(s) Inhalation every 12 hours    Anti-microbials:  doxycycline hyclate Capsule 100 milliGRAM(s) Oral every 12 hours    Cardiovascular:  hydrALAZINE 10 milliGRAM(s) Oral two times a day    Other:  aspirin enteric coated 81 milliGRAM(s) Oral daily  coronavirus (EUA) Vaccine (MODERNA) 0.5 milliLiter(s) IntraMuscular once  enoxaparin Injectable 40 milliGRAM(s) SubCutaneous daily  potassium chloride    Tablet ER 20 milliEquivalent(s) Oral once  predniSONE   Tablet 50 milliGRAM(s) Oral daily  sertraline 50 milliGRAM(s) Oral daily    MEDICATIONS  (PRN):  acetaminophen   Tablet .. 650 milliGRAM(s) Oral every 6 hours PRN Temp greater or equal to 38.5C (101.3F), Mild Pain (1 - 3)        OBJECTIVE:    I&O's Detail    23 Oct 2021 07:01  -  24 Oct 2021 07:00  --------------------------------------------------------  IN:    Oral Fluid: 530 mL  Total IN: 530 mL    OUT:  Total OUT: 0 mL    Total NET: 530 mL      24 Oct 2021 07:01  -  24 Oct 2021 13:08  --------------------------------------------------------  IN:    Oral Fluid: 160 mL  Total IN: 160 mL    OUT:  Total OUT: 0 mL    Total NET: 160 mL    PHYSICAL EXAM:       ICU Vital Signs Last 24 Hrs  T(C): 36.3 (24 Oct 2021 11:02), Max: 36.8 (23 Oct 2021 17:14)  T(F): 97.4 (24 Oct 2021 11:02), Max: 98.3 (23 Oct 2021 17:14)  HR: 75 (24 Oct 2021 11:02) (75 - 87)  BP: 163/72 (24 Oct 2021 11:02) (113/61 - 163/72)  BP(mean): --  ABP: --  ABP(mean): --  RR: 18 (24 Oct 2021 11:02) (18 - 18)  SpO2: 93% (24 Oct 2021 11:02) (92% - 93%) on room air     General: Awake. Alert. Confused. Cooperative. No distress. Appears stated age.  HEENT: Atraumatic. Bitemporal wasting. Anicteric. Normal oral mucosa. PERRL. EOMI.  Neck: Supple. Trachea midline. Thyroid without enlargement/tenderness/nodules. No carotid bruit. No JVD. Loss of bilateral supraclavicular fat pads.  Cardiovascular: Regular rate and rhythm. S1 S2 normal. No murmurs, rubs or gallops.  Respiratory: Respirations unlabored. Mild wheeze. Left lower lung field rales. Severe kyphosis.  Abdomen: Soft. Non-tender. Non-distended. No organomegaly. No masses. Normal bowel sounds.  Extremities: Warm to touch. No clubbing or cyanosis. No pedal edema.  Pulses: 2+ peripheral pulses all extremities.	  Skin: Normal skin color. No rashes or lesions. No ecchymoses. No cyanosis. Warm to touch.  Lymph Nodes: Cervical, supraclavicular and axillary nodes normal  Neurological: Moving all extremities. A and O x 1 - 2  Psychiatry: Calm    LABS:                          11.0   21.52 )-----------( 399      ( 24 Oct 2021 06:57 )             34.5     CBC    WBC  21.52 <==, 23.42 <==, 22.90 <==, 12.37 <==, 11.27 <==    Hemoglobin  11.0 <<==, 11.0 <<==, 11.7 <<==, 12.6 <<==, 12.1 <<==    Hematocrit  34.5 <==, 35.1 <==, 38.9 <==, 39.5 <==, 40.4 <==    Platelets  399 <==, 388 <==, 367 <==, 315 <==, 307 <==      140  |  104  |  58<H>  ----------------------------<  110<H>    10-24  3.4<L>   |  22  |  0.97      LYTES    sodium  140 <==, 137 <==, 138 <==, 140 <==, 139 <==    potassium   3.4 <==, 3.7 <==, 3.8 <==, 3.8 <==, 3.5 <==    chloride  104 <==, 99 <==, 99 <==, 103 <==, 101 <==    carbon dioxide  22 <==, 20 <==, 20 <==, 22 <==, 26 <==    =============================================================================================  RENAL FUNCTION:    Creatinine:   0.97  <<==, 1.08  <<==, 0.79  <<==, 0.71  <<==, 0.76  <<==    BUN:   58 <==, 55 <==, 32 <==, 19 <==, 17 <==    ============================================================================================    calcium   10.5 <==, 10.5 <==, 10.3 <==, 9.9 <==, 10.2 <==    ============================================================================================  LFTs    AST:   12 <==     ALT:  8  <==     AP:  85  <=    Bili:  0.4  <=    ABG - ( 21 Oct 2021 22:34 )  pH: 7.45  /  pCO2: 38    /  pO2: 99    / HCO3: 26    / Base Excess: 2.4   /  SaO2: 99.2      MICROBIOLOGY:     COVID-19 PCR . (10.14.21 @ 17:42)   COVID-19 PCR: NotDetec:       Culture - Urine (10.14.21 @ 16:58)   Specimen Source: Clean Catch Clean Catch (Midstream)   Culture Results:   >=3 organisms. Probable collection contamination.     Culture - Blood (10.21.21 @ 21:15)   Specimen Source: .Blood Blood-Venous   Culture Results:   No growth to date.     Culture - Blood (10.21.21 @ 21:15)   Specimen Source: .Blood Blood-Venous   Culture Results:   No growth to date.     RADIOLOGY:  [x] Chest radiographs reviewed and interpreted by me    < from: CT Angio Chest PE Protocol w/ IV Cont (10.22.21 @ 14:22) >    EXAM:  CT ANGIO CHEST PULM ART [facility]                          PROCEDURE DATE:  10/22/2021      INTERPRETATION:  Clinical information: Cough. Evaluate for pulmonary embolus. Exam is compared to previous study of 4/4/2011.    CT angiogram of the chest was obtained following the administration of intravenous contrast. Approximately 66 cc of Omnipaque 350 was administered and 34 cc was discarded. Coronal, sagittal and MIP images were submitted for review.    No hilar and/or mediastinal adenopathy is noted.    Heart is normal in size. Calcification of the coronary arteries is noted. No pericardial effusion is noted. Pulmonary arteries are normal in caliber. No filling defects are noted.    No endobronchial lesions are noted. Thick linear opacities representing atelectasis are noted within the lingula and both lower lobes. No pleural effusions are noted.    Below the diaphragm, visualized portions of the abdomen are unremarkable.    Degenerative changes of the spine are noted.    IMPRESSION: No pulmonary embolus is noted.    --- End of Report ---    DARRIUS CHERY MD; Attending Radiologist  This document has been electronically signed. Oct 22 2021  2:48PM    < end of copied text >  ---------------------------------------------------------------------------------------------------------------  < from: Xray Chest 1 View- PORTABLE-Urgent (Xray Chest 1 View- PORTABLE-Urgent .) (10.21.21 @ 14:36) >    EXAM:  XR CHEST PORTABLE URGENT 1V                          PROCEDURE DATE:  10/21/2021      INTERPRETATION:  EXAMINATION: XR CHEST URGENT    CLINICAL INDICATION: Hypoxia.    TECHNIQUE: Single frontal, portable view of the chest was obtained.    COMPARISON: Chest radiograph 10/14/2021.    FINDINGS: Heart size and the mediastinum cannot be accurately evaluated on this projection.  There is right lower lung linear atelectasis.  There are new patchy left lower lung opacities.  There is no pneumothorax or pleural effusion.  There is scoliosis of the spine.    IMPRESSION:  Right lower lung linear atelectasis.    New patchy left lower lung opacities which could be due to subsegmental atelectasis or pneumonia in the appropriate clinical context.    --- End of Report ---    GARRET DOYLE DO; Resident Radiologist  This document has been electronically signed.  LOBO EATON MD; Attending Radiologist  This document has been electronically signed. Oct 21 2021  5:22PM    < end of copied text >  ---------------------------------------------------------------------------------------------------------------    < from: Xray Chest 1 View- PORTABLE-Urgent (10.14.21 @ 12:09) >    EXAM:  XR CHEST PORTABLE URGENT 1V                          PROCEDURE DATE:  10/14/2021      INTERPRETATION:  EXAMINATION: XR CHEST URGENT    CLINICAL INDICATION: Mechanical fall. Concerns for pneumonia and rib fractures.    TECHNIQUE: X-ray single frontal chest.    COMPARISON: Multiple prior chest x-rays most recently dated 9/20/2019..    FINDINGS:  Trachea is midline.  There are no acute bony pathologies.  Cardiomediastinal silhouette cannot be reliably assessed in this projection.  Clear lungs.  There is no pneumothorax or pleural effusion.    IMPRESSION:  Clear lungs.    No visible acute osseous abnormalities.    If clinical concerns for rib fracture is present recommend obtaining dedicated rib series x-rays.    --- End of Report ---    CRISTELA NICK MD; Resident Radiologist  This document has been electronically signed.  LANIE WRIGHT MD; Attending Radiologist  This document has been electronically signed. Oct 15 2021  9:56AM    < end of copied text >  ---------------------------------------------------------------------------------------------------------------  < from: CT Head No Cont (10.14.21 @ 12:12) >    EXAM:  CT BRAIN                          PROCEDURE DATE:  10/14/2021      INTERPRETATION:  INDICATION:  Status post trauma with head injury.   Headache.  TECHNIQUE:  A non contrast 2.5mm axial CT study of the brain was performed from skull base to vertex. Coronal and sagittal reformations were generated from the axial data.  COMPARISON EXAMINATION:  no prior.    FINDINGS:    HEMISPHERES: Chronic ischemic changes are noted in both hemispheres with atrophy. No acute abnormality or hemorrhage noted.  VENTRICLES:  Ventricles are prominent relative to sulcal size and volume loss suggesting an underlying component of hydrocephalus.  POSTERIOR FOSSA:  The brain stem and cerebellum are unremarkable.  No CP angle lesion noted.  EXTRACEREBRAL SPACES:  No subdural or epidural collections are noted.  SKULL BASE AND CALVARIUM:  Appears intact.  No fracture or destructive lesion is identified.  SINUSES AND MASTOIDS:  Clear.  MISCELLANEOUS:  No orbital or suprasellar abnormality noted.    IMPRESSION:    1)  chronic ischemic changes with volume loss. However ventricles are prominent suggesting underlying mild hydrocephalus. Further assessment and follow-up recommended..  2)  no intracerebral hemorrhage, contusion, or extracerebral collections are identified.    --- End of Report ---    DASHA PROCTOR MD; Attending Radiologist  This document has been electronically signed. Oct 14 2021 12:26PM    < end of copied text >  ---------------------------------------------------------------------------------------------------------------  < from: MR Cervical Spine No Cont (10.16.21 @ 21:27) >    EXAM:  MR SPINE CERVICAL                          PROCEDURE DATE:  10/16/2021      INTERPRETATION:  CLINICAL INDICATION: Unsteady gait, fall, urinary incontinence    Magnetic resonance imaging of the cervical spine was carried out with sagittal surface coil imaging from C1 to T6-7 using T1 and fast spin echo T2 weighted images with and without fat saturation technique with axial T1 and fast spin echo T2 weighted imaging on a 1.5 Dyana magnet.    The cervical vertebrae are normal in height and signal intensity. Disc space narrowing is identified at C3-4 through C6-7 consistent with degenerative changes. Small disc osteophyte complexes are present at these levels as well as dorsal ligamentous hypertrophy. There is no significant spinal stenosis. There is no cord compression. There is no marrow edema. There is no acute fracture or dislocation.    IMPRESSION: Mild degenerative changes. No significant spinal stenosis. No cord compression.    --- End of Report ---    CASSIDY LEACH MD; Attending Radiologist  This document has been electronically signed. Oct 17 2021  4:02PM    < end of copied text >  ---------------------------------------------------------------------------------------------------------------      
Neurology Progress Note    S: Patient seen and examined. No new events overnight. patient denied CP, SOB, HA or pain.      Medication:  MEDICATIONS  (STANDING):  aspirin enteric coated 81 milliGRAM(s) Oral daily  coronavirus (EUA) Vaccine (MODERNA) 0.5 milliLiter(s) IntraMuscular once  enoxaparin Injectable 40 milliGRAM(s) SubCutaneous daily  hydrALAZINE 10 milliGRAM(s) Oral two times a day  sertraline 50 milliGRAM(s) Oral daily    MEDICATIONS  (PRN):  acetaminophen   Tablet .. 650 milliGRAM(s) Oral every 6 hours PRN Temp greater or equal to 38.5C (101.3F), Mild Pain (1 - 3)  oxycodone    5 mG/acetaminophen 325 mG 1 Tablet(s) Oral every 6 hours PRN Moderate Pain (4 - 6)    Vitals:  Vital Signs Last 24 Hrs  T(C): 36.5 (10-19-21 @ 16:13), Max: 37.1 (10-18-21 @ 22:21)  T(F): 97.7 (10-19-21 @ 16:13), Max: 98.8 (10-18-21 @ 22:21)  HR: 60 (10-19-21 @ 16:13) (60 - 77)  BP: 148/60 (10-19-21 @ 16:13) (144/78 - 151/65)  BP(mean): --  RR: 17 (10-19-21 @ 16:13) (17 - 18)  SpO2: 95% (10-19-21 @ 16:13) (94% - 95%)        Orthostatic VS  10-15-21 @ 19:11  Lying BP: 169/72 HR: 66  Sitting BP: 175/74 HR: 67  Standing BP: 190/74 HR: 85  Site: upper left arm  Mode: electronic    General Exam:   General Appearance: Appropriately dressed and in no acute distress       Head: Normocephalic, atraumatic and no dysmorphic features  Ear, Nose, and Throat: Moist mucous membranes  CVS: S1S2+  Resp: No SOB, no wheeze or rhonchi  Abd: soft NTND  Extremities: No edema, no cyanosis  Skin: No bruises, no rashes     Neurological Exam:  Mental Status: Awake, alert and oriented x 3.  Able to follow simple and complex verbal commands. Able to name and repeat. fluent speech. No obvious aphasia or dysarthria noted.   Cranial Nerves: PERRL, EOMI, VFFC, sensation V1-V3 intact,  no obvious facial asymmetry , equal elevation of palate, scm/trap 5/5, tongue is midline on protrusion. no obvious papilledema on fundoscopic exam. Hearing is grossly intact.   Motor: Normal bulk, tone and strength throughout. Fine finger movements were intact and symmetric. no tremors or drift noted.    Sensation: Intact to light touch and pinprick throughout. no right/left confusion. no extinction to tactile on DSS.   Deep Tendon Reflexes: +3 left knee jerk, +3 R bicep reflex, 2 beats of clonus on left leg  Toes flexor bilaterally  Coordination: No dysmetria on FNF o   Gait: deferred     I personally reviewed the below data/images/labs:    CBC Full  -  ( 19 Oct 2021 07:23 )  WBC Count : 12.37 K/uL  RBC Count : 4.35 M/uL  Hemoglobin : 12.6 g/dL  Hematocrit : 39.5 %  Platelet Count - Automated : 315 K/uL  Mean Cell Volume : 90.8 fl  Mean Cell Hemoglobin : 29.0 pg  Mean Cell Hemoglobin Concentration : 31.9 gm/dL  Auto Neutrophil # : x  Auto Lymphocyte # : x  Auto Monocyte # : x  Auto Eosinophil # : x  Auto Basophil # : x  Auto Neutrophil % : x  Auto Lymphocyte % : x  Auto Monocyte % : x  Auto Eosinophil % : x  Auto Basophil % : x      10-19    140  |  103  |  19  ----------------------------<  115<H>  3.8   |  22  |  0.71    Ca    9.9      19 Oct 2021 07:23        Urinalysis Basic - ( 14 Oct 2021 13:34 )    Color: Light Yellow / Appearance: Slightly Turbid / S.022 / pH: x  Gluc: x / Ketone: Small  / Bili: Negative / Urobili: Negative   Blood: x / Protein: 30 mg/dL / Nitrite: Positive   Leuk Esterase: Large / RBC: 7 /hpf /  /HPF   Sq Epi: x / Non Sq Epi: 0 /hpf / Bacteria: Many    Orthostatic VS    CT head:   1)  chronic ischemic changes with volume loss. However ventricles are prominent suggesting underlying mild hydrocephalus. Further assessment and follow-up recommended..  2)  no intracerebral hemorrhage, contusion, or extracerebral collections are identified.    < from: MR Cervical Spine No Cont (10.16.21 @ 21:27) >    EXAM:  MR SPINE CERVICAL                            PROCEDURE DATE:  10/16/2021            INTERPRETATION:  CLINICAL INDICATION: Unsteady gait, fall, urinary incontinence    Magnetic resonance imaging of the cervical spine was carried out with sagittal surface coil imaging from C1 to T6-7 using T1 and fast spin echo T2 weighted images with and without fat saturation technique with axial T1 and fast spin echo T2 weighted imaging on a 1.5 Dyana magnet.    The cervical vertebrae are normal in height and signal intensity. Disc space narrowing is identified at C3-4 through C6-7 consistent with degenerative changes. Small disc osteophyte complexes are present at these levels as well as dorsal ligamentous hypertrophy. There is no significant spinal stenosis. There is no cord compression. There is no marrow edema. There is no acute fracture or dislocation.        IMPRESSION: Mild degenerative changes. No significant spinal stenosis. No cord compression.      --- End of Report ---        CASSIDY LEACH MD; Attending Radiologist  This document has been electronically signed. Oct 17 2021  4:02PM    < end of copied text >  
Neurology Progress Note    S: Patient seen and examined. No new events overnight. patient denied CP, SOB, HA or pain.  dc planning      Medication:  MEDICATIONS  (STANDING):  albuterol/ipratropium for Nebulization 3 milliLiter(s) Nebulizer every 6 hours  aspirin enteric coated 81 milliGRAM(s) Oral daily  buDESOnide    Inhalation Suspension 0.5 milliGRAM(s) Inhalation every 12 hours  cefTRIAXone   IVPB      cefTRIAXone   IVPB 1000 milliGRAM(s) IV Intermittent every 24 hours  coronavirus (EUA) Vaccine (MODERNA) 0.5 milliLiter(s) IntraMuscular once  doxycycline hyclate Capsule 100 milliGRAM(s) Oral every 12 hours  enoxaparin Injectable 40 milliGRAM(s) SubCutaneous daily  hydrALAZINE 10 milliGRAM(s) Oral two times a day  predniSONE   Tablet 50 milliGRAM(s) Oral daily  sertraline 50 milliGRAM(s) Oral daily    MEDICATIONS  (PRN):  acetaminophen   Tablet .. 650 milliGRAM(s) Oral every 6 hours PRN Temp greater or equal to 38.5C (101.3F), Mild Pain (1 - 3)      Vitals:  Vital Signs Last 24 Hrs  T(C): 36.8 (10-22-21 @ 16:18), Max: 37 (10-22-21 @ 04:45)  T(F): 98.3 (10-22-21 @ 16:18), Max: 98.6 (10-22-21 @ 04:45)  HR: 79 (10-22-21 @ 16:18) (79 - 86)  BP: 133/63 (10-22-21 @ 16:18) (129/69 - 135/78)  BP(mean): --  RR: 18 (10-22-21 @ 16:18) (18 - 18)  SpO2: 92% (10-22-21 @ 16:18) (91% - 95%)    Orthostatic VS  10-21-21 @ 04:52  Lying BP: 146/71 HR: 70  Sitting BP: 135/68 HR: 75  Standing BP: 137/72 HR: --  Site: upper left arm  Mode: electronic      Orthostatic VS  10-21-21 @ 04:52  Lying BP: 146/71 HR: 70  Sitting BP: 135/68 HR: 75  Standing BP: 137/72 HR: --  Site: upper left arm  Mode: electronic  Orthostatic VS  10-20-21 @ 10:31  Lying BP: 130/70 HR: 79  Sitting BP: 139/79 HR: 86  Standing BP: 152/73 HR: 90  Site: 134/69 after ambulating a few steps  Mode: --    Orthostatic VS  10-20-21 @ 10:31  Lying BP: 130/70 HR: 79  Sitting BP: 139/79 HR: 86  Standing BP: 152/73 HR: 90  Site: 134/69 after ambulating a few steps  Mode: --          Orthostatic VS  10-15-21 @ 19:11  Lying BP: 169/72 HR: 66  Sitting BP: 175/74 HR: 67  Standing BP: 190/74 HR: 85  Site: upper left arm  Mode: electronic    General Exam:   General Appearance: Appropriately dressed and in no acute distress       Head: Normocephalic, atraumatic and no dysmorphic features  Ear, Nose, and Throat: Moist mucous membranes  CVS: S1S2+  Resp: No SOB, no wheeze or rhonchi  Abd: soft NTND  Extremities: No edema, no cyanosis  Skin: No bruises, no rashes     Neurological Exam:  Mental Status: Awake, alert and oriented x 3.  Able to follow simple and complex verbal commands. Able to name and repeat. fluent speech. No obvious aphasia or dysarthria noted.   Cranial Nerves: PERRL, EOMI, VFFC, sensation V1-V3 intact,  no obvious facial asymmetry , equal elevation of palate, scm/trap 5/5, tongue is midline on protrusion. no obvious papilledema on fundoscopic exam. Hearing is grossly intact.   Motor: Normal bulk, tone and strength throughout. Fine finger movements were intact and symmetric. no tremors or drift noted.    Sensation: Intact to light touch and pinprick throughout. no right/left confusion. no extinction to tactile on DSS.   Deep Tendon Reflexes: +3 left knee jerk, +3 R bicep reflex, 2 beats of clonus on left leg  Toes flexor bilaterally  Coordination: No dysmetria on FNF o   Gait: deferred     I personally reviewed the below data/images/labs:      CBC Full  -  ( 22 Oct 2021 07:03 )  WBC Count : 22.90 K/uL  RBC Count : 4.24 M/uL  Hemoglobin : 11.7 g/dL  Hematocrit : 38.9 %  Platelet Count - Automated : 367 K/uL  Mean Cell Volume : 91.7 fl  Mean Cell Hemoglobin : 27.6 pg  Mean Cell Hemoglobin Concentration : 30.1 gm/dL  Auto Neutrophil # : x  Auto Lymphocyte # : x  Auto Monocyte # : x  Auto Eosinophil # : x  Auto Basophil # : x  Auto Neutrophil % : x  Auto Lymphocyte % : x  Auto Monocyte % : x  Auto Eosinophil % : x  Auto Basophil % : x    10-22    138  |  99  |  32<H>  ----------------------------<  175<H>  3.8   |  20<L>  |  0.79    Ca    10.3      22 Oct 2021 07:07    TPro  7.3  /  Alb  3.4  /  TBili  0.4  /  DBili  x   /  AST  12  /  ALT  8<L>  /  AlkPhos  85  10-22    Urinalysis Basic - ( 14 Oct 2021 13:34 )    Color: Light Yellow / Appearance: Slightly Turbid / S.022 / pH: x  Gluc: x / Ketone: Small  / Bili: Negative / Urobili: Negative   Blood: x / Protein: 30 mg/dL / Nitrite: Positive   Leuk Esterase: Large / RBC: 7 /hpf /  /HPF   Sq Epi: x / Non Sq Epi: 0 /hpf / Bacteria: Many    Orthostatic VS    CT head:   1)  chronic ischemic changes with volume loss. However ventricles are prominent suggesting underlying mild hydrocephalus. Further assessment and follow-up recommended..  2)  no intracerebral hemorrhage, contusion, or extracerebral collections are identified.    < from: MR Cervical Spine No Cont (10.16.21 @ 21:27) >    EXAM:  MR SPINE CERVICAL                            PROCEDURE DATE:  10/16/2021            INTERPRETATION:  CLINICAL INDICATION: Unsteady gait, fall, urinary incontinence    Magnetic resonance imaging of the cervical spine was carried out with sagittal surface coil imaging from C1 to T6-7 using T1 and fast spin echo T2 weighted images with and without fat saturation technique with axial T1 and fast spin echo T2 weighted imaging on a 1.5 Dyana magnet.    The cervical vertebrae are normal in height and signal intensity. Disc space narrowing is identified at C3-4 through C6-7 consistent with degenerative changes. Small disc osteophyte complexes are present at these levels as well as dorsal ligamentous hypertrophy. There is no significant spinal stenosis. There is no cord compression. There is no marrow edema. There is no acute fracture or dislocation.        IMPRESSION: Mild degenerative changes. No significant spinal stenosis. No cord compression.      --- End of Report ---        CASSIDY LEACH MD; Attending Radiologist  This document has been electronically signed. Oct 17 2021  4:02PM    < end of copied text >  
Neurology Progress Note    S: Patient seen and examined. No new events overnight. patient denied CP, SOB, HA or pain.  dc planning     Medication:  MEDICATIONS  (STANDING):  aspirin enteric coated 81 milliGRAM(s) Oral daily  coronavirus (EUA) Vaccine (MODERNA) 0.5 milliLiter(s) IntraMuscular once  enoxaparin Injectable 40 milliGRAM(s) SubCutaneous daily  hydrALAZINE 10 milliGRAM(s) Oral two times a day  sertraline 50 milliGRAM(s) Oral daily    MEDICATIONS  (PRN):  acetaminophen   Tablet .. 650 milliGRAM(s) Oral every 6 hours PRN Temp greater or equal to 38.5C (101.3F), Mild Pain (1 - 3)  oxycodone    5 mG/acetaminophen 325 mG 1 Tablet(s) Oral every 6 hours PRN Moderate Pain (4 - 6)    Vitals:  Vital Signs Last 24 Hrs  T(C): 36.7 (10-20-21 @ 11:48), Max: 37 (10-20-21 @ 04:21)  T(F): 98 (10-20-21 @ 11:48), Max: 98.6 (10-20-21 @ 04:21)  HR: 68 (10-20-21 @ 04:21) (62 - 68)  BP: 142/69 (10-20-21 @ 04:21) (140/68 - 148/73)  BP(mean): --  RR: 18 (10-20-21 @ 04:21) (18 - 18)  SpO2: 95% (10-20-21 @ 10:31) (94% - 95%)    Orthostatic VS  10-20-21 @ 10:31  Lying BP: 130/70 HR: 79  Sitting BP: 139/79 HR: 86  Standing BP: 152/73 HR: 90  Site: 134/69 after ambulating a few steps  Mode: --          Orthostatic VS  10-15-21 @ 19:11  Lying BP: 169/72 HR: 66  Sitting BP: 175/74 HR: 67  Standing BP: 190/74 HR: 85  Site: upper left arm  Mode: electronic    General Exam:   General Appearance: Appropriately dressed and in no acute distress       Head: Normocephalic, atraumatic and no dysmorphic features  Ear, Nose, and Throat: Moist mucous membranes  CVS: S1S2+  Resp: No SOB, no wheeze or rhonchi  Abd: soft NTND  Extremities: No edema, no cyanosis  Skin: No bruises, no rashes     Neurological Exam:  Mental Status: Awake, alert and oriented x 3.  Able to follow simple and complex verbal commands. Able to name and repeat. fluent speech. No obvious aphasia or dysarthria noted.   Cranial Nerves: PERRL, EOMI, VFFC, sensation V1-V3 intact,  no obvious facial asymmetry , equal elevation of palate, scm/trap 5/5, tongue is midline on protrusion. no obvious papilledema on fundoscopic exam. Hearing is grossly intact.   Motor: Normal bulk, tone and strength throughout. Fine finger movements were intact and symmetric. no tremors or drift noted.    Sensation: Intact to light touch and pinprick throughout. no right/left confusion. no extinction to tactile on DSS.   Deep Tendon Reflexes: +3 left knee jerk, +3 R bicep reflex, 2 beats of clonus on left leg  Toes flexor bilaterally  Coordination: No dysmetria on FNF o   Gait: deferred     I personally reviewed the below data/images/labs:  no new labs   CBC Full  -  ( 19 Oct 2021 07:23 )  WBC Count : 12.37 K/uL  RBC Count : 4.35 M/uL  Hemoglobin : 12.6 g/dL  Hematocrit : 39.5 %  Platelet Count - Automated : 315 K/uL  Mean Cell Volume : 90.8 fl  Mean Cell Hemoglobin : 29.0 pg  Mean Cell Hemoglobin Concentration : 31.9 gm/dL  Auto Neutrophil # : x  Auto Lymphocyte # : x  Auto Monocyte # : x  Auto Eosinophil # : x  Auto Basophil # : x  Auto Neutrophil % : x  Auto Lymphocyte % : x  Auto Monocyte % : x  Auto Eosinophil % : x  Auto Basophil % : x      10-19    140  |  103  |  19  ----------------------------<  115<H>  3.8   |  22  |  0.71    Ca    9.9      19 Oct 2021 07:23        Urinalysis Basic - ( 14 Oct 2021 13:34 )    Color: Light Yellow / Appearance: Slightly Turbid / S.022 / pH: x  Gluc: x / Ketone: Small  / Bili: Negative / Urobili: Negative   Blood: x / Protein: 30 mg/dL / Nitrite: Positive   Leuk Esterase: Large / RBC: 7 /hpf /  /HPF   Sq Epi: x / Non Sq Epi: 0 /hpf / Bacteria: Many    Orthostatic VS    CT head:   1)  chronic ischemic changes with volume loss. However ventricles are prominent suggesting underlying mild hydrocephalus. Further assessment and follow-up recommended..  2)  no intracerebral hemorrhage, contusion, or extracerebral collections are identified.    < from: MR Cervical Spine No Cont (10.16.21 @ 21:27) >    EXAM:  MR SPINE CERVICAL                            PROCEDURE DATE:  10/16/2021            INTERPRETATION:  CLINICAL INDICATION: Unsteady gait, fall, urinary incontinence    Magnetic resonance imaging of the cervical spine was carried out with sagittal surface coil imaging from C1 to T6-7 using T1 and fast spin echo T2 weighted images with and without fat saturation technique with axial T1 and fast spin echo T2 weighted imaging on a 1.5 [employer] magnet.    The cervical vertebrae are normal in height and signal intensity. Disc space narrowing is identified at C3-4 through C6-7 consistent with degenerative changes. Small disc osteophyte complexes are present at these levels as well as dorsal ligamentous hypertrophy. There is no significant spinal stenosis. There is no cord compression. There is no marrow edema. There is no acute fracture or dislocation.        IMPRESSION: Mild degenerative changes. No significant spinal stenosis. No cord compression.      --- End of Report ---        CASSIDY LEACH MD; Attending Radiologist  This document has been electronically signed. Oct 17 2021  4:02PM    < end of copied text >  
Neurology Progress Note    S: Patient seen and examined. No new events overnight. patient denied CP, SOB, HA or pain.  dc planning but a bit labored breathing todya     Medication:  MEDICATIONS  (STANDING):  albuterol/ipratropium for Nebulization 3 milliLiter(s) Nebulizer every 6 hours  aspirin enteric coated 81 milliGRAM(s) Oral daily  buDESOnide    Inhalation Suspension 0.5 milliGRAM(s) Inhalation every 12 hours  cefTRIAXone   IVPB      cefTRIAXone   IVPB 1000 milliGRAM(s) IV Intermittent once  coronavirus (EUA) Vaccine (MODERNA) 0.5 milliLiter(s) IntraMuscular once  doxycycline hyclate Capsule 100 milliGRAM(s) Oral every 12 hours  enoxaparin Injectable 40 milliGRAM(s) SubCutaneous daily  hydrALAZINE 10 milliGRAM(s) Oral two times a day  methylPREDNISolone sodium succinate Injectable 20 milliGRAM(s) IV Push every 6 hours  sertraline 50 milliGRAM(s) Oral daily    MEDICATIONS  (PRN):  acetaminophen   Tablet .. 650 milliGRAM(s) Oral every 6 hours PRN Temp greater or equal to 38.5C (101.3F), Mild Pain (1 - 3)  oxycodone    5 mG/acetaminophen 325 mG 1 Tablet(s) Oral every 6 hours PRN Moderate Pain (4 - 6)    Vitals:  Vital Signs Last 24 Hrs  T(C): 36.4 (10-21-21 @ 11:07), Max: 37.2 (10-21-21 @ 04:52)  T(F): 97.5 (10-21-21 @ 11:07), Max: 98.9 (10-21-21 @ 04:52)  HR: 79 (10-21-21 @ 11:07) (63 - 79)  BP: 155/69 (10-21-21 @ 11:07) (131/71 - 155/69)  BP(mean): --  RR: 18 (10-21-21 @ 11:07) (18 - 18)  SpO2: 95% (10-21-21 @ 11:07) (94% - 96%)    Orthostatic VS  10-21-21 @ 04:52  Lying BP: 146/71 HR: 70  Sitting BP: 135/68 HR: 75  Standing BP: 137/72 HR: --  Site: upper left arm  Mode: electronic  Orthostatic VS  10-20-21 @ 10:31  Lying BP: 130/70 HR: 79  Sitting BP: 139/79 HR: 86  Standing BP: 152/73 HR: 90  Site: 134/69 after ambulating a few steps  Mode: --    Orthostatic VS  10-20-21 @ 10:31  Lying BP: 130/70 HR: 79  Sitting BP: 139/79 HR: 86  Standing BP: 152/73 HR: 90  Site: 134/69 after ambulating a few steps  Mode: --          Orthostatic VS  10-15-21 @ 19:11  Lying BP: 169/72 HR: 66  Sitting BP: 175/74 HR: 67  Standing BP: 190/74 HR: 85  Site: upper left arm  Mode: electronic    General Exam:   General Appearance: Appropriately dressed and in no acute distress       Head: Normocephalic, atraumatic and no dysmorphic features  Ear, Nose, and Throat: Moist mucous membranes  CVS: S1S2+  Resp: No SOB, no wheeze or rhonchi  Abd: soft NTND  Extremities: No edema, no cyanosis  Skin: No bruises, no rashes     Neurological Exam:  Mental Status: Awake, alert and oriented x 3.  Able to follow simple and complex verbal commands. Able to name and repeat. fluent speech. No obvious aphasia or dysarthria noted.   Cranial Nerves: PERRL, EOMI, VFFC, sensation V1-V3 intact,  no obvious facial asymmetry , equal elevation of palate, scm/trap 5/5, tongue is midline on protrusion. no obvious papilledema on fundoscopic exam. Hearing is grossly intact.   Motor: Normal bulk, tone and strength throughout. Fine finger movements were intact and symmetric. no tremors or drift noted.    Sensation: Intact to light touch and pinprick throughout. no right/left confusion. no extinction to tactile on DSS.   Deep Tendon Reflexes: +3 left knee jerk, +3 R bicep reflex, 2 beats of clonus on left leg  Toes flexor bilaterally  Coordination: No dysmetria on FNF o   Gait: deferred     I personally reviewed the below data/images/labs:        no new labs   CBC Full  -  ( 19 Oct 2021 07:23 )  WBC Count : 12.37 K/uL  RBC Count : 4.35 M/uL  Hemoglobin : 12.6 g/dL  Hematocrit : 39.5 %  Platelet Count - Automated : 315 K/uL  Mean Cell Volume : 90.8 fl  Mean Cell Hemoglobin : 29.0 pg  Mean Cell Hemoglobin Concentration : 31.9 gm/dL  Auto Neutrophil # : x  Auto Lymphocyte # : x  Auto Monocyte # : x  Auto Eosinophil # : x  Auto Basophil # : x  Auto Neutrophil % : x  Auto Lymphocyte % : x  Auto Monocyte % : x  Auto Eosinophil % : x  Auto Basophil % : x      10-19    140  |  103  |  19  ----------------------------<  115<H>  3.8   |  22  |  0.71    Ca    9.9      19 Oct 2021 07:23        Urinalysis Basic - ( 14 Oct 2021 13:34 )    Color: Light Yellow / Appearance: Slightly Turbid / S.022 / pH: x  Gluc: x / Ketone: Small  / Bili: Negative / Urobili: Negative   Blood: x / Protein: 30 mg/dL / Nitrite: Positive   Leuk Esterase: Large / RBC: 7 /hpf /  /HPF   Sq Epi: x / Non Sq Epi: 0 /hpf / Bacteria: Many    Orthostatic VS    CT head:   1)  chronic ischemic changes with volume loss. However ventricles are prominent suggesting underlying mild hydrocephalus. Further assessment and follow-up recommended..  2)  no intracerebral hemorrhage, contusion, or extracerebral collections are identified.    < from: MR Cervical Spine No Cont (10.16.21 @ 21:27) >    EXAM:  MR SPINE CERVICAL                            PROCEDURE DATE:  10/16/2021            INTERPRETATION:  CLINICAL INDICATION: Unsteady gait, fall, urinary incontinence    Magnetic resonance imaging of the cervical spine was carried out with sagittal surface coil imaging from C1 to T6-7 using T1 and fast spin echo T2 weighted images with and without fat saturation technique with axial T1 and fast spin echo T2 weighted imaging on a 1.5 [employer] magnet.    The cervical vertebrae are normal in height and signal intensity. Disc space narrowing is identified at C3-4 through C6-7 consistent with degenerative changes. Small disc osteophyte complexes are present at these levels as well as dorsal ligamentous hypertrophy. There is no significant spinal stenosis. There is no cord compression. There is no marrow edema. There is no acute fracture or dislocation.        IMPRESSION: Mild degenerative changes. No significant spinal stenosis. No cord compression.      --- End of Report ---        CASSIDY LEACH MD; Attending Radiologist  This document has been electronically signed. Oct 17 2021  4:02PM    < end of copied text >  
Neurology Progress Note    S: Patient seen and examined. No new events overnight. patient denied CP, SOB, HA or pain. on NC. CT chest neg for pe     Medication:  MEDICATIONS  (STANDING):  albuterol/ipratropium for Nebulization 3 milliLiter(s) Nebulizer every 6 hours  aspirin enteric coated 81 milliGRAM(s) Oral daily  buDESOnide    Inhalation Suspension 0.5 milliGRAM(s) Inhalation every 12 hours  cefTRIAXone   IVPB      cefTRIAXone   IVPB 1000 milliGRAM(s) IV Intermittent every 24 hours  coronavirus (EUA) Vaccine (MODERNA) 0.5 milliLiter(s) IntraMuscular once  doxycycline hyclate Capsule 100 milliGRAM(s) Oral every 12 hours  enoxaparin Injectable 40 milliGRAM(s) SubCutaneous daily  hydrALAZINE 10 milliGRAM(s) Oral two times a day  predniSONE   Tablet 50 milliGRAM(s) Oral daily  sertraline 50 milliGRAM(s) Oral daily    MEDICATIONS  (PRN):  acetaminophen   Tablet .. 650 milliGRAM(s) Oral every 6 hours PRN Temp greater or equal to 38.5C (101.3F), Mild Pain (1 - 3)    Vitals:  Vital Signs Last 24 Hrs  T(C): 36.3 (10-23-21 @ 04:58), Max: 36.8 (10-22-21 @ 16:18)  T(F): 97.4 (10-23-21 @ 04:58), Max: 98.3 (10-22-21 @ 16:18)  HR: 75 (10-23-21 @ 04:58) (75 - 85)  BP: 133/63 (10-23-21 @ 04:58) (129/69 - 135/68)  BP(mean): --  RR: 18 (10-23-21 @ 04:58) (18 - 18)  SpO2: 93% (10-23-21 @ 04:58) (91% - 93%)        Orthostatic VS  10-21-21 @ 04:52  Lying BP: 146/71 HR: 70  Sitting BP: 135/68 HR: 75  Standing BP: 137/72 HR: --  Site: upper left arm  Mode: electronic      Orthostatic VS  10-21-21 @ 04:52  Lying BP: 146/71 HR: 70  Sitting BP: 135/68 HR: 75  Standing BP: 137/72 HR: --  Site: upper left arm  Mode: electronic  Orthostatic VS  10-20-21 @ 10:31  Lying BP: 130/70 HR: 79  Sitting BP: 139/79 HR: 86  Standing BP: 152/73 HR: 90  Site: 134/69 after ambulating a few steps  Mode: --    Orthostatic VS  10-20-21 @ 10:31  Lying BP: 130/70 HR: 79  Sitting BP: 139/79 HR: 86  Standing BP: 152/73 HR: 90  Site: 134/69 after ambulating a few steps  Mode: --          Orthostatic VS  10-15-21 @ 19:11  Lying BP: 169/72 HR: 66  Sitting BP: 175/74 HR: 67  Standing BP: 190/74 HR: 85  Site: upper left arm  Mode: electronic    General Exam:   General Appearance: Appropriately dressed and in no acute distress       Head: Normocephalic, atraumatic and no dysmorphic features  Ear, Nose, and Throat: Moist mucous membranes  CVS: S1S2+  Resp: No SOB, no wheeze or rhonchi  Abd: soft NTND  Extremities: No edema, no cyanosis  Skin: No bruises, no rashes     Neurological Exam:  Mental Status: Awake, alert and oriented x 3.  Able to follow simple and complex verbal commands. Able to name and repeat. fluent speech. No obvious aphasia or dysarthria noted.   Cranial Nerves: PERRL, EOMI, VFFC, sensation V1-V3 intact,  no obvious facial asymmetry , equal elevation of palate, scm/trap 5/5, tongue is midline on protrusion. no obvious papilledema on fundoscopic exam. Hearing is grossly intact.   Motor: Normal bulk, tone and strength throughout. Fine finger movements were intact and symmetric. no tremors or drift noted.    Sensation: Intact to light touch and pinprick throughout. no right/left confusion. no extinction to tactile on DSS.   Deep Tendon Reflexes: +3 left knee jerk, +3 R bicep reflex, 2 beats of clonus on left leg  Toes flexor bilaterally  Coordination: No dysmetria on FNF o   Gait: deferred     I personally reviewed the below data/images/labs:      CBC Full  -  ( 23 Oct 2021 06:33 )  WBC Count : 23.42 K/uL  RBC Count : 3.86 M/uL  Hemoglobin : 11.0 g/dL  Hematocrit : 35.1 %  Platelet Count - Automated : 388 K/uL  Mean Cell Volume : 90.9 fl  Mean Cell Hemoglobin : 28.5 pg  Mean Cell Hemoglobin Concentration : 31.3 gm/dL  Auto Neutrophil # : x  Auto Lymphocyte # : x  Auto Monocyte # : x  Auto Eosinophil # : x  Auto Basophil # : x  Auto Neutrophil % : x  Auto Lymphocyte % : x  Auto Monocyte % : x  Auto Eosinophil % : x  Auto Basophil % : x    Color: Light Yellow / Appearance: Slightly Turbid / S.022 / pH: x  Gluc: x / Ketone: Small  / Bili: Negative / Urobili: Negative   Blood: x / Protein: 30 mg/dL / Nitrite: Positive   Leuk Esterase: Large / RBC: 7 /hpf /  /HPF   Sq Epi: x / Non Sq Epi: 0 /hpf / Bacteria: Many    Orthostatic VS    CT head:   1)  chronic ischemic changes with volume loss. However ventricles are prominent suggesting underlying mild hydrocephalus. Further assessment and follow-up recommended..  2)  no intracerebral hemorrhage, contusion, or extracerebral collections are identified.    < from: MR Cervical Spine No Cont (10.16.21 @ 21:27) >    EXAM:  MR SPINE CERVICAL                            PROCEDURE DATE:  10/16/2021            INTERPRETATION:  CLINICAL INDICATION: Unsteady gait, fall, urinary incontinence    Magnetic resonance imaging of the cervical spine was carried out with sagittal surface coil imaging from C1 to T6-7 using T1 and fast spin echo T2 weighted images with and without fat saturation technique with axial T1 and fast spin echo T2 weighted imaging on a 1.5 [employer] magnet.    The cervical vertebrae are normal in height and signal intensity. Disc space narrowing is identified at C3-4 through C6-7 consistent with degenerative changes. Small disc osteophyte complexes are present at these levels as well as dorsal ligamentous hypertrophy. There is no significant spinal stenosis. There is no cord compression. There is no marrow edema. There is no acute fracture or dislocation.        IMPRESSION: Mild degenerative changes. No significant spinal stenosis. No cord compression.      --- End of Report ---        CASSIDY LEACH MD; Attending Radiologist  This document has been electronically signed. Oct 17 2021  4:02PM    < end of copied text >  
Neurology Progress Note    S: Patient seen and examined. No new events overnight. patient denied CP, SOB, HA or pain. on NC. CT chest neg for pe.  okay in bed     Medication:  MEDICATIONS  (STANDING):  albuterol/ipratropium for Nebulization 3 milliLiter(s) Nebulizer every 6 hours  aspirin enteric coated 81 milliGRAM(s) Oral daily  buDESOnide    Inhalation Suspension 0.5 milliGRAM(s) Inhalation every 12 hours  coronavirus (EUA) Vaccine (MODERNA) 0.5 milliLiter(s) IntraMuscular once  doxycycline hyclate Capsule 100 milliGRAM(s) Oral every 12 hours  enoxaparin Injectable 40 milliGRAM(s) SubCutaneous daily  hydrALAZINE 10 milliGRAM(s) Oral two times a day  predniSONE   Tablet 50 milliGRAM(s) Oral daily  sertraline 50 milliGRAM(s) Oral daily    MEDICATIONS  (PRN):  acetaminophen   Tablet .. 650 milliGRAM(s) Oral every 6 hours PRN Temp greater or equal to 38.5C (101.3F), Mild Pain (1 - 3)    Vitals:  Vital Signs Last 24 Hrs  T(C): 36.4 (10-25-21 @ 04:20), Max: 36.4 (10-25-21 @ 04:20)  T(F): 97.6 (10-25-21 @ 04:20), Max: 97.6 (10-25-21 @ 04:20)  HR: 66 (10-25-21 @ 04:20) (66 - 78)  BP: 146/70 (10-25-21 @ 04:20) (138/72 - 163/72)  BP(mean): --  RR: 18 (10-25-21 @ 04:20) (18 - 18)  SpO2: 92% (10-25-21 @ 04:20) (92% - 94%)            Orthostatic VS  10-21-21 @ 04:52  Lying BP: 146/71 HR: 70  Sitting BP: 135/68 HR: 75  Standing BP: 137/72 HR: --  Site: upper left arm  Mode: electronic      Orthostatic VS  10-21-21 @ 04:52  Lying BP: 146/71 HR: 70  Sitting BP: 135/68 HR: 75  Standing BP: 137/72 HR: --  Site: upper left arm  Mode: electronic  Orthostatic VS  10-20-21 @ 10:31  Lying BP: 130/70 HR: 79  Sitting BP: 139/79 HR: 86  Standing BP: 152/73 HR: 90  Site: 134/69 after ambulating a few steps  Mode: --    Orthostatic VS  10-20-21 @ 10:31  Lying BP: 130/70 HR: 79  Sitting BP: 139/79 HR: 86  Standing BP: 152/73 HR: 90  Site: 134/69 after ambulating a few steps  Mode: --          Orthostatic VS  10-15-21 @ 19:11  Lying BP: 169/72 HR: 66  Sitting BP: 175/74 HR: 67  Standing BP: 190/74 HR: 85  Site: upper left arm  Mode: electronic    General Exam:   General Appearance: Appropriately dressed and in no acute distress       Head: Normocephalic, atraumatic and no dysmorphic features  Ear, Nose, and Throat: Moist mucous membranes  CVS: S1S2+  Resp: No SOB, no wheeze or rhonchi  Abd: soft NTND  Extremities: No edema, no cyanosis  Skin: No bruises, no rashes     Neurological Exam:  Mental Status: Awake, alert and oriented x 3.  Able to follow simple and complex verbal commands. Able to name and repeat. fluent speech. No obvious aphasia or dysarthria noted.   Cranial Nerves: PERRL, EOMI, VFFC, sensation V1-V3 intact,  no obvious facial asymmetry , equal elevation of palate, scm/trap 5/5, tongue is midline on protrusion. no obvious papilledema on fundoscopic exam. Hearing is grossly intact.   Motor: Normal bulk, tone and strength throughout. Fine finger movements were intact and symmetric. no tremors or drift noted.    Sensation: Intact to light touch and pinprick throughout. no right/left confusion. no extinction to tactile on DSS.   Deep Tendon Reflexes: +3 left knee jerk, +3 R bicep reflex, 2 beats of clonus on left leg  Toes flexor bilaterally  Coordination: No dysmetria on FNF o   Gait: deferred     I personally reviewed the below data/images/labs:    CBC Full  -  ( 25 Oct 2021 07:59 )  WBC Count : 15.35 K/uL  RBC Count : 3.73 M/uL  Hemoglobin : 10.5 g/dL  Hematocrit : 34.0 %  Platelet Count - Automated : 419 K/uL  Mean Cell Volume : 91.2 fl  Mean Cell Hemoglobin : 28.2 pg  Mean Cell Hemoglobin Concentration : 30.9 gm/dL  Auto Neutrophil # : x  Auto Lymphocyte # : x  Auto Monocyte # : x  Auto Eosinophil # : x  Auto Basophil # : x  Auto Neutrophil % : x  Auto Lymphocyte % : x  Auto Monocyte % : x  Auto Eosinophil % : x  Auto Basophil % : x    10-25    142  |  104  |  49<H>  ----------------------------<  71  4.1   |  21<L>  |  0.92    Ca    10.5      25 Oct 2021 07:59            Orthostatic VS    CT head:   1)  chronic ischemic changes with volume loss. However ventricles are prominent suggesting underlying mild hydrocephalus. Further assessment and follow-up recommended..  2)  no intracerebral hemorrhage, contusion, or extracerebral collections are identified.    < from: MR Cervical Spine No Cont (10.16.21 @ 21:27) >    EXAM:  MR SPINE CERVICAL                            PROCEDURE DATE:  10/16/2021            INTERPRETATION:  CLINICAL INDICATION: Unsteady gait, fall, urinary incontinence    Magnetic resonance imaging of the cervical spine was carried out with sagittal surface coil imaging from C1 to T6-7 using T1 and fast spin echo T2 weighted images with and without fat saturation technique with axial T1 and fast spin echo T2 weighted imaging on a 1.5 [employer] magnet.    The cervical vertebrae are normal in height and signal intensity. Disc space narrowing is identified at C3-4 through C6-7 consistent with degenerative changes. Small disc osteophyte complexes are present at these levels as well as dorsal ligamentous hypertrophy. There is no significant spinal stenosis. There is no cord compression. There is no marrow edema. There is no acute fracture or dislocation.        IMPRESSION: Mild degenerative changes. No significant spinal stenosis. No cord compression.      --- End of Report ---        CASSIDY LEACH MD; Attending Radiologist  This document has been electronically signed. Oct 17 2021  4:02PM    < end of copied text >  
Neurology Progress Note    S: Patient seen and examined. No new events overnight. patient denied CP, SOB, HA or pain. on NC. CT chest neg for pe.  okay in bed     Medication:  MEDICATIONS  (STANDING):  albuterol/ipratropium for Nebulization 3 milliLiter(s) Nebulizer every 6 hours  aspirin enteric coated 81 milliGRAM(s) Oral daily  buDESOnide    Inhalation Suspension 0.5 milliGRAM(s) Inhalation every 12 hours  coronavirus (EUA) Vaccine (MODERNA) 0.5 milliLiter(s) IntraMuscular once  doxycycline hyclate Capsule 100 milliGRAM(s) Oral every 12 hours  enoxaparin Injectable 40 milliGRAM(s) SubCutaneous daily  hydrALAZINE 10 milliGRAM(s) Oral two times a day  predniSONE   Tablet 50 milliGRAM(s) Oral daily  sertraline 50 milliGRAM(s) Oral daily    MEDICATIONS  (PRN):  acetaminophen   Tablet .. 650 milliGRAM(s) Oral every 6 hours PRN Temp greater or equal to 38.5C (101.3F), Mild Pain (1 - 3)    Vitals:  Vital Signs Last 24 Hrs  T(C): 36.4 (10-26-21 @ 05:05), Max: 36.7 (10-25-21 @ 11:27)  T(F): 97.5 (10-26-21 @ 05:05), Max: 98.1 (10-25-21 @ 11:27)  HR: 69 (10-26-21 @ 05:05) (65 - 79)  BP: 136/72 (10-26-21 @ 05:05) (127/71 - 137/67)  BP(mean): --  RR: 18 (10-26-21 @ 05:05) (18 - 18)  SpO2: 98% (10-26-21 @ 05:05) (88% - 99%)          Orthostatic VS  10-21-21 @ 04:52  Lying BP: 146/71 HR: 70  Sitting BP: 135/68 HR: 75  Standing BP: 137/72 HR: --  Site: upper left arm  Mode: electronic      Orthostatic VS  10-21-21 @ 04:52  Lying BP: 146/71 HR: 70  Sitting BP: 135/68 HR: 75  Standing BP: 137/72 HR: --  Site: upper left arm  Mode: electronic  Orthostatic VS  10-20-21 @ 10:31  Lying BP: 130/70 HR: 79  Sitting BP: 139/79 HR: 86  Standing BP: 152/73 HR: 90  Site: 134/69 after ambulating a few steps  Mode: --    Orthostatic VS  10-20-21 @ 10:31  Lying BP: 130/70 HR: 79  Sitting BP: 139/79 HR: 86  Standing BP: 152/73 HR: 90  Site: 134/69 after ambulating a few steps  Mode: --          Orthostatic VS  10-15-21 @ 19:11  Lying BP: 169/72 HR: 66  Sitting BP: 175/74 HR: 67  Standing BP: 190/74 HR: 85  Site: upper left arm  Mode: electronic    General Exam:   General Appearance: Appropriately dressed and in no acute distress       Head: Normocephalic, atraumatic and no dysmorphic features  Ear, Nose, and Throat: Moist mucous membranes  CVS: S1S2+  Resp: No SOB, no wheeze or rhonchi  Abd: soft NTND  Extremities: No edema, no cyanosis  Skin: No bruises, no rashes     Neurological Exam:  Mental Status: Awake, alert and oriented x 3.  Able to follow simple and complex verbal commands. Able to name and repeat. fluent speech. No obvious aphasia or dysarthria noted.   Cranial Nerves: PERRL, EOMI, VFFC, sensation V1-V3 intact,  no obvious facial asymmetry , equal elevation of palate, scm/trap 5/5, tongue is midline on protrusion. no obvious papilledema on fundoscopic exam. Hearing is grossly intact.   Motor: Normal bulk, tone and strength throughout. Fine finger movements were intact and symmetric. no tremors or drift noted.    Sensation: Intact to light touch and pinprick throughout. no right/left confusion. no extinction to tactile on DSS.   Deep Tendon Reflexes: +3 left knee jerk, +3 R bicep reflex, 2 beats of clonus on left leg  Toes flexor bilaterally  Coordination: No dysmetria on FNF o   Gait: deferred     I personally reviewed the below data/images/labs:    CBC Full  -  ( 26 Oct 2021 06:47 )  WBC Count : 13.33 K/uL  RBC Count : 3.68 M/uL  Hemoglobin : 10.4 g/dL  Hematocrit : 33.7 %  Platelet Count - Automated : 391 K/uL  Mean Cell Volume : 91.6 fl  Mean Cell Hemoglobin : 28.3 pg  Mean Cell Hemoglobin Concentration : 30.9 gm/dL  Auto Neutrophil # : x  Auto Lymphocyte # : x  Auto Monocyte # : x  Auto Eosinophil # : x  Auto Basophil # : x  Auto Neutrophil % : x  Auto Lymphocyte % : x  Auto Monocyte % : x  Auto Eosinophil % : x  Auto Basophil % : x      10-26    141  |  106  |  47<H>  ----------------------------<  77  4.5   |  21<L>  |  0.88    Ca    10.4      26 Oct 2021 06:48          Orthostatic VS    CT head:   1)  chronic ischemic changes with volume loss. However ventricles are prominent suggesting underlying mild hydrocephalus. Further assessment and follow-up recommended..  2)  no intracerebral hemorrhage, contusion, or extracerebral collections are identified.    < from: MR Cervical Spine No Cont (10.16.21 @ 21:27) >    EXAM:  MR SPINE CERVICAL                            PROCEDURE DATE:  10/16/2021            INTERPRETATION:  CLINICAL INDICATION: Unsteady gait, fall, urinary incontinence    Magnetic resonance imaging of the cervical spine was carried out with sagittal surface coil imaging from C1 to T6-7 using T1 and fast spin echo T2 weighted images with and without fat saturation technique with axial T1 and fast spin echo T2 weighted imaging on a 1.5 [employer] magnet.    The cervical vertebrae are normal in height and signal intensity. Disc space narrowing is identified at C3-4 through C6-7 consistent with degenerative changes. Small disc osteophyte complexes are present at these levels as well as dorsal ligamentous hypertrophy. There is no significant spinal stenosis. There is no cord compression. There is no marrow edema. There is no acute fracture or dislocation.        IMPRESSION: Mild degenerative changes. No significant spinal stenosis. No cord compression.      --- End of Report ---        CASSIDY LEACH MD; Attending Radiologist  This document has been electronically signed. Oct 17 2021  4:02PM    < end of copied text >  
Patient is a 85y old  Female who presents with a chief complaint of     SUBJECTIVE / OVERNIGHT EVENTS: Comfortable without new complaints.   Review of Systems  chest pain no  palpitations no  sob improving   nausea no  headache no    MEDICATIONS  (STANDING):  albuterol/ipratropium for Nebulization 3 milliLiter(s) Nebulizer every 6 hours  aspirin enteric coated 81 milliGRAM(s) Oral daily  buDESOnide    Inhalation Suspension 0.5 milliGRAM(s) Inhalation every 12 hours  cefTRIAXone   IVPB      cefTRIAXone   IVPB 1000 milliGRAM(s) IV Intermittent every 24 hours  coronavirus (EUA) Vaccine (MODERNA) 0.5 milliLiter(s) IntraMuscular once  doxycycline hyclate Capsule 100 milliGRAM(s) Oral every 12 hours  enoxaparin Injectable 40 milliGRAM(s) SubCutaneous daily  hydrALAZINE 10 milliGRAM(s) Oral two times a day  predniSONE   Tablet 50 milliGRAM(s) Oral daily  sertraline 50 milliGRAM(s) Oral daily    MEDICATIONS  (PRN):  acetaminophen   Tablet .. 650 milliGRAM(s) Oral every 6 hours PRN Temp greater or equal to 38.5C (101.3F), Mild Pain (1 - 3)      Vital Signs Last 24 Hrs  T(C): 36.7 (22 Oct 2021 19:23), Max: 37 (22 Oct 2021 04:45)  T(F): 98 (22 Oct 2021 19:23), Max: 98.6 (22 Oct 2021 04:45)  HR: 85 (22 Oct 2021 19:23) (79 - 86)  BP: 135/68 (22 Oct 2021 19:23) (129/69 - 135/78)  BP(mean): --  RR: 18 (22 Oct 2021 19:23) (18 - 18)  SpO2: 91% (22 Oct 2021 19:23) (91% - 95%)    PHYSICAL EXAM:  GENERAL: NAD  HEAD:  Atraumatic, Normocephalic  EYES: EOMI, PERRLA, conjunctiva and sclera clear  NECK: Supple, No JVD  CHEST/LUNG: Clear to auscultation bilaterally; No wheeze  HEART: Regular rate and rhythm; No murmurs, rubs, or gallops  ABDOMEN: Soft, Nontender, Nondistended; Bowel sounds present  EXTREMITIES:  2+ Peripheral Pulses, No clubbing, cyanosis, or edema  PSYCH: AAOx3  NEUROLOGY: non-focal  SKIN: No rashes or lesions    LABS:                        11.7   22.90 )-----------( 367      ( 22 Oct 2021 07:03 )             38.9     10-22    138  |  99  |  32<H>  ----------------------------<  175<H>  3.8   |  20<L>  |  0.79    Ca    10.3      22 Oct 2021 07:07    TPro  7.3  /  Alb  3.4  /  TBili  0.4  /  DBili  x   /  AST  12  /  ALT  8<L>  /  AlkPhos  85  10-22                RADIOLOGY & ADDITIONAL TESTS:    Imaging Personally Reviewed:  CTa no embolus     Consultant(s) Notes Reviewed:      Care Discussed with Consultants/Other Providers:  
Patient is a 85y old  Female who presents with a chief complaint of     SUBJECTIVE / OVERNIGHT EVENTS: Comfortable without new complaints.   Review of Systems  chest pain no  palpitations no  sob no  nausea no  headache no    MEDICATIONS  (STANDING):  aspirin enteric coated 81 milliGRAM(s) Oral daily  cefTRIAXone   IVPB 1000 milliGRAM(s) IV Intermittent every 24 hours  enoxaparin Injectable 40 milliGRAM(s) SubCutaneous daily  hydrALAZINE 10 milliGRAM(s) Oral two times a day  sertraline 50 milliGRAM(s) Oral daily    MEDICATIONS  (PRN):  acetaminophen   Tablet .. 650 milliGRAM(s) Oral every 6 hours PRN Temp greater or equal to 38.5C (101.3F), Mild Pain (1 - 3)  oxycodone    5 mG/acetaminophen 325 mG 1 Tablet(s) Oral every 6 hours PRN Moderate Pain (4 - 6)      Vital Signs Last 24 Hrs  T(C): 36.7 (15 Oct 2021 11:13), Max: 36.8 (15 Oct 2021 00:48)  T(F): 98.1 (15 Oct 2021 11:13), Max: 98.2 (15 Oct 2021 00:48)  HR: 70 (15 Oct 2021 14:44) (66 - 77)  BP: 158/74 (15 Oct 2021 14:44) (128/57 - 174/80)  BP(mean): --  RR: 18 (15 Oct 2021 11:13) (17 - 19)  SpO2: 96% (15 Oct 2021 14:44) (95% - 98%)    PHYSICAL EXAM:  GENERAL: NAD, well-developed  HEAD:  Atraumatic, Normocephalic  EYES: EOMI, PERRLA, conjunctiva and sclera clear  NECK: Supple, No JVD  CHEST/LUNG: Clear to auscultation bilaterally; No wheeze  HEART: Regular rate and rhythm; No murmurs, rubs, or gallops  ABDOMEN: Soft, Nontender, Nondistended; Bowel sounds present  EXTREMITIES:  2+ Peripheral Pulses, No clubbing, cyanosis, or edema  PSYCH: AAOx3  NEUROLOGY: non-focal  SKIN: No rashes or lesions    LABS:                        11.4   7.70  )-----------( 279      ( 15 Oct 2021 06:41 )             38.1     10-15    143  |  106  |  21  ----------------------------<  99  3.5   |  24  |  0.85    Ca    9.2      15 Oct 2021 06:39    TPro  7.8  /  Alb  4.2  /  TBili  0.7  /  DBili  x   /  AST  48<H>  /  ALT  17  /  AlkPhos  103  10-14    PT/INR - ( 14 Oct 2021 12:00 )   PT: 12.4 sec;   INR: 1.04 ratio         PTT - ( 14 Oct 2021 12:00 )  PTT:29.7 sec      Urinalysis Basic - ( 14 Oct 2021 13:34 )    Color: Light Yellow / Appearance: Slightly Turbid / S.022 / pH: x  Gluc: x / Ketone: Small  / Bili: Negative / Urobili: Negative   Blood: x / Protein: 30 mg/dL / Nitrite: Positive   Leuk Esterase: Large / RBC: 7 /hpf /  /HPF   Sq Epi: x / Non Sq Epi: 0 /hpf / Bacteria: Many          RADIOLOGY & ADDITIONAL TESTS:    Imaging Personally Reviewed:    Consultant(s) Notes Reviewed:      Care Discussed with Consultants/Other Providers:  
Patient is a 85y old  Female who presents with a chief complaint of     SUBJECTIVE / OVERNIGHT EVENTS: Comfortable without new complaints.   Review of Systems  chest pain no  palpitations no  sob no  nausea no  headache no    MEDICATIONS  (STANDING):  aspirin enteric coated 81 milliGRAM(s) Oral daily  cefTRIAXone   IVPB 1000 milliGRAM(s) IV Intermittent every 24 hours  enoxaparin Injectable 40 milliGRAM(s) SubCutaneous daily  hydrALAZINE 10 milliGRAM(s) Oral two times a day  sertraline 50 milliGRAM(s) Oral daily    MEDICATIONS  (PRN):  acetaminophen   Tablet .. 650 milliGRAM(s) Oral every 6 hours PRN Temp greater or equal to 38.5C (101.3F), Mild Pain (1 - 3)  oxycodone    5 mG/acetaminophen 325 mG 1 Tablet(s) Oral every 6 hours PRN Moderate Pain (4 - 6)      Vital Signs Last 24 Hrs  T(C): 36.7 (17 Oct 2021 11:52), Max: 37.3 (16 Oct 2021 20:14)  T(F): 98 (17 Oct 2021 11:52), Max: 99.1 (16 Oct 2021 20:14)  HR: 71 (17 Oct 2021 11:52) (69 - 72)  BP: 167/76 (17 Oct 2021 11:52) (136/70 - 170/79)  BP(mean): --  RR: 18 (17 Oct 2021 11:52) (18 - 18)  SpO2: 96% (17 Oct 2021 11:52) (94% - 96%)    PHYSICAL EXAM:  GENERAL: NAD, frail  HEAD:  Atraumatic, Normocephalic  EYES: EOMI, PERRLA, conjunctiva and sclera clear  NECK: Supple, No JVD  CHEST/LUNG: Clear to auscultation bilaterally; No wheeze  HEART: Regular rate and rhythm; No murmurs, rubs, or gallops  ABDOMEN: Soft, Nontender, Nondistended; Bowel sounds present  EXTREMITIES:  2+ Peripheral Pulses, No clubbing, cyanosis, or edema  PSYCH: AAOx3  NEUROLOGY: non-focal  SKIN: No rashes or lesions    LABS:                        11.1   9.19  )-----------( 286      ( 16 Oct 2021 07:20 )             36.9     10-16    143  |  109<H>  |  18  ----------------------------<  104<H>  3.5   |  22  |  0.84    Ca    9.2      16 Oct 2021 07:20                Culture - Urine (collected 14 Oct 2021 16:58)  Source: Clean Catch Clean Catch (Midstream)  Final Report (16 Oct 2021 06:07):    >=3 organisms. Probable collection contamination.        RADIOLOGY & ADDITIONAL TESTS:    Imaging Personally Reviewed:    Consultant(s) Notes Reviewed:      Care Discussed with Consultants/Other Providers:  
Patient is a 85y old  Female who presents with a chief complaint of     SUBJECTIVE / OVERNIGHT EVENTS: Comfortable without new complaints.   Review of Systems  chest pain no  palpitations no  sob no  nausea no  headache no    MEDICATIONS  (STANDING):  aspirin enteric coated 81 milliGRAM(s) Oral daily  coronavirus (EUA) Vaccine (MODERNA) 0.5 milliLiter(s) IntraMuscular once  enoxaparin Injectable 40 milliGRAM(s) SubCutaneous daily  hydrALAZINE 10 milliGRAM(s) Oral two times a day  sertraline 50 milliGRAM(s) Oral daily    MEDICATIONS  (PRN):  acetaminophen   Tablet .. 650 milliGRAM(s) Oral every 6 hours PRN Temp greater or equal to 38.5C (101.3F), Mild Pain (1 - 3)  oxycodone    5 mG/acetaminophen 325 mG 1 Tablet(s) Oral every 6 hours PRN Moderate Pain (4 - 6)      Vital Signs Last 24 Hrs  T(C): 36.7 (20 Oct 2021 11:48), Max: 37 (20 Oct 2021 04:21)  T(F): 98 (20 Oct 2021 11:48), Max: 98.6 (20 Oct 2021 04:21)  HR: 68 (20 Oct 2021 04:21) (62 - 68)  BP: 142/69 (20 Oct 2021 04:21) (140/68 - 148/73)  BP(mean): --  RR: 18 (20 Oct 2021 04:21) (18 - 18)  SpO2: 95% (20 Oct 2021 10:31) (94% - 95%)    PHYSICAL EXAM:  GENERAL: NAD, frail  HEAD:  Atraumatic, Normocephalic  EYES: EOMI, PERRLA, conjunctiva and sclera clear  NECK: Supple, No JVD  CHEST/LUNG: Clear to auscultation bilaterally; No wheeze  HEART: Regular rate and rhythm; No murmurs, rubs, or gallops  ABDOMEN: Soft, Nontender, Nondistended; Bowel sounds present  EXTREMITIES:  2+ Peripheral Pulses, No clubbing, cyanosis, or edema  PSYCH: AAOx3  NEUROLOGY: non-focal  SKIN: No rashes or lesions    LABS:                        12.6   12.37 )-----------( 315      ( 19 Oct 2021 07:23 )             39.5     10-19    140  |  103  |  19  ----------------------------<  115<H>  3.8   |  22  |  0.71    Ca    9.9      19 Oct 2021 07:23                  RADIOLOGY & ADDITIONAL TESTS:    Imaging Personally Reviewed:    Consultant(s) Notes Reviewed:      Care Discussed with Consultants/Other Providers:  
Patient is a 85y old  Female who presents with a chief complaint of     SUBJECTIVE / OVERNIGHT EVENTS: Comfortable without new complaints. Daughter at bedside.  Review of Systems  chest pain no  palpitations no  sob no  nausea no  headache no    MEDICATIONS  (STANDING):  aspirin enteric coated 81 milliGRAM(s) Oral daily  coronavirus (EUA) Vaccine (MODERNA) 0.5 milliLiter(s) IntraMuscular once  enoxaparin Injectable 40 milliGRAM(s) SubCutaneous daily  hydrALAZINE 10 milliGRAM(s) Oral two times a day  sertraline 50 milliGRAM(s) Oral daily    MEDICATIONS  (PRN):  acetaminophen   Tablet .. 650 milliGRAM(s) Oral every 6 hours PRN Temp greater or equal to 38.5C (101.3F), Mild Pain (1 - 3)  oxycodone    5 mG/acetaminophen 325 mG 1 Tablet(s) Oral every 6 hours PRN Moderate Pain (4 - 6)      Vital Signs Last 24 Hrs  T(C): 36.5 (19 Oct 2021 16:13), Max: 37.1 (18 Oct 2021 22:21)  T(F): 97.7 (19 Oct 2021 16:13), Max: 98.8 (18 Oct 2021 22:21)  HR: 60 (19 Oct 2021 16:13) (60 - 77)  BP: 148/60 (19 Oct 2021 16:13) (144/78 - 151/65)  BP(mean): --  RR: 17 (19 Oct 2021 16:13) (17 - 18)  SpO2: 95% (19 Oct 2021 16:13) (94% - 95%)    PHYSICAL EXAM:  GENERAL: NAD  HEAD:  Atraumatic, Normocephalic  EYES: EOMI, PERRLA, conjunctiva and sclera clear  NECK: Supple, No JVD  CHEST/LUNG: Clear to auscultation bilaterally; No wheeze  HEART: Regular rate and rhythm; No murmurs, rubs, or gallops  ABDOMEN: Soft, Nontender, Nondistended; Bowel sounds present  EXTREMITIES:  2+ Peripheral Pulses, No clubbing, cyanosis, or edema  PSYCH: AAOx3  NEUROLOGY: non-focal  SKIN: No rashes or lesions    LABS:                        12.6   12.37 )-----------( 315      ( 19 Oct 2021 07:23 )             39.5     10-19    140  |  103  |  19  ----------------------------<  115<H>  3.8   |  22  |  0.71    Ca    9.9      19 Oct 2021 07:23                  RADIOLOGY & ADDITIONAL TESTS:    Imaging Personally Reviewed:    Consultant(s) Notes Reviewed:      Care Discussed with Consultants/Other Providers:  
Patient is a 85y old  Female who presents with a chief complaint of     SUBJECTIVE / OVERNIGHT EVENTS: Comfortable without new complaints. Son at bedside  Review of Systems  chest pain no  palpitations no  sob no  nausea no  headache no    MEDICATIONS  (STANDING):  aspirin enteric coated 81 milliGRAM(s) Oral daily  cefTRIAXone   IVPB 1000 milliGRAM(s) IV Intermittent every 24 hours  enoxaparin Injectable 40 milliGRAM(s) SubCutaneous daily  hydrALAZINE 10 milliGRAM(s) Oral two times a day  sertraline 50 milliGRAM(s) Oral daily    MEDICATIONS  (PRN):  acetaminophen   Tablet .. 650 milliGRAM(s) Oral every 6 hours PRN Temp greater or equal to 38.5C (101.3F), Mild Pain (1 - 3)  oxycodone    5 mG/acetaminophen 325 mG 1 Tablet(s) Oral every 6 hours PRN Moderate Pain (4 - 6)      Vital Signs Last 24 Hrs  T(C): 36.8 (16 Oct 2021 11:47), Max: 37 (16 Oct 2021 00:26)  T(F): 98.3 (16 Oct 2021 11:47), Max: 98.6 (16 Oct 2021 00:26)  HR: 72 (16 Oct 2021 11:47) (64 - 73)  BP: 145/67 (16 Oct 2021 04:50) (145/67 - 166/76)  BP(mean): --  RR: 18 (16 Oct 2021 11:47) (18 - 18)  SpO2: 95% (16 Oct 2021 11:47) (93% - 97%)    PHYSICAL EXAM:  GENERAL: NAD, well-developed  HEAD:  Atraumatic, Normocephalic  EYES: EOMI, PERRLA, conjunctiva and sclera clear  NECK: Supple, No JVD  CHEST/LUNG: Clear to auscultation bilaterally; No wheeze  HEART: Regular rate and rhythm; No murmurs, rubs, or gallops  ABDOMEN: Soft, Nontender, Nondistended; Bowel sounds present  EXTREMITIES:  2+ Peripheral Pulses, No clubbing, cyanosis, or edema  PSYCH: AAOx3  NEUROLOGY: non-focal  SKIN: No rashes or lesions    LABS:                        11.1   9.19  )-----------( 286      ( 16 Oct 2021 07:20 )             36.9     10-16    143  |  109<H>  |  18  ----------------------------<  104<H>  3.5   |  22  |  0.84    Ca    9.2      16 Oct 2021 07:20            Urinalysis Basic - ( 14 Oct 2021 13:34 )    Color: Light Yellow / Appearance: Slightly Turbid / S.022 / pH: x  Gluc: x / Ketone: Small  / Bili: Negative / Urobili: Negative   Blood: x / Protein: 30 mg/dL / Nitrite: Positive   Leuk Esterase: Large / RBC: 7 /hpf /  /HPF   Sq Epi: x / Non Sq Epi: 0 /hpf / Bacteria: Many        Culture - Urine (collected 14 Oct 2021 16:58)  Source: Clean Catch Clean Catch (Midstream)  Final Report (16 Oct 2021 06:07):    >=3 organisms. Probable collection contamination.        RADIOLOGY & ADDITIONAL TESTS:    Imaging Personally Reviewed:    Consultant(s) Notes Reviewed:      Care Discussed with Consultants/Other Providers:  
Patient is a 85y old  Female who presents with a chief complaint of s/p fall  (18 Oct 2021 17:57)      SUBJECTIVE / OVERNIGHT EVENTS: Comfortable without new complaints. Daughter at bedside.  Review of Systems  chest pain no  palpitations no  sob no  nausea no  headache no    MEDICATIONS  (STANDING):  albuterol/ipratropium for Nebulization 3 milliLiter(s) Nebulizer every 6 hours  aspirin enteric coated 81 milliGRAM(s) Oral daily  benzonatate 200 milliGRAM(s) Oral three times a day  buDESOnide    Inhalation Suspension 0.5 milliGRAM(s) Inhalation every 12 hours  enoxaparin Injectable 40 milliGRAM(s) SubCutaneous daily  guaifenesin/dextromethorphan Oral Liquid 10 milliLiter(s) Oral four times a day  hydrALAZINE 10 milliGRAM(s) Oral two times a day  predniSONE   Tablet 50 milliGRAM(s) Oral daily  sertraline 50 milliGRAM(s) Oral daily    MEDICATIONS  (PRN):  acetaminophen   Tablet .. 650 milliGRAM(s) Oral every 6 hours PRN Temp greater or equal to 38.5C (101.3F), Mild Pain (1 - 3)      Vital Signs Last 24 Hrs  T(C): 36.6 (26 Oct 2021 11:43), Max: 36.7 (25 Oct 2021 20:14)  T(F): 97.9 (26 Oct 2021 11:43), Max: 98.1 (25 Oct 2021 20:14)  HR: 86 (26 Oct 2021 11:43) (65 - 86)  BP: 105/70 (26 Oct 2021 11:43) (105/70 - 136/72)  BP(mean): --  RR: 18 (26 Oct 2021 11:43) (18 - 18)  SpO2: 97% (26 Oct 2021 11:43) (90% - 99%)    PHYSICAL EXAM:  GENERAL: NAD , frail  HEAD:  Atraumatic, Normocephalic  EYES: EOMI, PERRLA, conjunctiva and sclera clear  NECK: Supple, No JVD  CHEST/LUNG: Clear to auscultation bilaterally; No wheeze  HEART: Regular rate and rhythm; No murmurs, rubs, or gallops  ABDOMEN: Soft, Nontender, Nondistended; Bowel sounds present  EXTREMITIES:  2+ Peripheral Pulses, No clubbing, cyanosis, or edema  PSYCH: AAOx3  NEUROLOGY: non-focal  SKIN: No rashes or lesions    LABS:                        10.4   13.33 )-----------( 391      ( 26 Oct 2021 06:47 )             33.7     10-26    141  |  106  |  47<H>  ----------------------------<  77  4.5   |  21<L>  |  0.88    Ca    10.4      26 Oct 2021 06:48                  RADIOLOGY & ADDITIONAL TESTS:    Imaging Personally Reviewed:    Consultant(s) Notes Reviewed:      Care Discussed with Consultants/Other Providers:

## 2021-10-26 NOTE — PROGRESS NOTE ADULT - ASSESSMENT
ASSESSMENT:    83 year old gentlewoman, lifelong non-smoker, with no history of intrinsic lung disease. She has a history of HTN and HLD. She has cognitive impairment and has had recurrent falls due to an unsteady gait. The patient underwent a left ureteral stent placement for an obstructing calculus in 9/2019. Pus was found in the bladder and proximal to the stone. She underwent left ureteroscopy with stone extraction in 10/2019. She was admitted on 10/14 following a fall while trying to  the garbage with right wrist and right knee injuries. No fractures were seen on radiographs. There was no reported lightheadedness, dizziness or loss of consciousness prior to the fall which is felt to be related to an unsteady gait. She reported episodes of urinary incontinence without dysuria, urinary urgency or urinary frequency and has received ceftriaxone for a possible UTI (urine culture was contaminated). Neurological evaluation has been unremarkable and she is awaiting an EEG in the outpatient setting. She had no shortness of breath during the admission and saturation was 96% @ 11am awaiting discharge. At the time of discharge, the patient was found to have a room air saturation of 82%. She had mild shortness of breath. She had a cough with sputum production. She had chest congestion and wheeze. She had developed bronchospasm. CTA chest was without pulmonary embolism - there is left lower lobe >> right lower lobe and lingula atelectasis - there is no evidence of pneumonia    Leukocytosis likely related to high dose steroids rather than infection    PLAN/RECOMMENDATIONS:    stable oxygenation on a nasal canula @ 2lpm - mild hypoxemia on room air (88%)  incentive spirometry - patient instructed in the use of the device  completing a 5 day course of doxycycline - blood cultures are negative - day 4/5  continue prednisone 50mg daily x 5 more days given ongoing bronchospasm  continue albuterol/atrovent nebs q6h   continue pulmicort 0.5mg nebs q12h - give after duoneb - rinse mouth after use  tessalon/robitussin DM  cardiology follow-up noted  cardiac meds: ASA/hydralazine  neurology follow-up noted  DVT prophylaxis - SQ lovenox 40mg daily  physical therapy  evaluation of right knee weakness and pain per PCP    Will follow with you. Plan of care discussed with the patient and her daughter at bedside. No pulmonary objection to discharge to rehab where physical therapy should help with lung reexpansion     Martinez Way MD, [facility] [place]  [phone]  Pulmonary Medicine

## 2021-10-26 NOTE — PROGRESS NOTE ADULT - ASSESSMENT
83 year female poor historian w/ HTN, left ureteral stent placement on 19 for obstructing calculus, uteroscopy with stone extraction on 10/11 presenting with urinary incontinence, uncontrolled HTN comes with episode was yesterday, full bladder emptying. P  3 falls in past year   patient also depressed brother recently  at age of 91 and she is having hard time dealing with it  incontinence since hysterectomy- not new  CT shows chronic ischemic changes with volume loss. However ventricles are prominent suggesting underlying mild hydrocephalus.   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 + neck pain and brisk reflexes on neck. possible cervical disease   10/21 more labored breathing today  10/22 better.  10/26 stable on NC   CT chest neg for PE.   - monitor resp status , pulmo eval.  recs appreicated   on NC.  CT chest neg PE, prednisone, doxycycline   - completed ceftriaxone .  - r/o reversible causes dementia. check b12 rpr tsh  - also was recently on zoloft - maybe med side effect  - can consider high volume tap if in agreement with family to r/o NPH but low yield. incontinence since hysterectomy not new.  depressed brother .  would consider in future if not improved. I would hold off   - PT/OT  - pain management   - check orthostatics - neg   - check rEEG can be done outpt if ready for discharge   - check FS, glucose control <180  - GI/DVT ppx  - Counseling on diet, exercise, and medication adherence was done  - Counseling on smoking cessation and alcohol consumption offered when appropriate.  - Pain assessed and judicious use of narcotics when appropriate was discussed.    - Stroke education given when appropriate.  - Importance of fall prevention discussed.   - Differential diagnosis and plan of care discussed with patient and/or family and primary team  - Thank you for allowing me to participate in the care of this patient. Call with questions.   - d/c planning if stable ONEAL   - spoke with ACP  Seth Arceo MD  Vascular Neurology  Office: [phone].

## 2021-10-26 NOTE — PROGRESS NOTE ADULT - ASSESSMENT
85 f with    Possible Pneumonia  - Antibiotic DC Ceftriaxone/ Continue Doxy for 5 days  - Prednisone taper  - Nebs  - Pulmonary follow dr. Way  - supplement O2    s/p Fall  - PT  - pain control    NPH  - Neurology follow.   - EEG as OTP  - Daughter interested in LP now but Neuro not recommending it now.    UTI resolved    HTN  - control    Depression  - continue Rx    DVT prophylaxis     DC rehab.     d/w patient and daughter    Marvin Weaver MD pager [phone]

## 2021-12-01 PROBLEM — F41.9 ANXIETY DISORDER, UNSPECIFIED: Chronic | Status: ACTIVE | Noted: 2021-10-14

## 2021-12-04 NOTE — CHART NOTE - NSCHARTNOTEFT_GEN_A_CORE
Informed by nurse patient with desatted to 85% on Room Air. Saw and evaluated patient at bedside. Patient A&Ox4 - denies lightheadedness, dizziness, chest pain, palpitations, SOB, N/V/D, blurred vision.     Vital Signs Last 24 Hrs  T(C): 36.4 (21 Oct 2021 11:07), Max: 37.2 (21 Oct 2021 04:52)  T(F): 97.5 (21 Oct 2021 11:07), Max: 98.9 (21 Oct 2021 04:52)  HR: 79 (21 Oct 2021 11:07) (63 - 79)  BP: 155/69 (21 Oct 2021 11:07) (131/71 - 155/69)  BP(mean): --  RR: 18 (21 Oct 2021 11:07) (18 - 18)  SpO2: 95% (21 Oct 2021 11:07) (94% - 96%)    Physical Exam:  CONSTITUTIONAL: NAD, well-developed  RESPIRATORY: Normal respiratory effort; rhonchi aucultated in RLL/LLL, no wheezing  CARDIOVASCULAR: Regular rate and rhythm, normal S1 and S2, no murmur/rub/gallop; No lower extremity edema; Peripheral pulses are 2+ bilaterally  ABDOMEN: Nontender to palpation, normoactive bowel sounds, no rebound/guarding; No hepatosplenomegaly  PSYCH: A+O to person, place, and time; phasic     Assessment:  83 year female poor historian w/ HTN, left ureteral stent placement on 9/28/19 for obstructing calculus, uteroscopy with stone extraction on 10/11 presenting with urinary incontinence, uncontrolled HTN comes with UTI s/p tx now noted with hypoxia on RA unclear etiology.     Plan:  STAT CXR   STAT ABG   Duonebs prn  Continue supplemental O2 via NC to keep O2 sat >90  Pulmonary c/s  Will continue to monitor patient closely.  Discussed with attending Dr. Weaver - agrees with plan above.       Iman MASON  Dept of Medicine  Pager [phone]
Pneumonia was ruled out.
Pt ready for discharge to [facility]. EEG pending. This writer spoke with Dr. Arceo the neurologist, who stated that if the patient is ready for discharge, the EEG can be done in his office. CM notified. Will prepare for discharge.  ODALIS Sherman NP
Notified by RN that pt is complaining of pain and swelling to her left hand.   Saw and examined pt at bedside.   Pt is currently in a thumb spica splint with mild swelling to her fingers.   Pts fingers are well perfused with no numbness or tingling. Full range of motion all fingers. Normal sensation present.     Likely pain secondary to hand fracture. Lower suspicion for DVT, pt currently on lovenox 40 BID.     Plan:  - Ace bandaged loosened, hand placed on pillow  - tylenol for pain  - will consider further imaging or ortho consult if pain does not improve  - will continue to monitor  - will endorse to AM team     Eduarda Sky PA-C  Department of Medicine
Request from Dr. Weaver to facilitate patient discharge. Medication reconciliation reviewed, revised, and resolved with Dr. Weaver who had medically cleared patient for discharge with follow-up as advised. Please refer to discharge note for detailed hospital course. Patient is currently stable for discharge to [facility] at this time. Eligibility, benefits and expectations of COVID vaccine was discussed, however patient and family member (daughter) refusing second dose of Moderna vaccination.        Iman Romeo PA-C  Dept of Medicine  Contact #[phone]

## 2021-12-22 PROCEDURE — 29125 APPL SHORT ARM SPLINT STATIC: CPT | Mod: RT

## 2021-12-22 PROCEDURE — 73090 X-RAY EXAM OF FOREARM: CPT

## 2021-12-22 PROCEDURE — 85027 COMPLETE CBC AUTOMATED: CPT

## 2021-12-22 PROCEDURE — 85730 THROMBOPLASTIN TIME PARTIAL: CPT

## 2021-12-22 PROCEDURE — 97116 GAIT TRAINING THERAPY: CPT

## 2021-12-22 PROCEDURE — 80053 COMPREHEN METABOLIC PANEL: CPT

## 2021-12-22 PROCEDURE — 97535 SELF CARE MNGMENT TRAINING: CPT

## 2021-12-22 PROCEDURE — 36415 COLL VENOUS BLD VENIPUNCTURE: CPT

## 2021-12-22 PROCEDURE — 97165 OT EVAL LOW COMPLEX 30 MIN: CPT

## 2021-12-22 PROCEDURE — 82803 BLOOD GASES ANY COMBINATION: CPT

## 2021-12-22 PROCEDURE — 70450 CT HEAD/BRAIN W/O DYE: CPT | Mod: MA

## 2021-12-22 PROCEDURE — 81001 URINALYSIS AUTO W/SCOPE: CPT

## 2021-12-22 PROCEDURE — 71275 CT ANGIOGRAPHY CHEST: CPT

## 2021-12-22 PROCEDURE — 86769 SARS-COV-2 COVID-19 ANTIBODY: CPT

## 2021-12-22 PROCEDURE — 87086 URINE CULTURE/COLONY COUNT: CPT

## 2021-12-22 PROCEDURE — 99285 EMERGENCY DEPT VISIT HI MDM: CPT

## 2021-12-22 PROCEDURE — U0005: CPT

## 2021-12-22 PROCEDURE — 84484 ASSAY OF TROPONIN QUANT: CPT

## 2021-12-22 PROCEDURE — 71045 X-RAY EXAM CHEST 1 VIEW: CPT

## 2021-12-22 PROCEDURE — 97530 THERAPEUTIC ACTIVITIES: CPT

## 2021-12-22 PROCEDURE — 80048 BASIC METABOLIC PNL TOTAL CA: CPT

## 2021-12-22 PROCEDURE — 93005 ELECTROCARDIOGRAM TRACING: CPT

## 2021-12-22 PROCEDURE — 72141 MRI NECK SPINE W/O DYE: CPT

## 2021-12-22 PROCEDURE — 94640 AIRWAY INHALATION TREATMENT: CPT

## 2021-12-22 PROCEDURE — 97162 PT EVAL MOD COMPLEX 30 MIN: CPT

## 2021-12-22 PROCEDURE — 87040 BLOOD CULTURE FOR BACTERIA: CPT

## 2021-12-22 PROCEDURE — 85610 PROTHROMBIN TIME: CPT

## 2021-12-22 PROCEDURE — 73562 X-RAY EXAM OF KNEE 3: CPT

## 2021-12-22 PROCEDURE — 73110 X-RAY EXAM OF WRIST: CPT

## 2021-12-22 PROCEDURE — 36600 WITHDRAWAL OF ARTERIAL BLOOD: CPT

## 2021-12-22 PROCEDURE — 85025 COMPLETE CBC W/AUTO DIFF WBC: CPT

## 2021-12-22 PROCEDURE — 97110 THERAPEUTIC EXERCISES: CPT

## 2021-12-22 PROCEDURE — 82962 GLUCOSE BLOOD TEST: CPT

## 2021-12-22 PROCEDURE — U0003: CPT

## 2022-01-10 ENCOUNTER — EMERGENCY (EMERGENCY)
Facility: HOSPITAL | Age: 87
LOS: 1 days | Discharge: HOME | End: 2022-01-10
Attending: EMERGENCY MEDICINE
Payer: MEDICARE

## 2022-01-10 VITALS
DIASTOLIC BLOOD PRESSURE: 88 MMHG | RESPIRATION RATE: 18 BRPM | TEMPERATURE: 98 F | SYSTOLIC BLOOD PRESSURE: 166 MMHG | OXYGEN SATURATION: 96 % | HEART RATE: 80 BPM

## 2022-01-10 VITALS
OXYGEN SATURATION: 95 % | DIASTOLIC BLOOD PRESSURE: 82 MMHG | SYSTOLIC BLOOD PRESSURE: 152 MMHG | HEIGHT: 61 IN | HEART RATE: 74 BPM | TEMPERATURE: 94 F | WEIGHT: 115.08 LBS | RESPIRATION RATE: 18 BRPM

## 2022-01-10 DIAGNOSIS — Z90.710 ACQUIRED ABSENCE OF BOTH CERVIX AND UTERUS: Chronic | ICD-10-CM

## 2022-01-10 LAB
ALBUMIN SERPL ELPH-MCNC: 4.5 G/DL — SIGNIFICANT CHANGE UP (ref 3.3–5)
ALP SERPL-CCNC: 105 U/L — SIGNIFICANT CHANGE UP (ref 40–120)
ALT FLD-CCNC: 16 U/L — SIGNIFICANT CHANGE UP (ref 10–45)
ANION GAP SERPL CALC-SCNC: 14 MMOL/L — SIGNIFICANT CHANGE UP (ref 5–17)
APPEARANCE UR: CLEAR — SIGNIFICANT CHANGE UP
AST SERPL-CCNC: 19 U/L — SIGNIFICANT CHANGE UP (ref 10–40)
BACTERIA # UR AUTO: ABNORMAL
BASOPHILS # BLD AUTO: 0.04 K/UL — SIGNIFICANT CHANGE UP (ref 0–0.2)
BASOPHILS NFR BLD AUTO: 0.4 % — SIGNIFICANT CHANGE UP (ref 0–2)
BILIRUB SERPL-MCNC: 0.4 MG/DL — SIGNIFICANT CHANGE UP (ref 0.2–1.2)
BILIRUB UR-MCNC: NEGATIVE — SIGNIFICANT CHANGE UP
BUN SERPL-MCNC: 23 MG/DL — SIGNIFICANT CHANGE UP (ref 7–23)
CALCIUM SERPL-MCNC: 10.4 MG/DL — SIGNIFICANT CHANGE UP (ref 8.4–10.5)
CHLORIDE SERPL-SCNC: 105 MMOL/L — SIGNIFICANT CHANGE UP (ref 96–108)
CO2 SERPL-SCNC: 24 MMOL/L — SIGNIFICANT CHANGE UP (ref 22–31)
COLOR SPEC: YELLOW — SIGNIFICANT CHANGE UP
CREAT SERPL-MCNC: 1 MG/DL — SIGNIFICANT CHANGE UP (ref 0.5–1.3)
DIFF PNL FLD: NEGATIVE — SIGNIFICANT CHANGE UP
EOSINOPHIL # BLD AUTO: 0.21 K/UL — SIGNIFICANT CHANGE UP (ref 0–0.5)
EOSINOPHIL NFR BLD AUTO: 2.3 % — SIGNIFICANT CHANGE UP (ref 0–6)
EPI CELLS # UR: 1 /HPF — SIGNIFICANT CHANGE UP
GLUCOSE SERPL-MCNC: 102 MG/DL — HIGH (ref 70–99)
GLUCOSE UR QL: NEGATIVE — SIGNIFICANT CHANGE UP
HCT VFR BLD CALC: 42.6 % — SIGNIFICANT CHANGE UP (ref 34.5–45)
HGB BLD-MCNC: 13.1 G/DL — SIGNIFICANT CHANGE UP (ref 11.5–15.5)
HYALINE CASTS # UR AUTO: 1 /LPF — SIGNIFICANT CHANGE UP (ref 0–2)
IMM GRANULOCYTES NFR BLD AUTO: 0.2 % — SIGNIFICANT CHANGE UP (ref 0–1.5)
KETONES UR-MCNC: NEGATIVE — SIGNIFICANT CHANGE UP
LEUKOCYTE ESTERASE UR-ACNC: ABNORMAL
LYMPHOCYTES # BLD AUTO: 1.17 K/UL — SIGNIFICANT CHANGE UP (ref 1–3.3)
LYMPHOCYTES # BLD AUTO: 12.7 % — LOW (ref 13–44)
MAGNESIUM SERPL-MCNC: 2.1 MG/DL — SIGNIFICANT CHANGE UP (ref 1.6–2.6)
MCHC RBC-ENTMCNC: 27.2 PG — SIGNIFICANT CHANGE UP (ref 27–34)
MCHC RBC-ENTMCNC: 30.8 GM/DL — LOW (ref 32–36)
MCV RBC AUTO: 88.4 FL — SIGNIFICANT CHANGE UP (ref 80–100)
MONOCYTES # BLD AUTO: 0.74 K/UL — SIGNIFICANT CHANGE UP (ref 0–0.9)
MONOCYTES NFR BLD AUTO: 8.1 % — SIGNIFICANT CHANGE UP (ref 2–14)
NEUTROPHILS # BLD AUTO: 7.01 K/UL — SIGNIFICANT CHANGE UP (ref 1.8–7.4)
NEUTROPHILS NFR BLD AUTO: 76.3 % — SIGNIFICANT CHANGE UP (ref 43–77)
NITRITE UR-MCNC: POSITIVE
NRBC # BLD: 0 /100 WBCS — SIGNIFICANT CHANGE UP (ref 0–0)
PH UR: 6 — SIGNIFICANT CHANGE UP (ref 5–8)
PLATELET # BLD AUTO: 317 K/UL — SIGNIFICANT CHANGE UP (ref 150–400)
POTASSIUM SERPL-MCNC: 4.5 MMOL/L — SIGNIFICANT CHANGE UP (ref 3.5–5.3)
POTASSIUM SERPL-SCNC: 4.5 MMOL/L — SIGNIFICANT CHANGE UP (ref 3.5–5.3)
PROT SERPL-MCNC: 7.3 G/DL — SIGNIFICANT CHANGE UP (ref 6–8.3)
PROT UR-MCNC: SIGNIFICANT CHANGE UP
RBC # BLD: 4.82 M/UL — SIGNIFICANT CHANGE UP (ref 3.8–5.2)
RBC # FLD: 13.9 % — SIGNIFICANT CHANGE UP (ref 10.3–14.5)
RBC CASTS # UR COMP ASSIST: 1 /HPF — SIGNIFICANT CHANGE UP (ref 0–4)
SODIUM SERPL-SCNC: 143 MMOL/L — SIGNIFICANT CHANGE UP (ref 135–145)
SP GR SPEC: 1.02 — SIGNIFICANT CHANGE UP (ref 1.01–1.02)
UROBILINOGEN FLD QL: NEGATIVE — SIGNIFICANT CHANGE UP
WBC # BLD: 9.19 K/UL — SIGNIFICANT CHANGE UP (ref 3.8–10.5)
WBC # FLD AUTO: 9.19 K/UL — SIGNIFICANT CHANGE UP (ref 3.8–10.5)
WBC UR QL: 6 /HPF — HIGH (ref 0–5)

## 2022-01-10 PROCEDURE — 70450 CT HEAD/BRAIN W/O DYE: CPT | Mod: 26,MA

## 2022-01-10 PROCEDURE — 87086 URINE CULTURE/COLONY COUNT: CPT

## 2022-01-10 PROCEDURE — 80053 COMPREHEN METABOLIC PANEL: CPT

## 2022-01-10 PROCEDURE — 85025 COMPLETE CBC W/AUTO DIFF WBC: CPT

## 2022-01-10 PROCEDURE — 70450 CT HEAD/BRAIN W/O DYE: CPT | Mod: MA

## 2022-01-10 PROCEDURE — 81001 URINALYSIS AUTO W/SCOPE: CPT

## 2022-01-10 PROCEDURE — 96374 THER/PROPH/DIAG INJ IV PUSH: CPT

## 2022-01-10 PROCEDURE — 99284 EMERGENCY DEPT VISIT MOD MDM: CPT | Mod: GC

## 2022-01-10 PROCEDURE — 83735 ASSAY OF MAGNESIUM: CPT

## 2022-01-10 PROCEDURE — 87186 SC STD MICRODIL/AGAR DIL: CPT

## 2022-01-10 PROCEDURE — 99284 EMERGENCY DEPT VISIT MOD MDM: CPT | Mod: 25

## 2022-01-10 RX ORDER — CEPHALEXIN 500 MG
1 CAPSULE ORAL
Qty: 14 | Refills: 0
Start: 2022-01-10 | End: 2022-01-16

## 2022-01-10 RX ORDER — CEFTRIAXONE 500 MG/1
1000 INJECTION, POWDER, FOR SOLUTION INTRAMUSCULAR; INTRAVENOUS ONCE
Refills: 0 | Status: COMPLETED | OUTPATIENT
Start: 2022-01-10 | End: 2022-01-10

## 2022-01-10 RX ADMIN — CEFTRIAXONE 100 MILLIGRAM(S): 500 INJECTION, POWDER, FOR SOLUTION INTRAMUSCULAR; INTRAVENOUS at 15:29

## 2022-01-10 NOTE — CONSULT NOTE ADULT - ASSESSMENT
86 y.o. R-H F with PMH of HTN, anxiety, obstructive calculus s/p L ureteral stent in 9/2019 and stone extraction on 10/2011 presented to the ED due to headache. L occipital, mild intensity, may originate from L shoulder, no visual deficits, no weakness. Neuro exam at this time showed L deviated tongue, but otherwise non-focal. CTH showing stable ventricle size and no acute hemorrhage or large territory infarct. Labs significant for UA + for LE and Nitrite and  86 y.o. R-H F with PMH of HTN, anxiety, obstructive calculus s/p L ureteral stent in 9/2019 and stone extraction on 10/2011 presented to the ED due to headache. L occipital, mild intensity, may originate from L shoulder, no visual deficits, no weakness. Neuro exam at this time showed L deviated tongue, but otherwise non-focal. CTH showing stable ventricle size and no acute hemorrhage or large territory infarct. Labs significant for UA + for LE and Nitrite and many bacteria.    Impression: Chronic confusion and subacute (2 week) L occipital headache of unclear etiology. Confusion may be diffuse brain dysfunction with ddx of infectious vs toxic metabolic vs worsening underlying dementia vs anxiety. L occipital headache has ddx of occipital neuralgia vs cervicalgia vs lower suspicion for increased ICP (stable imaging, improvement with headache when laying down, no alleviation upon sitting or standing)     Recommendations:  [] No inpatient neurologic work up at this time  [] If admitted for further infectious or toxic metabolic work up, MRI brain wo contrast  [] Would check TSH, T3/T4, A1c, Vitamin B1, B6, B12, folate, Vit E, methylmalonic acid, homocysteine    Case discussed with outpatient neurologist Dr. Arceo  Case to be seen with general neurology attending in AM if remains in hospital

## 2022-01-10 NOTE — ED PROVIDER NOTE - ATTENDING CONTRIBUTION TO CARE
87 yo female fell 3 weeks ago, CT at that time concerning for possible NPH, follows with neuro (Adis), had outpatient MR scheduled for 2d ago, patient refused; currently at assisted living 2/2 declining cognitive status.  no focal weakness noted on my exam save for ?trace facial, oriented x2-3.  collateral from daughter.  repeat CT, r/o chronic SDH, expanding vents, check labs, neuro consult and reassess.

## 2022-01-10 NOTE — ED ADULT NURSE REASSESSMENT NOTE - NS ED NURSE REASSESS COMMENT FT1
Attempted to call Juan M in [place] to call report on patient. Facility states we know they are coming back, will notify RN, and hung up the phone.

## 2022-01-10 NOTE — CONSULT NOTE ADULT - SUBJECTIVE AND OBJECTIVE BOX
HPI:  86 y.o. R-H F with PMH of HTN, anxiety, obstructive calculus s/p L ureteral stent in 2019 and stone extraction on 10/2011 presented to the ED due to headache. Headache is noted to be mild intensity (does not give rating), intermittent in nature (unclear how frequent), unclear alleviating factor. Location noted to be L occipital region, possible origin from L neck. Does not cause any visual deficits. Headache feels the least when laying down but no change in intensity when trying to sit up and stand up. Also obtained collateral from daughter, Cris. Has been having L occipital headache for 2 weeks. Since last hospitalization from 10/14-10/26/21, pt has been living in assisted living due to non-compliance to medications. Does not take aspirin. Only takes blood pressure medication. Recently, was started on zoloft 50 mg but was increased to 100 mg at the assisted living facility. Also was having worsening confusion, agitation, and anxiety over the past 2 months. Sometimes walks with walker. Does not smoke, use EtOH or illicit drug. Sees Dr. Arceo as outpatient. Was supposed to undergo MRI on  but pt refused to go. Has been refusing to go for MRI.     REVIEW OF SYSTEMS    A 10-system ROS was performed and is negative except for those items noted above and/or in the HPI.    PAST MEDICAL & SURGICAL HISTORY:  HTN (hypertension)    UTI (urinary tract infection)    Anxiety and depression    H/O abdominal hysterectomy      FAMILY HISTORY:    SOCIAL HISTORY: as noted in HPI    MEDICATIONS (HOME):  Home Medications:  aspirin 81 mg oral delayed release tablet: 2 tab(s) orally once a day (14 Oct 2021 16:44)  benzonatate 100 mg oral capsule: 2 cap(s) orally 3 times a day (26 Oct 2021 12:58)  budesonide: 0.5 milligram(s) inhaled every 12 hours (26 Oct 2021 13:00)  guaifenesin-dextromethorphan 100 mg-10 mg/5 mL oral liquid: 10 milliliter(s) orally 4 times a day (26 Oct 2021 13:00)  hydrALAZINE 10 mg oral tablet: 1 tab(s) orally 2 times a day (14 Oct 2021 16:44)  ipratropium-albuterol 0.5 mg-2.5 mg/3 mL inhalation solution: 3 milliliter(s) inhaled every 6 hours  D/w Dr. Way  - pulmonary  (26 Oct 2021 12:18)  predniSONE 50 mg oral tablet: 1 tab(s) orally once a day  x 5 more days  (26 Oct 2021 13:57)  sertraline 50 mg oral tablet: 1 tab(s) orally once a day (14 Oct 2021 16:44)  Tylenol 500 mg oral tablet: 2 tab(s) orally every 6 hours, As Needed (14 Oct 2021 16:44)    MEDICATIONS  (STANDING):  cefTRIAXone   IVPB 1000 milliGRAM(s) IV Intermittent once    MEDICATIONS  (PRN):    ALLERGIES/INTOLERANCES:  Allergies  No Known Allergies    Intolerances    VITALS & EXAMINATION:  Vital Signs Last 24 Hrs  T(C): 34.6 (10 Marcell 2022 12:40), Max: 34.6 (10 Marcell 2022 12:40)  T(F): 94.2 (10 Marcell 2022 12:40), Max: 94.2 (10 Marcell 2022 12:40)  HR: 74 (10 Marcell 2022 12:40) (74 - 74)  BP: 152/82 (10 Marcell 2022 12:40) (152/82 - 152/82)  BP(mean): --  RR: 18 (10 Marcell 2022 12:40) (18 - 18)  SpO2: 95% (10 Marcell 2022 12:40) (95% - 95%)    General:  Constitutional: normal weight Female, appears stated age  Head: Normocephalic & atraumatic.    Neurological (>12):  MS: Eyes open. Responds well to verbal stimuli. Awake, alert, oriented to person, place only. Anxious. Follows all commands.    Language: Speech is clear, fluent with good repetition & comprehension (able to name objects glove, pen)    CNs: PERRL (R = 3mm, L = 3mm). VFF. EOMI. No nystagmus, No diplopia. V1-3 intact to LT, well developed masseter muscles b/l. No facial asymmetry b/l, full eye closure strength b/l. Hearing grossly normal (rubbing fingers) b/l. Head turning & shoulder shrug intact b/l. Tongue slightly L sided`n    Motor: Normal muscle bulk & tone. No noticeable tremor or seizure. No pronator drift.              Deltoid	Biceps	   R	5	5	5	  L	5	5	5	    	H-Flex	H-Ext	K-Flex	K-Ext	D-Flex	P-Flex  R	5	5	5	5	5	5 	   L	5	5	5	5	5	5	     Sensation: Intact to LT and temperature throughout    Cortical: Extinction on DSS (neglect): none    Reflexes:              Biceps(C5)       BR(C6)      Patellar(L4)    Achilles(S1)      R	2	          2	              2		    2		  L	2	          2	              2		    2		    Coordination: intact rapid-alt movements. No dysmetria to FTN    Gait: deferred    LABORATORY:  CBC                       13.1   9.19  )-----------( 317      ( 10 Marcell 2022 13:50 )             42.6     Chem -10    143  |  105  |  23  ----------------------------<  102<H>  4.5   |  24  |  1.00    Ca    10.4      10 Marcell 2022 13:50  Mg     2.1     01-10    TPro  7.3  /  Alb  4.5  /  TBili  0.4  /  DBili  x   /  AST  19  /  ALT  16  /  AlkPhos  105  01-10    LFTs LIVER FUNCTIONS - ( 10 Marcell 2022 13:50 )  Alb: 4.5 g/dL / Pro: 7.3 g/dL / ALK PHOS: 105 U/L / ALT: 16 U/L / AST: 19 U/L / GGT: x           Coagulopathy   Lipid Panel   A1c   Cardiac enzymes     U/A Urinalysis Basic - ( 10 Marcell 2022 13:50 )    Color: Yellow / Appearance: Clear / S.022 / pH: x  Gluc: x / Ketone: Negative  / Bili: Negative / Urobili: Negative   Blood: x / Protein: Trace / Nitrite: Positive   Leuk Esterase: Moderate / RBC: 1 /hpf / WBC 6 /HPF   Sq Epi: x / Non Sq Epi: 1 /hpf / Bacteria: Many      CSF  Immunological  Other    STUDIES & IMAGING:  Studies (EKG, EEG, EMG, etc):     Radiology (XR, CT, MR, U/S, TTE/MARYELLEN):  < from: CT Head No Cont (01.10.22 @ 13:56) >  IMPRESSION:  When compared to the previous study,    Stable examination. No evidence of acute lobar infarct, intracranial   hemorrhage or mass effect.    --- End of Report ---    < end of copied text >

## 2022-01-10 NOTE — ED ADULT NURSE REASSESSMENT NOTE - NS ED NURSE REASSESS COMMENT FT1
Report received from ROSETTE Yang. Patient in stretcher in purple servin 4. A&O x4. pleasantly confused, unchanged from baseline. VSS. IV removed as patient is being discharged and awaiting on nonemergent ambulette. Patient repositioned in bed and reeducated on needing to stay in bed. Stretcher in lowest position, side rails up. Patient instructed to notify RN if assistance is needed. Pending nonemergent to come and bring patient back to [facility].

## 2022-01-10 NOTE — ED PROVIDER NOTE - CLINICAL SUMMARY MEDICAL DECISION MAKING FREE TEXT BOX
Patient is a 86 year-old-female with history of HTN and HLD and recent fall in Oct presents with 3-month history of headache. Labs, UA and CTH to r/o intracranial bleeds.

## 2022-01-10 NOTE — ED PROVIDER NOTE - CARE PROVIDER_API CALL
Seth Arceo)  Neurology; Vascular Neurology  [address], [address]  [place] [address]  Phone: [phone]  Fax: [phone]  Follow Up Time:

## 2022-01-10 NOTE — ED PROVIDER NOTE - OBJECTIVE STATEMENT
Patient is a 86 year-old-female with history of HTN and HLD and recent fall in Oct presents with 3-month history of headache. Located in the occipital region, unable to characterize or describe severity, increased in headache frequency. +nausea/vomiting, however felt that it is unrelated. +urinary frequency on ROS. Denies fever, chills, chest pain, shortness of breath, difficulty breathing, abdominal pain, bloody stools, dark stools. From [facility].

## 2022-01-10 NOTE — ED ADULT NURSE NOTE - OBJECTIVE STATEMENT
87 y/o female with PMH anxiety , depression, HTN, BIBEMS presenting to ED from assisted living. Per EMS, daughter called EMS for pt to get MRI. Per EMS, pt sustained fall 3 months ago, and has since been having intermittent headache associate nausea. Upon exam pt A&Ox2, 2mm PERRLA, strength equal in all extremities, gross neuro intact, no difficulty speaking in complete sentences, s1s2 heart sounds heard, pulses x 4, martin x4, abdomen soft nontender nondistended, skin intact. pt denies chest pain, sob, ha, n/v/d, abdominal pain, f/c, urinary symptoms, hematuria. Pt denies any complaints at present.

## 2022-01-10 NOTE — ED PROVIDER NOTE - NSFOLLOWUPINSTRUCTIONS_ED_ALL_ED_FT
You were seen in the emergency department for headache.     You were given:     You were prescribed:     For pain, you may take Tylenol (acetaminophen) and/or ibuprofen (advil or motrin). Please follow the instructions on the label/container.     Please follow up with your primary care doctor in 2-3 days for continuation of your care.     Return to the emergency department if you experience any new/concerning/worsening symptoms such as but not limited to: You were seen in the emergency department for headache. You were given: ceftriaxone for urinary tract infection.     You were prescribed:   - Keflex: please take 500mg twice a day for 7 days     Please follow up with your primary care doctor within 48 hours for continuation of your care.     Return to the emergency department if you experience any new/concerning/worsening symptoms such as but not limited to: intractable nausea, vomiting, vision changes, abdominal pain, dysuria, hematuria. You were seen in the emergency department for headache. You were given: ceftriaxone for urinary tract infection.     You were prescribed:   - Keflex: please take 500mg twice a day for 7 days     Please follow up with your primary care doctor within 48 hours for continuation of your care.   Please follow up with Dr. Arceo within 48 hours for further management of your care.     Return to the emergency department if you experience any new/concerning/worsening symptoms such as but not limited to: intractable nausea, vomiting, vision changes, abdominal pain, dysuria, hematuria.

## 2022-01-10 NOTE — ED PROVIDER NOTE - PROGRESS NOTE DETAILS
Praneeth PGY1  Per neuro note, no inpatient neurologic intervention warranted. Will DC with outpatient FU.

## 2022-01-10 NOTE — ED PROVIDER NOTE - PATIENT PORTAL LINK FT
You can access the FollowMyHealth Patient Portal offered by [facility] by registering at the following website: [email or web address]. By joining [facility]’s FollowMyHealth portal, you will also be able to view your health information using other applications (apps) compatible with our system.

## 2022-01-10 NOTE — ED PROVIDER NOTE - PHYSICAL EXAMINATION
General: non-toxic appearing, in no respiratory distress  HEENT: atraumatic, normocephalic; pupils are equal, round and react to light, extraocular movements intact bilaterally without deficits, no conjunctival pallor, mucous membranes moist  Neck: no jugular venous distension, full range of motion  Chest/Lung: clear to auscultation bilaterally, no wheezes/rhonchi/rales  Heart: regular rate and rhythm, no murmur/gallops/rubs  Abdomen: normal bowel sounds, soft, non-tender, non-distended  Extremities: no lower extremity edema, +2 radial pulses bilaterally, +2 dorsalis pedis pulses bilaterally  Musculoskeletal: full range of motion of all 4 extremities with 5/5 strength and normal sensation  Nervous System: alert and orientedx4, no motor deficits or sensory deficits; CNII-XII grossly intact; no focal neurologic deficits  Skin: no rashes/lacerations noted

## 2022-01-12 NOTE — ED POST DISCHARGE NOTE - DETAILS
1/12: Preliminary culture result. Pending final sensitivities. Pt d/c with keflex. Will continue to follow. Nataliia Kelly PA-C 1/13/22: Culture and sensitivities finalized. Bacteria sensitive to keflex. No need to contact pt. Nataliia Reed PA-C

## 2022-01-16 ENCOUNTER — INPATIENT (INPATIENT)
Facility: HOSPITAL | Age: 87
LOS: 9 days | Discharge: NURSING, REHAB OR LONG-TERM CARE | DRG: 371 | End: 2022-01-26
Attending: INTERNAL MEDICINE | Admitting: INTERNAL MEDICINE
Payer: MEDICARE

## 2022-01-16 VITALS
SYSTOLIC BLOOD PRESSURE: 136 MMHG | WEIGHT: 104.94 LBS | DIASTOLIC BLOOD PRESSURE: 68 MMHG | HEART RATE: 75 BPM | OXYGEN SATURATION: 97 % | TEMPERATURE: 97 F | RESPIRATION RATE: 18 BRPM

## 2022-01-16 DIAGNOSIS — K52.9 NONINFECTIVE GASTROENTERITIS AND COLITIS, UNSPECIFIED: ICD-10-CM

## 2022-01-16 DIAGNOSIS — Z90.710 ACQUIRED ABSENCE OF BOTH CERVIX AND UTERUS: Chronic | ICD-10-CM

## 2022-01-16 LAB
ALBUMIN SERPL ELPH-MCNC: 3 G/DL — LOW (ref 3.3–5)
ALP SERPL-CCNC: 83 U/L — SIGNIFICANT CHANGE UP (ref 40–120)
ALT FLD-CCNC: 18 U/L — SIGNIFICANT CHANGE UP (ref 12–78)
ANION GAP SERPL CALC-SCNC: 6 MMOL/L — SIGNIFICANT CHANGE UP (ref 5–17)
APPEARANCE UR: ABNORMAL
AST SERPL-CCNC: 16 U/L — SIGNIFICANT CHANGE UP (ref 15–37)
BASOPHILS # BLD AUTO: 0 K/UL — SIGNIFICANT CHANGE UP (ref 0–0.2)
BASOPHILS NFR BLD AUTO: 0 % — SIGNIFICANT CHANGE UP (ref 0–2)
BILIRUB SERPL-MCNC: 0.5 MG/DL — SIGNIFICANT CHANGE UP (ref 0.2–1.2)
BILIRUB UR-MCNC: NEGATIVE — SIGNIFICANT CHANGE UP
BUN SERPL-MCNC: 25 MG/DL — HIGH (ref 7–23)
CALCIUM SERPL-MCNC: 10.1 MG/DL — SIGNIFICANT CHANGE UP (ref 8.5–10.1)
CHLORIDE SERPL-SCNC: 107 MMOL/L — SIGNIFICANT CHANGE UP (ref 96–108)
CO2 SERPL-SCNC: 27 MMOL/L — SIGNIFICANT CHANGE UP (ref 22–31)
COLOR SPEC: YELLOW — SIGNIFICANT CHANGE UP
CREAT SERPL-MCNC: 0.98 MG/DL — SIGNIFICANT CHANGE UP (ref 0.5–1.3)
DIFF PNL FLD: ABNORMAL
EOSINOPHIL # BLD AUTO: 0 K/UL — SIGNIFICANT CHANGE UP (ref 0–0.5)
EOSINOPHIL NFR BLD AUTO: 0 % — SIGNIFICANT CHANGE UP (ref 0–6)
FLUAV AG NPH QL: SIGNIFICANT CHANGE UP
FLUBV AG NPH QL: SIGNIFICANT CHANGE UP
GLUCOSE SERPL-MCNC: 113 MG/DL — HIGH (ref 70–99)
GLUCOSE UR QL: NEGATIVE — SIGNIFICANT CHANGE UP
HCT VFR BLD CALC: 39.4 % — SIGNIFICANT CHANGE UP (ref 34.5–45)
HGB BLD-MCNC: 12.5 G/DL — SIGNIFICANT CHANGE UP (ref 11.5–15.5)
KETONES UR-MCNC: ABNORMAL
LEUKOCYTE ESTERASE UR-ACNC: ABNORMAL
LYMPHOCYTES # BLD AUTO: 0.83 K/UL — LOW (ref 1–3.3)
LYMPHOCYTES # BLD AUTO: 5 % — LOW (ref 13–44)
MCHC RBC-ENTMCNC: 27.9 PG — SIGNIFICANT CHANGE UP (ref 27–34)
MCHC RBC-ENTMCNC: 31.7 GM/DL — LOW (ref 32–36)
MCV RBC AUTO: 87.9 FL — SIGNIFICANT CHANGE UP (ref 80–100)
MONOCYTES # BLD AUTO: 1.99 K/UL — HIGH (ref 0–0.9)
MONOCYTES NFR BLD AUTO: 12 % — SIGNIFICANT CHANGE UP (ref 2–14)
NEUTROPHILS # BLD AUTO: 13.6 K/UL — HIGH (ref 1.8–7.4)
NEUTROPHILS NFR BLD AUTO: 80 % — HIGH (ref 43–77)
NITRITE UR-MCNC: NEGATIVE — SIGNIFICANT CHANGE UP
NRBC # BLD: SIGNIFICANT CHANGE UP /100 WBCS (ref 0–0)
PH UR: 6 — SIGNIFICANT CHANGE UP (ref 5–8)
PLATELET # BLD AUTO: 282 K/UL — SIGNIFICANT CHANGE UP (ref 150–400)
POTASSIUM SERPL-MCNC: 3.1 MMOL/L — LOW (ref 3.5–5.3)
POTASSIUM SERPL-SCNC: 3.1 MMOL/L — LOW (ref 3.5–5.3)
PROT SERPL-MCNC: 6.9 G/DL — SIGNIFICANT CHANGE UP (ref 6–8.3)
PROT UR-MCNC: 30 MG/DL
RBC # BLD: 4.48 M/UL — SIGNIFICANT CHANGE UP (ref 3.8–5.2)
RBC # FLD: 14.3 % — SIGNIFICANT CHANGE UP (ref 10.3–14.5)
RSV RNA NPH QL NAA+NON-PROBE: SIGNIFICANT CHANGE UP
SARS-COV-2 RNA SPEC QL NAA+PROBE: SIGNIFICANT CHANGE UP
SODIUM SERPL-SCNC: 140 MMOL/L — SIGNIFICANT CHANGE UP (ref 135–145)
SP GR SPEC: 1.02 — SIGNIFICANT CHANGE UP (ref 1.01–1.02)
UROBILINOGEN FLD QL: NEGATIVE — SIGNIFICANT CHANGE UP
WBC # BLD: 16.59 K/UL — HIGH (ref 3.8–10.5)
WBC # FLD AUTO: 16.59 K/UL — HIGH (ref 3.8–10.5)

## 2022-01-16 PROCEDURE — G1004: CPT

## 2022-01-16 PROCEDURE — 93010 ELECTROCARDIOGRAM REPORT: CPT

## 2022-01-16 PROCEDURE — 74177 CT ABD & PELVIS W/CONTRAST: CPT | Mod: 26,ME

## 2022-01-16 PROCEDURE — 71045 X-RAY EXAM CHEST 1 VIEW: CPT | Mod: 26

## 2022-01-16 PROCEDURE — 99284 EMERGENCY DEPT VISIT MOD MDM: CPT | Mod: FS

## 2022-01-16 RX ORDER — PIPERACILLIN AND TAZOBACTAM 4; .5 G/20ML; G/20ML
3.38 INJECTION, POWDER, LYOPHILIZED, FOR SOLUTION INTRAVENOUS EVERY 8 HOURS
Refills: 0 | Status: DISCONTINUED | OUTPATIENT
Start: 2022-01-16 | End: 2022-01-16

## 2022-01-16 RX ORDER — PIPERACILLIN AND TAZOBACTAM 4; .5 G/20ML; G/20ML
3.38 INJECTION, POWDER, LYOPHILIZED, FOR SOLUTION INTRAVENOUS ONCE
Refills: 0 | Status: COMPLETED | OUTPATIENT
Start: 2022-01-16 | End: 2022-01-16

## 2022-01-16 RX ORDER — PANTOPRAZOLE SODIUM 20 MG/1
40 TABLET, DELAYED RELEASE ORAL DAILY
Refills: 0 | Status: DISCONTINUED | OUTPATIENT
Start: 2022-01-16 | End: 2022-01-24

## 2022-01-16 RX ORDER — METRONIDAZOLE 500 MG
TABLET ORAL
Refills: 0 | Status: DISCONTINUED | OUTPATIENT
Start: 2022-01-16 | End: 2022-01-16

## 2022-01-16 RX ORDER — ENOXAPARIN SODIUM 100 MG/ML
50 INJECTION SUBCUTANEOUS EVERY 12 HOURS
Refills: 0 | Status: DISCONTINUED | OUTPATIENT
Start: 2022-01-17 | End: 2022-01-17

## 2022-01-16 RX ORDER — ENOXAPARIN SODIUM 100 MG/ML
40 INJECTION SUBCUTANEOUS EVERY 12 HOURS
Refills: 0 | Status: DISCONTINUED | OUTPATIENT
Start: 2022-01-16 | End: 2022-01-16

## 2022-01-16 RX ORDER — PIPERACILLIN AND TAZOBACTAM 4; .5 G/20ML; G/20ML
3.38 INJECTION, POWDER, LYOPHILIZED, FOR SOLUTION INTRAVENOUS ONCE
Refills: 0 | Status: DISCONTINUED | OUTPATIENT
Start: 2022-01-16 | End: 2022-01-16

## 2022-01-16 RX ORDER — METRONIDAZOLE 500 MG
500 TABLET ORAL EVERY 8 HOURS
Refills: 0 | Status: DISCONTINUED | OUTPATIENT
Start: 2022-01-16 | End: 2022-01-17

## 2022-01-16 RX ORDER — ONDANSETRON 8 MG/1
4 TABLET, FILM COATED ORAL ONCE
Refills: 0 | Status: COMPLETED | OUTPATIENT
Start: 2022-01-16 | End: 2022-01-16

## 2022-01-16 RX ORDER — SODIUM CHLORIDE 9 MG/ML
500 INJECTION INTRAMUSCULAR; INTRAVENOUS; SUBCUTANEOUS ONCE
Refills: 0 | Status: COMPLETED | OUTPATIENT
Start: 2022-01-16 | End: 2022-01-16

## 2022-01-16 RX ORDER — ENOXAPARIN SODIUM 100 MG/ML
50 INJECTION SUBCUTANEOUS ONCE
Refills: 0 | Status: COMPLETED | OUTPATIENT
Start: 2022-01-16 | End: 2022-01-16

## 2022-01-16 RX ORDER — SODIUM CHLORIDE 9 MG/ML
1000 INJECTION INTRAMUSCULAR; INTRAVENOUS; SUBCUTANEOUS ONCE
Refills: 0 | Status: COMPLETED | OUTPATIENT
Start: 2022-01-16 | End: 2022-01-16

## 2022-01-16 RX ORDER — ENOXAPARIN SODIUM 100 MG/ML
50 INJECTION SUBCUTANEOUS ONCE
Refills: 0 | Status: DISCONTINUED | OUTPATIENT
Start: 2022-01-16 | End: 2022-01-16

## 2022-01-16 RX ORDER — HEPARIN SODIUM 5000 [USP'U]/ML
5000 INJECTION INTRAVENOUS; SUBCUTANEOUS EVERY 12 HOURS
Refills: 0 | Status: DISCONTINUED | OUTPATIENT
Start: 2022-01-16 | End: 2022-01-16

## 2022-01-16 RX ORDER — POTASSIUM CHLORIDE 20 MEQ
40 PACKET (EA) ORAL ONCE
Refills: 0 | Status: COMPLETED | OUTPATIENT
Start: 2022-01-16 | End: 2022-01-16

## 2022-01-16 RX ADMIN — ENOXAPARIN SODIUM 50 MILLIGRAM(S): 100 INJECTION SUBCUTANEOUS at 23:30

## 2022-01-16 RX ADMIN — PIPERACILLIN AND TAZOBACTAM 200 GRAM(S): 4; .5 INJECTION, POWDER, LYOPHILIZED, FOR SOLUTION INTRAVENOUS at 19:00

## 2022-01-16 RX ADMIN — SODIUM CHLORIDE 1000 MILLILITER(S): 9 INJECTION INTRAMUSCULAR; INTRAVENOUS; SUBCUTANEOUS at 16:50

## 2022-01-16 RX ADMIN — Medication 500 MILLIGRAM(S): at 21:44

## 2022-01-16 RX ADMIN — Medication 40 MILLIEQUIVALENT(S): at 18:07

## 2022-01-16 RX ADMIN — SODIUM CHLORIDE 1000 MILLILITER(S): 9 INJECTION INTRAMUSCULAR; INTRAVENOUS; SUBCUTANEOUS at 17:50

## 2022-01-16 RX ADMIN — SODIUM CHLORIDE 500 MILLILITER(S): 9 INJECTION INTRAMUSCULAR; INTRAVENOUS; SUBCUTANEOUS at 18:07

## 2022-01-16 RX ADMIN — SODIUM CHLORIDE 500 MILLILITER(S): 9 INJECTION INTRAMUSCULAR; INTRAVENOUS; SUBCUTANEOUS at 18:47

## 2022-01-16 NOTE — CONSULT NOTE ADULT - SUBJECTIVE AND OBJECTIVE BOX
[facility]     Patient is a 86y old  Female who presents with a chief complaint of     HPI:      HPI:    86y Female for Cardiology Consult    PAST MEDICAL & SURGICAL HISTORY:      FAMILY HISTORY:      SOCIAL HISTORY:   Alcohol:  Smoking:    Allergies    No Known Allergies    Intolerances        MEDICATIONS  (STANDING):  enoxaparin Injectable 50 milliGRAM(s) SubCutaneous every 12 hours  metroNIDAZOLE    Tablet 500 milliGRAM(s) Oral every 8 hours  pantoprazole  Injectable 40 milliGRAM(s) IV Push daily    MEDICATIONS  (PRN):      REVIEW OF SYSTEMS:  CONSTITUTIONAL: No fever, weight loss, chills, shakes, or fat  RESPIRATORY: No cough, wheezing, hemoptysis, or shortness of breath  CARDIOVASCULAR: No chest pain, dyspnea, palpitations, dizziness, syncope, paroxysmal nocturnal dyspnea, orthopnea, or arm or leg swelling  GASTROINTESTINAL: No abdominal  or epigastric pain, nausea, vomiting, hematemesis, diarrhea, constipation, melena or bright red bloo  NEUROLOGICAL: No headaches, memory loss, slurred speech, limb weakness, loss of strength, numbness, or tremors  SKIN: No itching, burning, rashes, or lesions  ENDOCRINE: No heat or cold intolerance, or hair loss  MUSCULOSKELETAL: No joint pain or swelling, muscle, back, or extremity pain  HEME/LYMPH: No easy bruising or bleeding gums  ALLERY AND IMMUNOLOGIC: No hives or rash.    Vital Signs Last 24 Hrs  T(C): 36.6 (2022 20:14), Max: 37.2 (2022 14:14)  T(F): 97.9 (2022 20:14), Max: 99 (2022 14:14)  HR: 87 (2022 20:14) (73 - 87)  BP: 163/71 (2022 20:14) (129/68 - 163/71)  BP(mean): --  RR: 18 (2022 20:14) (15 - 18)  SpO2: 97% (2022 20:14) (97% - 98%)    PHYSICAL EXAM:  HEAD:  Atraumatic, Normocephalic  EYES: EOMI, PERRLA, conjunctiva and sclera clear  NECK: Supple and normal thyroid.  No JVD or carotid bruit.   HEART: S1, S2 regular , 1/6 soft ejection systolic murmur in mitral area , no thrill and no gallops .  PULMONARY: Bilateral vesicular breathing , few scattered ronchi and few scattered rales are present .  ABDOMEN: Soft nontender and positive bowl sounds   EXTREMITIES:  No clubbing, cyanosis, or pedal  edema  NEUROLOGICAL: AAOX3 , no focal deficit .    LABS:                        12.5   16.59 )-----------( 282      ( 2022 14:58 )             39.4     -16    140  |  107  |  25<H>  ----------------------------<  113<H>  3.1<L>   |  27  |  0.98    Ca    10.1      2022 16:25    TPro  6.9  /  Alb  3.0<L>  /  TBili  0.5  /  DBili  x   /  AST  16  /  ALT  18  /  AlkPhos  83  -          Urinalysis Basic - ( 2022 15:01 )    Color: Yellow / Appearance: Slightly Turbid / S.020 / pH: x  Gluc: x / Ketone: Trace  / Bili: Negative / Urobili: Negative   Blood: x / Protein: 30 mg/dL / Nitrite: Negative   Leuk Esterase: Trace / RBC: 0-2 /HPF / WBC 3-5   Sq Epi: x / Non Sq Epi: Occasional / Bacteria: x      BNP      EKG:  ECHO:  IMAGING:    Assessment and Plan :     Will continue to follow during hospital course and give further recommendations as needed . Thanks for your referral . if any questions please contact me at office [phone] [phone])  JAMILA RODRIGUEZ MD [place] [id number]  [address]  OFFICE : [phone]  CELL : [phone]    CARDIOLOGY CONSULT :     Patient is a 86y old  Female who presents with a chief complaint of diarrhea .    HPI:  · Chief Complaint: The patient is a 86y Female complaining of abdominal pain.  · HPI Objective Statement: 86 female sent from [facility] for diarrhea x 3 days since starting antibiotics for UTI.   	Per patient she was being treated for UTI and began having diarrhea and RLQ pain.   	Denies fever, chills.   	Denies n/v.   	+ Abdominal pain , + Diarrhea  	Denies current urinary symptoms.  Denies CP, cough or SOB.      HPI:    86y Female for Cardiology Consult    PAST MEDICAL & SURGICAL HISTORY:      FAMILY HISTORY:      SOCIAL HISTORY:   Alcohol:  Smoking:    Allergies    No Known Allergies    Intolerances        MEDICATIONS  (STANDING):  enoxaparin Injectable 50 milliGRAM(s) SubCutaneous every 12 hours  metroNIDAZOLE    Tablet 500 milliGRAM(s) Oral every 8 hours  pantoprazole  Injectable 40 milliGRAM(s) IV Push daily    MEDICATIONS  (PRN):      Vital Signs Last 24 Hrs  T(C): 36.6 (2022 20:14), Max: 37.2 (2022 14:14)  T(F): 97.9 (2022 20:14), Max: 99 (2022 14:14)  HR: 87 (2022 20:14) (73 - 87)  BP: 163/71 (2022 20:14) (129/68 - 163/71)  BP(mean): --  RR: 18 (2022 20:14) (15 - 18)  SpO2: 97% (2022 20:14) (97% - 98%)    LABS:                        12.5   16.59 )-----------( 282      ( 2022 14:58 )             39.4     01-16    140  |  107  |  25<H>  ----------------------------<  113<H>  3.1<L>   |  27  |  0.98    Ca    10.1      2022 16:25    TPro  6.9  /  Alb  3.0<L>  /  TBili  0.5  /  DBili  x   /  AST  16  /  ALT  18  /  AlkPhos  83  01-16          Urinalysis Basic - ( 2022 15:01 )    Color: Yellow / Appearance: Slightly Turbid / S.020 / pH: x  Gluc: x / Ketone: Trace  / Bili: Negative / Urobili: Negative   Blood: x / Protein: 30 mg/dL / Nitrite: Negative   Leuk Esterase: Trace / RBC: 0-2 /HPF / WBC 3-5   Sq Epi: x / Non Sq Epi: Occasional / Bacteria: x      Assessment and Plan :   FULL CONSULT DICTATED .  86 years old female has diarrhea and gastroenteritis and mild dehydration . Continue gemtle I/V hydration . Patient has EKG abnormalities . cardiac stable so far .  Will continue to follow during hospital course and give further recommendations as needed . Thanks for your referral . if any questions please contact me at office ([phone])

## 2022-01-16 NOTE — H&P ADULT - NSHPADDITIONALINFOADULT_GEN_ALL_CORE
86 female sent from [facility] living for diarrhea x 3 days since starting antibiotics for UTI.   	Per patient she was being treated for UTI and began having diarrhea and RLQ pain.   	Denies fever, chills.   	Denies n/v.   	+ Abdominal pain , + Diarrhea  	Denies current urinary symptoms.  Denies CP, cough or SOBno abdominal distension, no blood in stool, no burning urination, no chills, no dysuria, no fever, no hematuria, no nausea, no vomiting++ RLQ abdominal pain, no bloating, no constipation, ++ diarrhea,   < from: CT Abdomen and Pelvis w/ IV Cont (01.16.22 @ 17:29) >

## 2022-01-16 NOTE — H&P ADULT - PROBLEM SELECTOR PLAN 1
DIARRHEA 2/2 TO COLITIS ,RULE OUT C.DIFF ,STOOL GI CPR IS PENDING   Proctocolitis as per CT ABD -GI CONS ,ID CONS obtained

## 2022-01-16 NOTE — CHART NOTE - NSCHARTNOTEFT_GEN_A_CORE
Called by RN; patient refusing bloodwork and CTA/LE dopplers. Patient seen and examined at bedside. Patient is aggravated, states that she would like to talk to her own doctor before getting any more tests done. Explained to the patient in detail that it is important that she gets these tests done; however she states that she would like to talk to her own doctor in the morning. Will attempt to re-do tests in AM.     Will continue to monitor; RN to call if any changes

## 2022-01-16 NOTE — ED ADULT NURSE NOTE - OBJECTIVE STATEMENT
Presents to ER with diarrhea for the past couple of days. Pt states she was placed on antibx of a recent UTI and has had diarrhea ever since antibx were initiated.  Rectal temp 99.

## 2022-01-16 NOTE — ED PROVIDER NOTE - IV ALTEPLASE DOOR HIDDEN
Patient:   SY SAM            MRN: [id number]            FIN: [id number]               Age:   40 years     Sex:  Female     :  1977   Associated Diagnoses:   Tingling in extremities   Author:   Isauro Gibbs DO      Basic Information   Time seen: Immediately upon arrival.   History source: Patient.      History of Present Illness   The patient presents with Patient states that when she awoke this morning she had a mild headache.  She does occasionally get headaches but she experienced some \"numbness and tingling\" to the face that seems to be different than her typical headaches.  She describes it throughout her face and it did not localize to one side or the other.  There was no facial droop or vision difficulties.  She describes some tingling sensations in her right hand as well.  She describes the sensation in her bilateral legs as well.  There was no description of weakness.  She denies any chest pain or shortness of breath.  There have been no fevers.  She seems to be comfortable currently without any obvious distress..        Review of Systems   Constitutional symptoms:  No fever,    Skin symptoms:  No rash,    Eye symptoms:  No recent vision problems,    ENMT symptoms:  No sore throat,    Respiratory symptoms:  No shortness of breath,    Cardiovascular symptoms:  No chest pain,    Gastrointestinal symptoms:  No abdominal pain,    Genitourinary symptoms:  No dysuria,    Musculoskeletal symptoms:  No Joint pain,    Neurologic symptoms:  Headache, numbness, tingling.       Health Status   Allergies: see MAUREEN.   Medications: None.   Menstrual history: Per nurse's notes.      Past Medical/ Family/ Social History      Medical history   Negative.   Surgical history: Cholecystectomy, .   Family history: Not significant.   Social history: Alcohol use: Denies, Tobacco use: Denies, Drug use: Denies.      Physical Examination               Vital Signs               Per nurse's notes.    Pulse Ox 100% on Room Air which is normal for this patient.   Vital Signs were reviewed.   General:  Alert, no acute distress.    Skin:  Warm, dry.    Head:  Normocephalic, atraumatic.    Neck:  Supple, trachea midline.    Eye:  Pupils are equal, round and reactive to light, extraocular movements are intact.    Ears, nose, mouth and throat:  Oral mucosa moist.   Cardiovascular:  Regular rate and rhythm, S1, S2.    Respiratory:  Lungs are clear to auscultation, respirations are non-labored, Symmetrical chest wall expansion.    Gastrointestinal:  Soft, Nontender, Non distended.    Musculoskeletal:  Normal ROM, no swelling, no deformity.    Neurological:  Alert and oriented to person, place, time, and situation, No focal neurological deficit observed.    Psychiatric:  Appropriate mood & affect.      Medical Decision Making   Head Computed Tomography:  Report  CT Brain w/o Contrast   TECHNIQUE: CT of the brain was performed without intravenous contrast.  Adjustment of the mA and/or kV was performed based on the patient's  size to minimize radiation dose.      CLINICAL INDICATION: HA, numbness    COMPARISON: None.    FINDINGS:     Extra axial spaces: Normal in size and morphology for the patient's  age.  Hemorrhage: None.  Ventricular system: Normal in size and morphology for the patient's  age.  Basal cisterns: Normal.  Cerebral parenchyma: Normal.  Midline shift: None.  Cerebellum: Normal.  Brainstem: Normal.    OTHER:    Calvarium: Normal.  Visualized Paranasal sinuses: Clear.  Visualized Orbits: Normal.  Visualized upper cervical spine: Normal.  Sella and skull base: Normal.    IMPRESSION:    There is no evidence for acute intracranial abnormality. Read full  report.    .      Impression and Plan   Diagnosis   Tingling in extremities ([id number] R20.2, Discharge, Emergency medicine, Medical)   Plan   Condition: Improved.    Disposition: Patient care transitioned to: leaving AMA.    Patient was given the following  educational materials: Paresthesia.    Follow up with: Jesus Manuel Gibbs Return if symptoms worsen.    Notes: 1:30PM Patient resting comfortably and seems to be feeling better after Toradol administration.  Workup has been unremarkable.  Case was discussed with Dr. ARRON Gibbs the neurologist on call.  I discussed patient's symptoms with him.  He recommends obtaining an MRI/MRA.  Patient states that she is not able to stay for this.  She has prior obligations.  She is leaving AGAINST MEDICAL ADVICE despite my efforts to convince her otherwise.    Pt is alert and oriented and of sound decision making capacity. I reccomended pt  be admitted for furthe evaluation. Pt is refusing admission and is fully aware of the potential for death and disability and is leaving against my medical advice despite my best efforts to convince her to stay.  .         show

## 2022-01-16 NOTE — ED ADULT NURSE NOTE - NSIMPLEMENTINTERV_GEN_ALL_ED
Implemented All Fall with Harm Risk Interventions:  [place] to call system. Call bell, personal items and telephone within reach. Instruct patient to call for assistance. Room bathroom lighting operational. Non-slip footwear when patient is off stretcher. Physically safe environment: no spills, clutter or unnecessary equipment. Stretcher in lowest position, wheels locked, appropriate side rails in place. Provide visual cue, wrist band, yellow gown, etc. Monitor gait and stability. Monitor for mental status changes and reorient to person, place, and time. Review medications for side effects contributing to fall risk. Reinforce activity limits and safety measures with patient and family. Provide visual clues: red socks.

## 2022-01-16 NOTE — H&P ADULT - PROBLEM SELECTOR PLAN 4
Supportive care,frequent redirection,continue home medications,management of agitation as needed .Patient keeps refusing all tests and labs -daughter is aware .Psych cons requested

## 2022-01-16 NOTE — ED PROVIDER NOTE - PROGRESS NOTE DETAILS
Dr. Basurto called ED. Had conversation with Dr. Jaimes.  requesting admission of patient. Will admit and follow CTA results. called to bedside. Pateint refusing chest xray. Agreed to CTA for r/o PE.

## 2022-01-16 NOTE — ED PROVIDER NOTE - OBJECTIVE STATEMENT
86 female sent from [facility] assisted living for diarrhea x 3 days since starting antibiotics for UTI.   Per patient she was being treated for UTI and began having diarrhea and RLQ pain.   Denies fever, chills.   Denies n/v.   + Abdominal pain , + Diarrhea  Denies current urinary symptoms.  Denies CP, cough or SOB.

## 2022-01-16 NOTE — CONSULT NOTE ADULT - TIME BILLING
in direct care of patient , reviewing labs and other results and adjusting medications and in discussion with other consultants , RN and PMD

## 2022-01-16 NOTE — H&P ADULT - HISTORY OF PRESENT ILLNESS
86 female sent from [facility] for diarrhea x 3 days since starting antibiotics for UTI.   	Per patient she was being treated for UTI and began having diarrhea and RLQ pain.   	Denies fever, chills.   	Denies n/v.   	+ Abdominal pain , + Diarrhea  	Denies current urinary symptoms.  Denies CP, cough or SOBno abdominal distension, no blood in stool, no burning urination, no chills, no dysuria, no fever, no hematuria, no nausea, no vomiting++ RLQ abdominal pain, no bloating, no constipation, ++ diarrhea,   < from: CT Abdomen and Pelvis w/ IV Cont (01.16.22 @ 17:29) >    ACC: [id number] EXAM:  CT ABDOMEN AND PELVIS IC                          PROCEDURE DATE:  01/16/2022          INTERPRETATION:  CLINICAL INFORMATION: Acute abdominal pain.    COMPARISON: None.    CONTRAST/COMPLICATIONS:  IV Contrast: Omnipaque 350  90cc administered   10 cc discarded  Oral Contrast: NONE  Complications: None reported at time of study completion    PROCEDURE:  CT of the Abdomen and Pelvis was performed.  Sagittal and coronal reformats were performed.    FINDINGS:  LOWER CHEST: Possible filling defect in a branch of the right lower lobe   pulmonary artery (series 2, image 7), suspicious for pulmonary embolism.   Bibasilar subsegmental atelectasis.    LIVER: Within normal limits.  BILE DUCTS: Normal caliber.  GALLBLADDER: Withinnormal limits.  SPLEEN: Subcentimeter hypodense splenic lesions, too small to   characterize.  PANCREAS: Within normal limits.  ADRENALS: Within normal limits.  KIDNEYS/URETERS: No hydronephrosis. Left upper pole renal cyst.   Additional subcentimeter hypodense bilateral renal lesions, too small to   characterize.    BLADDER: Within normal limits.  REPRODUCTIVE ORGANS: Hysterectomy.    BOWEL: Colonic diverticulosis. Long segment mural thickening of the colon   extending from the ascending colon to the level of the rectum. Small   bowel anastomosis. No bowel obstruction. Appendix is normal.  PERITONEUM: No ascites.  VESSELS: Atherosclerotic changes.  RETROPERITONEUM/LYMPH NODES: No lymphadenopathy.  ABDOMINAL WALL: Within normal limits.  BONES: Degenerative changes.    IMPRESSION:  Possible filling defect in a branch of the right lower lobe pulmonary   artery, suspicious for pulmonary embolism. Further evaluation with CTA   chest (pulmonary artery protocol) is recommended.    Long segment proctocolitis.    Findings were discussed with MARLON FERRER on 1/16/2022 at 6:28 PM   by Dr. Wagner with read back confirmation.    --- End of Report ---            ANABEL WAGNER MD; Attending Radiologist  This document has been electronically signed. Jan 16 2022  6:29PM    < end of copied text >

## 2022-01-16 NOTE — H&P ADULT - PROBLEM SELECTOR PLAN 3
Admitted for septic workup and evaluation,send blood and urine cx,serial lactate levels,monitor vitals closley,ivfs hydration,monitor urine output and renal profile,iv abx as per ID CONS .GI is following

## 2022-01-16 NOTE — H&P ADULT - ASSESSMENT
86 female sent from [facility] for diarrhea x 3 days since starting antibiotics for UTI.   	Per patient she was being treated for UTI and began having diarrhea and RLQ pain.   	Denies fever, chills.   	Denies n/v.   	+ Abdominal pain , + Diarrhea  	Denies current urinary symptoms.  Denies CP, cough or SOBno abdominal distension, no blood in stool, no burning urination, no chills, no dysuria, no fever, no hematuria, no nausea, no vomiting++ RLQ abdominal pain, no bloating, no constipation, ++ diarrhea,   < from: CT Abdomen and Pelvis w/ IV Cont (01.16.22 @ 17:29) >    ACC: [id number] EXAM:  CT ABDOMEN AND PELVIS IC                          PROCEDURE DATE:  01/16/2022          INTERPRETATION:  CLINICAL INFORMATION: Acute abdominal pain.    COMPARISON: None.    CONTRAST/COMPLICATIONS:  IV Contrast: Omnipaque 350  90cc administered   10 cc discarded  Oral Contrast: NONE  Complications: None reported at time of study completion    PROCEDURE:  CT of the Abdomen and Pelvis was performed.  Sagittal and coronal reformats were performed.    FINDINGS:  LOWER CHEST: Possible filling defect in a branch of the right lower lobe   pulmonary artery (series 2, image 7), suspicious for pulmonary embolism.   Bibasilar subsegmental atelectasis.    LIVER: Within normal limits.  BILE DUCTS: Normal caliber.  GALLBLADDER: Withinnormal limits.  SPLEEN: Subcentimeter hypodense splenic lesions, too small to   characterize.  PANCREAS: Within normal limits.  ADRENALS: Within normal limits.  KIDNEYS/URETERS: No hydronephrosis. Left upper pole renal cyst.   Additional subcentimeter hypodense bilateral renal lesions, too small to   characterize.    BLADDER: Within normal limits.  REPRODUCTIVE ORGANS: Hysterectomy.    BOWEL: Colonic diverticulosis. Long segment mural thickening of the colon   extending from the ascending colon to the level of the rectum. Small   bowel anastomosis. No bowel obstruction. Appendix is normal.  PERITONEUM: No ascites.  VESSELS: Atherosclerotic changes.  RETROPERITONEUM/LYMPH NODES: No lymphadenopathy.  ABDOMINAL WALL: Within normal limits.  BONES: Degenerative changes.    IMPRESSION:  Possible filling defect in a branch of the right lower lobe pulmonary   artery, suspicious for pulmonary embolism. Further evaluation with CTA   chest (pulmonary artery protocol) is recommended.    Long segment proctocolitis.    Findings were discussed with MARLON FERRER on 1/16/2022 at 6:28 PM   by Dr. Wagner with read back confirmation.    --- End of Report ---            ANABEL WAGNER MD; Attending Radiologist  This document has been electronically signed. Jan 16 2022  6:29PM    < end of copied text >     86 female sent from [facility] for diarrhea x 3 days since starting antibiotics for UTI.   	Per patient she was being treated for UTI and began having diarrhea and RLQ pain.   	Denies fever, chills.   	Denies n/v.   	+ Abdominal pain , + Diarrhea  	Denies current urinary symptoms.  Denies CP, cough or SOBno abdominal distension, no blood in stool, no burning urination, no chills, no dysuria, no fever, no hematuria, no nausea, no vomiting++ RLQ abdominal pain, no bloating, no constipation, ++ diarrhea,   < from: CT Abdomen and Pelvis w/ IV Cont (01.16.22 @ 17:29) >    ACC: [id number] EXAM:  CT ABDOMEN AND PELVIS IC                          PROCEDURE DATE:  01/16/2022          INTERPRETATION:  CLINICAL INFORMATION: Acute abdominal pain.    COMPARISON: None.    CONTRAST/COMPLICATIONS:  IV Contrast: Omnipaque 350  90cc administered   10 cc discarded  Oral Contrast: NONE  Complications: None reported at time of study completion    PROCEDURE:  CT of the Abdomen and Pelvis was performed.  Sagittal and coronal reformats were performed.    FINDINGS:  LOWER CHEST: Possible filling defect in a branch of the right lower lobe   pulmonary artery (series 2, image 7), suspicious for pulmonary embolism.   Bibasilar subsegmental atelectasis.    LIVER: Within normal limits.  BILE DUCTS: Normal caliber.  GALLBLADDER: Withinnormal limits.  SPLEEN: Subcentimeter hypodense splenic lesions, too small to   characterize.  PANCREAS: Within normal limits.  ADRENALS: Within normal limits.  KIDNEYS/URETERS: No hydronephrosis. Left upper pole renal cyst.   Additional subcentimeter hypodense bilateral renal lesions, too small to   characterize.    BLADDER: Within normal limits.  REPRODUCTIVE ORGANS: Hysterectomy.    BOWEL: Colonic diverticulosis. Long segment mural thickening of the colon   extending from the ascending colon to the level of the rectum. Small   bowel anastomosis. No bowel obstruction. Appendix is normal.  PERITONEUM: No ascites.  VESSELS: Atherosclerotic changes.  RETROPERITONEUM/LYMPH NODES: No lymphadenopathy.  ABDOMINAL WALL: Within normal limits.  BONES: Degenerative changes.    IMPRESSION:  Possible filling defect in a branch of the right lower lobe pulmonary   artery, suspicious for pulmonary embolism. Further evaluation with CTA   chest (pulmonary artery protocol) is recommended.    Long segment proctocolitis.    Findings were discussed with MARLON FERRER on 1/16/2022 at 6:28 PM   by Dr. Jean with read back confirmation.    --- End of Report ---

## 2022-01-16 NOTE — H&P ADULT - COMMENTS
+ Abdominal pain , + Diarrhea  	Denies current urinary symptoms.  Denies CP, cough or SOBno abdominal distension, no blood in stool, no burning urination, no chills, no dysuria, no fever, no hematuria, no nausea, no vomiting++ RLQ abdominal pain, no bloating, no constipation, ++ diarrhea,

## 2022-01-16 NOTE — PROVIDER CONTACT NOTE (OTHER) - ASSESSMENT
Patient is alert, awake, confused. Patient also just refused blood work and wants to speak with MD. independent

## 2022-01-16 NOTE — CONSULT NOTE ADULT - ASSESSMENT
ALLA FUENTES 86 f 1/16/2022 1936 DR RAJI MARC     Initial evaluation/Pulmonary Critical Care consultation requested on  1/16/2022 by Dr SERRANO from Dr Kaur   Patient examined chart reviewed    HOSPITAL ADMISSION   PATIENT CAME  FROM (if information available)      ALLA FUENTES 86 f 1/16/2022 1936 DR RAJI MARC     REVIEW OF SYMPTOMS      Able to give (reliable) ROS  NO     PHYSICAL EXAM    HEENT Unremarkable  atraumatic   RESP Fair air entry EXP prolonged    Harsh breath sound Resp distres mild   CARDIAC S1 S2 No S3     NO JVD    ABDOMEN SOFT BS PRESENT NOT DISTENDED No hepatosplenomegaly   PEDAL EDEMA present No calf tenderness  NO rash       DOA/CC.  1/16/2022 86 f presented with pain abd and diarrhea x3 d ssince starting abio for uti at [place]    ctap showed poss pe and pulm consulted 1/16/2022         PROBLEMS  1/16/2022   admission.       DIARRHEA poa   Proctocolitis 1/16/2022 ctap ic   PULMONARY EMBOLISM poa 1/16/2022                                    COVID STATUS. 1/16/2022 scv2 (-)   ICU STAY. none  GOC.        GENERAL ISSUES .                                       ALLERGY.     nka                       WEIGHT.         1/16/2022 47                            BMI.              PATIENT DATA   VITALS/PO/IO/VENT/DRIPS.     1/16/2022 afeb 87 160/70         ASSESSMENT/RECOMMENDATIONS.    HEMODYNAMICS.   Monitor bp Target MAP 65 (+)    RESP.   Monitor po Target po 90-95%    OXYGEN REQUIREMENTS.   1/16/2022 ra 97%     INFECTION  Proctocolitis  w 1/16/2022 w 16   Cr 1/16/2022 Cr .9   ua 1/16/2022 ua w 3-5   ct ap ic  1/16/2022 long segment proctocolitis Possible pe rll   flu ab 1/16/2022 (-)  rsv 1/16/2022 (-)   A/R  1/16/2022   gi eval  npo  iv fl  abio      PULMONARY EMBOLISM  ct ap ic  1/16/2022 long segment proctocolitis Possible pe rll   1/16/2022 Will hold off repeat cta with iv contrast 48 h to avoid ruthie   1/16/2022 Check v duplx  1/16/2022 start heparin drip   1/16/2022 padilla Pharmacist Kristyn         TIME SPENT   Over 55 minutes aggregate care time spent on encounter; activities included   direct patient care, counseling and/or coordinating care reviewing notes, lab data/ imaging , discussion with multidisciplinary team/ patient  /family and explaining in detail risks, benefits, alternatives  of the recommendations     ALLA FUENTES 86 f 1/16/2022 1936 DR RAJI MARC

## 2022-01-16 NOTE — H&P ADULT - REASON FOR ADMISSION
86 female sent from [facility] living for diarrhea x 3 days since starting antibiotics for UTI.   	Per patient she was being treated for UTI and began having diarrhea and RLQ pain.   	Denies fever, chills.   	Denies n/v.   	+ Abdominal pain , + Diarrhea  	Denies current urinary symptoms.  Denies CP, cough or SOBno abdominal distension, no blood in stool, no burning urination, no chills, no dysuria, no fever, no hematuria, no nausea, no vomiting++ RLQ abdominal pain, no bloating, no constipation, ++ diarrhea,   < from: CT Abdomen and Pelvis w/ IV Cont (01.16.22 @ 17:29) >

## 2022-01-16 NOTE — H&P ADULT - NSHPSOCIALHISTORY_GEN_ALL_CORE
Social History:    Marital Status:   Occupation:   Lives with:     Substance Use :  Tobacco Usage:  (   ) never smoked   (   ) former smoker   (   ) current smoker  (     ) pack year  (        ) last tobacco use date  Alcohol Usage:      Health Management     For female:   Last Mammo:   Last Pap:     For male:  Last prostate exam:          [  ] date:            (  ) findings      Immunization Hx:   (  ) flu shot                               (     ) date   (  ) pneumonia shot               (     ) date  (  ) tetanus                               (     ) date     (     ) Advanced Directives: (     ) declined   [  ] health care proxy Social History:    Marital Status:   Occupation:   Lives with:     Substance Use :  no  Tobacco Usage:  (   ) never smoked   (   ) former smoker   (  no ) current smoker  (     ) pack year  (        ) last tobacco use date  Alcohol Usage: no      Health Management     For female:   Last Mammo:   Last Pap:     For male:  Last prostate exam:          [  ] date:            (  ) findings      Immunization Hx:   (  ) flu shot                               (     ) date   (  ) pneumonia shot               (     ) date  (  ) tetanus                               (     ) date     (     ) Advanced Directives: (     ) declined   [  ] health care proxy

## 2022-01-16 NOTE — CONSULT NOTE ADULT - SUBJECTIVE AND OBJECTIVE BOX
GINA GOLD    PLV APER 01    Allergies    No Known Allergies    Intolerances        PAST MEDICAL & SURGICAL HISTORY:      FAMILY HISTORY:      Home Medications:      MEDICATIONS  (STANDING):  metroNIDAZOLE  IVPB      pantoprazole  Injectable 40 milliGRAM(s) IV Push daily    MEDICATIONS  (PRN):      Diet, NPO:   Except Medications (22 @ 19:45) [Active]          Vital Signs Last 24 Hrs  T(C): 36.6 (2022 20:14), Max: 37.2 (2022 14:14)  T(F): 97.9 (2022 20:14), Max: 99 (2022 14:14)  HR: 87 (2022 20:14) (73 - 87)  BP: 163/71 (2022 20:14) (129/68 - 163/71)  BP(mean): --  RR: 18 (2022 20:14) (15 - 18)  SpO2: 97% (2022 20:14) (97% - 98%)              LABS:                        12.5   16.59 )-----------( 282      ( 2022 14:58 )             39.4         140  |  107  |  25<H>  ----------------------------<  113<H>  3.1<L>   |  27  |  0.98    Ca    10.1      2022 16:25    TPro  6.9  /  Alb  3.0<L>  /  TBili  0.5  /  DBili  x   /  AST  16  /  ALT  18  /  AlkPhos  83        Urinalysis Basic - ( 2022 15:01 )    Color: Yellow / Appearance: Slightly Turbid / S.020 / pH: x  Gluc: x / Ketone: Trace  / Bili: Negative / Urobili: Negative   Blood: x / Protein: 30 mg/dL / Nitrite: Negative   Leuk Esterase: Trace / RBC: 0-2 /HPF / WBC 3-5   Sq Epi: x / Non Sq Epi: Occasional / Bacteria: x            WBC:  WBC Count: 16.59 K/uL ( @ 14:58)      MICROBIOLOGY:  RECENT CULTURES:                  Sodium:  Sodium, Serum: 140 mmol/L ( @ 16:25)      0.98 mg/dL  @ 16:25      Hemoglobin:  Hemoglobin: 12.5 g/dL ( @ 14:58)      Platelets: Platelet Count - Automated: 282 K/uL ( @ 14:58)      LIVER FUNCTIONS - ( 2022 16:25 )  Alb: 3.0 g/dL / Pro: 6.9 g/dL / ALK PHOS: 83 U/L / ALT: 18 U/L / AST: 16 U/L / GGT: x             Urinalysis Basic - ( 2022 15:01 )    Color: Yellow / Appearance: Slightly Turbid / S.020 / pH: x  Gluc: x / Ketone: Trace  / Bili: Negative / Urobili: Negative   Blood: x / Protein: 30 mg/dL / Nitrite: Negative   Leuk Esterase: Trace / RBC: 0-2 /HPF / WBC 3-5   Sq Epi: x / Non Sq Epi: Occasional / Bacteria: x        RADIOLOGY & ADDITIONAL STUDIES:      MICROBIOLOGY:  RECENT CULTURES:

## 2022-01-16 NOTE — ED PROVIDER NOTE - ATTENDING CONTRIBUTION TO CARE
87 y/o F with c/o RLQ abd pain and diarrhea after recent antibiotics for UTI    Well appearing no distress  MMM  heart/lungs wnl  abd soft TTP RLQ    labs, UA, CT

## 2022-01-16 NOTE — H&P ADULT - NSHPLABSRESULTS_GEN_ALL_CORE
12.5   16.59 )-----------( 282      ( 2022 14:58 )             39.4       CBC Full  -  ( 2022 14:58 )  WBC Count : 16.59 K/uL  RBC Count : 4.48 M/uL  Hemoglobin : 12.5 g/dL  Hematocrit : 39.4 %  Platelet Count - Automated : 282 K/uL  Mean Cell Volume : 87.9 fl  Mean Cell Hemoglobin : 27.9 pg  Mean Cell Hemoglobin Concentration : 31.7 gm/dL  Auto Neutrophil # : 13.60 K/uL  Auto Lymphocyte # : 0.83 K/uL  Auto Monocyte # : 1.99 K/uL  Auto Eosinophil # : 0.00 K/uL  Auto Basophil # : 0.00 K/uL  Auto Neutrophil % : 80.0 %  Auto Lymphocyte % : 5.0 %  Auto Monocyte % : 12.0 %  Auto Eosinophil % : 0.0 %  Auto Basophil % : 0.0 %      -16    140  |  107  |  25<H>  ----------------------------<  113<H>  3.1<L>   |  27  |  0.98    Ca    10.1      2022 16:25    TPro  6.9  /  Alb  3.0<L>  /  TBili  0.5  /  DBili  x   /  AST  16  /  ALT  18  /  AlkPhos  83  01-16      CAPILLARY BLOOD GLUCOSE          Vital Signs Last 24 Hrs  T(C): 36.5 (2022 05:28), Max: 37.2 (2022 14:14)  T(F): 97.7 (2022 05:28), Max: 99 (2022 14:14)  HR: 66 (2022 05:28) (66 - 87)  BP: 150/75 (2022 05:28) (129/68 - 163/71)  BP(mean): --  RR: 20 (2022 05:28) (15 - 20)  SpO2: 96% (2022 05:28) (96% - 98%)    Urinalysis Basic - ( 2022 15:01 )    Color: Yellow / Appearance: Slightly Turbid / S.020 / pH: x  Gluc: x / Ketone: Trace  / Bili: Negative / Urobili: Negative   Blood: x / Protein: 30 mg/dL / Nitrite: Negative   Leuk Esterase: Trace / RBC: 0-2 /HPF / WBC 3-5   Sq Epi: x / Non Sq Epi: Occasional / Bacteria: x

## 2022-01-17 ENCOUNTER — APPOINTMENT (OUTPATIENT)
Dept: MRI IMAGING | Facility: CLINIC | Age: 87
End: 2022-01-17

## 2022-01-17 DIAGNOSIS — R19.7 DIARRHEA, UNSPECIFIED: ICD-10-CM

## 2022-01-17 DIAGNOSIS — K52.9 NONINFECTIVE GASTROENTERITIS AND COLITIS, UNSPECIFIED: ICD-10-CM

## 2022-01-17 DIAGNOSIS — G91.2 (IDIOPATHIC) NORMAL PRESSURE HYDROCEPHALUS: ICD-10-CM

## 2022-01-17 DIAGNOSIS — R09.89 OTHER SPECIFIED SYMPTOMS AND SIGNS INVOLVING THE CIRCULATORY AND RESPIRATORY SYSTEMS: ICD-10-CM

## 2022-01-17 DIAGNOSIS — E87.6 HYPOKALEMIA: ICD-10-CM

## 2022-01-17 DIAGNOSIS — F03.90 UNSPECIFIED DEMENTIA WITHOUT BEHAVIORAL DISTURBANCE: ICD-10-CM

## 2022-01-17 DIAGNOSIS — Z29.9 ENCOUNTER FOR PROPHYLACTIC MEASURES, UNSPECIFIED: ICD-10-CM

## 2022-01-17 LAB
ALBUMIN SERPL ELPH-MCNC: 2.5 G/DL — LOW (ref 3.3–5)
ALP SERPL-CCNC: 74 U/L — SIGNIFICANT CHANGE UP (ref 40–120)
ALT FLD-CCNC: 14 U/L — SIGNIFICANT CHANGE UP (ref 12–78)
ANION GAP SERPL CALC-SCNC: 8 MMOL/L — SIGNIFICANT CHANGE UP (ref 5–17)
AST SERPL-CCNC: 16 U/L — SIGNIFICANT CHANGE UP (ref 15–37)
BILIRUB SERPL-MCNC: 0.3 MG/DL — SIGNIFICANT CHANGE UP (ref 0.2–1.2)
BUN SERPL-MCNC: 13 MG/DL — SIGNIFICANT CHANGE UP (ref 7–23)
C DIFF BY PCR RESULT: DETECTED
C DIFF TOX GENS STL QL NAA+PROBE: SIGNIFICANT CHANGE UP
CALCIUM SERPL-MCNC: 8.9 MG/DL — SIGNIFICANT CHANGE UP (ref 8.5–10.1)
CHLORIDE SERPL-SCNC: 112 MMOL/L — HIGH (ref 96–108)
CO2 SERPL-SCNC: 24 MMOL/L — SIGNIFICANT CHANGE UP (ref 22–31)
CREAT SERPL-MCNC: 0.81 MG/DL — SIGNIFICANT CHANGE UP (ref 0.5–1.3)
CULTURE RESULTS: NO GROWTH — SIGNIFICANT CHANGE UP
GLUCOSE SERPL-MCNC: 117 MG/DL — HIGH (ref 70–99)
HCT VFR BLD CALC: 35.8 % — SIGNIFICANT CHANGE UP (ref 34.5–45)
HGB BLD-MCNC: 11.4 G/DL — LOW (ref 11.5–15.5)
INR BLD: 1.14 RATIO — SIGNIFICANT CHANGE UP (ref 0.88–1.16)
MCHC RBC-ENTMCNC: 27.9 PG — SIGNIFICANT CHANGE UP (ref 27–34)
MCHC RBC-ENTMCNC: 31.8 GM/DL — LOW (ref 32–36)
MCV RBC AUTO: 87.7 FL — SIGNIFICANT CHANGE UP (ref 80–100)
NRBC # BLD: 0 /100 WBCS — SIGNIFICANT CHANGE UP (ref 0–0)
PLATELET # BLD AUTO: 253 K/UL — SIGNIFICANT CHANGE UP (ref 150–400)
POTASSIUM SERPL-MCNC: 3.3 MMOL/L — LOW (ref 3.5–5.3)
POTASSIUM SERPL-SCNC: 3.3 MMOL/L — LOW (ref 3.5–5.3)
PROT SERPL-MCNC: 6.2 G/DL — SIGNIFICANT CHANGE UP (ref 6–8.3)
PROTHROM AB SERPL-ACNC: 13.2 SEC — SIGNIFICANT CHANGE UP (ref 10.6–13.6)
RBC # BLD: 4.08 M/UL — SIGNIFICANT CHANGE UP (ref 3.8–5.2)
RBC # FLD: 14.2 % — SIGNIFICANT CHANGE UP (ref 10.3–14.5)
SODIUM SERPL-SCNC: 144 MMOL/L — SIGNIFICANT CHANGE UP (ref 135–145)
SPECIMEN SOURCE: SIGNIFICANT CHANGE UP
WBC # BLD: 10.89 K/UL — HIGH (ref 3.8–10.5)
WBC # FLD AUTO: 10.89 K/UL — HIGH (ref 3.8–10.5)

## 2022-01-17 PROCEDURE — 71275 CT ANGIOGRAPHY CHEST: CPT | Mod: 26

## 2022-01-17 PROCEDURE — 99221 1ST HOSP IP/OBS SF/LOW 40: CPT

## 2022-01-17 RX ORDER — OLANZAPINE 15 MG/1
2.5 TABLET, FILM COATED ORAL EVERY 6 HOURS
Refills: 0 | Status: DISCONTINUED | OUTPATIENT
Start: 2022-01-17 | End: 2022-01-26

## 2022-01-17 RX ORDER — DEXTROSE MONOHYDRATE, SODIUM CHLORIDE, AND POTASSIUM CHLORIDE 50; .745; 4.5 G/1000ML; G/1000ML; G/1000ML
1000 INJECTION, SOLUTION INTRAVENOUS
Refills: 0 | Status: DISCONTINUED | OUTPATIENT
Start: 2022-01-17 | End: 2022-01-24

## 2022-01-17 RX ORDER — ONDANSETRON 8 MG/1
4 TABLET, FILM COATED ORAL EVERY 8 HOURS
Refills: 0 | Status: DISCONTINUED | OUTPATIENT
Start: 2022-01-17 | End: 2022-01-26

## 2022-01-17 RX ORDER — ACETAMINOPHEN 500 MG
650 TABLET ORAL EVERY 6 HOURS
Refills: 0 | Status: DISCONTINUED | OUTPATIENT
Start: 2022-01-17 | End: 2022-01-26

## 2022-01-17 RX ORDER — VANCOMYCIN HCL 1 G
125 VIAL (EA) INTRAVENOUS EVERY 6 HOURS
Refills: 0 | Status: DISCONTINUED | OUTPATIENT
Start: 2022-01-17 | End: 2022-01-26

## 2022-01-17 RX ORDER — TRAMADOL HYDROCHLORIDE 50 MG/1
50 TABLET ORAL
Refills: 0 | Status: DISCONTINUED | OUTPATIENT
Start: 2022-01-17 | End: 2022-01-17

## 2022-01-17 RX ORDER — LACTOBACILLUS ACIDOPHILUS 100MM CELL
1 CAPSULE ORAL
Refills: 0 | Status: DISCONTINUED | OUTPATIENT
Start: 2022-01-17 | End: 2022-01-26

## 2022-01-17 RX ORDER — LANOLIN ALCOHOL/MO/W.PET/CERES
3 CREAM (GRAM) TOPICAL AT BEDTIME
Refills: 0 | Status: DISCONTINUED | OUTPATIENT
Start: 2022-01-17 | End: 2022-01-26

## 2022-01-17 RX ORDER — LOSARTAN POTASSIUM 100 MG/1
25 TABLET, FILM COATED ORAL DAILY
Refills: 0 | Status: DISCONTINUED | OUTPATIENT
Start: 2022-01-17 | End: 2022-01-26

## 2022-01-17 RX ORDER — ENOXAPARIN SODIUM 100 MG/ML
50 INJECTION SUBCUTANEOUS EVERY 12 HOURS
Refills: 0 | Status: DISCONTINUED | OUTPATIENT
Start: 2022-01-17 | End: 2022-01-21

## 2022-01-17 RX ADMIN — DEXTROSE MONOHYDRATE, SODIUM CHLORIDE, AND POTASSIUM CHLORIDE 60 MILLILITER(S): 50; .745; 4.5 INJECTION, SOLUTION INTRAVENOUS at 06:51

## 2022-01-17 RX ADMIN — Medication 500 MILLIGRAM(S): at 05:32

## 2022-01-17 RX ADMIN — LOSARTAN POTASSIUM 25 MILLIGRAM(S): 100 TABLET, FILM COATED ORAL at 05:32

## 2022-01-17 RX ADMIN — DEXTROSE MONOHYDRATE, SODIUM CHLORIDE, AND POTASSIUM CHLORIDE 60 MILLILITER(S): 50; .745; 4.5 INJECTION, SOLUTION INTRAVENOUS at 18:03

## 2022-01-17 RX ADMIN — PANTOPRAZOLE SODIUM 40 MILLIGRAM(S): 20 TABLET, DELAYED RELEASE ORAL at 12:59

## 2022-01-17 RX ADMIN — Medication 1 TABLET(S): at 18:01

## 2022-01-17 NOTE — CONSULT NOTE ADULT - SUBJECTIVE AND OBJECTIVE BOX
Patient is a 86y old  Female who presents with a chief complaint of Gastroenteritis (16 Jan 2022 23:52)      HPI:  85 yo woman sent from [facility] on 1/16/22 after she reportedly developed diarrhea for 3 days and abdominal pain. Per patient she also had blood in her stool but this is not documented in any of the ER notes or chart notes. She underwent CT scan of the abdomen and pelvis which incidentally showed possible filling defect in a branch of the right lower lobe pulmonary artery concerning for pulmonary embolism; patient denied any history of respiratory complaints and reports that she is usually very active at the [facility].  She was ordered to have dedicated CT angio and LE doppler studies which she is at this time refusing.  Asked to evaluate patient with recommendations regarding anticoagulation       ROS:  Negative except for: blood in stool per patient; diarrhea for 3 days after antibiotics for UTI; denies any chest pain, denies dyspnea    PAST MEDICAL & SURGICAL HISTORY:  Hypertension  UTI    SOCIAL HISTORY:  Lives at [facility] since 2019  Denies tobacco use  Denies ETOH    FAMILY HISTORY:  no history of clotting disorders    MEDICATIONS  (STANDING):  dextrose 5% + sodium chloride 0.45% with potassium chloride 20 mEq/L 1000 milliLiter(s) (60 mL/Hr) IV Continuous <Continuous>  enoxaparin Injectable 50 milliGRAM(s) SubCutaneous every 12 hours  lactobacillus acidophilus 1 Tablet(s) Oral two times a day  losartan 25 milliGRAM(s) Oral daily  metroNIDAZOLE    Tablet 500 milliGRAM(s) Oral every 8 hours  pantoprazole  Injectable 40 milliGRAM(s) IV Push daily    MEDICATIONS  (PRN):  acetaminophen     Tablet .. 650 milliGRAM(s) Oral every 6 hours PRN Temp greater or equal to 38C (100.4F), Mild Pain (1 - 3)  aluminum hydroxide/magnesium hydroxide/simethicone Suspension 30 milliLiter(s) Oral every 4 hours PRN Dyspepsia  melatonin 3 milliGRAM(s) Oral at bedtime PRN Insomnia  ondansetron Injectable 4 milliGRAM(s) IV Push every 8 hours PRN Nausea and/or Vomiting  traMADol 50 milliGRAM(s) Oral four times a day PRN Moderate Pain (4 - 6)      Allergies    No Known Allergies        Vital Signs Last 24 Hrs  T(C): 36.5 (17 Jan 2022 05:28), Max: 37.2 (16 Jan 2022 14:14)  T(F): 97.7 (17 Jan 2022 05:28), Max: 99 (16 Jan 2022 14:14)  HR: 66 (17 Jan 2022 05:28) (66 - 87)  BP: 150/75 (17 Jan 2022 05:28) (129/68 - 163/71)  RR: 20 (17 Jan 2022 05:28) (15 - 20)  SpO2: 96% (17 Jan 2022 05:28) (96% - 98%)    PHYSICAL EXAM  General: adult in NAD  HEENT: clear oropharynx, anicteric sclera, pink conjunctivae  Neck: supple  CV: normal S1S2 with no murmur rubs or gallops  Lungs: clear to auscultation, no wheezes, no rhales  Abdomen: soft non-tender non-distended, no hepato/splenomegaly  Ext: no clubbing cyanosis or edema  Skin: no rashes and no petichiae  Neuro: alert and oriented X3 no focal deficits      LABS:    CBC Full  -  ( 16 Jan 2022 14:58 )  WBC Count : 16.59 K/uL  RBC Count : 4.48 M/uL  Hemoglobin : 12.5 g/dL  Hematocrit : 39.4 %  Platelet Count - Automated : 282 K/uL  Mean Cell Volume : 87.9 fl  Mean Cell Hemoglobin : 27.9 pg  Mean Cell Hemoglobin Concentration : 31.7 gm/dL  Auto Neutrophil # : 13.60 K/uL  Auto Lymphocyte # : 0.83 K/uL  Auto Monocyte # : 1.99 K/uL  Auto Eosinophil # : 0.00 K/uL  Auto Basophil # : 0.00 K/uL  Auto Neutrophil % : 80.0 %  Auto Lymphocyte % : 5.0 %  Auto Monocyte % : 12.0 %  Auto Eosinophil % : 0.0 %  Auto Basophil % : 0.0 %    01-16    140  |  107  |  25<H>  ----------------------------<  113<H>  3.1<L>   |  27  |  0.98    Ca    10.1      16 Jan 2022 16:25    TPro  6.9  /  Alb  3.0<L>  /  TBili  0.5  /  DBili  x   /  AST  16  /  ALT  18  /  AlkPhos  83  01-16    RADIOLOGY :  < from: CT Abdomen and Pelvis w/ IV Cont (01.16.22 @ 17:29) >    FINDINGS:  LOWER CHEST: Possible filling defect in a branch of the right lower lobe   pulmonary artery (series 2, image 7), suspicious for pulmonary embolism.   Bibasilar subsegmental atelectasis.    LIVER: Within normal limits.  BILE DUCTS: Normal caliber.  GALLBLADDER: Withinnormal limits.  SPLEEN: Subcentimeter hypodense splenic lesions, too small to   characterize.  PANCREAS: Within normal limits.  ADRENALS: Within normal limits.  KIDNEYS/URETERS: No hydronephrosis. Left upper pole renal cyst.   Additional subcentimeter hypodense bilateral renal lesions, too small to   characterize.    BLADDER: Within normal limits.  REPRODUCTIVE ORGANS: Hysterectomy.    BOWEL: Colonic diverticulosis. Long segment mural thickening of the colon   extending from the ascending colon to the level of the rectum. Small   bowel anastomosis. No bowel obstruction. Appendix is normal.  PERITONEUM: No ascites.  VESSELS: Atherosclerotic changes.  RETROPERITONEUM/LYMPH NODES: No lymphadenopathy.  ABDOMINAL WALL: Within normal limits.  BONES: Degenerative changes.    IMPRESSION:  Possible filling defect in a branch of the right lower lobe pulmonary   artery, suspicious for pulmonary embolism. Further evaluation with CTA   chest (pulmonary artery protocol) is recommended.    Long segment proctocolitis.    < end of copied text >

## 2022-01-17 NOTE — CONSULT NOTE ADULT - CONSULT REQUESTED DATE/TIME
17-Jan-2022 09:35
17-Jan-2022 04:57
17-Jan-2022 02:52
17-Jan-2022 11:53
17-Jan-2022 13:20
16-Jan-2022

## 2022-01-17 NOTE — PROGRESS NOTE ADULT - SUBJECTIVE AND OBJECTIVE BOX
PROGRESS NOTE /IM INITIAL EVALUATION .Spoke to the daughter Hilary and PCP from [facility]   Patient is a 86y old  Female who presents with a chief complaint of Gastroenteritis (2022 23:52)  Chart and available morning labs /imaging are reviewed electronically , urgent issues addressed . More information  is being added upon completion of rounds , when more information is collected and management discussed with consultants , medical staff and social service/case management on the floor   OVERNIGHT  No new issues reported by medical staff . All above noted Patient is resting in a bed comfortably .Confused ,poor mentation .No distress noted   HPI:  86 [facility] x 3 days since starting antibiotics for UTI.   	Per patient she was being treated for UTI and began having diarrhea and RLQ pain.   	Denies fever, chills.   	Denies n/v.   	+ Abdominal pain , + Diarrhea  	Denies current urinary symptoms.  Denies CP, cough or SOBno abdominal distension, no blood in stool, no burning urination, no chills, no dysuria, no fever, no hematuria, no nausea, no vomiting++ RLQ abdominal pain, no bloating, no constipation, ++ diarrhea,   < from: CT Abdomen and Pelvis w/ IV Cont (22 @ 17:29) >    ACC: [id number] EXAM:  CT ABDOMEN AND PELVIS IC                          PROCEDURE DATE:  2022          INTERPRETATION:  CLINICAL INFORMATION: Acute abdominal pain.    COMPARISON: None.    CONTRAST/COMPLICATIONS:  IV Contrast: Omnipaque 350  90cc administered   10 cc discarded  Oral Contrast: NONE  Complications: None reported at time of study completion    PROCEDURE:  CT of the Abdomen and Pelvis was performed.  Sagittal and coronal reformats were performed.    FINDINGS:  LOWER CHEST: Possible filling defect in a branch of the right lower lobe   pulmonary artery (series 2, image 7), suspicious for pulmonary embolism.   Bibasilar subsegmental atelectasis.    LIVER: Within normal limits.  BILE DUCTS: Normal caliber.  GALLBLADDER: Withinnormal limits.  SPLEEN: Subcentimeter hypodense splenic lesions, too small to   characterize.  PANCREAS: Within normal limits.  ADRENALS: Within normal limits.  KIDNEYS/URETERS: No hydronephrosis. Left upper pole renal cyst.   Additional subcentimeter hypodense bilateral renal lesions, too small to   characterize.    BLADDER: Within normal limits.  REPRODUCTIVE ORGANS: Hysterectomy.    BOWEL: Colonic diverticulosis. Long segment mural thickening of the colon   extending from the ascending colon to the level of the rectum. Small   bowel anastomosis. No bowel obstruction. Appendix is normal.  PERITONEUM: No ascites.  VESSELS: Atherosclerotic changes.  RETROPERITONEUM/LYMPH NODES: No lymphadenopathy.  ABDOMINAL WALL: Within normal limits.  BONES: Degenerative changes.    IMPRESSION:  Possible filling defect in a branch of the right lower lobe pulmonary   artery, suspicious for pulmonary embolism. Further evaluation with CTA   chest (pulmonary artery protocol) is recommended.    Long segment proctocolitis.    Findings were discussed with MARLON FERRER on 2022 at 6:28 PM   by Dr. Wagner with read back confirmation.    --- End of Report ---            ANABEL WAGNER MD; Attending Radiologist  This document has been electronically signed. 2022  6:29PM    < end of copied text >   (2022 18:47)    PAST MEDICAL & SURGICAL HISTORY:      MEDICATIONS  (STANDING):  dextrose 5% + sodium chloride 0.45% with potassium chloride 20 mEq/L 1000 milliLiter(s) (60 mL/Hr) IV Continuous <Continuous>  lactobacillus acidophilus 1 Tablet(s) Oral two times a day  losartan 25 milliGRAM(s) Oral daily  pantoprazole  Injectable 40 milliGRAM(s) IV Push daily    MEDICATIONS  (PRN):  acetaminophen     Tablet .. 650 milliGRAM(s) Oral every 6 hours PRN Temp greater or equal to 38C (100.4F), Mild Pain (1 - 3)  aluminum hydroxide/magnesium hydroxide/simethicone Suspension 30 milliLiter(s) Oral every 4 hours PRN Dyspepsia  LORazepam   Injectable 1 milliGRAM(s) IV Push once PRN mri  melatonin 3 milliGRAM(s) Oral at bedtime PRN Insomnia  OLANZapine Injectable 2.5 milliGRAM(s) IntraMuscular every 6 hours PRN agitation  ondansetron Injectable 4 milliGRAM(s) IV Push every 8 hours PRN Nausea and/or Vomiting  traMADol 50 milliGRAM(s) Oral four times a day PRN Moderate Pain (4 - 6)      OBJECTIVE    T(C): 36.6 (22 @ 11:13), Max: 36.7 (22 @ 18:18)  HR: 65 (22 @ 11:13) (65 - 87)  BP: 117/70 (22 @ 11:13) (117/70 - 163/71)  RR: 16 (22 @ 11:13) (16 - 20)  SpO2: 96% (22 @ 11:13) (96% - 98%)  Wt(kg): --  I&O's Summary    2022 07:01  -  2022 07:00  --------------------------------------------------------  IN: 0 mL / OUT: 450 mL / NET: -450 mL          REVIEW OF SYSTEMS:  CONSTITUTIONAL: No fever, weight loss, or fatigue  EYES: No eye pain, visual disturbances, or discharge  ENMT:   No sinus or throat pain  NECK: No pain or stiffness  RESPIRATORY: No cough, wheezing, chills or hemoptysis; No shortness of breath  CARDIOVASCULAR: No chest pain, palpitations, dizziness, or leg swelling  GASTROINTESTINAL: No abdominal pain. No nausea, vomiting; No diarrhea or constipation. No melena or hematochezia.  GENITOURINARY: No dysuria, frequency, hematuria, or incontinence  NEUROLOGICAL: No headaches, memory loss, loss of strength, numbness, or tremors  SKIN: No itching, burning, rashes, or lesions   MUSCULOSKELETAL: No joint pain or swelling; No muscle, back, or extremity pain    PHYSICAL EXAM:  Appearance: NAD. VS past 24 hrs -as above   HEENT:   Moist oral mucosa. Conjunctiva clear b/l.   Neck : supple  Respiratory: Lungs CTAB.  Gastrointestinal:  Soft, nontender. No rebound. No rigidity. BS present	  Cardiovascular: RRR ,S1S2 present  Neurologic: Non-focal. Moving all extremities.  Extremities: No edema. No erythema. No calf tenderness.  Skin: No rashes, No ecchymoses, No cyanosis.	  wounds ,skin lesions-See skin assesment flow sheet   LABS:                        11.4   10.89 )-----------( 253      ( 2022 11:42 )             35.8         144  |  112<H>  |  13  ----------------------------<  117<H>  3.3<L>   |  24  |  0.81    Ca    8.9      2022 11:42    TPro  6.2  /  Alb  2.5<L>  /  TBili  0.3  /  DBili  x   /  AST  16  /  ALT  14  /  AlkPhos  74  01-    CAPILLARY BLOOD GLUCOSE        PT/INR - ( 2022 11:42 )   PT: 13.2 sec;   INR: 1.14 ratio           Urinalysis Basic - ( 2022 15:01 )    Color: Yellow / Appearance: Slightly Turbid / S.020 / pH: x  Gluc: x / Ketone: Trace  / Bili: Negative / Urobili: Negative   Blood: x / Protein: 30 mg/dL / Nitrite: Negative   Leuk Esterase: Trace / RBC: 0-2 /HPF / WBC 3-5   Sq Epi: x / Non Sq Epi: Occasional / Bacteria: x        RADIOLOGY & ADDITIONAL TESTS: < from: Xray Chest 1 View- PORTABLE-Urgent (Xray Chest 1 View- PORTABLE-Urgent .) (22 @ 19:43) >  ACC: [id number] EXAM:  XR CHEST PORTABLE URGENT 1V                          PROCEDURE DATE:  2022          INTERPRETATION:  Portable chest radiograph    CLINICAL INFORMATION: Dyspnea, shortness of breath.    TECHNIQUE:  Portable  AP view of thechest.    COMPARISON: No previous examinations are available for review.    FINDINGS:  CATHETERS AND TUBES: None    PULMONARY: Subtle focal lung opacity overlying RIGHT anterior second rib.   Apical lordotic radiograph or follow-up CT scan recommended to   differentiate rib calcification versus underlying parenchymal pathology.   Remaining visualized lung parenchyma grossly clear..   No pneumothorax.    HEART/VASCULAR: The heart and mediastinum size and configuration are   within normal limits.    BONES: Visualized osseous structures are intact.    IMPRESSION:   Subtle focal lung opacity overlying RIGHT anterior second   rib. Apical lordotic radiograph or follow-up CT scan recommended to   differentiate rib calcification versus underlying parenchymal pathology.    < end of copied text >  < from: CT Abdomen and Pelvis w/ IV Cont (22 @ 17:29) >  ACC: [id number] EXAM:  CT ABDOMEN AND PELVIS IC                          PROCEDURE DATE:  2022          INTERPRETATION:  CLINICAL INFORMATION: Acute abdominal pain.    COMPARISON: None.    CONTRAST/COMPLICATIONS:  IV Contrast: Omnipaque 350  90cc administered   10 cc discarded  Oral Contrast: NONE  Complications: None reported at time of study completion    PROCEDURE:  CT of the Abdomen and Pelvis was performed.  Sagittal and coronal reformats were performed.    FINDINGS:  LOWER CHEST: Possible filling defect in a branch of the right lower lobe   pulmonary artery (series 2, image 7), suspicious for pulmonary embolism.   Bibasilar subsegmental atelectasis.    LIVER: Within normal limits.  BILE DUCTS: Normal caliber.  GALLBLADDER: Withinnormal limits.  SPLEEN: Subcentimeter hypodense splenic lesions, too small to   characterize.  PANCREAS: Within normal limits.  ADRENALS: Within normal limits.  KIDNEYS/URETERS: No hydronephrosis. Left upper pole renal cyst.   Additional subcentimeter hypodense bilateral renal lesions, too small to   characterize.    BLADDER: Within normal limits.  REPRODUCTIVE ORGANS: Hysterectomy.    BOWEL: Colonic diverticulosis. Long segment mural thickening of the colon   extending from the ascending colon to the level of the rectum. Small   bowel anastomosis. No bowel obstruction. Appendix is normal.  PERITONEUM: No ascites.  VESSELS: Atherosclerotic changes.  RETROPERITONEUM/LYMPH NODES: No lymphadenopathy.  ABDOMINAL WALL: Within normal limits.  BONES: Degenerative changes.    IMPRESSION:  Possible filling defect in a branch of the right lower lobe pulmonary   artery, suspicious for pulmonary embolism. Further evaluation with CTA   chest (pulmonary artery protocol) is recommended.    Long segment proctocolitis.    Findings were discussed with MARLON FERRER on 2022 at 6:28 PM   by Dr. Wagner with read back confirmation.    < end of copied text >     reviewed elctronically  ASSESSMENT/PLAN: 	  75minutes spent on this visit, 50% visit time spent in care co-ordination with other attendings and counselling patient ,requesting necessary consults ,informing family about status & plan of care .I have discussed care plan with [facility] /[facility] wellness/admitting /nursing   department ,outpatient PCP , hospital consultants , ER physician & med staff .

## 2022-01-17 NOTE — CONSULT NOTE ADULT - SUBJECTIVE AND OBJECTIVE BOX
[facility]  Ty Sheldon PA-C  [facility] [place] [address]  [phone]      Chief Complaint:  Patient is a 86y old  Female who presents with a chief complaint of Gastroenteritis (2022 23:52)      HPI:86 female sent from [facility] for diarrhea x 3 days since starting antibiotics for UTI.   	Per patient she was being treated for UTI and began having diarrhea and RLQ pain.   	Denies fever, chills.   	Denies n/v.   	+ Abdominal pain , + Diarrhea  	Denies current urinary symptoms.    patient refused to talk to me    Allergies:  No Known Allergies      Medications:  acetaminophen     Tablet .. 650 milliGRAM(s) Oral every 6 hours PRN  aluminum hydroxide/magnesium hydroxide/simethicone Suspension 30 milliLiter(s) Oral every 4 hours PRN  dextrose 5% + sodium chloride 0.45% with potassium chloride 20 mEq/L 1000 milliLiter(s) IV Continuous <Continuous>  lactobacillus acidophilus 1 Tablet(s) Oral two times a day  LORazepam   Injectable 1 milliGRAM(s) IV Push once PRN  losartan 25 milliGRAM(s) Oral daily  melatonin 3 milliGRAM(s) Oral at bedtime PRN  OLANZapine Injectable 2.5 milliGRAM(s) IntraMuscular every 6 hours PRN  ondansetron Injectable 4 milliGRAM(s) IV Push every 8 hours PRN  pantoprazole  Injectable 40 milliGRAM(s) IV Push daily  traMADol 50 milliGRAM(s) Oral four times a day PRN      PMHX/PSHX:  No pertinent past medical history        Family history:      Social History:     ROS:     General:  No wt loss, fevers, chills, night sweats, fatigue,   Eyes:  Good vision, no reported pain  ENT:  No sore throat, pain, runny nose, dysphagia  CV:  No pain, palpitations, hypo/hypertension  Resp:  No dyspnea, cough, tachypnea, wheezing  GI:  No pain, No nausea, No vomiting, No diarrhea, No constipation, No weight loss, No fever, No pruritis, No rectal bleeding, No tarry stools, No dysphagia,  :  No pain, bleeding, incontinence, nocturia  Muscle:  No pain, weakness  Neuro:  No weakness, tingling, memory problems  Psych:  No fatigue, insomnia, mood problems, depression  Endocrine:  No polyuria, polydipsia, cold/heat intolerance  Heme:  No petechiae, ecchymosis, easy bruisability  Skin:  No rash, tattoos, scars, edema      PHYSICAL EXAM:   Vital Signs:  Vital Signs Last 24 Hrs  T(C): 36.6 (2022 11:13), Max: 37.2 (2022 14:14)  T(F): 97.9 (2022 11:13), Max: 99 (2022 14:14)  HR: 65 (2022 11:13) (65 - 87)  BP: 117/70 (2022 11:13) (117/70 - 163/71)  BP(mean): --  RR: 16 (2022 11:13) (15 - 20)  SpO2: 96% (2022 11:) (96% - 98%)  Daily     Daily     GENERAL:  Appears stated age,   HEENT:  NC/AT,    CHEST:  Full & symmetric excursion,   HEART:  Regular rhythm  ABDOMEN:  Soft, non-tender, non-distended,   EXTEREMITIES:  no cyanosis,clubbing or edema  SKIN:  No rash  NEURO:  Alert,    LABS:                        11.4   10.89 )-----------( 253      ( 2022 11:42 )             35.8     01-17    144  |  112<H>  |  13  ----------------------------<  117<H>  3.3<L>   |  24  |  0.81    Ca    8.9      2022 11:42    TPro  6.2  /  Alb  2.5<L>  /  TBili  0.3  /  DBili  x   /  AST  16  /  ALT  14  /  AlkPhos  74  17    LIVER FUNCTIONS - ( 2022 11:42 )  Alb: 2.5 g/dL / Pro: 6.2 g/dL / ALK PHOS: 74 U/L / ALT: 14 U/L / AST: 16 U/L / GGT: x           PT/INR - ( 2022 11:42 )   PT: 13.2 sec;   INR: 1.14 ratio           Urinalysis Basic - ( 2022 15:01 )    Color: Yellow / Appearance: Slightly Turbid / S.020 / pH: x  Gluc: x / Ketone: Trace  / Bili: Negative / Urobili: Negative   Blood: x / Protein: 30 mg/dL / Nitrite: Negative   Leuk Esterase: Trace / RBC: 0-2 /HPF / WBC 3-5   Sq Epi: x / Non Sq Epi: Occasional / Bacteria: x          Imaging:

## 2022-01-17 NOTE — CONSULT NOTE ADULT - ASSESSMENT
85 yo woman presented to the ER with GI symptoms including diarrhea, abdominal pain and reported blood in her stool, found incidentally on CT abd/Plv to have "possible filling defect in branch of RLL pulmonary artery" concerning for pulmonary embolism; asymptomatic from this.    - CT angio chest and LE dopplers ordered by primary team but patient is refusing to have these done  - given patient with report of bloody stool, would advised against full dose anticoagulation in setting of incidental finding which is asymptomatic until dedicated CT angio of the chest can be done  - patient is not hypoxic or symptomatic from incidental finding  - would check stool guaiac   - leukocytosis, likely reactive with UTI and proctocolitis for which she is getting antibiotics  - case discussed with patient and Dr. Rodriguez (hospitalist)

## 2022-01-17 NOTE — CONSULT NOTE ADULT - ASSESSMENT
86 female sent from [facility] assisted living for diarrhea x 3 days since starting antibiotics for UTI.   	Per patient she was being treated for UTI and began having diarrhea and RLQ pain.    on IVF  labs reviewed  ct abd noted  I and O  monitor VS and HD and Sat  on emp ABX  cx in progress  biomarkers  assist with needs - ADL  GOC discussion in progress   86 female sent from [facility] for diarrhea x 3 days since starting antibiotics for UTI.   	Per patient she was being treated for UTI and began having diarrhea and RLQ pain.    on IVF  labs reviewed  ct abd noted  I and O  monitor VS and HD and Sat  on emp ABX  cx in progress  biomarkers  assist with needs - ADL  GOC discussion in progress - full code - pt made the decision - see GOC documentation

## 2022-01-17 NOTE — CONSULT NOTE ADULT - ASSESSMENT
proctocolitis  diarrhea    now off antibiotics  check GI pcr  check c diff  diet as tolerated  will follow

## 2022-01-17 NOTE — CONSULT NOTE ADULT - CONVERSATION DETAILS
Cris Cobian DTR - HCP  pt is alert - oriented - has capacity  HCP appointed  pt is full code  wishes for resuscitation attempt - does not want prolonged Life Support Cris - CARLOSR - HCP  pt is alert - oriented - has capacity  HCP appointed  pt is full code  wishes for resuscitation attempt - does not want prolonged Life Support    spoke with Daughter - Cris

## 2022-01-17 NOTE — CONSULT NOTE ADULT - SUBJECTIVE AND OBJECTIVE BOX
Date/Time Patient Seen:  		  Referring MD:   Data Reviewed	       Patient is a 86y old  Female who presents with a chief complaint of Gastroenteritis (16 Jan 2022 23:52)      Subjective/HPI   HISTORY OF PRESENT ILLNESS:    International Travel:  International Travel within 21 days? No.(1)     Domestic Travel:  Any travel outside of [facility] within the last 14 days? No.(1)     Preferred Language to Address Healthcare:  · Preferred Language to Address Healthcare	English     Child Abuse Assessment (patients less than 13 yrs):  Chief Complaint: abdominal pain.    · Chief Complaint: The patient is a 86y Female complaining of abdominal pain.  · HPI Objective Statement: 86 female sent from [facility] for diarrhea x 3 days since starting antibiotics for UTI.   	Per patient she was being treated for UTI and began having diarrhea and RLQ pain.   	Denies fever, chills.   	Denies n/v.   	+ Abdominal pain , + Diarrhea  	Denies current urinary symptoms.  Denies CP, cough or SOB.  · Presenting Symptoms: DIARRHEA, PAIN  · Negative Findings: no abdominal distension, no blood in stool, no burning urination, no chills, no dysuria, no fever, no hematuria, no nausea, no vomiting  · Location: abdomen  · Laterality: right  · Area: lower  · Radiation: no radiation  · Timing: worsening  · Duration: day(s)  3 days  · Severity: MILD  · Context: unknown  · Recent Exposure To: none known  · Aggravated Factors: none  · Relieving Factors: none    PAST MEDICAL & SURGICAL HISTORY:        Medication list         MEDICATIONS  (STANDING):  dextrose 5% + sodium chloride 0.45% with potassium chloride 20 mEq/L 1000 milliLiter(s) (60 mL/Hr) IV Continuous <Continuous>  enoxaparin Injectable 50 milliGRAM(s) SubCutaneous every 12 hours  losartan 25 milliGRAM(s) Oral daily  metroNIDAZOLE    Tablet 500 milliGRAM(s) Oral every 8 hours  pantoprazole  Injectable 40 milliGRAM(s) IV Push daily    MEDICATIONS  (PRN):         Vitals log        ICU Vital Signs Last 24 Hrs  T(C): 36.6 (16 Jan 2022 20:14), Max: 37.2 (16 Jan 2022 14:14)  T(F): 97.9 (16 Jan 2022 20:14), Max: 99 (16 Jan 2022 14:14)  HR: 87 (16 Jan 2022 20:14) (73 - 87)  BP: 163/71 (16 Jan 2022 20:14) (129/68 - 163/71)  BP(mean): --  ABP: --  ABP(mean): --  RR: 18 (16 Jan 2022 20:14) (15 - 18)  SpO2: 97% (16 Jan 2022 20:14) (97% - 98%)           Input and Output:  I&O's Detail    16 Jan 2022 07:01  -  17 Jan 2022 04:57  --------------------------------------------------------  IN:  Total IN: 0 mL    OUT:    Voided (mL): 450 mL  Total OUT: 450 mL    Total NET: -450 mL          Lab Data                        12.5   16.59 )-----------( 282      ( 16 Jan 2022 14:58 )             39.4     01-16    140  |  107  |  25<H>  ----------------------------<  113<H>  3.1<L>   |  27  |  0.98    Ca    10.1      16 Jan 2022 16:25    TPro  6.9  /  Alb  3.0<L>  /  TBili  0.5  /  DBili  x   /  AST  16  /  ALT  18  /  AlkPhos  83  01-16    non smoker  non drinker  lives in [facility]  ros - diarrhea  family hx - HTN        Review of Systems	    diarrhea  dec PO intake  weakness    Objective     Physical Examination    heart s1s2  lung dec BS  abd dec BS  head nc      Pertinent Lab findings & Imaging      Renee:  NO   Adequate UO     I&O's Detail    16 Jan 2022 07:01  -  17 Jan 2022 04:57  --------------------------------------------------------  IN:  Total IN: 0 mL    OUT:    Voided (mL): 450 mL  Total OUT: 450 mL    Total NET: -450 mL               Discussed with:     Cultures:	        Radiology      ACC: [id number] EXAM:  CT ABDOMEN AND PELVIS IC                          PROCEDURE DATE:  01/16/2022          INTERPRETATION:  CLINICAL INFORMATION: Acute abdominal pain.    COMPARISON: None.    CONTRAST/COMPLICATIONS:  IV Contrast: Omnipaque 350  90 cc administered   10 cc discarded  Oral Contrast: NONE  Complications: None reported at time of study completion    PROCEDURE:  CT of the Abdomen and Pelvis was performed.  Sagittal and coronal reformats were performed.    FINDINGS:  LOWER CHEST: Possible filling defect in a branch of the right lower lobe   pulmonary artery (series 2, image 7), suspicious for pulmonary embolism.   Bibasilar subsegmental atelectasis.    LIVER: Within normal limits.  BILE DUCTS: Normal caliber.  GALLBLADDER: Within normal limits.  SPLEEN: Subcentimeter hypodense splenic lesions, too small to   characterize.  PANCREAS: Within normal limits.  ADRENALS: Within normal limits.  KIDNEYS/URETERS: No hydronephrosis. Left upper pole renal cyst.   Additional subcentimeter hypodense bilateral renal lesions, too small to   characterize.    BLADDER: Within normal limits.  REPRODUCTIVE ORGANS: Hysterectomy.    BOWEL: Colonic diverticulosis. Long segment mural thickening of the colon   extending from the ascending colon to the level of the rectum. Small   bowel anastomosis. No bowel obstruction. Appendix is normal.  PERITONEUM: No ascites.  VESSELS: Atherosclerotic changes.  RETROPERITONEUM/LYMPH NODES: No lymphadenopathy.  ABDOMINAL WALL: Within normal limits.  BONES: Degenerative changes.    IMPRESSION:  Possible filling defect in a branch of the right lower lobe pulmonary   artery, suspicious for pulmonary embolism. Further evaluation with CTA   chest (pulmonary artery protocol) is recommended.    Long segment proctocolitis.    Findings were discussed with MARLON FERRER on 1/16/2022 at 6:28 PM   by Dr. Jean with read back confirmation.    --- End of Report ---            ANABEL JEAN MD; Attending Radiologist  This document has been electronically signed. Jan 16 2022  6:29PM

## 2022-01-17 NOTE — CONSULT NOTE ADULT - SUBJECTIVE AND OBJECTIVE BOX
[facility]  INFECTIOUS DISEASES - [address], [place] [address]  Tel: [phone]     Fax: [phone]  =======================================================    [id number]  GINA GOLD     CC: Patient is a 86y old  Female who presents with a chief complaint of Gastroenteritis (2022 23:52)    HPI:  86 female with hx of HTN, NPH, kidney stones, sent from [facility] for diarrhea x 3 days since starting antibiotics for UTI. She denies any fevers, chills, nausea or vomiting. She denies any dysuria or other urinary symptoms. She currently denies abdominal pain.      PAST MEDICAL & SURGICAL HISTORY:  HTN  NPH    Social Hx:     FAMILY HISTORY:      Allergies    No Known Allergies    Intolerances        Antibiotics:  MEDICATIONS  (STANDING):  dextrose 5% + sodium chloride 0.45% with potassium chloride 20 mEq/L 1000 milliLiter(s) (60 mL/Hr) IV Continuous <Continuous>  lactobacillus acidophilus 1 Tablet(s) Oral two times a day  losartan 25 milliGRAM(s) Oral daily  metroNIDAZOLE    Tablet 500 milliGRAM(s) Oral every 8 hours  pantoprazole  Injectable 40 milliGRAM(s) IV Push daily    MEDICATIONS  (PRN):  acetaminophen     Tablet .. 650 milliGRAM(s) Oral every 6 hours PRN Temp greater or equal to 38C (100.4F), Mild Pain (1 - 3)  aluminum hydroxide/magnesium hydroxide/simethicone Suspension 30 milliLiter(s) Oral every 4 hours PRN Dyspepsia  melatonin 3 milliGRAM(s) Oral at bedtime PRN Insomnia  OLANZapine Injectable 2.5 milliGRAM(s) IntraMuscular every 6 hours PRN agitation  ondansetron Injectable 4 milliGRAM(s) IV Push every 8 hours PRN Nausea and/or Vomiting  traMADol 50 milliGRAM(s) Oral four times a day PRN Moderate Pain (4 - 6)       REVIEW OF SYSTEMS:  CONSTITUTIONAL:  No Fever or chills  HEENT:  No sore throat or runny nose.  CARDIOVASCULAR:  No chest pain or SOB.  RESPIRATORY:  No cough, shortness of breath  GASTROINTESTINAL:  see history  GENITOURINARY:  No dysuria, frequency  MUSCULOSKELETAL:  no back pain  NEUROLOGIC:  No headache, no dizziness  PSYCHIATRIC:  No disorder of thought or mood.    Physical Exam:  Vital Signs Last 24 Hrs  T(C): 36.6 (2022 11:13), Max: 37.2 (2022 14:14)  T(F): 97.9 (2022 11:13), Max: 99 (2022 14:14)  HR: 65 (2022 11:13) (65 - 87)  BP: 117/70 (2022 11:13) (117/70 - 163/71)  BP(mean): --  RR: 16 (2022 11:13) (15 - 20)  SpO2: 96% (2022 11:13) (96% - 98%)    Weight (kg): 47.6 ( @ 14:05)  GEN: NAD  HEENT: normocephalic and atraumatic.   NECK: Supple.  LUNGS: Clear to auscultation.  HEART: Regular rate and rhythm   ABDOMEN: Soft, nontender, and nondistended.     EXTREMITIES: No leg edema.  NEUROLOGIC: grossly intact.  PSYCHIATRIC: Appropriate affect .  Labs:      140  |  107  |  25<H>  ----------------------------<  113<H>  3.1<L>   |  27  |  0.98    Ca    10.1      2022 16:25    TPro  6.9  /  Alb  3.0<L>  /  TBili  0.5  /  DBili  x   /  AST  16  /  ALT  18  /  AlkPhos  83                            11.4   10.89 )-----------( 253      ( 2022 11:42 )             35.8       Urinalysis Basic - ( 2022 15:01 )    Color: Yellow / Appearance: Slightly Turbid / S.020 / pH: x  Gluc: x / Ketone: Trace  / Bili: Negative / Urobili: Negative   Blood: x / Protein: 30 mg/dL / Nitrite: Negative   Leuk Esterase: Trace / RBC: 0-2 /HPF / WBC 3-5   Sq Epi: x / Non Sq Epi: Occasional / Bacteria: x      LIVER FUNCTIONS - ( 2022 16:25 )  Alb: 3.0 g/dL / Pro: 6.9 g/dL / ALK PHOS: 83 U/L / ALT: 18 U/L / AST: 16 U/L / GGT: x                         SARS-CoV-2 Result: Jesse (22 @ 14:58)      RECENT CULTURES:        All imaging and other data have been reviewed.    CT A/P:  LOWER CHEST: Possible filling defect in a branch of the right lower lobe   pulmonary artery (series 2, image 7), suspicious for pulmonary embolism.   Bibasilar subsegmental atelectasis.    LIVER: Within normal limits.  BILE DUCTS: Normal caliber.  GALLBLADDER: Within normal limits.  SPLEEN: Subcentimeter hypodense splenic lesions, too small to   characterize.  PANCREAS: Within normal limits.  ADRENALS: Within normal limits.  KIDNEYS/URETERS: No hydronephrosis. Left upper pole renal cyst.   Additional subcentimeter hypodense bilateral renal lesions, too small to   characterize.    BLADDER: Within normal limits.  REPRODUCTIVE ORGANS: Hysterectomy.    BOWEL: Colonic diverticulosis. Long segment mural thickening of the colon   extending from the ascending colon to the level of the rectum. Small   bowel anastomosis. No bowel obstruction. Appendix is normal.  PERITONEUM: No ascites.  VESSELS: Atherosclerotic changes.  RETROPERITONEUM/LYMPH NODES: No lymphadenopathy.  ABDOMINAL WALL: Within normal limits.  BONES: Degenerative changes.    IMPRESSION:  Possible filling defect in a branch of the right lower lobe pulmonary   artery, suspicious for pulmonary embolism. Further evaluation with CTA   chest (pulmonary artery protocol) is recommended.    Long segment proctocolitis.

## 2022-01-17 NOTE — CONSULT NOTE ADULT - ASSESSMENT
86 female with hx of HTN, NPH, kidney stones with prior stent placement, sent from [facility] for diarrhea x 3 days. Concern for clostridium difficile infection given evidence of proctocolitis on CT imaging and recent antibiotic use.     Leukocytosis improved, WBC down to 10. No evidence of  infection on CT A/P. Given patient stable would hold off antibiotics pending stool studies.    -discontinue antibiotics pending GI PCR Panel and c diff testing  -follow blood cultures    Thank you for courtesy of this consult.     Will follow.    Aracely Brady MD  Division of Infectious Diseases   Cell [phone] between 8am and 6pm   After 6pm and weekends please call ID service at [phone].  86 female with hx of HTN, NPH, kidney stones with prior stent placement, sent from [facility] for diarrhea x 3 days. Concern for clostridium difficile infection given evidence of proctocolitis on CT imaging and recent antibiotic use.     Leukocytosis improved, WBC down to 10. Pulmonary embolism seen but no evidence of  infection on CT A/P. Given patient stable would hold off antibiotics pending stool studies.    -discontinue antibiotics pending GI PCR Panel and c diff testing  -follow blood cultures    Thank you for courtesy of this consult.     Will follow.    Aracely Brady MD  Division of Infectious Diseases   Cell [phone] between 8am and 6pm   After 6pm and weekends please call ID service at [phone].

## 2022-01-17 NOTE — CONSULT NOTE ADULT - CONSULT REASON
gastroenteritides , EKg abnormalities
nausea/vomiting
pe
GOC
Subsegmental pulmonary embolism
diarrhea

## 2022-01-17 NOTE — ED ADULT NURSE REASSESSMENT NOTE - NS ED NURSE REASSESS COMMENT FT1
pt remains in er refuses all tests imaging and blood work despite several explanations from medical staff

## 2022-01-17 NOTE — PROGRESS NOTE ADULT - SUBJECTIVE AND OBJECTIVE BOX
GINA GOLD    PLV APER 01    Allergies    No Known Allergies    Intolerances        PAST MEDICAL & SURGICAL HISTORY:      FAMILY HISTORY:      Home Medications:      MEDICATIONS  (STANDING):  dextrose 5% + sodium chloride 0.45% with potassium chloride 20 mEq/L 1000 milliLiter(s) (60 mL/Hr) IV Continuous <Continuous>  enoxaparin Injectable 50 milliGRAM(s) SubCutaneous every 12 hours  lactobacillus acidophilus 1 Tablet(s) Oral two times a day  losartan 25 milliGRAM(s) Oral daily  metroNIDAZOLE    Tablet 500 milliGRAM(s) Oral every 8 hours  pantoprazole  Injectable 40 milliGRAM(s) IV Push daily    MEDICATIONS  (PRN):  acetaminophen     Tablet .. 650 milliGRAM(s) Oral every 6 hours PRN Temp greater or equal to 38C (100.4F), Mild Pain (1 - 3)  aluminum hydroxide/magnesium hydroxide/simethicone Suspension 30 milliLiter(s) Oral every 4 hours PRN Dyspepsia  melatonin 3 milliGRAM(s) Oral at bedtime PRN Insomnia  ondansetron Injectable 4 milliGRAM(s) IV Push every 8 hours PRN Nausea and/or Vomiting  traMADol 50 milliGRAM(s) Oral four times a day PRN Moderate Pain (4 - 6)              Vital Signs Last 24 Hrs  T(C): 36.5 (2022 05:28), Max: 37.2 (2022 14:14)  T(F): 97.7 (2022 05:28), Max: 99 (2022 14:14)  HR: 66 (2022 05:28) (66 - 87)  BP: 150/75 (2022 05:28) (129/68 - 163/71)  BP(mean): --  RR: 20 (2022 05:28) (15 - 20)  SpO2: 96% (2022 05:28) (96% - 98%)      22 @ 07:01  -  22 @ 07:00  --------------------------------------------------------  IN: 0 mL / OUT: 450 mL / NET: -450 mL              LABS:                        12.5   16.59 )-----------( 282      ( 2022 14:58 )             39.4         140  |  107  |  25<H>  ----------------------------<  113<H>  3.1<L>   |  27  |  0.98    Ca    10.1      2022 16:25    TPro  6.9  /  Alb  3.0<L>  /  TBili  0.5  /  DBili  x   /  AST  16  /  ALT  18  /  AlkPhos  83        Urinalysis Basic - ( 2022 15:01 )    Color: Yellow / Appearance: Slightly Turbid / S.020 / pH: x  Gluc: x / Ketone: Trace  / Bili: Negative / Urobili: Negative   Blood: x / Protein: 30 mg/dL / Nitrite: Negative   Leuk Esterase: Trace / RBC: 0-2 /HPF / WBC 3-5   Sq Epi: x / Non Sq Epi: Occasional / Bacteria: x            WBC:  WBC Count: 16.59 K/uL ( @ 14:58)      MICROBIOLOGY:  RECENT CULTURES:                  Sodium:  Sodium, Serum: 140 mmol/L ( @ 16:25)      0.98 mg/dL  @ 16:25      Hemoglobin:  Hemoglobin: 12.5 g/dL ( @ 14:58)      Platelets: Platelet Count - Automated: 282 K/uL ( @ 14:58)      LIVER FUNCTIONS - ( 2022 16:25 )  Alb: 3.0 g/dL / Pro: 6.9 g/dL / ALK PHOS: 83 U/L / ALT: 18 U/L / AST: 16 U/L / GGT: x             Urinalysis Basic - ( 2022 15:01 )    Color: Yellow / Appearance: Slightly Turbid / S.020 / pH: x  Gluc: x / Ketone: Trace  / Bili: Negative / Urobili: Negative   Blood: x / Protein: 30 mg/dL / Nitrite: Negative   Leuk Esterase: Trace / RBC: 0-2 /HPF / WBC 3-5   Sq Epi: x / Non Sq Epi: Occasional / Bacteria: x        RADIOLOGY & ADDITIONAL STUDIES:      MICROBIOLOGY:  RECENT CULTURES:

## 2022-01-18 LAB
ALBUMIN SERPL ELPH-MCNC: 2.3 G/DL — LOW (ref 3.3–5)
ALP SERPL-CCNC: 68 U/L — SIGNIFICANT CHANGE UP (ref 40–120)
ALT FLD-CCNC: 15 U/L — SIGNIFICANT CHANGE UP (ref 12–78)
ANION GAP SERPL CALC-SCNC: 5 MMOL/L — SIGNIFICANT CHANGE UP (ref 5–17)
AST SERPL-CCNC: 16 U/L — SIGNIFICANT CHANGE UP (ref 15–37)
BILIRUB SERPL-MCNC: 0.3 MG/DL — SIGNIFICANT CHANGE UP (ref 0.2–1.2)
BUN SERPL-MCNC: 12 MG/DL — SIGNIFICANT CHANGE UP (ref 7–23)
CALCIUM SERPL-MCNC: 8.8 MG/DL — SIGNIFICANT CHANGE UP (ref 8.5–10.1)
CHLORIDE SERPL-SCNC: 112 MMOL/L — HIGH (ref 96–108)
CO2 SERPL-SCNC: 26 MMOL/L — SIGNIFICANT CHANGE UP (ref 22–31)
CREAT SERPL-MCNC: 0.97 MG/DL — SIGNIFICANT CHANGE UP (ref 0.5–1.3)
FOLATE SERPL-MCNC: 6.4 NG/ML — SIGNIFICANT CHANGE UP
GLUCOSE SERPL-MCNC: 120 MG/DL — HIGH (ref 70–99)
HCT VFR BLD CALC: 33.8 % — LOW (ref 34.5–45)
HGB BLD-MCNC: 10.8 G/DL — LOW (ref 11.5–15.5)
INR BLD: 1.14 RATIO — SIGNIFICANT CHANGE UP (ref 0.88–1.16)
MAGNESIUM SERPL-MCNC: 1.8 MG/DL — SIGNIFICANT CHANGE UP (ref 1.6–2.6)
MCHC RBC-ENTMCNC: 27.9 PG — SIGNIFICANT CHANGE UP (ref 27–34)
MCHC RBC-ENTMCNC: 32 GM/DL — SIGNIFICANT CHANGE UP (ref 32–36)
MCV RBC AUTO: 87.3 FL — SIGNIFICANT CHANGE UP (ref 80–100)
NRBC # BLD: 0 /100 WBCS — SIGNIFICANT CHANGE UP (ref 0–0)
PHOSPHATE SERPL-MCNC: 2.2 MG/DL — LOW (ref 2.5–4.5)
PLATELET # BLD AUTO: 265 K/UL — SIGNIFICANT CHANGE UP (ref 150–400)
POTASSIUM SERPL-MCNC: 3 MMOL/L — LOW (ref 3.5–5.3)
POTASSIUM SERPL-SCNC: 3 MMOL/L — LOW (ref 3.5–5.3)
PROT SERPL-MCNC: 5.8 G/DL — LOW (ref 6–8.3)
PROTHROM AB SERPL-ACNC: 13.2 SEC — SIGNIFICANT CHANGE UP (ref 10.6–13.6)
RBC # BLD: 3.87 M/UL — SIGNIFICANT CHANGE UP (ref 3.8–5.2)
RBC # FLD: 14.1 % — SIGNIFICANT CHANGE UP (ref 10.3–14.5)
SODIUM SERPL-SCNC: 143 MMOL/L — SIGNIFICANT CHANGE UP (ref 135–145)
TSH SERPL-MCNC: 1.01 UIU/ML — SIGNIFICANT CHANGE UP (ref 0.36–3.74)
VIT B12 SERPL-MCNC: 342 PG/ML — SIGNIFICANT CHANGE UP (ref 232–1245)
WBC # BLD: 8.55 K/UL — SIGNIFICANT CHANGE UP (ref 3.8–10.5)
WBC # FLD AUTO: 8.55 K/UL — SIGNIFICANT CHANGE UP (ref 3.8–10.5)

## 2022-01-18 PROCEDURE — 70551 MRI BRAIN STEM W/O DYE: CPT | Mod: 26

## 2022-01-18 PROCEDURE — 70544 MR ANGIOGRAPHY HEAD W/O DYE: CPT | Mod: 26,59

## 2022-01-18 PROCEDURE — 99233 SBSQ HOSP IP/OBS HIGH 50: CPT

## 2022-01-18 PROCEDURE — 93970 EXTREMITY STUDY: CPT | Mod: 26

## 2022-01-18 RX ORDER — POTASSIUM PHOSPHATE, MONOBASIC POTASSIUM PHOSPHATE, DIBASIC 236; 224 MG/ML; MG/ML
15 INJECTION, SOLUTION INTRAVENOUS ONCE
Refills: 0 | Status: COMPLETED | OUTPATIENT
Start: 2022-01-18 | End: 2022-01-18

## 2022-01-18 RX ADMIN — POTASSIUM PHOSPHATE, MONOBASIC POTASSIUM PHOSPHATE, DIBASIC 62.5 MILLIMOLE(S): 236; 224 INJECTION, SOLUTION INTRAVENOUS at 10:22

## 2022-01-18 RX ADMIN — LOSARTAN POTASSIUM 25 MILLIGRAM(S): 100 TABLET, FILM COATED ORAL at 05:45

## 2022-01-18 RX ADMIN — ENOXAPARIN SODIUM 50 MILLIGRAM(S): 100 INJECTION SUBCUTANEOUS at 05:44

## 2022-01-18 RX ADMIN — ENOXAPARIN SODIUM 50 MILLIGRAM(S): 100 INJECTION SUBCUTANEOUS at 17:36

## 2022-01-18 RX ADMIN — Medication 1 TABLET(S): at 17:36

## 2022-01-18 RX ADMIN — PANTOPRAZOLE SODIUM 40 MILLIGRAM(S): 20 TABLET, DELAYED RELEASE ORAL at 12:23

## 2022-01-18 RX ADMIN — DEXTROSE MONOHYDRATE, SODIUM CHLORIDE, AND POTASSIUM CHLORIDE 60 MILLILITER(S): 50; .745; 4.5 INJECTION, SOLUTION INTRAVENOUS at 10:22

## 2022-01-18 RX ADMIN — Medication 125 MILLIGRAM(S): at 05:50

## 2022-01-18 RX ADMIN — Medication 125 MILLIGRAM(S): at 00:47

## 2022-01-18 RX ADMIN — Medication 125 MILLIGRAM(S): at 12:23

## 2022-01-18 RX ADMIN — Medication 1 TABLET(S): at 05:45

## 2022-01-18 RX ADMIN — Medication 125 MILLIGRAM(S): at 17:37

## 2022-01-18 NOTE — PROGRESS NOTE ADULT - SUBJECTIVE AND OBJECTIVE BOX
Neurology follow up note    GINA [id number]      Interval History:    Patient feels ok no new complaints.    Allergies    No Known Allergies    Intolerances        MEDICATIONS    acetaminophen     Tablet .. 650 milliGRAM(s) Oral every 6 hours PRN  aluminum hydroxide/magnesium hydroxide/simethicone Suspension 30 milliLiter(s) Oral every 4 hours PRN  dextrose 5% + sodium chloride 0.45% with potassium chloride 20 mEq/L 1000 milliLiter(s) IV Continuous <Continuous>  enoxaparin Injectable 50 milliGRAM(s) SubCutaneous every 12 hours  lactobacillus acidophilus 1 Tablet(s) Oral two times a day  LORazepam   Injectable 1 milliGRAM(s) IV Push once PRN  losartan 25 milliGRAM(s) Oral daily  melatonin 3 milliGRAM(s) Oral at bedtime PRN  OLANZapine Injectable 2.5 milliGRAM(s) IntraMuscular every 6 hours PRN  ondansetron Injectable 4 milliGRAM(s) IV Push every 8 hours PRN  pantoprazole  Injectable 40 milliGRAM(s) IV Push daily  traMADol 50 milliGRAM(s) Oral four times a day PRN  vancomycin    Solution 125 milliGRAM(s) Oral every 6 hours              Vital Signs Last 24 Hrs  T(C): 36.4 (2022 20:21), Max: 36.4 (2022 20:21)  T(F): 97.6 (2022 20:21), Max: 97.6 (2022 20:21)  HR: 73 (2022 05:47) (70 - 77)  BP: 160/77 (2022 05:47) (143/61 - 162/67)  BP(mean): --  RR: 18 (2022 05:47) (18 - 19)  SpO2: 99% (2022 05:47) (97% - 99%)    REVIEW OF SYSTEMS:  Limited secondary to the patient has forgetfulness, but constitutionally, she denies fevers, chills, or night sweats.  Head:  No headaches.  Eyes:  No double vision or blurry vision.  Ears:  No ringing in the ears.  Neck:  No neck pain.  Respiratory:  No shortness of breath.  Cardiovascular:  No chest pain.  Abdomen:  No nausea, vomiting, or abdominal pain.  Genitourinary:  Does have a history of urinary incontinence.  Extremities/Neurological:  No numbness or tingling.  Musculoskeletal:  Occasional joint pain.    PHYSICAL EXAMINATION:   HEENT:  Head:  Normocephalic, atraumatic.  Eyes:  No scleral icterus.  Ears:  Hearing bilaterally intact.  NECK:  Supple.  RESPIRATORY:  Good air entry bilaterally.  CARDIOVASCULAR:  S1 and S2 heard.  ABDOMEN:  Soft and nontender.  EXTREMITIES:  No clubbing or cyanosis was noted.      NEUROLOGIC:  The patient is awake and alert.  Location was hospital, year was , month was February, was not able to say how many nickels in a quarter but 10 dimes in a dollar, could not say dog backward is spelled as God.  Recall was 0/3 within three minutes, even with prompting.  She was able to follow complex commands, touch right hand and touched left ear, was able to name simple objects, was able to tell two daughters' name.  Extraocular movements were intact.  Speech was fluent.  Smile was symmetric.  Motor:  Bilateral upper were 4/5, bilateral lower were 3/5.  At present, is not ambulatory as per my conversation with the daughter.              LABS:  CBC Full  -  ( 2022 02:14 )  WBC Count : 8.55 K/uL  RBC Count : 3.87 M/uL  Hemoglobin : 10.8 g/dL  Hematocrit : 33.8 %  Platelet Count - Automated : 265 K/uL  Mean Cell Volume : 87.3 fl  Mean Cell Hemoglobin : 27.9 pg  Mean Cell Hemoglobin Concentration : 32.0 gm/dL  Auto Neutrophil # : x  Auto Lymphocyte # : x  Auto Monocyte # : x  Auto Eosinophil # : x  Auto Basophil # : x  Auto Neutrophil % : x  Auto Lymphocyte % : x  Auto Monocyte % : x  Auto Eosinophil % : x  Auto Basophil % : x    Urinalysis Basic - ( 2022 15:01 )    Color: Yellow / Appearance: Slightly Turbid / S.020 / pH: x  Gluc: x / Ketone: Trace  / Bili: Negative / Urobili: Negative   Blood: x / Protein: 30 mg/dL / Nitrite: Negative   Leuk Esterase: Trace / RBC: 0-2 /HPF / WBC 3-5   Sq Epi: x / Non Sq Epi: Occasional / Bacteria: x          143  |  112<H>  |  12  ----------------------------<  120<H>  3.0<L>   |  26  |  0.97    Ca    8.8      2022 02:14  Phos  2.2       Mg     1.8         TPro  5.8<L>  /  Alb  2.3<L>  /  TBili  0.3  /  DBili  x   /  AST  16  /  ALT  15  /  AlkPhos  68      Hemoglobin A1C:     LIVER FUNCTIONS - ( 2022 02:14 )  Alb: 2.3 g/dL / Pro: 5.8 g/dL / ALK PHOS: 68 U/L / ALT: 15 U/L / AST: 16 U/L / GGT: x           Vitamin B12 Vitamin B12, Serum: 342 pg/mL ( @ 03:19)    PT/INR - ( 2022 06:05 )   PT: 13.2 sec;   INR: 1.14 ratio               RADIOLOGY    ANALYSIS AND PLAN:  An 86-year-old with an episode of memory issues.  For episode of memory issues, suspect most likely the patient does have mild cognitive impairment versus more probable underlying dementia.  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  If the patient is able to go for it, will give Ativan,  For mild cognitive impairment versus subtle dementia supportive treatment for now   For history of hypertension, monitor systolic blood pressure.  For periods of agitation, I would recommend a psychiatry follow-up.  spoke to outside neurologist DR Arceo      Spoke with the daughter at the bedside, her name is Cris, her cell number is [phone], the home number is [phone].   She does understand while in the hospital setting, she may become more disoriented.    Greater than 45 minutes of time was spent with the patient, plan of care, reviewing data, with greater than 50% of the visit was spent counseling and/or coordinating care with multidisciplinary healthcare team

## 2022-01-18 NOTE — PROGRESS NOTE ADULT - SUBJECTIVE AND OBJECTIVE BOX
PROGRESS NOTE  Patient is a 86y old  Female who presents with a chief complaint of Gastroenteritis (2022 23:52)  Chart and available morning labs /imaging are reviewed electronically , urgent issues addressed . More information  is being added upon completion of rounds , when more information is collected and management discussed with consultants , medical staff and social service/case management on the floor     OVERNIGHT  No new issues reported by medical staff . All above noted Patient is resting in a bed comfortably .Confused ,poor mentation .No distress noted   Denies complains Seen in ER   HPI:  86 female sent from [place] for diarrhea x 3 days since starting antibiotics for UTI.   	Per patient she was being treated for UTI and began having diarrhea and RLQ pain.   	Denies fever, chills.   	Denies n/v.   	+ Abdominal pain , + Diarrhea  	Denies current urinary symptoms.  Denies CP, cough or SOBno abdominal distension, no blood in stool, no burning urination, no chills, no dysuria, no fever, no hematuria, no nausea, no vomiting++ RLQ abdominal pain, no bloating, no constipation, ++ diarrhea,   < from: CT Abdomen and Pelvis w/ IV Cont (22 @ 17:29) >    ACC: [id number] EXAM:  CT ABDOMEN AND PELVIS IC                          PROCEDURE DATE:  2022          INTERPRETATION:  CLINICAL INFORMATION: Acute abdominal pain.    COMPARISON: None.    CONTRAST/COMPLICATIONS:  IV Contrast: Omnipaque 350  90cc administered   10 cc discarded  Oral Contrast: NONE  Complications: None reported at time of study completion    PROCEDURE:  CT of the Abdomen and Pelvis was performed.  Sagittal and coronal reformats were performed.    FINDINGS:  LOWER CHEST: Possible filling defect in a branch of the right lower lobe   pulmonary artery (series 2, image 7), suspicious for pulmonary embolism.   Bibasilar subsegmental atelectasis.    LIVER: Within normal limits.  BILE DUCTS: Normal caliber.  GALLBLADDER: Withinnormal limits.  SPLEEN: Subcentimeter hypodense splenic lesions, too small to   characterize.  PANCREAS: Within normal limits.  ADRENALS: Within normal limits.  KIDNEYS/URETERS: No hydronephrosis. Left upper pole renal cyst.   Additional subcentimeter hypodense bilateral renal lesions, too small to   characterize.    BLADDER: Within normal limits.  REPRODUCTIVE ORGANS: Hysterectomy.    BOWEL: Colonic diverticulosis. Long segment mural thickening of the colon   extending from the ascending colon to the level of the rectum. Small   bowel anastomosis. No bowel obstruction. Appendix is normal.  PERITONEUM: No ascites.  VESSELS: Atherosclerotic changes.  RETROPERITONEUM/LYMPH NODES: No lymphadenopathy.  ABDOMINAL WALL: Within normal limits.  BONES: Degenerative changes.    IMPRESSION:  Possible filling defect in a branch of the right lower lobe pulmonary   artery, suspicious for pulmonary embolism. Further evaluation with CTA   chest (pulmonary artery protocol) is recommended.    Long segment proctocolitis.    Findings were discussed with MARLON FERRER on 2022 at 6:28 PM   by Dr. Wagner with read back confirmation.    --- End of Report ---            ANABEL WAGNER MD; Attending Radiologist  This document has been electronically signed. 2022  6:29PM    < end of copied text >   (2022 18:47)    PAST MEDICAL & SURGICAL HISTORY:      MEDICATIONS  (STANDING):  dextrose 5% + sodium chloride 0.45% with potassium chloride 20 mEq/L 1000 milliLiter(s) (60 mL/Hr) IV Continuous <Continuous>  enoxaparin Injectable 50 milliGRAM(s) SubCutaneous every 12 hours  lactobacillus acidophilus 1 Tablet(s) Oral two times a day  losartan 25 milliGRAM(s) Oral daily  pantoprazole  Injectable 40 milliGRAM(s) IV Push daily  vancomycin    Solution 125 milliGRAM(s) Oral every 6 hours    MEDICATIONS  (PRN):  acetaminophen     Tablet .. 650 milliGRAM(s) Oral every 6 hours PRN Temp greater or equal to 38C (100.4F), Mild Pain (1 - 3)  aluminum hydroxide/magnesium hydroxide/simethicone Suspension 30 milliLiter(s) Oral every 4 hours PRN Dyspepsia  LORazepam   Injectable 1 milliGRAM(s) IV Push once PRN mri  LORazepam   Injectable 1 milliGRAM(s) IV Push once PRN mri  melatonin 3 milliGRAM(s) Oral at bedtime PRN Insomnia  OLANZapine Injectable 2.5 milliGRAM(s) IntraMuscular every 6 hours PRN agitation  ondansetron Injectable 4 milliGRAM(s) IV Push every 8 hours PRN Nausea and/or Vomiting  traMADol 50 milliGRAM(s) Oral four times a day PRN Moderate Pain (4 - 6)      OBJECTIVE    T(C): 36.4 (22 @ 20:21), Max: 36.4 (22 @ 20:21)  HR: 73 (22 @ 05:47) (70 - 77)  BP: 160/77 (22 @ 05:47) (143/61 - 162/67)  RR: 18 (22 @ 05:47) (18 - 19)  SpO2: 99% (22 @ 05:47) (97% - 99%)  Wt(kg): --  I&O's Summary        REVIEW OF SYSTEMS:  CONSTITUTIONAL: No fever, weight loss, or fatigue  EYES: No eye pain, visual disturbances, or discharge  ENMT:   No sinus or throat pain  NECK: No pain or stiffness  RESPIRATORY: No cough, wheezing, chills or hemoptysis; No shortness of breath  CARDIOVASCULAR: No chest pain, palpitations, dizziness, or leg swelling  GASTROINTESTINAL: No abdominal pain. No nausea, vomiting; No diarrhea or constipation. No melena or hematochezia.  GENITOURINARY: No dysuria, frequency, hematuria, or incontinence  NEUROLOGICAL: No headaches, memory loss, loss of strength, numbness, or tremors  SKIN: No itching, burning, rashes, or lesions   MUSCULOSKELETAL: No joint pain or swelling; No muscle, back, or extremity pain    PHYSICAL EXAM:  Appearance: NAD. VS past 24 hrs -as above   HEENT:   Moist oral mucosa. Conjunctiva clear b/l.   Neck : supple  Respiratory: Lungs CTAB.  Gastrointestinal:  Soft, nontender. No rebound. No rigidity. BS present	  Cardiovascular: RRR ,S1S2 present  Neurologic: Non-focal. Moving all extremities.  Extremities: No edema. No erythema. No calf tenderness.  Skin: No rashes, No ecchymoses, No cyanosis.	  wounds ,skin lesions-See skin assesment flow sheet   LABS:                        10.8   8.55  )-----------( 265      ( 2022 02:14 )             33.8     -    143  |  112<H>  |  12  ----------------------------<  120<H>  3.0<L>   |  26  |  0.97    Ca    8.8      2022 02:14  Phos  2.2       Mg     1.8         TPro  5.8<L>  /  Alb  2.3<L>  /  TBili  0.3  /  DBili  x   /  AST  16  /  ALT  15  /  AlkPhos  68  -18    CAPILLARY BLOOD GLUCOSE        PT/INR - ( 2022 06:05 )   PT: 13.2 sec;   INR: 1.14 ratio           Urinalysis Basic - ( 2022 15:01 )    Color: Yellow / Appearance: Slightly Turbid / S.020 / pH: x  Gluc: x / Ketone: Trace  / Bili: Negative / Urobili: Negative   Blood: x / Protein: 30 mg/dL / Nitrite: Negative   Leuk Esterase: Trace / RBC: 0-2 /HPF / WBC 3-5   Sq Epi: x / Non Sq Epi: Occasional / Bacteria: x        Culture - Urine (collected 2022 20:54)  Source: Clean Catch Clean Catch (Midstream)  Final Report (2022 16:32):    No growth      RADIOLOGY & ADDITIONAL TESTS:   reviewed elctronically  ASSESSMENT/PLAN: 	    25 minutes aggregate time was spent on this visit, 50% visit time spent in care co-ordination with other attendings and counselling patient .I have discussed care plan with patient / HCP/family member ,who expressed understanding of problems treatment and their effect and side effects, alternatives in details. I have asked if they have any questions and concerns and appropriately addressed them to best of my ability. Advance care planning was discussed , pallitaive care issues ,CMO ,hospice levels of care were discussed in details , forms ,advance directives were reviewed .All questions were answered to the best of my knowledge .25 min spent.

## 2022-01-18 NOTE — PROGRESS NOTE ADULT - SUBJECTIVE AND OBJECTIVE BOX
Date/Time Patient Seen:  		  Referring MD:   Data Reviewed	       Patient is a 86y old  Female who presents with a chief complaint of Gastroenteritis (16 Jan 2022 23:52)      Subjective/HPI     PAST MEDICAL & SURGICAL HISTORY:  No pertinent past medical history          Medication list         MEDICATIONS  (STANDING):  dextrose 5% + sodium chloride 0.45% with potassium chloride 20 mEq/L 1000 milliLiter(s) (60 mL/Hr) IV Continuous <Continuous>  enoxaparin Injectable 50 milliGRAM(s) SubCutaneous every 12 hours  lactobacillus acidophilus 1 Tablet(s) Oral two times a day  losartan 25 milliGRAM(s) Oral daily  pantoprazole  Injectable 40 milliGRAM(s) IV Push daily  vancomycin    Solution 125 milliGRAM(s) Oral every 6 hours    MEDICATIONS  (PRN):  acetaminophen     Tablet .. 650 milliGRAM(s) Oral every 6 hours PRN Temp greater or equal to 38C (100.4F), Mild Pain (1 - 3)  aluminum hydroxide/magnesium hydroxide/simethicone Suspension 30 milliLiter(s) Oral every 4 hours PRN Dyspepsia  LORazepam   Injectable 1 milliGRAM(s) IV Push once PRN mri  melatonin 3 milliGRAM(s) Oral at bedtime PRN Insomnia  OLANZapine Injectable 2.5 milliGRAM(s) IntraMuscular every 6 hours PRN agitation  ondansetron Injectable 4 milliGRAM(s) IV Push every 8 hours PRN Nausea and/or Vomiting  traMADol 50 milliGRAM(s) Oral four times a day PRN Moderate Pain (4 - 6)         Vitals log        ICU Vital Signs Last 24 Hrs  T(C): 36.4 (17 Jan 2022 20:21), Max: 36.6 (17 Jan 2022 11:13)  T(F): 97.6 (17 Jan 2022 20:21), Max: 97.9 (17 Jan 2022 11:13)  HR: 70 (18 Jan 2022 00:00) (65 - 77)  BP: 143/61 (18 Jan 2022 00:00) (117/70 - 162/67)  BP(mean): --  ABP: --  ABP(mean): --  RR: 19 (18 Jan 2022 00:00) (16 - 19)  SpO2: 98% (18 Jan 2022 00:00) (96% - 98%)           Input and Output:  I&O's Detail    16 Jan 2022 07:01  -  17 Jan 2022 07:00  --------------------------------------------------------  IN:  Total IN: 0 mL    OUT:    Voided (mL): 450 mL  Total OUT: 450 mL    Total NET: -450 mL          Lab Data                        10.8   8.55  )-----------( 265      ( 18 Jan 2022 02:14 )             33.8     01-18    143  |  112<H>  |  12  ----------------------------<  120<H>  3.0<L>   |  26  |  0.97    Ca    8.8      18 Jan 2022 02:14  Phos  2.2     01-18  Mg     1.8     01-18    TPro  5.8<L>  /  Alb  2.3<L>  /  TBili  0.3  /  DBili  x   /  AST  16  /  ALT  15  /  AlkPhos  68  01-18            Review of Systems	      Objective     Physical Examination  heart s1s2  lung dec BS  abd soft  head nc        Pertinent Lab findings & Imaging      Víctor:  NO   Adequate UO     I&O's Detail    16 Jan 2022 07:01  -  17 Jan 2022 07:00  --------------------------------------------------------  IN:  Total IN: 0 mL    OUT:    Voided (mL): 450 mL  Total OUT: 450 mL    Total NET: -450 mL               Discussed with:     Cultures:	        Radiology

## 2022-01-18 NOTE — PROGRESS NOTE ADULT - SUBJECTIVE AND OBJECTIVE BOX
[facility]  Ty Sheldon PA-C  [facility] [place] [address] [phone]      INTERVAL HPI/OVERNIGHT EVENTS:  Pt s/e  Pt not very responsive, +loose BMs reported by staff  C. diff results noted    MEDICATIONS  (STANDING):  dextrose 5% + sodium chloride 0.45% with potassium chloride 20 mEq/L 1000 milliLiter(s) (60 mL/Hr) IV Continuous <Continuous>  enoxaparin Injectable 50 milliGRAM(s) SubCutaneous every 12 hours  lactobacillus acidophilus 1 Tablet(s) Oral two times a day  losartan 25 milliGRAM(s) Oral daily  pantoprazole  Injectable 40 milliGRAM(s) IV Push daily  vancomycin    Solution 125 milliGRAM(s) Oral every 6 hours    MEDICATIONS  (PRN):  acetaminophen     Tablet .. 650 milliGRAM(s) Oral every 6 hours PRN Temp greater or equal to 38C (100.4F), Mild Pain (1 - 3)  aluminum hydroxide/magnesium hydroxide/simethicone Suspension 30 milliLiter(s) Oral every 4 hours PRN Dyspepsia  LORazepam   Injectable 1 milliGRAM(s) IV Push once PRN mri  melatonin 3 milliGRAM(s) Oral at bedtime PRN Insomnia  OLANZapine Injectable 2.5 milliGRAM(s) IntraMuscular every 6 hours PRN agitation  ondansetron Injectable 4 milliGRAM(s) IV Push every 8 hours PRN Nausea and/or Vomiting  traMADol 50 milliGRAM(s) Oral four times a day PRN Moderate Pain (4 - 6)      Allergies:  No Known Allergies      PHYSICAL EXAM:   Vital Signs:  Vital Signs Last 24 Hrs  T(C): 36.4 (2022 20:21), Max: 36.4 (2022 20:21)  T(F): 97.6 (2022 20:21), Max: 97.6 (2022 20:21)  HR: 73 (2022 05:47) (70 - 77)  BP: 160/77 (2022 05:47) (143/61 - 162/67)  BP(mean): --  RR: 18 (2022 05:47) (18 - 19)  SpO2: 99% (2022 05:47) (97% - 99%)  Daily     Daily     GENERAL:  Appears stated age,   HEENT:  NC/AT,    CHEST:  Full & symmetric excursion,   HEART:  Regular rhythm,  ABDOMEN:  Soft, non-tender, non-distended,  EXTEREMITIES:  no cyanosis  SKIN:  No rash  NEURO:  Alert,       LABS:                        10.8   8.55  )-----------( 265      ( 2022 02:14 )             33.8     01-18    143  |  112<H>  |  12  ----------------------------<  120<H>  3.0<L>   |  26  |  0.97    Ca    8.8      2022 02:14  Phos  2.2     01-18  Mg     1.8     -18    TPro  5.8<L>  /  Alb  2.3<L>  /  TBili  0.3  /  DBili  x   /  AST  16  /  ALT  15  /  AlkPhos  68  01-18    PT/INR - ( 2022 06:05 )   PT: 13.2 sec;   INR: 1.14 ratio           Urinalysis Basic - ( 2022 15:01 )    Color: Yellow / Appearance: Slightly Turbid / S.020 / pH: x  Gluc: x / Ketone: Trace  / Bili: Negative / Urobili: Negative   Blood: x / Protein: 30 mg/dL / Nitrite: Negative   Leuk Esterase: Trace / RBC: 0-2 /HPF / WBC 3-5   Sq Epi: x / Non Sq Epi: Occasional / Bacteria: x

## 2022-01-18 NOTE — PROGRESS NOTE ADULT - REASON FOR ADMISSION
86 female sent from [facility] living for diarrhea x 3 days since starting antibiotics for UTI.   	Per patient she was being treated for UTI and began having diarrhea and RLQ pain.   	Denies fever, chills.   	Denies n/v.   	+ Abdominal pain , + Diarrhea  	Denies current urinary symptoms.  Denies CP, cough or SOBno abdominal distension, no blood in stool, no burning urination, no chills, no dysuria, no fever, no hematuria, no nausea, no vomiting++ RLQ abdominal pain, no bloating, no constipation, ++ diarrhea,   < from: CT Abdomen and Pelvis w/ IV Cont (01.16.22 @ 17:29) >

## 2022-01-18 NOTE — PROGRESS NOTE ADULT - SUBJECTIVE AND OBJECTIVE BOX
GINA GOLD    PLV APER 01    Allergies    No Known Allergies    Intolerances        PAST MEDICAL & SURGICAL HISTORY:      FAMILY HISTORY:      Home Medications:      MEDICATIONS  (STANDING):  dextrose 5% + sodium chloride 0.45% with potassium chloride 20 mEq/L 1000 milliLiter(s) (60 mL/Hr) IV Continuous <Continuous>  enoxaparin Injectable 50 milliGRAM(s) SubCutaneous every 12 hours  lactobacillus acidophilus 1 Tablet(s) Oral two times a day  losartan 25 milliGRAM(s) Oral daily  pantoprazole  Injectable 40 milliGRAM(s) IV Push daily  potassium phosphate IVPB 15 milliMole(s) IV Intermittent once  vancomycin    Solution 125 milliGRAM(s) Oral every 6 hours    MEDICATIONS  (PRN):  acetaminophen     Tablet .. 650 milliGRAM(s) Oral every 6 hours PRN Temp greater or equal to 38C (100.4F), Mild Pain (1 - 3)  aluminum hydroxide/magnesium hydroxide/simethicone Suspension 30 milliLiter(s) Oral every 4 hours PRN Dyspepsia  LORazepam   Injectable 1 milliGRAM(s) IV Push once PRN mri  melatonin 3 milliGRAM(s) Oral at bedtime PRN Insomnia  OLANZapine Injectable 2.5 milliGRAM(s) IntraMuscular every 6 hours PRN agitation  ondansetron Injectable 4 milliGRAM(s) IV Push every 8 hours PRN Nausea and/or Vomiting  traMADol 50 milliGRAM(s) Oral four times a day PRN Moderate Pain (4 - 6)              Vital Signs Last 24 Hrs  T(C): 36.4 (2022 20:21), Max: 36.6 (2022 11:13)  T(F): 97.6 (2022 20:21), Max: 97.9 (2022 11:13)  HR: 73 (2022 05:47) (65 - 77)  BP: 160/77 (2022 05:47) (117/70 - 162/67)  BP(mean): --  RR: 18 (2022 05:47) (16 - 19)  SpO2: 99% (2022 05:47) (96% - 99%)              LABS:                        10.8   8.55  )-----------( 265      ( 2022 02:14 )             33.8     01-18    143  |  112<H>  |  12  ----------------------------<  120<H>  3.0<L>   |  26  |  0.97    Ca    8.8      2022 02:14  Phos  2.2       Mg     1.8         TPro  5.8<L>  /  Alb  2.3<L>  /  TBili  0.3  /  DBili  x   /  AST  16  /  ALT  15  /  AlkPhos  68      PT/INR - ( 2022 06:05 )   PT: 13.2 sec;   INR: 1.14 ratio           Urinalysis Basic - ( 2022 15:01 )    Color: Yellow / Appearance: Slightly Turbid / S.020 / pH: x  Gluc: x / Ketone: Trace  / Bili: Negative / Urobili: Negative   Blood: x / Protein: 30 mg/dL / Nitrite: Negative   Leuk Esterase: Trace / RBC: 0-2 /HPF / WBC 3-5   Sq Epi: x / Non Sq Epi: Occasional / Bacteria: x            WBC:  WBC Count: 8.55 K/uL ( @ 02:14)  WBC Count: 10.89 K/uL ( @ 11:42)  WBC Count: 16.59 K/uL ( @ 14:58)      MICROBIOLOGY:  RECENT CULTURES:   Clean Catch Clean Catch (Midstream) XXXX XXXX   No growth                PT/INR - ( 2022 06:05 )   PT: 13.2 sec;   INR: 1.14 ratio             Sodium:  Sodium, Serum: 143 mmol/L ( @ 02:14)  Sodium, Serum: 144 mmol/L ( @ 11:42)  Sodium, Serum: 140 mmol/L ( @ 16:25)      0.97 mg/dL :14  0.81 mg/dL  @ 11:42  0.98 mg/dL  @ 16:25      Hemoglobin:  Hemoglobin: 10.8 g/dL ( @ 02:14)  Hemoglobin: 11.4 g/dL ( @ 11:42)  Hemoglobin: 12.5 g/dL ( @ 14:58)      Platelets: Platelet Count - Automated: 265 K/uL ( @ 02:14)  Platelet Count - Automated: 253 K/uL ( @ 11:42)  Platelet Count - Automated: 282 K/uL ( @ 14:58)      LIVER FUNCTIONS - ( 2022 02:14 )  Alb: 2.3 g/dL / Pro: 5.8 g/dL / ALK PHOS: 68 U/L / ALT: 15 U/L / AST: 16 U/L / GGT: x             Urinalysis Basic - ( 2022 15:01 )    Color: Yellow / Appearance: Slightly Turbid / S.020 / pH: x  Gluc: x / Ketone: Trace  / Bili: Negative / Urobili: Negative   Blood: x / Protein: 30 mg/dL / Nitrite: Negative   Leuk Esterase: Trace / RBC: 0-2 /HPF / WBC 3-5   Sq Epi: x / Non Sq Epi: Occasional / Bacteria: x        RADIOLOGY & ADDITIONAL STUDIES:      MICROBIOLOGY:  RECENT CULTURES:   Clean Catch Clean Catch (Midstream) XXXX XXXX   No growth

## 2022-01-18 NOTE — PROGRESS NOTE ADULT - SUBJECTIVE AND OBJECTIVE BOX
[facility]  INFECTIOUS DISEASES - [address], [place] [address]  Tel: [phone]     Fax: [phone]  =======================================================    GINA GOLD [id number]    Follow up: No fevers. Abdomen feels better and thinks diarrhea is subsiding. Denies any nausea or vomiting.    Allergies:  No Known Allergies      Antibiotics:  acetaminophen     Tablet .. 650 milliGRAM(s) Oral every 6 hours PRN  aluminum hydroxide/magnesium hydroxide/simethicone Suspension 30 milliLiter(s) Oral every 4 hours PRN  dextrose 5% + sodium chloride 0.45% with potassium chloride 20 mEq/L 1000 milliLiter(s) IV Continuous <Continuous>  enoxaparin Injectable 50 milliGRAM(s) SubCutaneous every 12 hours  lactobacillus acidophilus 1 Tablet(s) Oral two times a day  LORazepam   Injectable 1 milliGRAM(s) IV Push once PRN  losartan 25 milliGRAM(s) Oral daily  melatonin 3 milliGRAM(s) Oral at bedtime PRN  OLANZapine Injectable 2.5 milliGRAM(s) IntraMuscular every 6 hours PRN  ondansetron Injectable 4 milliGRAM(s) IV Push every 8 hours PRN  pantoprazole  Injectable 40 milliGRAM(s) IV Push daily  traMADol 50 milliGRAM(s) Oral four times a day PRN  vancomycin    Solution 125 milliGRAM(s) Oral every 6 hours       REVIEW OF SYSTEMS:  CONSTITUTIONAL:  No Fever or chills  HEENT:  No sore throat or runny nose.  CARDIOVASCULAR:  No chest pain or SOB.  RESPIRATORY:  No cough, shortness of breath  GASTROINTESTINAL:  see history  GENITOURINARY:  No dysuria, frequency  NEUROLOGIC:  No headache, no dizziness  PSYCHIATRIC:  No disorder of thought or mood.     Physical Exam:  ICU Vital Signs Last 24 Hrs  T(C): 36.4 (2022 20:21), Max: 36.4 (2022 20:21)  T(F): 97.6 (2022 20:21), Max: 97.6 (2022 20:21)  HR: 73 (2022 05:47) (70 - 77)  BP: 160/77 (2022 05:47) (143/61 - 162/67)  BP(mean): --  ABP: --  ABP(mean): --  RR: 18 (2022 05:47) (18 - 19)  SpO2: 99% (2022 05:47) (97% - 99%)     GEN: NAD  HEENT: normocephalic and atraumatic.   NECK: Supple.  LUNGS: normal respiratory effort  HEART: Regular rate and rhythm   ABDOMEN: Soft, nontender, and nondistended.     EXTREMITIES: No leg edema.  NEUROLOGIC: grossly intact.  PSYCHIATRIC: Appropriate affect .    Labs:      143  |  112<H>  |  12  ----------------------------<  120<H>  3.0<L>   |  26  |  0.97    Ca    8.8      2022 02:14  Phos  2.2       Mg     1.8         TPro  5.8<L>  /  Alb  2.3<L>  /  TBili  0.3  /  DBili  x   /  AST  16  /  ALT  15  /  AlkPhos  68  -18                          10.8   8.55  )-----------( 265      ( 2022 02:14 )             33.8     PT/INR - ( 2022 06:05 )   PT: 13.2 sec;   INR: 1.14 ratio           Urinalysis Basic - ( 2022 15:01 )    Color: Yellow / Appearance: Slightly Turbid / S.020 / pH: x  Gluc: x / Ketone: Trace  / Bili: Negative / Urobili: Negative   Blood: x / Protein: 30 mg/dL / Nitrite: Negative   Leuk Esterase: Trace / RBC: 0-2 /HPF / WBC 3-5   Sq Epi: x / Non Sq Epi: Occasional / Bacteria: x      LIVER FUNCTIONS - ( 2022 02:14 )  Alb: 2.3 g/dL / Pro: 5.8 g/dL / ALK PHOS: 68 U/L / ALT: 15 U/L / AST: 16 U/L / GGT: x             RECENT CULTURES:   @ 20:54 Clean Catch Clean Catch (Midstream)     No growth              All imaging and data are reviewed.

## 2022-01-18 NOTE — PROGRESS NOTE ADULT - SUBJECTIVE AND OBJECTIVE BOX
Patient seen and examined;  Chart reviewed and events noted;   laying in stretcher in ER; on isolation now for C-diff colitis  agreed to testing and had dedicated CT scan chest which showed pulmonary embolism; had LE dopplers which were negative      MEDICATIONS  (STANDING):  dextrose 5% + sodium chloride 0.45% with potassium chloride 20 mEq/L 1000 milliLiter(s) (60 mL/Hr) IV Continuous <Continuous>  enoxaparin Injectable 50 milliGRAM(s) SubCutaneous every 12 hours  lactobacillus acidophilus 1 Tablet(s) Oral two times a day  losartan 25 milliGRAM(s) Oral daily  pantoprazole  Injectable 40 milliGRAM(s) IV Push daily  vancomycin    Solution 125 milliGRAM(s) Oral every 6 hours    MEDICATIONS  (PRN):  acetaminophen     Tablet .. 650 milliGRAM(s) Oral every 6 hours PRN Temp greater or equal to 38C (100.4F), Mild Pain (1 - 3)  aluminum hydroxide/magnesium hydroxide/simethicone Suspension 30 milliLiter(s) Oral every 4 hours PRN Dyspepsia  LORazepam   Injectable 1 milliGRAM(s) IV Push once PRN mri  melatonin 3 milliGRAM(s) Oral at bedtime PRN Insomnia  OLANZapine Injectable 2.5 milliGRAM(s) IntraMuscular every 6 hours PRN agitation  ondansetron Injectable 4 milliGRAM(s) IV Push every 8 hours PRN Nausea and/or Vomiting  traMADol 50 milliGRAM(s) Oral four times a day PRN Moderate Pain (4 - 6)      Vital Signs Last 24 Hrs  T(C): 36.4 (17 Jan 2022 20:21), Max: 36.6 (17 Jan 2022 11:13)  T(F): 97.6 (17 Jan 2022 20:21), Max: 97.9 (17 Jan 2022 11:13)  HR: 73 (18 Jan 2022 05:47) (65 - 77)  BP: 160/77 (18 Jan 2022 05:47) (117/70 - 162/67)  BP(mean): --  RR: 18 (18 Jan 2022 05:47) (16 - 19)  SpO2: 99% (18 Jan 2022 05:47) (96% - 99%)    PHYSICAL EXAM  General: adult in NAD  HEENT: clear oropharynx, anicteric sclera, pink conjunctivae  Neck: supple  CV: normal S1S2 with no murmur rubs or gallops  Lungs: clear to auscultation, no wheezes, no rhales  Abdomen: soft non-tender non-distended, no hepato/splenomegaly  Ext: no clubbing cyanosis or edema  Skin: no rashes and no petichiae  Neuro: alert and oriented X3 no focal deficits      LABS:                        10.8   8.55  )-----------( 265      ( 18 Jan 2022 02:14 )             33.8     01-18    143  |  112<H>  |  12  ----------------------------<  120<H>  3.0<L>   |  26  |  0.97    Ca    8.8      18 Jan 2022 02:14  Phos  2.2     01-18  Mg     1.8     01-18    TPro  5.8<L>  /  Alb  2.3<L>  /  TBili  0.3  /  DBili  x   /  AST  16  /  ALT  15  /  AlkPhos  68  01-18    PT/INR - ( 18 Jan 2022 06:05 )   PT: 13.2 sec;   INR: 1.14 ratio       RADIOLOGY STUDIES:    1/18/22  LE Dopplers; negative for DVT       < from: CT Angio Chest PE Protocol w/ IV Cont (01.17.22 @ 14:46) >      INTERPRETATION:  Clinical information: Evaluate for pulmonary embolus.    CT angiogram of the chest was obtained following administration of   intravenous contrast. Approximately 58 cc of Omnipaque 350 was   administered and 42 cc was discarded. Coronal, sagittal and MIP images   were submitted for review.    Calcified lymph node is present in the subcarinal region.    Heart is enlarged in size. Calcification of the coronary arteries noted.   No pericardial effusion is noted. Pulmonary arteries are normal in   caliber. Filling defect is noted within the segmental branch of the right   lower lobe pulmonary artery.    No endobronchial lesions are noted. Minimal atelectasis is noted in the   posterior dependent aspects of both lower lobes. No pleural effusions are   noted.    Below the diaphragm, visualized portions of the abdomen demonstrate   low-attenuation lesion in the leftkidney which is indeterminate based on   this exam.    Degenerative changes of the spine are noted.    IMPRESSION: Pulmonary embolus in the segmental branch of the right lower   lobe pulmonary artery.    Above finding was communicated to Dr Acosta on 1/17/2022 at 2:57 PM.    < end of copied text >

## 2022-01-19 DIAGNOSIS — R90.89 OTHER ABNORMAL FINDINGS ON DIAGNOSTIC IMAGING OF CENTRAL NERVOUS SYSTEM: ICD-10-CM

## 2022-01-19 LAB
ANION GAP SERPL CALC-SCNC: 7 MMOL/L — SIGNIFICANT CHANGE UP (ref 5–17)
BUN SERPL-MCNC: 11 MG/DL — SIGNIFICANT CHANGE UP (ref 7–23)
CALCIUM SERPL-MCNC: 8.8 MG/DL — SIGNIFICANT CHANGE UP (ref 8.5–10.1)
CHLORIDE SERPL-SCNC: 109 MMOL/L — HIGH (ref 96–108)
CO2 SERPL-SCNC: 27 MMOL/L — SIGNIFICANT CHANGE UP (ref 22–31)
CREAT SERPL-MCNC: 0.78 MG/DL — SIGNIFICANT CHANGE UP (ref 0.5–1.3)
GLUCOSE SERPL-MCNC: 119 MG/DL — HIGH (ref 70–99)
HCT VFR BLD CALC: 38.7 % — SIGNIFICANT CHANGE UP (ref 34.5–45)
HGB BLD-MCNC: 12.4 G/DL — SIGNIFICANT CHANGE UP (ref 11.5–15.5)
INR BLD: 1.16 RATIO — SIGNIFICANT CHANGE UP (ref 0.88–1.16)
MAGNESIUM SERPL-MCNC: 1.9 MG/DL — SIGNIFICANT CHANGE UP (ref 1.6–2.6)
MCHC RBC-ENTMCNC: 27.8 PG — SIGNIFICANT CHANGE UP (ref 27–34)
MCHC RBC-ENTMCNC: 32 GM/DL — SIGNIFICANT CHANGE UP (ref 32–36)
MCV RBC AUTO: 86.8 FL — SIGNIFICANT CHANGE UP (ref 80–100)
NRBC # BLD: 0 /100 WBCS — SIGNIFICANT CHANGE UP (ref 0–0)
PHOSPHATE SERPL-MCNC: 2.5 MG/DL — SIGNIFICANT CHANGE UP (ref 2.5–4.5)
PLATELET # BLD AUTO: 276 K/UL — SIGNIFICANT CHANGE UP (ref 150–400)
POTASSIUM SERPL-MCNC: 3.4 MMOL/L — LOW (ref 3.5–5.3)
POTASSIUM SERPL-SCNC: 3.4 MMOL/L — LOW (ref 3.5–5.3)
PROTHROM AB SERPL-ACNC: 13.5 SEC — SIGNIFICANT CHANGE UP (ref 10.6–13.6)
RBC # BLD: 4.46 M/UL — SIGNIFICANT CHANGE UP (ref 3.8–5.2)
RBC # FLD: 14.1 % — SIGNIFICANT CHANGE UP (ref 10.3–14.5)
SODIUM SERPL-SCNC: 143 MMOL/L — SIGNIFICANT CHANGE UP (ref 135–145)
WBC # BLD: 8.57 K/UL — SIGNIFICANT CHANGE UP (ref 3.8–10.5)
WBC # FLD AUTO: 8.57 K/UL — SIGNIFICANT CHANGE UP (ref 3.8–10.5)

## 2022-01-19 RX ORDER — INFLUENZA VIRUS VACCINE 15; 15; 15; 15 UG/.5ML; UG/.5ML; UG/.5ML; UG/.5ML
0.7 SUSPENSION INTRAMUSCULAR ONCE
Refills: 0 | Status: DISCONTINUED | OUTPATIENT
Start: 2022-01-19 | End: 2022-01-26

## 2022-01-19 RX ORDER — POTASSIUM CHLORIDE 20 MEQ
40 PACKET (EA) ORAL ONCE
Refills: 0 | Status: COMPLETED | OUTPATIENT
Start: 2022-01-19 | End: 2022-01-19

## 2022-01-19 RX ADMIN — ENOXAPARIN SODIUM 50 MILLIGRAM(S): 100 INJECTION SUBCUTANEOUS at 06:58

## 2022-01-19 RX ADMIN — ENOXAPARIN SODIUM 50 MILLIGRAM(S): 100 INJECTION SUBCUTANEOUS at 17:12

## 2022-01-19 RX ADMIN — DEXTROSE MONOHYDRATE, SODIUM CHLORIDE, AND POTASSIUM CHLORIDE 60 MILLILITER(S): 50; .745; 4.5 INJECTION, SOLUTION INTRAVENOUS at 00:51

## 2022-01-19 RX ADMIN — DEXTROSE MONOHYDRATE, SODIUM CHLORIDE, AND POTASSIUM CHLORIDE 60 MILLILITER(S): 50; .745; 4.5 INJECTION, SOLUTION INTRAVENOUS at 22:04

## 2022-01-19 RX ADMIN — Medication 650 MILLIGRAM(S): at 00:50

## 2022-01-19 RX ADMIN — PANTOPRAZOLE SODIUM 40 MILLIGRAM(S): 20 TABLET, DELAYED RELEASE ORAL at 12:43

## 2022-01-19 RX ADMIN — Medication 40 MILLIEQUIVALENT(S): at 12:42

## 2022-01-19 RX ADMIN — Medication 125 MILLIGRAM(S): at 00:50

## 2022-01-19 RX ADMIN — Medication 1 TABLET(S): at 17:11

## 2022-01-19 RX ADMIN — Medication 3 MILLIGRAM(S): at 00:51

## 2022-01-19 RX ADMIN — Medication 125 MILLIGRAM(S): at 13:48

## 2022-01-19 RX ADMIN — Medication 125 MILLIGRAM(S): at 06:58

## 2022-01-19 RX ADMIN — Medication 650 MILLIGRAM(S): at 01:45

## 2022-01-19 RX ADMIN — Medication 1 TABLET(S): at 06:58

## 2022-01-19 RX ADMIN — Medication 125 MILLIGRAM(S): at 18:28

## 2022-01-19 RX ADMIN — LOSARTAN POTASSIUM 25 MILLIGRAM(S): 100 TABLET, FILM COATED ORAL at 06:58

## 2022-01-19 NOTE — PROGRESS NOTE ADULT - SUBJECTIVE AND OBJECTIVE BOX
GINA ALLA    PLV APER 01    Allergies    No Known Allergies    Intolerances        PAST MEDICAL & SURGICAL HISTORY:  No pertinent past medical history        FAMILY HISTORY:      Home Medications:  amLODIPine 5 mg oral tablet: 1 tab(s) orally once a day (18 Jan 2022 13:44)  Aspirin Enteric Coated 81 mg oral delayed release tablet: 1 tab(s) orally once a day (18 Jan 2022 13:44)  hydrALAZINE 10 mg oral tablet: 1 tab(s) orally 2 times a day (18 Jan 2022 13:44)  Nasal Saline 0.65% nasal spray: 2 spray(s) nasal 2 times a day for 15 days. Start: 01/06/22 (18 Jan 2022 13:44)  sertraline 50 mg oral tablet: 1 tab(s) orally once a day (18 Jan 2022 13:44)      MEDICATIONS  (STANDING):  dextrose 5% + sodium chloride 0.45% with potassium chloride 20 mEq/L 1000 milliLiter(s) (60 mL/Hr) IV Continuous <Continuous>  enoxaparin Injectable 50 milliGRAM(s) SubCutaneous every 12 hours  lactobacillus acidophilus 1 Tablet(s) Oral two times a day  losartan 25 milliGRAM(s) Oral daily  pantoprazole  Injectable 40 milliGRAM(s) IV Push daily  vancomycin    Solution 125 milliGRAM(s) Oral every 6 hours    MEDICATIONS  (PRN):  acetaminophen     Tablet .. 650 milliGRAM(s) Oral every 6 hours PRN Temp greater or equal to 38C (100.4F), Mild Pain (1 - 3)  aluminum hydroxide/magnesium hydroxide/simethicone Suspension 30 milliLiter(s) Oral every 4 hours PRN Dyspepsia  LORazepam   Injectable 1 milliGRAM(s) IV Push once PRN mri  LORazepam   Injectable 1 milliGRAM(s) IV Push once PRN mri  melatonin 3 milliGRAM(s) Oral at bedtime PRN Insomnia  OLANZapine Injectable 2.5 milliGRAM(s) IntraMuscular every 6 hours PRN agitation  ondansetron Injectable 4 milliGRAM(s) IV Push every 8 hours PRN Nausea and/or Vomiting  traMADol 50 milliGRAM(s) Oral four times a day PRN Moderate Pain (4 - 6)              Vital Signs Last 24 Hrs  T(C): 37.6 (19 Jan 2022 00:45), Max: 37.6 (19 Jan 2022 00:45)  T(F): 99.6 (19 Jan 2022 00:45), Max: 99.6 (19 Jan 2022 00:45)  HR: 81 (19 Jan 2022 00:45) (81 - 81)  BP: 160/70 (19 Jan 2022 00:45) (160/70 - 160/70)  BP(mean): --  RR: 16 (19 Jan 2022 00:45) (16 - 16)  SpO2: 97% (19 Jan 2022 00:45) (97% - 97%)              LABS:                        10.8   8.55  )-----------( 265      ( 18 Jan 2022 02:14 )             33.8     01-18    143  |  112<H>  |  12  ----------------------------<  120<H>  3.0<L>   |  26  |  0.97    Ca    8.8      18 Jan 2022 02:14  Phos  2.2     01-18  Mg     1.8     01-18    TPro  5.8<L>  /  Alb  2.3<L>  /  TBili  0.3  /  DBili  x   /  AST  16  /  ALT  15  /  AlkPhos  68  01-18    PT/INR - ( 18 Jan 2022 06:05 )   PT: 13.2 sec;   INR: 1.14 ratio                   WBC:  WBC Count: 8.55 K/uL (01-18 @ 02:14)  WBC Count: 10.89 K/uL (01-17 @ 11:42)  WBC Count: 16.59 K/uL (01-16 @ 14:58)      MICROBIOLOGY:  RECENT CULTURES:  01-17 .Blood Blood XXXX XXXX   No growth to date.    01-16 Clean Catch Clean Catch (Midstream) XXXX XXXX   No growth                PT/INR - ( 18 Jan 2022 06:05 )   PT: 13.2 sec;   INR: 1.14 ratio             Sodium:  Sodium, Serum: 143 mmol/L (01-18 @ 02:14)  Sodium, Serum: 144 mmol/L (01-17 @ 11:42)  Sodium, Serum: 140 mmol/L (01-16 @ 16:25)      0.97 mg/dL 01-18 @ 02:14  0.81 mg/dL 01-17 @ 11:42  0.98 mg/dL 01-16 @ 16:25      Hemoglobin:  Hemoglobin: 10.8 g/dL (01-18 @ 02:14)  Hemoglobin: 11.4 g/dL (01-17 @ 11:42)  Hemoglobin: 12.5 g/dL (01-16 @ 14:58)      Platelets: Platelet Count - Automated: 265 K/uL (01-18 @ 02:14)  Platelet Count - Automated: 253 K/uL (01-17 @ 11:42)  Platelet Count - Automated: 282 K/uL (01-16 @ 14:58)      LIVER FUNCTIONS - ( 18 Jan 2022 02:14 )  Alb: 2.3 g/dL / Pro: 5.8 g/dL / ALK PHOS: 68 U/L / ALT: 15 U/L / AST: 16 U/L / GGT: x                 RADIOLOGY & ADDITIONAL STUDIES:      MICROBIOLOGY:  RECENT CULTURES:  01-17 .Blood Blood XXXX XXXX   No growth to date.    01-16 Clean Catch Clean Catch (Midstream) XXXX XXXX   No growth

## 2022-01-19 NOTE — PROGRESS NOTE ADULT - REASON FOR ADMISSION
86 female sent from [facility] living for diarrhea x 3 days since starting antibiotics for UTI.   	Per patient she was being treated for UTI and began having diarrhea and RLQ pain.   	Denies fever, chills.   	Denies n/v.   	+ Abdominal pain , + Diarrhea  	Denies current urinary symptoms.  Denies CP, cough or SOBno abdominal distension, no blood in stool, no burning urination, no chills, no dysuria, no fever, no hematuria, no nausea, no vomiting++ RLQ abdominal pain, no bloating, no constipation, ++ diarrhea,   < from: CT Abdomen and Pelvis w/ IV Cont (01.16.22 @ 17:29) >

## 2022-01-19 NOTE — PROGRESS NOTE ADULT - REASON FOR ADMISSION
86 female sent from [facility] living for diarrhea x 3 days since starting antibiotics for UTI.   	Per patient she was being treated for UTI and began having diarrhea and RLQ pain.   	Denies fever, chills.   	Denies n/v.   	+ Abdominal pain , + Diarrhea  	Denies current urinary symptoms.  Denies CP, cough or SOBno abdominal distension, no blood in stool, no burning urination, no chills, no dysuria, no fever, no hematuria, no nausea, no vomiting++ RLQ abdominal pain, no bloating, no constipation, ++ diarrhea,   < from: CT Abdomen and Pelvis w/ IV Cont (01.16.22 @ 17:29) >

## 2022-01-19 NOTE — PROGRESS NOTE ADULT - SUBJECTIVE AND OBJECTIVE BOX
PROGRESS NOTE  Patient is a 86y old  Female who presents with a chief complaint of 86 female sent from [facility] for diarrhea x 3 days since starting antibiotics for UTI.   	Per patient she was being treated for UTI and began having diarrhea and RLQ pain.   	Denies fever, chills.   	Denies n/v.   	+ Abdominal pain , + Diarrhea  	Denies current urinary symptoms.  Denies CP, cough or SOBno abdominal distension, no blood in stool, no burning urination, no chills, no dysuria, no fever, no hematuria, no nausea, no vomiting++ RLQ abdominal pain, no bloating, no constipation, ++ diarrhea,   < from: CT Abdomen and Pelvis w/ IV Cont (01.16.22 @ 17:29) > (19 Jan 2022 10:36)    Chart and available morning labs /imaging are reviewed electronically , urgent issues addressed . More information  is being added upon completion of rounds , when more information is collected and management discussed with consultants , medical staff and social service/case management on the floor   OVERNIGHT  No new issues reported by medical staff . All above noted Patient is resting in a bed comfortably .Confused ,poor mentation .No distress noted   Seen by SLP and RD  HPI:  86 female sent from [facility] for diarrhea x 3 days since starting antibiotics for UTI.   	Per patient she was being treated for UTI and began having diarrhea and RLQ pain.   	Denies fever, chills.   	Denies n/v.   	+ Abdominal pain , + Diarrhea  	Denies current urinary symptoms.  Denies CP, cough or SOBno abdominal distension, no blood in stool, no burning urination, no chills, no dysuria, no fever, no hematuria, no nausea, no vomiting++ RLQ abdominal pain, no bloating, no constipation, ++ diarrhea,   < from: CT Abdomen and Pelvis w/ IV Cont (01.16.22 @ 17:29) >    ACC: [id number] EXAM:  CT ABDOMEN AND PELVIS IC                          PROCEDURE DATE:  01/16/2022          INTERPRETATION:  CLINICAL INFORMATION: Acute abdominal pain.    COMPARISON: None.    CONTRAST/COMPLICATIONS:  IV Contrast: Omnipaque 350  90cc administered   10 cc discarded  Oral Contrast: NONE  Complications: None reported at time of study completion    PROCEDURE:  CT of the Abdomen and Pelvis was performed.  Sagittal and coronal reformats were performed.    FINDINGS:  LOWER CHEST: Possible filling defect in a branch of the right lower lobe   pulmonary artery (series 2, image 7), suspicious for pulmonary embolism.   Bibasilar subsegmental atelectasis.    LIVER: Within normal limits.  BILE DUCTS: Normal caliber.  GALLBLADDER: Withinnormal limits.  SPLEEN: Subcentimeter hypodense splenic lesions, too small to   characterize.  PANCREAS: Within normal limits.  ADRENALS: Within normal limits.  KIDNEYS/URETERS: No hydronephrosis. Left upper pole renal cyst.   Additional subcentimeter hypodense bilateral renal lesions, too small to   characterize.    BLADDER: Within normal limits.  REPRODUCTIVE ORGANS: Hysterectomy.    BOWEL: Colonic diverticulosis. Long segment mural thickening of the colon   extending from the ascending colon to the level of the rectum. Small   bowel anastomosis. No bowel obstruction. Appendix is normal.  PERITONEUM: No ascites.  VESSELS: Atherosclerotic changes.  RETROPERITONEUM/LYMPH NODES: No lymphadenopathy.  ABDOMINAL WALL: Within normal limits.  BONES: Degenerative changes.    IMPRESSION:  Possible filling defect in a branch of the right lower lobe pulmonary   artery, suspicious for pulmonary embolism. Further evaluation with CTA   chest (pulmonary artery protocol) is recommended.    Long segment proctocolitis.    Findings were discussed with MARLON FERRER on 1/16/2022 at 6:28 PM   by Dr. Wagner with read back confirmation.    --- End of Report ---            ANABEL WAGNER MD; Attending Radiologist  This document has been electronically signed. Jan 16 2022  6:29PM    < end of copied text >   (16 Jan 2022 18:47)    PAST MEDICAL & SURGICAL HISTORY:  No pertinent past medical history        MEDICATIONS  (STANDING):  dextrose 5% + sodium chloride 0.45% with potassium chloride 20 mEq/L 1000 milliLiter(s) (60 mL/Hr) IV Continuous <Continuous>  enoxaparin Injectable 50 milliGRAM(s) SubCutaneous every 12 hours  lactobacillus acidophilus 1 Tablet(s) Oral two times a day  losartan 25 milliGRAM(s) Oral daily  pantoprazole  Injectable 40 milliGRAM(s) IV Push daily  potassium chloride    Tablet ER 40 milliEquivalent(s) Oral once  vancomycin    Solution 125 milliGRAM(s) Oral every 6 hours    MEDICATIONS  (PRN):  acetaminophen     Tablet .. 650 milliGRAM(s) Oral every 6 hours PRN Temp greater or equal to 38C (100.4F), Mild Pain (1 - 3)  aluminum hydroxide/magnesium hydroxide/simethicone Suspension 30 milliLiter(s) Oral every 4 hours PRN Dyspepsia  LORazepam   Injectable 1 milliGRAM(s) IV Push once PRN mri  LORazepam   Injectable 1 milliGRAM(s) IV Push once PRN mri  melatonin 3 milliGRAM(s) Oral at bedtime PRN Insomnia  OLANZapine Injectable 2.5 milliGRAM(s) IntraMuscular every 6 hours PRN agitation  ondansetron Injectable 4 milliGRAM(s) IV Push every 8 hours PRN Nausea and/or Vomiting  traMADol 50 milliGRAM(s) Oral four times a day PRN Moderate Pain (4 - 6)      OBJECTIVE    T(C): 36.8 (01-19-22 @ 10:43), Max: 37.6 (01-19-22 @ 00:45)  HR: 70 (01-19-22 @ 10:43) (70 - 81)  BP: 145/78 (01-19-22 @ 10:43) (145/78 - 160/70)  RR: 18 (01-19-22 @ 10:43) (16 - 18)  SpO2: 97% (01-19-22 @ 10:43) (97% - 99%)  Wt(kg): --  I&O's Summary        REVIEW OF SYSTEMS:  CONSTITUTIONAL: No fever, weight loss, or fatigue  EYES: No eye pain, visual disturbances, or discharge  ENMT:   No sinus or throat pain  NECK: No pain or stiffness  RESPIRATORY: No cough, wheezing, chills or hemoptysis; No shortness of breath  CARDIOVASCULAR: No chest pain, palpitations, dizziness, or leg swelling  GASTROINTESTINAL: No abdominal pain. No nausea, vomiting; No diarrhea or constipation. No melena or hematochezia.  GENITOURINARY: No dysuria, frequency, hematuria, or incontinence  NEUROLOGICAL: No headaches, memory loss, loss of strength, numbness, or tremors  SKIN: No itching, burning, rashes, or lesions   MUSCULOSKELETAL: No joint pain or swelling; No muscle, back, or extremity pain    PHYSICAL EXAM:  Appearance: NAD. VS past 24 hrs -as above   HEENT:   Moist oral mucosa. Conjunctiva clear b/l.   Neck : supple  Respiratory: Lungs CTAB.  Gastrointestinal:  Soft, nontender. No rebound. No rigidity. BS present	  Cardiovascular: RRR ,S1S2 present  Neurologic: Non-focal. Moving all extremities.  Extremities: No edema. No erythema. No calf tenderness.  Skin: No rashes, No ecchymoses, No cyanosis.	  wounds ,skin lesions-See skin assesment flow sheet   LABS:                        12.4   8.57  )-----------( 276      ( 19 Jan 2022 07:57 )             38.7     01-19    143  |  109<H>  |  11  ----------------------------<  119<H>  3.4<L>   |  27  |  0.78    Ca    8.8      19 Jan 2022 07:57  Phos  2.5     01-19  Mg     1.9     01-19    TPro  5.8<L>  /  Alb  2.3<L>  /  TBili  0.3  /  DBili  x   /  AST  16  /  ALT  15  /  AlkPhos  68  01-18    CAPILLARY BLOOD GLUCOSE        PT/INR - ( 19 Jan 2022 07:57 )   PT: 13.5 sec;   INR: 1.16 ratio               Culture - Blood (collected 17 Jan 2022 16:27)  Source: .Blood Blood  Preliminary Report (18 Jan 2022 17:02):    No growth to date.    Culture - Blood (collected 17 Jan 2022 16:27)  Source: .Blood Blood  Preliminary Report (18 Jan 2022 17:02):    No growth to date.    Culture - Urine (collected 16 Jan 2022 20:54)  Source: Clean Catch Clean Catch (Midstream)  Final Report (17 Jan 2022 16:32):    No growth      RADIOLOGY & ADDITIONAL TESTS: < from: MR Head No Cont (01.18.22 @ 09:39) >  ACC: [id number] EXAM:  MR BRAIN                        ACC: [id number] EXAM:  MR ANGIO BRAIN                          PROCEDURE DATE:  01/18/2022          INTERPRETATION:  CLINICAL INFORMATION: Acute mental status. PMR.   Admitting Dxs: K52.9 NONINFECTIVE GASTROENTERITIS AND COLITIS,   UNSPECIFIED.    TECHNIQUE: Multiplanar, multisequence MRI of the brain without gadolinium   contrast, 3-D time of flight brain MRA. 3-D reformats were obtained.    COMPARISON: No relevant prior studies.    Brain MRI:    There is a 7 x 5 x 5 mm lesion in the right temporal lobe with   heterogeneous internal T1 and T2 signal, T2 hypointense rim and   susceptibility artifact suggesting a cavernoma.    There is no evidence of acute infarct. Scattered foci of T2/FLAIR  hyperintensity in the bilateral hemispheric white matter are nonspecific   but likely related to chronic white matter microvascular ischemic   disease. There is cerebral volume loss. Chronic lacunar infarct within   the right basal ganglia.    Flowvoids of the major intracranial vessels at the skull base follow   expected course and contour.    The paranasal sinuses demonstrate no signal abnormality. The mastoids   demonstrate no signal abnormality.  Status post left intraocular lens   implant.    Brain MRA:    There is flow-related signal in the bilateral intradural internal   carotid, anterior cerebral and middle cerebral arteries. No evidence of   large vessel occlusion.    There is flow-related signal in the bilateral posterior cerebral,   basilar, and intradural vertebral arteries. The branch vasculature of the   posterior circulation is within normal limits. No evidence of large   vessel occlusion.    No evidence of aneurysm. Tiny aneurysms can be beyond the resolution of   MRA technique. No evidence of arteriovenous malformation.      IMPRESSION:    Brain MRI:  1.  No acute infarct.  2.  A 7 x 5 x 5 mm lesion in the right temporal lobe with heterogeneous   internal T1 and T2 signal, T2 hypointense rim and susceptibility artifact   suggesting a cavernoma. Consider correlation with MRI brain with   contrast, if there is no contraindication.    Brain MRA: No large vessel occlusion or aneurysm.    < end of copied text >  < from: MR Angio Head No Cont (01.18.22 @ 09:30) >    Brain MRA:    There is flow-related signal in the bilateral intradural internal   carotid, anterior cerebral and middle cerebral arteries. No evidence of   large vessel occlusion.    There is flow-related signal in the bilateral posterior cerebral,   basilar, and intradural vertebral arteries. The branch vasculature of the   posterior circulation is within normal limits. No evidence of large   vessel occlusion.    No evidence of aneurysm. Tiny aneurysms can be beyond the resolution of   MRA technique. No evidence of arteriovenous malformation.      IMPRESSION:    Brain MRI:  1.  No acute infarct.  2.  A 7 x 5 x 5 mm lesion in the right temporal lobe with heterogeneous   internal T1 and T2 signal, T2 hypointense rim and susceptibility artifact   suggesting a cavernoma. Consider correlation with MRI brain with   contrast, if there is no contraindication.    Brain MRA: No large vessel occlusion or aneurysm.    < end of copied text >     reviewed elctronically  ASSESSMENT/PLAN: 	    25 minutes aggregate time was spent on this visit, 50% visit time spent in care co-ordination with other attendings and counselling patient .I have discussed care plan with patient / HCP/family member ,who expressed understanding of problems treatment and their effect and side effects, alternatives in details. I have asked if they have any questions and concerns and appropriately addressed them to best of my ability.

## 2022-01-19 NOTE — SWALLOW BEDSIDE ASSESSMENT ADULT - COMMENTS
Consult received and chart reviewed. Patient seen at bedside this AM for initial assessment of swallow function. Patient was alert, cooperative, and denied pain. Patient demonstrates ability to follow simple commands. Vocal quality WFL.    Per charting, the patient is a "86 female sent from [facility] living for diarrhea x 3 days since starting antibiotics for UTI." WBC WFL. CXR 1/17/22 revealed " Pulmonary embolus in the segmental branch of the right lower lobe pulmonary artery." MR Head revealed "Brain MRI: 1. No acute infarct. 2. A 7 x 5 x 5 mm lesion in the right temporal lobe with heterogeneous internal T1 and T2 signal, T2 hypointense rim and susceptibility artifact suggesting a cavernoma. Consider correlation with MRI brain with contrast, if there is no contraindication. Brain MRA: No large vessel occlusion or aneurysm."    Discussed results and recommendations with Patient, RN, and call out to MD.

## 2022-01-19 NOTE — SWALLOW BEDSIDE ASSESSMENT ADULT - SWALLOW EVAL: RECOMMENDED FEEDING/EATING TECHNIQUES
alternate food with liquid/check mouth frequently for oral residue/pocketing/maintain upright posture during/after eating for 30 mins/no straws/oral hygiene/position upright (90 degrees)/small sips/bites

## 2022-01-19 NOTE — SWALLOW BEDSIDE ASSESSMENT ADULT - SWALLOW EVAL: DIAGNOSIS
1. Patient demonstrates a functional oral phase for pureed, regular solids, mildly thick, and thin liquids marked by adequate oral acceptance and timely collection, transfer, and transport. Adequate clearance post swallow. 2. Patient demonstrates a mild pharyngeal dysphagia for pureed, regular solids, mildly thick, and thin liquids marked by suspected delayed pharyngeal swallow trigger and hyolaryngeal elevation noted by digital palpation without evidence of airway penetration/aspiration. 3. Recommend regular solids and thin liquids, with aspiration precautions, as tolerated. Continue to monitor and notify SLP if changes occur.

## 2022-01-19 NOTE — DIETITIAN INITIAL EVALUATION ADULT. - ORAL INTAKE PTA/DIET HISTORY
patient fair historian states was eating well PTA at [facility].  patient with no food allergies noted  patient seen at this time with speech pathologist and RD in room . speech will recommend solid diet thin liquids

## 2022-01-19 NOTE — PROGRESS NOTE ADULT - SUBJECTIVE AND OBJECTIVE BOX
Neurology follow up note    GINA [id number]      Interval History:    Patient feels ok no new complaints.    Allergies    No Known Allergies    Intolerances        MEDICATIONS    acetaminophen     Tablet .. 650 milliGRAM(s) Oral every 6 hours PRN  aluminum hydroxide/magnesium hydroxide/simethicone Suspension 30 milliLiter(s) Oral every 4 hours PRN  dextrose 5% + sodium chloride 0.45% with potassium chloride 20 mEq/L 1000 milliLiter(s) IV Continuous <Continuous>  enoxaparin Injectable 50 milliGRAM(s) SubCutaneous every 12 hours  influenza  Vaccine (HIGH DOSE) 0.7 milliLiter(s) IntraMuscular once  lactobacillus acidophilus 1 Tablet(s) Oral two times a day  LORazepam   Injectable 1 milliGRAM(s) IV Push once PRN  LORazepam   Injectable 1 milliGRAM(s) IV Push once PRN  losartan 25 milliGRAM(s) Oral daily  melatonin 3 milliGRAM(s) Oral at bedtime PRN  OLANZapine Injectable 2.5 milliGRAM(s) IntraMuscular every 6 hours PRN  ondansetron Injectable 4 milliGRAM(s) IV Push every 8 hours PRN  pantoprazole  Injectable 40 milliGRAM(s) IV Push daily  traMADol 50 milliGRAM(s) Oral four times a day PRN  vancomycin    Solution 125 milliGRAM(s) Oral every 6 hours              Vital Signs Last 24 Hrs  T(C): 36.6 (19 Jan 2022 11:46), Max: 37.6 (19 Jan 2022 00:45)  T(F): 97.9 (19 Jan 2022 11:46), Max: 99.6 (19 Jan 2022 00:45)  HR: 58 (19 Jan 2022 11:46) (58 - 81)  BP: 148/65 (19 Jan 2022 11:46) (145/78 - 160/70)  BP(mean): --  RR: 18 (19 Jan 2022 11:46) (16 - 18)  SpO2: 93% (19 Jan 2022 11:46) (93% - 99%)    REVIEW OF SYSTEMS:  Limited secondary to the patient has forgetfulness, but constitutionally, she denies fevers, chills, or night sweats.  Head:  No headaches.  Eyes:  No double vision or blurry vision.  Ears:  No ringing in the ears.  Neck:  No neck pain.  Respiratory:  No shortness of breath.  Cardiovascular:  No chest pain.  Abdomen:  No nausea, vomiting, or abdominal pain.  Genitourinary:  Does have a history of urinary incontinence.  Extremities/Neurological:  No numbness or tingling.  Musculoskeletal:  Occasional joint pain.    PHYSICAL EXAMINATION:   HEENT:  Head:  Normocephalic, atraumatic.  Eyes:  No scleral icterus.  Ears:  Hearing bilaterally intact.  NECK:  Supple.  RESPIRATORY:  Good air entry bilaterally.  CARDIOVASCULAR:  S1 and S2 heard.  ABDOMEN:  Soft and nontender.  EXTREMITIES:  No clubbing or cyanosis was noted.      NEUROLOGIC:  The patient is awake and alert.  Location was hospital, year was 2022, month was February, was not able to say how many nickels in a quarter but 10 dimes in a dollar, could not say dog backward is spelled as God.  Recall was 0/3 within three minutes, even with prompting.  She was able to follow complex commands, touch right hand and touched left ear, was able to name simple objects, was able to tell two daughters' name.  Extraocular movements were intact.  Speech was fluent.  Smile was symmetric.  Motor:  Bilateral upper were 4/5, bilateral lower were 3/5.  At present, is not ambulatory as per my conversation with the daughter.                   LABS:  CBC Full  -  ( 19 Jan 2022 07:57 )  WBC Count : 8.57 K/uL  RBC Count : 4.46 M/uL  Hemoglobin : 12.4 g/dL  Hematocrit : 38.7 %  Platelet Count - Automated : 276 K/uL  Mean Cell Volume : 86.8 fl  Mean Cell Hemoglobin : 27.8 pg  Mean Cell Hemoglobin Concentration : 32.0 gm/dL  Auto Neutrophil # : x  Auto Lymphocyte # : x  Auto Monocyte # : x  Auto Eosinophil # : x  Auto Basophil # : x  Auto Neutrophil % : x  Auto Lymphocyte % : x  Auto Monocyte % : x  Auto Eosinophil % : x  Auto Basophil % : x      01-19    143  |  109<H>  |  11  ----------------------------<  119<H>  3.4<L>   |  27  |  0.78    Ca    8.8      19 Jan 2022 07:57  Phos  2.5     01-19  Mg     1.9     01-19    TPro  5.8<L>  /  Alb  2.3<L>  /  TBili  0.3  /  DBili  x   /  AST  16  /  ALT  15  /  AlkPhos  68  01-18    Hemoglobin A1C:     LIVER FUNCTIONS - ( 18 Jan 2022 02:14 )  Alb: 2.3 g/dL / Pro: 5.8 g/dL / ALK PHOS: 68 U/L / ALT: 15 U/L / AST: 16 U/L / GGT: x           Vitamin B12   PT/INR - ( 19 Jan 2022 07:57 )   PT: 13.5 sec;   INR: 1.16 ratio               RADIOLOGY      ANALYSIS AND PLAN:  An 86-year-old with an episode of memory issues.  For episode of memory issues, suspect most likely the patient does have mild cognitive impairment versus more probable underlying dementia.  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  If the patient is able to go for it, will give Ativan,  For mild cognitive impairment versus subtle dementia supportive treatment for now   For history of hypertension, monitor systolic blood pressure.  For periods of agitation, I would recommend a psychiatry follow-up.  spoke to outside neurologist DR Arceo  1/18    Spoke with the daughter at the bedside, her name is Cris, her cell number is [phone], the home number is [phone]. 1/19  She does understand while in the hospital setting, she may become more disoriented.    Greater than 45 minutes of time was spent with the patient, plan of care, reviewing data, with greater than 50% of the visit was spent counseling and/or coordinating care with multidisciplinary healthcare team   Neurology follow up note    GINA [id number]      Interval History:    Patient feels ok no new complaints.    Allergies    No Known Allergies    Intolerances        MEDICATIONS    acetaminophen     Tablet .. 650 milliGRAM(s) Oral every 6 hours PRN  aluminum hydroxide/magnesium hydroxide/simethicone Suspension 30 milliLiter(s) Oral every 4 hours PRN  dextrose 5% + sodium chloride 0.45% with potassium chloride 20 mEq/L 1000 milliLiter(s) IV Continuous <Continuous>  enoxaparin Injectable 50 milliGRAM(s) SubCutaneous every 12 hours  influenza  Vaccine (HIGH DOSE) 0.7 milliLiter(s) IntraMuscular once  lactobacillus acidophilus 1 Tablet(s) Oral two times a day  LORazepam   Injectable 1 milliGRAM(s) IV Push once PRN  LORazepam   Injectable 1 milliGRAM(s) IV Push once PRN  losartan 25 milliGRAM(s) Oral daily  melatonin 3 milliGRAM(s) Oral at bedtime PRN  OLANZapine Injectable 2.5 milliGRAM(s) IntraMuscular every 6 hours PRN  ondansetron Injectable 4 milliGRAM(s) IV Push every 8 hours PRN  pantoprazole  Injectable 40 milliGRAM(s) IV Push daily  traMADol 50 milliGRAM(s) Oral four times a day PRN  vancomycin    Solution 125 milliGRAM(s) Oral every 6 hours              Vital Signs Last 24 Hrs  T(C): 36.6 (19 Jan 2022 11:46), Max: 37.6 (19 Jan 2022 00:45)  T(F): 97.9 (19 Jan 2022 11:46), Max: 99.6 (19 Jan 2022 00:45)  HR: 58 (19 Jan 2022 11:46) (58 - 81)  BP: 148/65 (19 Jan 2022 11:46) (145/78 - 160/70)  BP(mean): --  RR: 18 (19 Jan 2022 11:46) (16 - 18)  SpO2: 93% (19 Jan 2022 11:46) (93% - 99%)    REVIEW OF SYSTEMS:  Limited secondary to the patient has forgetfulness, but constitutionally, she denies fevers, chills, or night sweats.  Head:  No headaches.  Eyes:  No double vision or blurry vision.  Ears:  No ringing in the ears.  Neck:  No neck pain.  Respiratory:  No shortness of breath.  Cardiovascular:  No chest pain.  Abdomen:  No nausea, vomiting, or abdominal pain.  Genitourinary:  Does have a history of urinary incontinence.  Extremities/Neurological:  No numbness or tingling.  Musculoskeletal:  Occasional joint pain.    PHYSICAL EXAMINATION:   HEENT:  Head:  Normocephalic, atraumatic.  Eyes:  No scleral icterus.  Ears:  Hearing bilaterally intact.  NECK:  Supple.  RESPIRATORY:  Good air entry bilaterally.  CARDIOVASCULAR:  S1 and S2 heard.  ABDOMEN:  Soft and nontender.  EXTREMITIES:  No clubbing or cyanosis was noted.      NEUROLOGIC:  The patient is awake and alert.  Location was hospital, year was 2022, month was February, was not able to say how many nickels in a quarter but 10 dimes in a dollar, could not say dog backward is spelled as God.  Recall was 0/3 within three minutes, even with prompting.  She was able to follow complex commands, touch right hand and touched left ear, was able to name simple objects, was able to tell two daughters' name.  Extraocular movements were intact.  Speech was fluent.  Smile was symmetric.  Motor:  Bilateral upper were 4/5, bilateral lower were 3/5.  At present, is not ambulatory as per my conversation with the daughter.                   LABS:  CBC Full  -  ( 19 Jan 2022 07:57 )  WBC Count : 8.57 K/uL  RBC Count : 4.46 M/uL  Hemoglobin : 12.4 g/dL  Hematocrit : 38.7 %  Platelet Count - Automated : 276 K/uL  Mean Cell Volume : 86.8 fl  Mean Cell Hemoglobin : 27.8 pg  Mean Cell Hemoglobin Concentration : 32.0 gm/dL  Auto Neutrophil # : x  Auto Lymphocyte # : x  Auto Monocyte # : x  Auto Eosinophil # : x  Auto Basophil # : x  Auto Neutrophil % : x  Auto Lymphocyte % : x  Auto Monocyte % : x  Auto Eosinophil % : x  Auto Basophil % : x      01-19    143  |  109<H>  |  11  ----------------------------<  119<H>  3.4<L>   |  27  |  0.78    Ca    8.8      19 Jan 2022 07:57  Phos  2.5     01-19  Mg     1.9     01-19    TPro  5.8<L>  /  Alb  2.3<L>  /  TBili  0.3  /  DBili  x   /  AST  16  /  ALT  15  /  AlkPhos  68  01-18    Hemoglobin A1C:     LIVER FUNCTIONS - ( 18 Jan 2022 02:14 )  Alb: 2.3 g/dL / Pro: 5.8 g/dL / ALK PHOS: 68 U/L / ALT: 15 U/L / AST: 16 U/L / GGT: x           Vitamin B12   PT/INR - ( 19 Jan 2022 07:57 )   PT: 13.5 sec;   INR: 1.16 ratio               RADIOLOGY      ANALYSIS AND PLAN:  An 86-year-old with an episode of memory issues.  For episode of memory issues, suspect most likely the patient does have mild cognitive impairment versus more probable underlying dementia.  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  If the patient is able to go for it, will give Ativan patient refusing to go for repeat mri   For mild cognitive impairment versus subtle dementia supportive treatment for now   For history of hypertension, monitor systolic blood pressure.  For periods of agitation, I would recommend a psychiatry follow-up.  spoke to outside neurologist DR Arceo  1/18  neurologic wise stable for discharge     Spoke with the daughter at the bedside, her name is Cris, her cell number is [phone], the home number is [phone]. 1/19  She does understand while in the hospital setting, she may become more disoriented.    Greater than 40 minutes of time was spent with the patient, plan of care, reviewing data, with greater than 50% of the visit was spent counseling and/or coordinating care with multidisciplinary healthcare team

## 2022-01-19 NOTE — PATIENT PROFILE ADULT - FALL HARM RISK - HARM RISK INTERVENTIONS
Assistance with ambulation/Assistance OOB with selected safe patient handling equipment/Communicate Risk of Fall with Harm to all staff/Reinforce activity limits and safety measures with patient and family/Tailored Fall Risk Interventions/Visual Cue: Yellow wristband and red socks/Bed in lowest position, wheels locked, appropriate side rails in place/Call bell, personal items and telephone in reach/Instruct patient to call for assistance before getting out of bed or chair/Non-slip footwear when patient is out of bed/[place] to call system/Physically safe environment - no spills, clutter or unnecessary equipment/Purposeful Proactive Rounding/Room/bathroom lighting operational, light cord in reach Assistance with ambulation/Assistance OOB with selected safe patient handling equipment/Communicate Risk of Fall with Harm to all staff/Discuss with provider need for PT consult/Monitor gait and stability/Provide patient with walking aids - walker, cane, crutches/Reinforce activity limits and safety measures with patient and family/Tailored Fall Risk Interventions/Visual Cue: Yellow wristband and red socks/Bed in lowest position, wheels locked, appropriate side rails in place/Call bell, personal items and telephone in reach/Instruct patient to call for assistance before getting out of bed or chair/Non-slip footwear when patient is out of bed/[place] to call system/Physically safe environment - no spills, clutter or unnecessary equipment/Purposeful Proactive Rounding/Room/bathroom lighting operational, light cord in reach

## 2022-01-19 NOTE — PROGRESS NOTE ADULT - SUBJECTIVE AND OBJECTIVE BOX
Date/Time Patient Seen:  		  Referring MD:   Data Reviewed	       Patient is a 86y old  Female who presents with a chief complaint of 86 female sent from [facility] for diarrhea x 3 days since starting antibiotics for UTI.   	Per patient she was being treated for UTI and began having diarrhea and RLQ pain.   	Denies fever, chills.   	Denies n/v.   	+ Abdominal pain , + Diarrhea  	Denies current urinary symptoms.  Denies CP, cough or SOBno abdominal distension, no blood in stool, no burning urination, no chills, no dysuria, no fever, no hematuria, no nausea, no vomiting++ RLQ abdominal pain, no bloating, no constipation, ++ diarrhea,   < from: CT Abdomen and Pelvis w/ IV Cont (01.16.22 @ 17:29) > (18 Jan 2022 09:41)      Subjective/HPI     PAST MEDICAL & SURGICAL HISTORY:  No pertinent past medical history    No pertinent past medical history          Medication list         MEDICATIONS  (STANDING):  dextrose 5% + sodium chloride 0.45% with potassium chloride 20 mEq/L 1000 milliLiter(s) (60 mL/Hr) IV Continuous <Continuous>  enoxaparin Injectable 50 milliGRAM(s) SubCutaneous every 12 hours  lactobacillus acidophilus 1 Tablet(s) Oral two times a day  losartan 25 milliGRAM(s) Oral daily  pantoprazole  Injectable 40 milliGRAM(s) IV Push daily  vancomycin    Solution 125 milliGRAM(s) Oral every 6 hours    MEDICATIONS  (PRN):  acetaminophen     Tablet .. 650 milliGRAM(s) Oral every 6 hours PRN Temp greater or equal to 38C (100.4F), Mild Pain (1 - 3)  aluminum hydroxide/magnesium hydroxide/simethicone Suspension 30 milliLiter(s) Oral every 4 hours PRN Dyspepsia  LORazepam   Injectable 1 milliGRAM(s) IV Push once PRN mri  LORazepam   Injectable 1 milliGRAM(s) IV Push once PRN mri  melatonin 3 milliGRAM(s) Oral at bedtime PRN Insomnia  OLANZapine Injectable 2.5 milliGRAM(s) IntraMuscular every 6 hours PRN agitation  ondansetron Injectable 4 milliGRAM(s) IV Push every 8 hours PRN Nausea and/or Vomiting  traMADol 50 milliGRAM(s) Oral four times a day PRN Moderate Pain (4 - 6)         Vitals log        ICU Vital Signs Last 24 Hrs  T(C): 37.6 (19 Jan 2022 00:45), Max: 37.6 (19 Jan 2022 00:45)  T(F): 99.6 (19 Jan 2022 00:45), Max: 99.6 (19 Jan 2022 00:45)  HR: 81 (19 Jan 2022 00:45) (73 - 81)  BP: 160/70 (19 Jan 2022 00:45) (160/70 - 160/77)  BP(mean): --  ABP: --  ABP(mean): --  RR: 16 (19 Jan 2022 00:45) (16 - 18)  SpO2: 97% (19 Jan 2022 00:45) (97% - 99%)           Input and Output:  I&O's Detail      Lab Data                        10.8   8.55  )-----------( 265      ( 18 Jan 2022 02:14 )             33.8     01-18    143  |  112<H>  |  12  ----------------------------<  120<H>  3.0<L>   |  26  |  0.97    Ca    8.8      18 Jan 2022 02:14  Phos  2.2     01-18  Mg     1.8     01-18    TPro  5.8<L>  /  Alb  2.3<L>  /  TBili  0.3  /  DBili  x   /  AST  16  /  ALT  15  /  AlkPhos  68  01-18            Review of Systems	      Objective     Physical Examination    heart s1s2  lung dec BS  abd soft      Pertinent Lab findings & Imaging      Renee:  NO   Adequate UO     I&O's Detail           Discussed with:     Cultures:	        Radiology

## 2022-01-19 NOTE — PROGRESS NOTE ADULT - REASON FOR ADMISSION
86 female sent from [facility] living for diarrhea x 3 days since starting antibiotics for UTI.   	Per patient she was being treated for UTI and began having diarrhea and RLQ pain.   	Denies fever, chills.   	Denies n/v.   	+ Abdominal pain , + Diarrhea  	Denies current urinary symptoms.  Denies CP, cough or SOBno abdominal distension, no blood in stool, no burning urination, no chills, no dysuria, no fever, no hematuria, no nausea, no vomiting++ RLQ abdominal pain, no bloating, no constipation, ++ diarrhea,   < from: CT Abdomen and Pelvis w/ IV Cont (01.16.22 @ 17:29) >

## 2022-01-19 NOTE — DIETITIAN INITIAL EVALUATION ADULT. - ADD RECOMMEND
1. recommend low fiber diet with ensure clear TID 2. continue probiotic as ordered 3. follow diet tolerance

## 2022-01-19 NOTE — PROGRESS NOTE ADULT - SUBJECTIVE AND OBJECTIVE BOX
[facility]  Ty Sheldon PA-C  [facility] [place] [address]  [phone]      INTERVAL HPI/OVERNIGHT EVENTS:  Pt s/e  Pt reports her BMs are improving, still loose but less frequent  Denies abdominal pain or further GI complaints    MEDICATIONS  (STANDING):  dextrose 5% + sodium chloride 0.45% with potassium chloride 20 mEq/L 1000 milliLiter(s) (60 mL/Hr) IV Continuous <Continuous>  enoxaparin Injectable 50 milliGRAM(s) SubCutaneous every 12 hours  lactobacillus acidophilus 1 Tablet(s) Oral two times a day  losartan 25 milliGRAM(s) Oral daily  pantoprazole  Injectable 40 milliGRAM(s) IV Push daily  potassium chloride    Tablet ER 40 milliEquivalent(s) Oral once  vancomycin    Solution 125 milliGRAM(s) Oral every 6 hours    MEDICATIONS  (PRN):  acetaminophen     Tablet .. 650 milliGRAM(s) Oral every 6 hours PRN Temp greater or equal to 38C (100.4F), Mild Pain (1 - 3)  aluminum hydroxide/magnesium hydroxide/simethicone Suspension 30 milliLiter(s) Oral every 4 hours PRN Dyspepsia  LORazepam   Injectable 1 milliGRAM(s) IV Push once PRN mri  LORazepam   Injectable 1 milliGRAM(s) IV Push once PRN mri  melatonin 3 milliGRAM(s) Oral at bedtime PRN Insomnia  OLANZapine Injectable 2.5 milliGRAM(s) IntraMuscular every 6 hours PRN agitation  ondansetron Injectable 4 milliGRAM(s) IV Push every 8 hours PRN Nausea and/or Vomiting  traMADol 50 milliGRAM(s) Oral four times a day PRN Moderate Pain (4 - 6)      Allergies    No Known Allergies      PHYSICAL EXAM:   Vital Signs:  Vital Signs Last 24 Hrs  T(C): 36.7 (19 Jan 2022 06:45), Max: 37.6 (19 Jan 2022 00:45)  T(F): 98.1 (19 Jan 2022 06:45), Max: 99.6 (19 Jan 2022 00:45)  HR: 78 (19 Jan 2022 06:45) (78 - 81)  BP: 157/61 (19 Jan 2022 06:45) (157/61 - 160/70)  BP(mean): --  RR: 16 (19 Jan 2022 06:45) (16 - 16)  SpO2: 99% (19 Jan 2022 06:45) (97% - 99%)  Daily     Daily     GENERAL:  Appears stated age,   HEENT:  NC/AT,    CHEST:  Full & symmetric excursion,   HEART:  Regular rhythm,  ABDOMEN:  Soft, non-tender, non-distended,  EXTEREMITIES:  no cyanosis  SKIN:  No rash  NEURO:  Alert,       LABS:                        12.4   8.57  )-----------( 276      ( 19 Jan 2022 07:57 )             38.7     01-19    143  |  109<H>  |  11  ----------------------------<  119<H>  3.4<L>   |  27  |  0.78    Ca    8.8      19 Jan 2022 07:57  Phos  2.5     01-19  Mg     1.9     01-19    TPro  5.8<L>  /  Alb  2.3<L>  /  TBili  0.3  /  DBili  x   /  AST  16  /  ALT  15  /  AlkPhos  68  01-18    PT/INR - ( 19 Jan 2022 07:57 )   PT: 13.5 sec;   INR: 1.16 ratio

## 2022-01-19 NOTE — SWALLOW BEDSIDE ASSESSMENT ADULT - ADDITIONAL RECOMMENDATIONS
This department will continue to follow Patient for diet tolerance during this admission, as schedule permits

## 2022-01-19 NOTE — PHYSICAL THERAPY INITIAL EVALUATION ADULT - ADDITIONAL COMMENTS
Patient poor historian. Per EMR, Patient from [facility]. Patient states she ambulates with RW. Unable to provide additional information.

## 2022-01-19 NOTE — DIETITIAN INITIAL EVALUATION ADULT. - OTHER INFO
86 year old female Dx c diff colitis proctocolitis pulmonary embolism PMH dementia diarrhea hypokalemia  weight at admit 104# unable to obtain ht at this time  patient in ED limited documentation available   patient reports eats most foods PTA. RD spoke to daughter who reports over last 2 months new to assisted living patient eating well and gaining weight . gained 8 # . was only eating sugar containing foods at home per daughter .   was 104# when sent to [facility] now 112# per daughter

## 2022-01-19 NOTE — PHYSICAL THERAPY INITIAL EVALUATION ADULT - PERTINENT HX OF CURRENT PROBLEM, REHAB EVAL
86 female sent from [facility] assisted living for diarrhea x 3 days since starting antibiotics for UTI.

## 2022-01-19 NOTE — PROGRESS NOTE ADULT - REASON FOR ADMISSION
86 female sent from [facility] for diarrhea x 3 days since starting antibiotics for UTI.   	Per patient she was being treated for UTI and began having diarrhea and RLQ pain.   	Denies fever, chills.   	Denies n/v.   	+ Abdominal pain , + Diarrhea  	Denies current urinary symptoms.  Denies CP, cough or SOBno abdominal distension, no blood in stool, no burning urination, no chills, no dysuria, no fever, no hematuria, no nausea, no vomiting++ RLQ abdominal pain, no bloating, no constipation, ++ diarrhea,   < from: CT Abdomen and Pelvis w/ IV Cont (01.16.22 @ 17:29) >

## 2022-01-19 NOTE — DIETITIAN INITIAL EVALUATION ADULT. - REASON FOR ADMISSION
86 female sent from [facility] living for diarrhea x 3 days since starting antibiotics for UTI.   	Per patient she was being treated for UTI and began having diarrhea and RLQ pain.   	Denies fever, chills.   	Denies n/v.   	+ Abdominal pain , + Diarrhea  	Denies current urinary symptoms.  Denies CP, cough or SOBno abdominal distension, no blood in stool, no burning urination, no chills, no dysuria, no fever, no hematuria, no nausea, no vomiting++ RLQ abdominal pain, no bloating, no constipation, ++ diarrhea,   < from: CT Abdomen and Pelvis w/ IV Cont (01.16.22 @ 17:29) >

## 2022-01-19 NOTE — PROGRESS NOTE ADULT - REASON FOR ADMISSION
86 female sent from [facility] living for diarrhea x 3 days since starting antibiotics for UTI.   	Per patient she was being treated for UTI and began having diarrhea and RLQ pain.   	Denies fever, chills.   	Denies n/v.   	+ Abdominal pain , + Diarrhea  	Denies current urinary symptoms.  Denies CP, cough or SOBno abdominal distension, no blood in stool, no burning urination, no chills, no dysuria, no fever, no hematuria, no nausea, no vomiting++ RLQ abdominal pain, no bloating, no constipation, ++ diarrhea,   < from: CT Abdomen and Pelvis w/ IV Cont (01.16.22 @ 17:29) >

## 2022-01-20 LAB
ANION GAP SERPL CALC-SCNC: 6 MMOL/L — SIGNIFICANT CHANGE UP (ref 5–17)
BUN SERPL-MCNC: 14 MG/DL — SIGNIFICANT CHANGE UP (ref 7–23)
CALCIUM SERPL-MCNC: 9 MG/DL — SIGNIFICANT CHANGE UP (ref 8.5–10.1)
CHLORIDE SERPL-SCNC: 108 MMOL/L — SIGNIFICANT CHANGE UP (ref 96–108)
CO2 SERPL-SCNC: 27 MMOL/L — SIGNIFICANT CHANGE UP (ref 22–31)
CREAT SERPL-MCNC: 0.89 MG/DL — SIGNIFICANT CHANGE UP (ref 0.5–1.3)
GLUCOSE SERPL-MCNC: 113 MG/DL — HIGH (ref 70–99)
HCT VFR BLD CALC: 36.1 % — SIGNIFICANT CHANGE UP (ref 34.5–45)
HGB BLD-MCNC: 11.3 G/DL — LOW (ref 11.5–15.5)
MCHC RBC-ENTMCNC: 27.2 PG — SIGNIFICANT CHANGE UP (ref 27–34)
MCHC RBC-ENTMCNC: 31.3 GM/DL — LOW (ref 32–36)
MCV RBC AUTO: 86.8 FL — SIGNIFICANT CHANGE UP (ref 80–100)
NRBC # BLD: 0 /100 WBCS — SIGNIFICANT CHANGE UP (ref 0–0)
PLATELET # BLD AUTO: 315 K/UL — SIGNIFICANT CHANGE UP (ref 150–400)
POTASSIUM SERPL-MCNC: 3.8 MMOL/L — SIGNIFICANT CHANGE UP (ref 3.5–5.3)
POTASSIUM SERPL-SCNC: 3.8 MMOL/L — SIGNIFICANT CHANGE UP (ref 3.5–5.3)
RBC # BLD: 4.16 M/UL — SIGNIFICANT CHANGE UP (ref 3.8–5.2)
RBC # FLD: 13.8 % — SIGNIFICANT CHANGE UP (ref 10.3–14.5)
SODIUM SERPL-SCNC: 141 MMOL/L — SIGNIFICANT CHANGE UP (ref 135–145)
WBC # BLD: 12.57 K/UL — HIGH (ref 3.8–10.5)
WBC # FLD AUTO: 12.57 K/UL — HIGH (ref 3.8–10.5)

## 2022-01-20 RX ADMIN — DEXTROSE MONOHYDRATE, SODIUM CHLORIDE, AND POTASSIUM CHLORIDE 60 MILLILITER(S): 50; .745; 4.5 INJECTION, SOLUTION INTRAVENOUS at 12:02

## 2022-01-20 RX ADMIN — PANTOPRAZOLE SODIUM 40 MILLIGRAM(S): 20 TABLET, DELAYED RELEASE ORAL at 12:01

## 2022-01-20 RX ADMIN — Medication 125 MILLIGRAM(S): at 05:35

## 2022-01-20 RX ADMIN — Medication 125 MILLIGRAM(S): at 17:35

## 2022-01-20 RX ADMIN — LOSARTAN POTASSIUM 25 MILLIGRAM(S): 100 TABLET, FILM COATED ORAL at 05:35

## 2022-01-20 RX ADMIN — Medication 125 MILLIGRAM(S): at 00:00

## 2022-01-20 RX ADMIN — ENOXAPARIN SODIUM 50 MILLIGRAM(S): 100 INJECTION SUBCUTANEOUS at 17:35

## 2022-01-20 RX ADMIN — ENOXAPARIN SODIUM 50 MILLIGRAM(S): 100 INJECTION SUBCUTANEOUS at 05:34

## 2022-01-20 RX ADMIN — Medication 1 TABLET(S): at 05:34

## 2022-01-20 RX ADMIN — Medication 125 MILLIGRAM(S): at 23:40

## 2022-01-20 RX ADMIN — Medication 1 TABLET(S): at 17:35

## 2022-01-20 RX ADMIN — Medication 125 MILLIGRAM(S): at 12:02

## 2022-01-20 NOTE — PROGRESS NOTE ADULT - REASON FOR ADMISSION
86 female sent from [facility] living for diarrhea x 3 days since starting antibiotics for UTI.   	Per patient she was being treated for UTI and began having diarrhea and RLQ pain.   	Denies fever, chills.   	Denies n/v.   	+ Abdominal pain , + Diarrhea  	Denies current urinary symptoms.  Denies CP, cough or SOBno abdominal distension, no blood in stool, no burning urination, no chills, no dysuria, no fever, no hematuria, no nausea, no vomiting++ RLQ abdominal pain, no bloating, no constipation, ++ diarrhea,   < from: CT Abdomen and Pelvis w/ IV Cont (01.16.22 @ 17:29) >

## 2022-01-20 NOTE — PROGRESS NOTE ADULT - SUBJECTIVE AND OBJECTIVE BOX
[facility]  Ty Sheldon PA-C  [facility] [place] [address]  [phone]      INTERVAL HPI/OVERNIGHT EVENTS:  Pt s/e  Pt reports she is still having loose stool  Denies abdominal pain  Tolerating diet    MEDICATIONS  (STANDING):  dextrose 5% + sodium chloride 0.45% with potassium chloride 20 mEq/L 1000 milliLiter(s) (60 mL/Hr) IV Continuous <Continuous>  enoxaparin Injectable 50 milliGRAM(s) SubCutaneous every 12 hours  influenza  Vaccine (HIGH DOSE) 0.7 milliLiter(s) IntraMuscular once  lactobacillus acidophilus 1 Tablet(s) Oral two times a day  losartan 25 milliGRAM(s) Oral daily  pantoprazole  Injectable 40 milliGRAM(s) IV Push daily  vancomycin    Solution 125 milliGRAM(s) Oral every 6 hours    MEDICATIONS  (PRN):  acetaminophen     Tablet .. 650 milliGRAM(s) Oral every 6 hours PRN Temp greater or equal to 38C (100.4F), Mild Pain (1 - 3)  aluminum hydroxide/magnesium hydroxide/simethicone Suspension 30 milliLiter(s) Oral every 4 hours PRN Dyspepsia  LORazepam   Injectable 1 milliGRAM(s) IV Push once PRN mri  LORazepam   Injectable 1 milliGRAM(s) IV Push once PRN mri  melatonin 3 milliGRAM(s) Oral at bedtime PRN Insomnia  OLANZapine Injectable 2.5 milliGRAM(s) IntraMuscular every 6 hours PRN agitation  ondansetron Injectable 4 milliGRAM(s) IV Push every 8 hours PRN Nausea and/or Vomiting  traMADol 50 milliGRAM(s) Oral four times a day PRN Moderate Pain (4 - 6)      Allergies    No Known Allergies    PHYSICAL EXAM:   Vital Signs:  Vital Signs Last 24 Hrs  T(C): 36.4 (2022 04:45), Max: 36.7 (2022 20:32)  T(F): 97.6 (2022 04:45), Max: 98.1 (2022 20:32)  HR: 78 (2022 04:45) (58 - 78)  BP: 162/71 (2022 04:45) (148/65 - 162/71)  BP(mean): --  RR: 18 (2022 04:45) (18 - 18)  SpO2: 93% (2022 04:45) (93% - 93%)  Daily     Daily Weight in k (2022 04:45)    GENERAL:  Appears stated age,   HEENT:  NC/AT,    CHEST:  Full & symmetric excursion,   HEART:  Regular rhythm,  ABDOMEN:  Soft, non-tender, non-distended,  EXTEREMITIES:  no cyanosis  SKIN:  No rash  NEURO:  Alert,       LABS:                        11.3   12.57 )-----------( 315      ( 2022 07:32 )             36.1     01-20    141  |  108  |  14  ----------------------------<  113<H>  3.8   |  27  |  0.89    Ca    9.0      2022 07:32  Phos  2.5     -19  Mg     1.9     -19      PT/INR - ( 2022 07:57 )   PT: 13.5 sec;   INR: 1.16 ratio

## 2022-01-20 NOTE — PROGRESS NOTE ADULT - REASON FOR ADMISSION
86 female sent from [facility] living for diarrhea x 3 days since starting antibiotics for UTI.   	Per patient she was being treated for UTI and began having diarrhea and RLQ pain.   	Denies fever, chills.   	Denies n/v.   	+ Abdominal pain , + Diarrhea  	Denies current urinary symptoms.  Denies CP, cough or SOBno abdominal distension, no blood in stool, no burning urination, no chills, no dysuria, no fever, no hematuria, no nausea, no vomiting++ RLQ abdominal pain, no bloating, no constipation, ++ diarrhea,   < from: CT Abdomen and Pelvis w/ IV Cont (01.16.22 @ 17:29) >

## 2022-01-20 NOTE — PROGRESS NOTE ADULT - SUBJECTIVE AND OBJECTIVE BOX
Patient is a 86y Female with a known history of :  Diarrhea [R19.7]    Hypokalemia [E87.6]    Proctocolitis [K52.9]    Dementia [F03.90]    NPH (normal pressure hydrocephalus) [G91.2]    Suspected pulmonary embolism [R09.89]    Prophylactic measure [Z29.9]    Abnormal brain MRI [R90.89]      HPI:  86 female sent from [facility] for diarrhea x 3 days since starting antibiotics for UTI.   	Per patient she was being treated for UTI and began having diarrhea and RLQ pain.   	Denies fever, chills.   	Denies n/v.   	+ Abdominal pain , + Diarrhea  	Denies current urinary symptoms.  Denies CP, cough or SOBno abdominal distension, no blood in stool, no burning urination, no chills, no dysuria, no fever, no hematuria, no nausea, no vomiting++ RLQ abdominal pain, no bloating, no constipation, ++ diarrhea,   < from: CT Abdomen and Pelvis w/ IV Cont (01.16.22 @ 17:29) >    ACC: [id number] EXAM:  CT ABDOMEN AND PELVIS IC                          PROCEDURE DATE:  01/16/2022          INTERPRETATION:  CLINICAL INFORMATION: Acute abdominal pain.    COMPARISON: None.    CONTRAST/COMPLICATIONS:  IV Contrast: Omnipaque 350  90cc administered   10 cc discarded  Oral Contrast: NONE  Complications: None reported at time of study completion    PROCEDURE:  CT of the Abdomen and Pelvis was performed.  Sagittal and coronal reformats were performed.    FINDINGS:  LOWER CHEST: Possible filling defect in a branch of the right lower lobe   pulmonary artery (series 2, image 7), suspicious for pulmonary embolism.   Bibasilar subsegmental atelectasis.    LIVER: Within normal limits.  BILE DUCTS: Normal caliber.  GALLBLADDER: Withinnormal limits.  SPLEEN: Subcentimeter hypodense splenic lesions, too small to   characterize.  PANCREAS: Within normal limits.  ADRENALS: Within normal limits.  KIDNEYS/URETERS: No hydronephrosis. Left upper pole renal cyst.   Additional subcentimeter hypodense bilateral renal lesions, too small to   characterize.    BLADDER: Within normal limits.  REPRODUCTIVE ORGANS: Hysterectomy.    BOWEL: Colonic diverticulosis. Long segment mural thickening of the colon   extending from the ascending colon to the level of the rectum. Small   bowel anastomosis. No bowel obstruction. Appendix is normal.  PERITONEUM: No ascites.  VESSELS: Atherosclerotic changes.  RETROPERITONEUM/LYMPH NODES: No lymphadenopathy.  ABDOMINAL WALL: Within normal limits.  BONES: Degenerative changes.    IMPRESSION:  Possible filling defect in a branch of the right lower lobe pulmonary   artery, suspicious for pulmonary embolism. Further evaluation with CTA   chest (pulmonary artery protocol) is recommended.    Long segment proctocolitis.    Findings were discussed with MARLON FERRER on 1/16/2022 at 6:28 PM   by Dr. Wagner with read back confirmation.    --- End of Report ---            ANABEL WAGNER MD; Attending Radiologist  This document has been electronically signed. Jan 16 2022  6:29PM    < end of copied text >   (16 Jan 2022 18:47)      REVIEW OF SYSTEMS:    CONSTITUTIONAL: No fever, weight loss, or fatigue  EYES: No eye pain, visual disturbances, or discharge  ENMT:  No difficulty hearing, tinnitus, vertigo; No sinus or throat pain  NECK: No pain or stiffness  BREASTS: No pain, masses, or nipple discharge  RESPIRATORY: No cough, wheezing, chills or hemoptysis; No shortness of breath  CARDIOVASCULAR: No chest pain, palpitations, dizziness, or leg swelling  GASTROINTESTINAL: No abdominal or epigastric pain. No nausea, vomiting, or hematemesis; No diarrhea or constipation. No melena or hematochezia.  GENITOURINARY: No dysuria, frequency, hematuria, or incontinence  NEUROLOGICAL: No headaches, memory loss, loss of strength, numbness, or tremors  SKIN: No itching, burning, rashes, or lesions   LYMPH NODES: No enlarged glands  ENDOCRINE: No heat or cold intolerance; No hair loss  MUSCULOSKELETAL: No joint pain or swelling; No muscle, back, or extremity pain  PSYCHIATRIC: No depression, anxiety, mood swings, or difficulty sleeping  HEME/LYMPH: No easy bruising, or bleeding gums  ALLERGY AND IMMUNOLOGIC: No hives or eczema    MEDICATIONS  (STANDING):  dextrose 5% + sodium chloride 0.45% with potassium chloride 20 mEq/L 1000 milliLiter(s) (60 mL/Hr) IV Continuous <Continuous>  enoxaparin Injectable 50 milliGRAM(s) SubCutaneous every 12 hours  influenza  Vaccine (HIGH DOSE) 0.7 milliLiter(s) IntraMuscular once  lactobacillus acidophilus 1 Tablet(s) Oral two times a day  losartan 25 milliGRAM(s) Oral daily  pantoprazole  Injectable 40 milliGRAM(s) IV Push daily  vancomycin    Solution 125 milliGRAM(s) Oral every 6 hours    MEDICATIONS  (PRN):  acetaminophen     Tablet .. 650 milliGRAM(s) Oral every 6 hours PRN Temp greater or equal to 38C (100.4F), Mild Pain (1 - 3)  aluminum hydroxide/magnesium hydroxide/simethicone Suspension 30 milliLiter(s) Oral every 4 hours PRN Dyspepsia  LORazepam   Injectable 1 milliGRAM(s) IV Push once PRN mri  LORazepam   Injectable 1 milliGRAM(s) IV Push once PRN mri  melatonin 3 milliGRAM(s) Oral at bedtime PRN Insomnia  OLANZapine Injectable 2.5 milliGRAM(s) IntraMuscular every 6 hours PRN agitation  ondansetron Injectable 4 milliGRAM(s) IV Push every 8 hours PRN Nausea and/or Vomiting  traMADol 50 milliGRAM(s) Oral four times a day PRN Moderate Pain (4 - 6)      ALLERGIES: No Known Allergies      FAMILY HISTORY:      PHYSICAL EXAMINATION:  -----------------------------  T(C): 36.4 (01-20-22 @ 04:45), Max: 36.8 (01-19-22 @ 10:43)  HR: 78 (01-20-22 @ 04:45) (58 - 78)  BP: 162/71 (01-20-22 @ 04:45) (145/78 - 162/71)  RR: 18 (01-20-22 @ 04:45) (18 - 18)  SpO2: 93% (01-20-22 @ 04:45) (93% - 97%)  Wt(kg): --    01-19 @ 07:01  -  01-20 @ 07:00  --------------------------------------------------------  IN:    dextrose 5% + sodium chloride 0.45% w/ Additives: 1380 mL    Oral Fluid: 350 mL  Total IN: 1730 mL    OUT:  Total OUT: 0 mL    Total NET: 1730 mL            VITALS  T(C): 36.4 (01-20-22 @ 04:45), Max: 36.8 (01-19-22 @ 10:43)  HR: 78 (01-20-22 @ 04:45) (58 - 78)  BP: 162/71 (01-20-22 @ 04:45) (145/78 - 162/71)  RR: 18 (01-20-22 @ 04:45) (18 - 18)  SpO2: 93% (01-20-22 @ 04:45) (93% - 97%)    Constitutional: well developed, normal appearance, well groomed, well nourished, no deformities and no acute distress.   Eyes: the conjunctiva exhibited no abnormalities and the eyelids demonstrated no xanthelasmas.   HEENT: normal oral mucosa, no oral pallor and no oral cyanosis.   Neck: normal jugular venous A waves present, normal jugular venous V waves present and no jugular venous ramos A waves.   Pulmonary: no respiratory distress, normal respiratory rhythm and effort, no accessory muscle use and lungs were clear to auscultation bilaterally.   Cardiovascular: heart rate and rhythm were normal, normal S1 and S2 and no murmur, gallop, rub, heave or thrill are present.   Abdomen: soft, non-tender, no hepato-splenomegaly and no abdominal mass palpated.   Musculoskeletal: the gait could not be assessed..   Extremities: no clubbing of the fingernails, no localized cyanosis, no petechial hemorrhages and no ischemic changes.   Skin: normal skin color and pigmentation, no rash, no venous stasis, no skin lesions, no skin ulcer and no xanthoma was observed.   Psychiatric: oriented to person, place, and time, the affect was normal, the mood was normal and not feeling anxious.     LABS:   --------  01-20    141  |  108  |  14  ----------------------------<  113<H>  3.8   |  27  |  0.89    Ca    9.0      20 Jan 2022 07:32  Phos  2.5     01-19  Mg     1.9     01-19                           11.3   12.57 )-----------( 315      ( 20 Jan 2022 07:32 )             36.1     PT/INR - ( 19 Jan 2022 07:57 )   PT: 13.5 sec;   INR: 1.16 ratio                     RADIOLOGY:  -----------------    ECG:     ECHO:

## 2022-01-20 NOTE — PROGRESS NOTE ADULT - SUBJECTIVE AND OBJECTIVE BOX
GINA GOLD    PLV TELN 334 W1    Allergies    No Known Allergies    Intolerances        PAST MEDICAL & SURGICAL HISTORY:  No pertinent past medical history        FAMILY HISTORY:      Home Medications:  amLODIPine 5 mg oral tablet: 1 tab(s) orally once a day (18 Jan 2022 13:44)  Aspirin Enteric Coated 81 mg oral delayed release tablet: 1 tab(s) orally once a day (18 Jan 2022 13:44)  hydrALAZINE 10 mg oral tablet: 1 tab(s) orally 2 times a day (18 Jan 2022 13:44)  Nasal Saline 0.65% nasal spray: 2 spray(s) nasal 2 times a day for 15 days. Start: 01/06/22 (18 Jan 2022 13:44)  sertraline 50 mg oral tablet: 1 tab(s) orally once a day (18 Jan 2022 13:44)      MEDICATIONS  (STANDING):  dextrose 5% + sodium chloride 0.45% with potassium chloride 20 mEq/L 1000 milliLiter(s) (60 mL/Hr) IV Continuous <Continuous>  enoxaparin Injectable 50 milliGRAM(s) SubCutaneous every 12 hours  influenza  Vaccine (HIGH DOSE) 0.7 milliLiter(s) IntraMuscular once  lactobacillus acidophilus 1 Tablet(s) Oral two times a day  losartan 25 milliGRAM(s) Oral daily  pantoprazole  Injectable 40 milliGRAM(s) IV Push daily  vancomycin    Solution 125 milliGRAM(s) Oral every 6 hours    MEDICATIONS  (PRN):  acetaminophen     Tablet .. 650 milliGRAM(s) Oral every 6 hours PRN Temp greater or equal to 38C (100.4F), Mild Pain (1 - 3)  aluminum hydroxide/magnesium hydroxide/simethicone Suspension 30 milliLiter(s) Oral every 4 hours PRN Dyspepsia  LORazepam   Injectable 1 milliGRAM(s) IV Push once PRN mri  LORazepam   Injectable 1 milliGRAM(s) IV Push once PRN mri  melatonin 3 milliGRAM(s) Oral at bedtime PRN Insomnia  OLANZapine Injectable 2.5 milliGRAM(s) IntraMuscular every 6 hours PRN agitation  ondansetron Injectable 4 milliGRAM(s) IV Push every 8 hours PRN Nausea and/or Vomiting  traMADol 50 milliGRAM(s) Oral four times a day PRN Moderate Pain (4 - 6)      Diet, Low Fiber:   Supplement Feeding Modality:  Oral  Ensure Clear Cans or Servings Per Day:  1       Frequency:  Three Times a day (01-19-22 @ 09:18) [Active]          Vital Signs Last 24 Hrs  T(C): 36.4 (20 Jan 2022 04:45), Max: 36.8 (19 Jan 2022 10:43)  T(F): 97.6 (20 Jan 2022 04:45), Max: 98.2 (19 Jan 2022 10:43)  HR: 78 (20 Jan 2022 04:45) (58 - 78)  BP: 162/71 (20 Jan 2022 04:45) (145/78 - 162/71)  BP(mean): --  RR: 18 (20 Jan 2022 04:45) (18 - 18)  SpO2: 93% (20 Jan 2022 04:45) (93% - 97%)      01-19-22 @ 07:01  -  01-20-22 @ 07:00  --------------------------------------------------------  IN: 1730 mL / OUT: 0 mL / NET: 1730 mL              LABS:                        11.3   12.57 )-----------( 315      ( 20 Jan 2022 07:32 )             36.1     01-20    x   |  x   |  14  ----------------------------<  113<H>  x    |  27  |  0.89    Ca    9.0      20 Jan 2022 07:32  Phos  2.5     01-19  Mg     1.9     01-19      PT/INR - ( 19 Jan 2022 07:57 )   PT: 13.5 sec;   INR: 1.16 ratio                   WBC:  WBC Count: 12.57 K/uL (01-20 @ 07:32)  WBC Count: 8.57 K/uL (01-19 @ 07:57)  WBC Count: 8.55 K/uL (01-18 @ 02:14)  WBC Count: 10.89 K/uL (01-17 @ 11:42)  WBC Count: 16.59 K/uL (01-16 @ 14:58)      MICROBIOLOGY:  RECENT CULTURES:  01-17 .Blood Blood XXXX XXXX   No growth to date.    01-16 Clean Catch Clean Catch (Midstream) XXXX XXXX   No growth                PT/INR - ( 19 Jan 2022 07:57 )   PT: 13.5 sec;   INR: 1.16 ratio             Sodium:  Sodium, Serum: 143 mmol/L (01-19 @ 07:57)  Sodium, Serum: 143 mmol/L (01-18 @ 02:14)  Sodium, Serum: 144 mmol/L (01-17 @ 11:42)  Sodium, Serum: 140 mmol/L (01-16 @ 16:25)      0.89 mg/dL 01-20 @ 07:32  0.78 mg/dL 01-19 @ 07:57  0.97 mg/dL 01-18 @ 02:14  0.81 mg/dL 01-17 @ 11:42  0.98 mg/dL 01-16 @ 16:25      Hemoglobin:  Hemoglobin: 11.3 g/dL (01-20 @ 07:32)  Hemoglobin: 12.4 g/dL (01-19 @ 07:57)  Hemoglobin: 10.8 g/dL (01-18 @ 02:14)  Hemoglobin: 11.4 g/dL (01-17 @ 11:42)  Hemoglobin: 12.5 g/dL (01-16 @ 14:58)      Platelets: Platelet Count - Automated: 315 K/uL (01-20 @ 07:32)  Platelet Count - Automated: 276 K/uL (01-19 @ 07:57)  Platelet Count - Automated: 265 K/uL (01-18 @ 02:14)  Platelet Count - Automated: 253 K/uL (01-17 @ 11:42)  Platelet Count - Automated: 282 K/uL (01-16 @ 14:58)              RADIOLOGY & ADDITIONAL STUDIES:      MICROBIOLOGY:  RECENT CULTURES:  01-17 .Blood Blood XXXX XXXX   No growth to date.    01-16 Clean Catch Clean Catch (Midstream) XXXX XXXX   No growth

## 2022-01-20 NOTE — PROGRESS NOTE ADULT - REASON FOR ADMISSION
86 female sent from [facility] living for diarrhea x 3 days since starting antibiotics for UTI.   	Per patient she was being treated for UTI and began having diarrhea and RLQ pain.   	Denies fever, chills.   	Denies n/v.   	+ Abdominal pain , + Diarrhea  	Denies current urinary symptoms.  Denies CP, cough or SOBno abdominal distension, no blood in stool, no burning urination, no chills, no dysuria, no fever, no hematuria, no nausea, no vomiting++ RLQ abdominal pain, no bloating, no constipation, ++ diarrhea,   < from: CT Abdomen and Pelvis w/ IV Cont (01.16.22 @ 17:29) >

## 2022-01-20 NOTE — PROGRESS NOTE ADULT - SUBJECTIVE AND OBJECTIVE BOX
Neurology follow up note    GINA [id number]      Interval History:    Patient feels ok no new complaints.    Allergies    No Known Allergies    Intolerances        MEDICATIONS    acetaminophen     Tablet .. 650 milliGRAM(s) Oral every 6 hours PRN  aluminum hydroxide/magnesium hydroxide/simethicone Suspension 30 milliLiter(s) Oral every 4 hours PRN  dextrose 5% + sodium chloride 0.45% with potassium chloride 20 mEq/L 1000 milliLiter(s) IV Continuous <Continuous>  enoxaparin Injectable 50 milliGRAM(s) SubCutaneous every 12 hours  influenza  Vaccine (HIGH DOSE) 0.7 milliLiter(s) IntraMuscular once  lactobacillus acidophilus 1 Tablet(s) Oral two times a day  LORazepam   Injectable 1 milliGRAM(s) IV Push once PRN  LORazepam   Injectable 1 milliGRAM(s) IV Push once PRN  losartan 25 milliGRAM(s) Oral daily  melatonin 3 milliGRAM(s) Oral at bedtime PRN  OLANZapine Injectable 2.5 milliGRAM(s) IntraMuscular every 6 hours PRN  ondansetron Injectable 4 milliGRAM(s) IV Push every 8 hours PRN  pantoprazole  Injectable 40 milliGRAM(s) IV Push daily  traMADol 50 milliGRAM(s) Oral four times a day PRN  vancomycin    Solution 125 milliGRAM(s) Oral every 6 hours              Vital Signs Last 24 Hrs  T(C): 36.4 (20 Jan 2022 04:45), Max: 36.7 (19 Jan 2022 20:32)  T(F): 97.6 (20 Jan 2022 04:45), Max: 98.1 (19 Jan 2022 20:32)  HR: 78 (20 Jan 2022 04:45) (71 - 78)  BP: 162/71 (20 Jan 2022 04:45) (156/73 - 162/71)  BP(mean): --  RR: 18 (20 Jan 2022 04:45) (18 - 18)  SpO2: 93% (20 Jan 2022 04:45) (93% - 93%)    REVIEW OF SYSTEMS:  Limited secondary to the patient has forgetfulness, but constitutionally, she denies fevers, chills, or night sweats.  Head:  No headaches.  Eyes:  No double vision or blurry vision.  Ears:  No ringing in the ears.  Neck:  No neck pain.  Respiratory:  No shortness of breath.  Cardiovascular:  No chest pain.  Abdomen:  No nausea, vomiting, or abdominal pain.  Genitourinary:  Does have a history of urinary incontinence.  Extremities/Neurological:  No numbness or tingling.  Musculoskeletal:  Occasional joint pain.    PHYSICAL EXAMINATION:   HEENT:  Head:  Normocephalic, atraumatic.  Eyes:  No scleral icterus.  Ears:  Hearing bilaterally intact.  NECK:  Supple.  RESPIRATORY:  Good air entry bilaterally.  CARDIOVASCULAR:  S1 and S2 heard.  ABDOMEN:  Soft and nontender.  EXTREMITIES:  No clubbing or cyanosis was noted.      NEUROLOGIC:  The patient is awake and alert.  Location was hospital, year was 2022, month was February, was not able to say how many nickels in a quarter but 10 dimes in a dollar, could not say dog backward is spelled as God.  Recall was 0/3 within three minutes, even with prompting.  She was able to follow complex commands, touch right hand and touched left ear, was able to name simple objects, was able to tell two daughters' name.  Extraocular movements were intact.  Speech was fluent.  Smile was symmetric.  Motor:  Bilateral upper were 4/5, bilateral lower were 3/5.  At present, is not ambulatory as per my conversation with the daughter.              LABS:  CBC Full  -  ( 20 Jan 2022 07:32 )  WBC Count : 12.57 K/uL  RBC Count : 4.16 M/uL  Hemoglobin : 11.3 g/dL  Hematocrit : 36.1 %  Platelet Count - Automated : 315 K/uL  Mean Cell Volume : 86.8 fl  Mean Cell Hemoglobin : 27.2 pg  Mean Cell Hemoglobin Concentration : 31.3 gm/dL  Auto Neutrophil # : x  Auto Lymphocyte # : x  Auto Monocyte # : x  Auto Eosinophil # : x  Auto Basophil # : x  Auto Neutrophil % : x  Auto Lymphocyte % : x  Auto Monocyte % : x  Auto Eosinophil % : x  Auto Basophil % : x      01-20    141  |  108  |  14  ----------------------------<  113<H>  3.8   |  27  |  0.89    Ca    9.0      20 Jan 2022 07:32  Phos  2.5     01-19  Mg     1.9     01-19      Hemoglobin A1C:       Vitamin B12   PT/INR - ( 19 Jan 2022 07:57 )   PT: 13.5 sec;   INR: 1.16 ratio               RADIOLOGY      ANALYSIS AND PLAN:  An 86-year-old with an episode of memory issues.  For episode of memory issues, suspect most likely the patient does have mild cognitive impairment versus more probable underlying dementia.  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  If the patient is able to go for it, will give Ativan patient refusing to go for repeat mri -- spoke to nurse today 1/20  For mild cognitive impairment versus subtle dementia supportive treatment for now   For history of hypertension, monitor systolic blood pressure.  For periods of agitation, I would recommend a psychiatry follow-up.  spoke to outside neurologist DR Arceo  1/18  neurologic wise stable for discharge     Spoke with the daughter at the bedside, her name is Cris, her cell number is [phone], the home number is [phone]. 1/19  She does understand while in the hospital setting, she may become more disoriented.    Greater than 40 minutes of time was spent with the patient, plan of care, reviewing data, with greater than 50% of the visit was spent counseling and/or coordinating care with multidisciplinary healthcare team

## 2022-01-20 NOTE — PROGRESS NOTE ADULT - REASON FOR ADMISSION
86 female sent from [facility] living for diarrhea x 3 days since starting antibiotics for UTI.   	Per patient she was being treated for UTI and began having diarrhea and RLQ pain.   	Denies fever, chills.   	Denies n/v.   	+ Abdominal pain , + Diarrhea  	Denies current urinary symptoms.  Denies CP, cough or SOBno abdominal distension, no blood in stool, no burning urination, no chills, no dysuria, no fever, no hematuria, no nausea, no vomiting++ RLQ abdominal pain, no bloating, no constipation, ++ diarrhea,   < from: CT Abdomen and Pelvis w/ IV Cont (01.16.22 @ 17:29) >

## 2022-01-20 NOTE — PROGRESS NOTE ADULT - REASON FOR ADMISSION
86 female sent from [facility] living for diarrhea x 3 days since starting antibiotics for UTI.   	Per patient she was being treated for UTI and began having diarrhea and RLQ pain.   	Denies fever, chills.   	Denies n/v.   	+ Abdominal pain , + Diarrhea  	Denies current urinary symptoms.  Denies CP, cough or SOBno abdominal distension, no blood in stool, no burning urination, no chills, no dysuria, no fever, no hematuria, no nausea, no vomiting++ RLQ abdominal pain, no bloating, no constipation, ++ diarrhea,   < from: CT Abdomen and Pelvis w/ IV Cont (01.16.22 @ 17:29) >

## 2022-01-20 NOTE — PROGRESS NOTE ADULT - SUBJECTIVE AND OBJECTIVE BOX
Date/Time Patient Seen:  		  Referring MD:   Data Reviewed	       Patient is a 86y old  Female who presents with a chief complaint of 86 female sent from [facility] for diarrhea x 3 days since starting antibiotics for UTI.   	Per patient she was being treated for UTI and began having diarrhea and RLQ pain.   	Denies fever, chills.   	Denies n/v.   	+ Abdominal pain , + Diarrhea  	Denies current urinary symptoms.  Denies CP, cough or SOBno abdominal distension, no blood in stool, no burning urination, no chills, no dysuria, no fever, no hematuria, no nausea, no vomiting++ RLQ abdominal pain, no bloating, no constipation, ++ diarrhea,   < from: CT Abdomen and Pelvis w/ IV Cont (01.16.22 @ 17:29) > (19 Jan 2022 14:45)      Subjective/HPI     PAST MEDICAL & SURGICAL HISTORY:  No pertinent past medical history    No pertinent past medical history          Medication list         MEDICATIONS  (STANDING):  dextrose 5% + sodium chloride 0.45% with potassium chloride 20 mEq/L 1000 milliLiter(s) (60 mL/Hr) IV Continuous <Continuous>  enoxaparin Injectable 50 milliGRAM(s) SubCutaneous every 12 hours  influenza  Vaccine (HIGH DOSE) 0.7 milliLiter(s) IntraMuscular once  lactobacillus acidophilus 1 Tablet(s) Oral two times a day  losartan 25 milliGRAM(s) Oral daily  pantoprazole  Injectable 40 milliGRAM(s) IV Push daily  vancomycin    Solution 125 milliGRAM(s) Oral every 6 hours    MEDICATIONS  (PRN):  acetaminophen     Tablet .. 650 milliGRAM(s) Oral every 6 hours PRN Temp greater or equal to 38C (100.4F), Mild Pain (1 - 3)  aluminum hydroxide/magnesium hydroxide/simethicone Suspension 30 milliLiter(s) Oral every 4 hours PRN Dyspepsia  LORazepam   Injectable 1 milliGRAM(s) IV Push once PRN mri  LORazepam   Injectable 1 milliGRAM(s) IV Push once PRN mri  melatonin 3 milliGRAM(s) Oral at bedtime PRN Insomnia  OLANZapine Injectable 2.5 milliGRAM(s) IntraMuscular every 6 hours PRN agitation  ondansetron Injectable 4 milliGRAM(s) IV Push every 8 hours PRN Nausea and/or Vomiting  traMADol 50 milliGRAM(s) Oral four times a day PRN Moderate Pain (4 - 6)         Vitals log        ICU Vital Signs Last 24 Hrs  T(C): 36.4 (20 Jan 2022 04:45), Max: 36.8 (19 Jan 2022 10:43)  T(F): 97.6 (20 Jan 2022 04:45), Max: 98.2 (19 Jan 2022 10:43)  HR: 78 (20 Jan 2022 04:45) (58 - 78)  BP: 162/71 (20 Jan 2022 04:45) (145/78 - 162/71)  BP(mean): --  ABP: --  ABP(mean): --  RR: 18 (20 Jan 2022 04:45) (16 - 18)  SpO2: 93% (20 Jan 2022 04:45) (93% - 99%)           Input and Output:  I&O's Detail    19 Jan 2022 07:01  -  20 Jan 2022 06:11  --------------------------------------------------------  IN:    dextrose 5% + sodium chloride 0.45% w/ Additives: 720 mL    Oral Fluid: 350 mL  Total IN: 1070 mL    OUT:  Total OUT: 0 mL    Total NET: 1070 mL          Lab Data                        12.4   8.57  )-----------( 276      ( 19 Jan 2022 07:57 )             38.7     01-19    143  |  109<H>  |  11  ----------------------------<  119<H>  3.4<L>   |  27  |  0.78    Ca    8.8      19 Jan 2022 07:57  Phos  2.5     01-19  Mg     1.9     01-19              Review of Systems	      Objective     Physical Examination    heart s1s2  lung dec BS  abd soft      Pertinent Lab findings & Imaging      Víctor:  NO   Adequate UO     I&O's Detail    19 Jan 2022 07:01  -  20 Jan 2022 06:11  --------------------------------------------------------  IN:    dextrose 5% + sodium chloride 0.45% w/ Additives: 720 mL    Oral Fluid: 350 mL  Total IN: 1070 mL    OUT:  Total OUT: 0 mL    Total NET: 1070 mL               Discussed with:     Cultures:	        Radiology

## 2022-01-21 LAB
ANION GAP SERPL CALC-SCNC: 5 MMOL/L — SIGNIFICANT CHANGE UP (ref 5–17)
BUN SERPL-MCNC: 13 MG/DL — SIGNIFICANT CHANGE UP (ref 7–23)
CALCIUM SERPL-MCNC: 8.9 MG/DL — SIGNIFICANT CHANGE UP (ref 8.5–10.1)
CHLORIDE SERPL-SCNC: 108 MMOL/L — SIGNIFICANT CHANGE UP (ref 96–108)
CO2 SERPL-SCNC: 28 MMOL/L — SIGNIFICANT CHANGE UP (ref 22–31)
CREAT SERPL-MCNC: 0.87 MG/DL — SIGNIFICANT CHANGE UP (ref 0.5–1.3)
GLUCOSE SERPL-MCNC: 132 MG/DL — HIGH (ref 70–99)
HCT VFR BLD CALC: 36.7 % — SIGNIFICANT CHANGE UP (ref 34.5–45)
HGB BLD-MCNC: 11.5 G/DL — SIGNIFICANT CHANGE UP (ref 11.5–15.5)
MCHC RBC-ENTMCNC: 27.1 PG — SIGNIFICANT CHANGE UP (ref 27–34)
MCHC RBC-ENTMCNC: 31.3 GM/DL — LOW (ref 32–36)
MCV RBC AUTO: 86.6 FL — SIGNIFICANT CHANGE UP (ref 80–100)
NRBC # BLD: 0 /100 WBCS — SIGNIFICANT CHANGE UP (ref 0–0)
PLATELET # BLD AUTO: 346 K/UL — SIGNIFICANT CHANGE UP (ref 150–400)
POTASSIUM SERPL-MCNC: 4.5 MMOL/L — SIGNIFICANT CHANGE UP (ref 3.5–5.3)
POTASSIUM SERPL-SCNC: 4.5 MMOL/L — SIGNIFICANT CHANGE UP (ref 3.5–5.3)
RBC # BLD: 4.24 M/UL — SIGNIFICANT CHANGE UP (ref 3.8–5.2)
RBC # FLD: 14 % — SIGNIFICANT CHANGE UP (ref 10.3–14.5)
SODIUM SERPL-SCNC: 141 MMOL/L — SIGNIFICANT CHANGE UP (ref 135–145)
WBC # BLD: 13.06 K/UL — HIGH (ref 3.8–10.5)
WBC # FLD AUTO: 13.06 K/UL — HIGH (ref 3.8–10.5)

## 2022-01-21 RX ORDER — APIXABAN 2.5 MG/1
10 TABLET, FILM COATED ORAL EVERY 12 HOURS
Refills: 0 | Status: DISCONTINUED | OUTPATIENT
Start: 2022-01-21 | End: 2022-01-26

## 2022-01-21 RX ADMIN — ENOXAPARIN SODIUM 50 MILLIGRAM(S): 100 INJECTION SUBCUTANEOUS at 05:14

## 2022-01-21 RX ADMIN — DEXTROSE MONOHYDRATE, SODIUM CHLORIDE, AND POTASSIUM CHLORIDE 60 MILLILITER(S): 50; .745; 4.5 INJECTION, SOLUTION INTRAVENOUS at 05:13

## 2022-01-21 RX ADMIN — Medication 125 MILLIGRAM(S): at 18:27

## 2022-01-21 RX ADMIN — Medication 125 MILLIGRAM(S): at 13:14

## 2022-01-21 RX ADMIN — Medication 125 MILLIGRAM(S): at 23:52

## 2022-01-21 RX ADMIN — Medication 125 MILLIGRAM(S): at 05:14

## 2022-01-21 RX ADMIN — Medication 1 TABLET(S): at 05:14

## 2022-01-21 RX ADMIN — APIXABAN 10 MILLIGRAM(S): 2.5 TABLET, FILM COATED ORAL at 18:27

## 2022-01-21 RX ADMIN — Medication 1 TABLET(S): at 18:27

## 2022-01-21 RX ADMIN — PANTOPRAZOLE SODIUM 40 MILLIGRAM(S): 20 TABLET, DELAYED RELEASE ORAL at 13:13

## 2022-01-21 RX ADMIN — LOSARTAN POTASSIUM 25 MILLIGRAM(S): 100 TABLET, FILM COATED ORAL at 05:14

## 2022-01-21 NOTE — PROGRESS NOTE ADULT - REASON FOR ADMISSION
86 female sent from [facility] living for diarrhea x 3 days since starting antibiotics for UTI.   	Per patient she was being treated for UTI and began having diarrhea and RLQ pain.   	Denies fever, chills.   	Denies n/v.   	+ Abdominal pain , + Diarrhea  	Denies current urinary symptoms.  Denies CP, cough or SOBno abdominal distension, no blood in stool, no burning urination, no chills, no dysuria, no fever, no hematuria, no nausea, no vomiting++ RLQ abdominal pain, no bloating, no constipation, ++ diarrhea,   < from: CT Abdomen and Pelvis w/ IV Cont (01.16.22 @ 17:29) >

## 2022-01-21 NOTE — PROGRESS NOTE ADULT - REASON FOR ADMISSION
86 female sent from [facility] living for diarrhea x 3 days since starting antibiotics for UTI.   	Per patient she was being treated for UTI and began having diarrhea and RLQ pain.   	Denies fever, chills.   	Denies n/v.   	+ Abdominal pain , + Diarrhea  	Denies current urinary symptoms.  Denies CP, cough or SOBno abdominal distension, no blood in stool, no burning urination, no chills, no dysuria, no fever, no hematuria, no nausea, no vomiting++ RLQ abdominal pain, no bloating, no constipation, ++ diarrhea,   < from: CT Abdomen and Pelvis w/ IV Cont (01.16.22 @ 17:29) >

## 2022-01-21 NOTE — PROGRESS NOTE ADULT - SUBJECTIVE AND OBJECTIVE BOX
[facility]  Ty Sheldon PA-C  [facility] [place] [address]  [phone]      INTERVAL HPI/OVERNIGHT EVENTS:  Pt s/e  Pt reports her diarrhea is improving  Denies further GI complaints  Tolerating diet    MEDICATIONS  (STANDING):  apixaban 10 milliGRAM(s) Oral every 12 hours  dextrose 5% + sodium chloride 0.45% with potassium chloride 20 mEq/L 1000 milliLiter(s) (60 mL/Hr) IV Continuous <Continuous>  influenza  Vaccine (HIGH DOSE) 0.7 milliLiter(s) IntraMuscular once  lactobacillus acidophilus 1 Tablet(s) Oral two times a day  losartan 25 milliGRAM(s) Oral daily  pantoprazole  Injectable 40 milliGRAM(s) IV Push daily  vancomycin    Solution 125 milliGRAM(s) Oral every 6 hours    MEDICATIONS  (PRN):  acetaminophen     Tablet .. 650 milliGRAM(s) Oral every 6 hours PRN Temp greater or equal to 38C (100.4F), Mild Pain (1 - 3)  aluminum hydroxide/magnesium hydroxide/simethicone Suspension 30 milliLiter(s) Oral every 4 hours PRN Dyspepsia  LORazepam   Injectable 1 milliGRAM(s) IV Push once PRN mri  LORazepam   Injectable 1 milliGRAM(s) IV Push once PRN mri  melatonin 3 milliGRAM(s) Oral at bedtime PRN Insomnia  OLANZapine Injectable 2.5 milliGRAM(s) IntraMuscular every 6 hours PRN agitation  ondansetron Injectable 4 milliGRAM(s) IV Push every 8 hours PRN Nausea and/or Vomiting  traMADol 50 milliGRAM(s) Oral four times a day PRN Moderate Pain (4 - 6)      Allergies    No Known Allergies    PHYSICAL EXAM:   Vital Signs:  Vital Signs Last 24 Hrs  T(C): 36.5 (2022 05:03), Max: 36.8 (2022 20:53)  T(F): 97.7 (2022 05:03), Max: 98.3 (2022 20:53)  HR: 81 (2022 05:03) (72 - 90)  BP: 154/85 (2022 05:03) (150/74 - 162/75)  BP(mean): --  RR: 18 (2022 05:03) (18 - 19)  SpO2: 92% (2022 05:03) (90% - 93%)  Daily     Daily Weight in k.7 (2022 05:03)    GENERAL:  Appears stated age,   HEENT:  NC/AT,    CHEST:  Full & symmetric excursion,   HEART:  Regular rhythm,  ABDOMEN:  Soft, non-tender, non-distended,  EXTEREMITIES:  no cyanosis  SKIN:  No rash  NEURO:  Alert,       LABS:                        11.5   13.06 )-----------( 346      ( 2022 07:34 )             36.7         141  |  108  |  13  ----------------------------<  132<H>  4.5   |  28  |  0.87    Ca    8.9      2022 07:34

## 2022-01-21 NOTE — PROGRESS NOTE ADULT - SUBJECTIVE AND OBJECTIVE BOX
GINA GOLD    PLV TELN [address]    Allergies    No Known Allergies    Intolerances        PAST MEDICAL & SURGICAL HISTORY:  No pertinent past medical history        FAMILY HISTORY:      Home Medications:  amLODIPine 5 mg oral tablet: 1 tab(s) orally once a day (18 Jan 2022 13:44)  Aspirin Enteric Coated 81 mg oral delayed release tablet: 1 tab(s) orally once a day (18 Jan 2022 13:44)  hydrALAZINE 10 mg oral tablet: 1 tab(s) orally 2 times a day (18 Jan 2022 13:44)  Nasal Saline 0.65% nasal spray: 2 spray(s) nasal 2 times a day for 15 days. Start: 01/06/22 (18 Jan 2022 13:44)  sertraline 50 mg oral tablet: 1 tab(s) orally once a day (18 Jan 2022 13:44)      MEDICATIONS  (STANDING):  dextrose 5% + sodium chloride 0.45% with potassium chloride 20 mEq/L 1000 milliLiter(s) (60 mL/Hr) IV Continuous <Continuous>  enoxaparin Injectable 50 milliGRAM(s) SubCutaneous every 12 hours  influenza  Vaccine (HIGH DOSE) 0.7 milliLiter(s) IntraMuscular once  lactobacillus acidophilus 1 Tablet(s) Oral two times a day  losartan 25 milliGRAM(s) Oral daily  pantoprazole  Injectable 40 milliGRAM(s) IV Push daily  vancomycin    Solution 125 milliGRAM(s) Oral every 6 hours    MEDICATIONS  (PRN):  acetaminophen     Tablet .. 650 milliGRAM(s) Oral every 6 hours PRN Temp greater or equal to 38C (100.4F), Mild Pain (1 - 3)  aluminum hydroxide/magnesium hydroxide/simethicone Suspension 30 milliLiter(s) Oral every 4 hours PRN Dyspepsia  LORazepam   Injectable 1 milliGRAM(s) IV Push once PRN mri  LORazepam   Injectable 1 milliGRAM(s) IV Push once PRN mri  melatonin 3 milliGRAM(s) Oral at bedtime PRN Insomnia  OLANZapine Injectable 2.5 milliGRAM(s) IntraMuscular every 6 hours PRN agitation  ondansetron Injectable 4 milliGRAM(s) IV Push every 8 hours PRN Nausea and/or Vomiting  traMADol 50 milliGRAM(s) Oral four times a day PRN Moderate Pain (4 - 6)      Diet, Low Fiber:   Supplement Feeding Modality:  Oral  Ensure Clear Cans or Servings Per Day:  1       Frequency:  Three Times a day (01-19-22 @ 09:18) [Active]          Vital Signs Last 24 Hrs  T(C): 36.5 (21 Jan 2022 05:03), Max: 36.8 (20 Jan 2022 20:53)  T(F): 97.7 (21 Jan 2022 05:03), Max: 98.3 (20 Jan 2022 20:53)  HR: 81 (21 Jan 2022 05:03) (72 - 90)  BP: 154/85 (21 Jan 2022 05:03) (150/74 - 162/75)  BP(mean): --  RR: 18 (21 Jan 2022 05:03) (18 - 19)  SpO2: 92% (21 Jan 2022 05:03) (90% - 93%)      01-20-22 @ 07:01  -  01-21-22 @ 07:00  --------------------------------------------------------  IN: 360 mL / OUT: 0 mL / NET: 360 mL              LABS:                        11.3   12.57 )-----------( 315      ( 20 Jan 2022 07:32 )             36.1     01-20    141  |  108  |  14  ----------------------------<  113<H>  3.8   |  27  |  0.89    Ca    9.0      20 Jan 2022 07:32  Phos  2.5     01-19  Mg     1.9     01-19      PT/INR - ( 19 Jan 2022 07:57 )   PT: 13.5 sec;   INR: 1.16 ratio                   WBC:  WBC Count: 12.57 K/uL (01-20 @ 07:32)  WBC Count: 8.57 K/uL (01-19 @ 07:57)  WBC Count: 8.55 K/uL (01-18 @ 02:14)  WBC Count: 10.89 K/uL (01-17 @ 11:42)      MICROBIOLOGY:  RECENT CULTURES:  01-17 .Blood Blood XXXX XXXX   No growth to date.    01-16 Clean Catch Clean Catch (Midstream) XXXX XXXX   No growth                PT/INR - ( 19 Jan 2022 07:57 )   PT: 13.5 sec;   INR: 1.16 ratio             Sodium:  Sodium, Serum: 141 mmol/L (01-20 @ 07:32)  Sodium, Serum: 143 mmol/L (01-19 @ 07:57)  Sodium, Serum: 143 mmol/L (01-18 @ 02:14)  Sodium, Serum: 144 mmol/L (01-17 @ 11:42)      0.89 mg/dL 01-20 @ 07:32  0.78 mg/dL 01-19 @ 07:57  0.97 mg/dL 01-18 @ 02:14  0.81 mg/dL 01-17 @ 11:42      Hemoglobin:  Hemoglobin: 11.3 g/dL (01-20 @ 07:32)  Hemoglobin: 12.4 g/dL (01-19 @ 07:57)  Hemoglobin: 10.8 g/dL (01-18 @ 02:14)  Hemoglobin: 11.4 g/dL (01-17 @ 11:42)      Platelets: Platelet Count - Automated: 315 K/uL (01-20 @ 07:32)  Platelet Count - Automated: 276 K/uL (01-19 @ 07:57)  Platelet Count - Automated: 265 K/uL (01-18 @ 02:14)  Platelet Count - Automated: 253 K/uL (01-17 @ 11:42)              RADIOLOGY & ADDITIONAL STUDIES:      MICROBIOLOGY:  RECENT CULTURES:  01-17 .Blood Blood XXXX XXXX   No growth to date.    01-16 Clean Catch Clean Catch (Midstream) XXXX XXXX   No growth

## 2022-01-21 NOTE — PROGRESS NOTE ADULT - SUBJECTIVE AND OBJECTIVE BOX
Neurology follow up note    GINA [id number]      Interval History:    Patient feels ok no new complaints.    Allergies    No Known Allergies    Intolerances        MEDICATIONS    acetaminophen     Tablet .. 650 milliGRAM(s) Oral every 6 hours PRN  aluminum hydroxide/magnesium hydroxide/simethicone Suspension 30 milliLiter(s) Oral every 4 hours PRN  apixaban 10 milliGRAM(s) Oral every 12 hours  dextrose 5% + sodium chloride 0.45% with potassium chloride 20 mEq/L 1000 milliLiter(s) IV Continuous <Continuous>  influenza  Vaccine (HIGH DOSE) 0.7 milliLiter(s) IntraMuscular once  lactobacillus acidophilus 1 Tablet(s) Oral two times a day  LORazepam   Injectable 1 milliGRAM(s) IV Push once PRN  LORazepam   Injectable 1 milliGRAM(s) IV Push once PRN  losartan 25 milliGRAM(s) Oral daily  melatonin 3 milliGRAM(s) Oral at bedtime PRN  OLANZapine Injectable 2.5 milliGRAM(s) IntraMuscular every 6 hours PRN  ondansetron Injectable 4 milliGRAM(s) IV Push every 8 hours PRN  pantoprazole  Injectable 40 milliGRAM(s) IV Push daily  traMADol 50 milliGRAM(s) Oral four times a day PRN  vancomycin    Solution 125 milliGRAM(s) Oral every 6 hours              Vital Signs Last 24 Hrs  T(C): 36.5 (21 Jan 2022 05:03), Max: 36.8 (20 Jan 2022 20:53)  T(F): 97.7 (21 Jan 2022 05:03), Max: 98.3 (20 Jan 2022 20:53)  HR: 81 (21 Jan 2022 05:03) (72 - 90)  BP: 154/85 (21 Jan 2022 05:03) (150/74 - 162/75)  BP(mean): --  RR: 18 (21 Jan 2022 05:03) (18 - 19)  SpO2: 92% (21 Jan 2022 05:03) (90% - 93%)        REVIEW OF SYSTEMS:  Limited secondary to the patient has forgetfulness, but constitutionally, she denies fevers, chills, or night sweats.  Head:  No headaches.  Eyes:  No double vision or blurry vision.  Ears:  No ringing in the ears.  Neck:  No neck pain.  Respiratory:  No shortness of breath.  Cardiovascular:  No chest pain.  Abdomen:  No nausea, vomiting, or abdominal pain.  Genitourinary:  Does have a history of urinary incontinence.  Extremities/Neurological:  No numbness or tingling.  Musculoskeletal:  Occasional joint pain.    PHYSICAL EXAMINATION:   HEENT:  Head:  Normocephalic, atraumatic.  Eyes:  No scleral icterus.  Ears:  Hearing bilaterally intact.  NECK:  Supple.  RESPIRATORY:  Good air entry bilaterally.  CARDIOVASCULAR:  S1 and S2 heard.  ABDOMEN:  Soft and nontender.  EXTREMITIES:  No clubbing or cyanosis was noted.      NEUROLOGIC:  The patient is awake and alert. positive forgetfulness  Location was hospital, She was able to follow complex commands, touch right hand and touched left ear, was able to name simple objects, was able to tell two daughters' name.  Extraocular movements were intact.  Speech was fluent.  Smile was symmetric.  Motor:  Bilateral upper were 4/5, bilateral lower were 3/5.  At present, is not ambulatory as per my conversation with the daughter.          LABS:  CBC Full  -  ( 21 Jan 2022 07:34 )  WBC Count : 13.06 K/uL  RBC Count : 4.24 M/uL  Hemoglobin : 11.5 g/dL  Hematocrit : 36.7 %  Platelet Count - Automated : 346 K/uL  Mean Cell Volume : 86.6 fl  Mean Cell Hemoglobin : 27.1 pg  Mean Cell Hemoglobin Concentration : 31.3 gm/dL  Auto Neutrophil # : x  Auto Lymphocyte # : x  Auto Monocyte # : x  Auto Eosinophil # : x  Auto Basophil # : x  Auto Neutrophil % : x  Auto Lymphocyte % : x  Auto Monocyte % : x  Auto Eosinophil % : x  Auto Basophil % : x      01-21    141  |  108  |  13  ----------------------------<  132<H>  4.5   |  28  |  0.87    Ca    8.9      21 Jan 2022 07:34      Hemoglobin A1C:       Vitamin B12         RADIOLOGY    ANALYSIS AND PLAN:  An 86-year-old with an episode of memory issues.  For episode of memory issues, suspect most likely the patient does have mild cognitive impairment versus more probable underlying dementia.  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  If the patient is able to go for it, will give Ativan patient refusing to go for repeat mri -- spoke to nurse today 1/20  For mild cognitive impairment versus subtle dementia supportive treatment for now   For history of hypertension, monitor systolic blood pressure.  For periods of agitation, I would recommend a psychiatry follow-up.  spoke to outside neurologist DR Arceo  1/18  neurologic wise stable for discharge     Spoke with the daughter at the bedside, her name is Cris, her cell number is [phone], the home number is [phone]. 1/19  She does understand while in the hospital setting, she may become more disoriented.    Greater than 34 minutes of time was spent with the patient, plan of care, reviewing data, with greater than 50% of the visit was spent counseling and/or coordinating care with multidisciplinary healthcare team

## 2022-01-21 NOTE — PROGRESS NOTE ADULT - SUBJECTIVE AND OBJECTIVE BOX
PROGRESS NOTE  Patient is a 86y old  Female who presents with a chief complaint of 86 female sent from [facility] for diarrhea x 3 days since starting antibiotics for UTI.   	Per patient she was being treated for UTI and began having diarrhea and RLQ pain.   	Denies fever, chills.   	Denies n/v.   	+ Abdominal pain , + Diarrhea  	Denies current urinary symptoms.  Denies CP, cough or SOBno abdominal distension, no blood in stool, no burning urination, no chills, no dysuria, no fever, no hematuria, no nausea, no vomiting++ RLQ abdominal pain, no bloating, no constipation, ++ diarrhea,   < from: CT Abdomen and Pelvis w/ IV Cont (01.16.22 @ 17:29) > (19 Jan 2022 10:36)    Chart and available morning labs /imaging are reviewed electronically , urgent issues addressed . More information  is being added upon completion of rounds , when more information is collected and management discussed with consultants , medical staff and social service/case management on the floor   OVERNIGHT  No new issues reported by medical staff . All above noted Patient is resting in a bed comfortably .Confused ,poor mentation .No distress noted   Seen by SLP and RD  HPI:  86 female sent from [facility] for [facility] x 3 days since starting antibiotics for UTI.   	Per patient she was being treated for UTI and began having diarrhea and RLQ pain.   	Denies fever, chills.   	Denies n/v.   	+ Abdominal pain , + Diarrhea  	Denies current urinary symptoms.  Denies CP, cough or SOBno abdominal distension, no blood in stool, no burning urination, no chills, no dysuria, no fever, no hematuria, no nausea, no vomiting++ RLQ abdominal pain, no bloating, no constipation, ++ diarrhea,   < from: CT Abdomen and Pelvis w/ IV Cont (01.16.22 @ 17:29) >    ACC: [id number] EXAM:  CT ABDOMEN AND PELVIS IC                          PROCEDURE DATE:  01/16/2022          INTERPRETATION:  CLINICAL INFORMATION: Acute abdominal pain.    COMPARISON: None.    CONTRAST/COMPLICATIONS:  IV Contrast: Omnipaque 350  90cc administered   10 cc discarded  Oral Contrast: NONE  Complications: None reported at time of study completion    PROCEDURE:  CT of the Abdomen and Pelvis was performed.  Sagittal and coronal reformats were performed.    FINDINGS:  LOWER CHEST: Possible filling defect in a branch of the right lower lobe   pulmonary artery (series 2, image 7), suspicious for pulmonary embolism.   Bibasilar subsegmental atelectasis.    LIVER: Within normal limits.  BILE DUCTS: Normal caliber.  GALLBLADDER: Withinnormal limits.  SPLEEN: Subcentimeter hypodense splenic lesions, too small to   characterize.  PANCREAS: Within normal limits.  ADRENALS: Within normal limits.  KIDNEYS/URETERS: No hydronephrosis. Left upper pole renal cyst.   Additional subcentimeter hypodense bilateral renal lesions, too small to   characterize.    BLADDER: Within normal limits.  REPRODUCTIVE ORGANS: Hysterectomy.    BOWEL: Colonic diverticulosis. Long segment mural thickening of the colon   extending from the ascending colon to the level of the rectum. Small   bowel anastomosis. No bowel obstruction. Appendix is normal.  PERITONEUM: No ascites.  VESSELS: Atherosclerotic changes.  RETROPERITONEUM/LYMPH NODES: No lymphadenopathy.  ABDOMINAL WALL: Within normal limits.  BONES: Degenerative changes.    IMPRESSION:  Possible filling defect in a branch of the right lower lobe pulmonary   artery, suspicious for pulmonary embolism. Further evaluation with CTA   chest (pulmonary artery protocol) is recommended.    Long segment proctocolitis.    Findings were discussed with MARLON FERRER on 1/16/2022 at 6:28 PM   by Dr. Wagner with read back confirmation.    --- End of Report ---            ANABEL WAGNER MD; Attending Radiologist  This document has been electronically signed. Jan 16 2022  6:29PM    < end of copied text >   (16 Jan 2022 18:47)    PAST MEDICAL & SURGICAL HISTORY:  No pertinent past medical history        MEDICATIONS  (STANDING):  dextrose 5% + sodium chloride 0.45% with potassium chloride 20 mEq/L 1000 milliLiter(s) (60 mL/Hr) IV Continuous <Continuous>  enoxaparin Injectable 50 milliGRAM(s) SubCutaneous every 12 hours  lactobacillus acidophilus 1 Tablet(s) Oral two times a day  losartan 25 milliGRAM(s) Oral daily  pantoprazole  Injectable 40 milliGRAM(s) IV Push daily  potassium chloride    Tablet ER 40 milliEquivalent(s) Oral once  vancomycin    Solution 125 milliGRAM(s) Oral every 6 hours    MEDICATIONS  (PRN):  acetaminophen     Tablet .. 650 milliGRAM(s) Oral every 6 hours PRN Temp greater or equal to 38C (100.4F), Mild Pain (1 - 3)  aluminum hydroxide/magnesium hydroxide/simethicone Suspension 30 milliLiter(s) Oral every 4 hours PRN Dyspepsia  LORazepam   Injectable 1 milliGRAM(s) IV Push once PRN mri  LORazepam   Injectable 1 milliGRAM(s) IV Push once PRN mri  melatonin 3 milliGRAM(s) Oral at bedtime PRN Insomnia  OLANZapine Injectable 2.5 milliGRAM(s) IntraMuscular every 6 hours PRN agitation  ondansetron Injectable 4 milliGRAM(s) IV Push every 8 hours PRN Nausea and/or Vomiting  traMADol 50 milliGRAM(s) Oral four times a day PRN Moderate Pain (4 - 6)      OBJECTIVE    T(C): 36.8 (01-19-22 @ 10:43), Max: 37.6 (01-19-22 @ 00:45)  HR: 70 (01-19-22 @ 10:43) (70 - 81)  BP: 145/78 (01-19-22 @ 10:43) (145/78 - 160/70)  RR: 18 (01-19-22 @ 10:43) (16 - 18)  SpO2: 97% (01-19-22 @ 10:43) (97% - 99%)  Wt(kg): --  I&O's Summary        REVIEW OF SYSTEMS:  CONSTITUTIONAL: No fever, weight loss, or fatigue  EYES: No eye pain, visual disturbances, or discharge  ENMT:   No sinus or throat pain  NECK: No pain or stiffness  RESPIRATORY: No cough, wheezing, chills or hemoptysis; No shortness of breath  CARDIOVASCULAR: No chest pain, palpitations, dizziness, or leg swelling  GASTROINTESTINAL: No abdominal pain. No nausea, vomiting; No diarrhea or constipation. No melena or hematochezia.  GENITOURINARY: No dysuria, frequency, hematuria, or incontinence  NEUROLOGICAL: No headaches, memory loss, loss of strength, numbness, or tremors  SKIN: No itching, burning, rashes, or lesions   MUSCULOSKELETAL: No joint pain or swelling; No muscle, back, or extremity pain    PHYSICAL EXAM:  Appearance: NAD. VS past 24 hrs -as above   HEENT:   Moist oral mucosa. Conjunctiva clear b/l.   Neck : supple  Respiratory: Lungs CTAB.  Gastrointestinal:  Soft, nontender. No rebound. No rigidity. BS present	  Cardiovascular: RRR ,S1S2 present  Neurologic: Non-focal. Moving all extremities.  Extremities: No edema. No erythema. No calf tenderness.  Skin: No rashes, No ecchymoses, No cyanosis.	  wounds ,skin lesions-See skin assesment flow sheet   LABS:                        12.4   8.57  )-----------( 276      ( 19 Jan 2022 07:57 )             38.7     01-19    143  |  109<H>  |  11  ----------------------------<  119<H>  3.4<L>   |  27  |  0.78    Ca    8.8      19 Jan 2022 07:57  Phos  2.5     01-19  Mg     1.9     01-19    TPro  5.8<L>  /  Alb  2.3<L>  /  TBili  0.3  /  DBili  x   /  AST  16  /  ALT  15  /  AlkPhos  68  01-18    CAPILLARY BLOOD GLUCOSE        PT/INR - ( 19 Jan 2022 07:57 )   PT: 13.5 sec;   INR: 1.16 ratio               Culture - Blood (collected 17 Jan 2022 16:27)  Source: .Blood Blood  Preliminary Report (18 Jan 2022 17:02):    No growth to date.    Culture - Blood (collected 17 Jan 2022 16:27)  Source: .Blood Blood  Preliminary Report (18 Jan 2022 17:02):    No growth to date.    Culture - Urine (collected 16 Jan 2022 20:54)  Source: Clean Catch Clean Catch (Midstream)  Final Report (17 Jan 2022 16:32):    No growth      RADIOLOGY & ADDITIONAL TESTS: < from: MR Head No Cont (01.18.22 @ 09:39) >  ACC: [id number] EXAM:  MR BRAIN                        ACC: [id number] EXAM:  MR ANGIO BRAIN                          PROCEDURE DATE:  01/18/2022          INTERPRETATION:  CLINICAL INFORMATION: Acute mental status. PMR.   Admitting Dxs: K52.9 NONINFECTIVE GASTROENTERITIS AND COLITIS,   UNSPECIFIED.    TECHNIQUE: Multiplanar, multisequence MRI of the brain without gadolinium   contrast, 3-D time of flight brain MRA. 3-D reformats were obtained.    COMPARISON: No relevant prior studies.    Brain MRI:    There is a 7 x 5 x 5 mm lesion in the right temporal lobe with   heterogeneous internal T1 and T2 signal, T2 hypointense rim and   susceptibility artifact suggesting a cavernoma.    There is no evidence of acute infarct. Scattered foci of T2/FLAIR  hyperintensity in the bilateral hemispheric white matter are nonspecific   but likely related to chronic white matter microvascular ischemic   disease. There is cerebral volume loss. Chronic lacunar infarct within   the right basal ganglia.    Flowvoids of the major intracranial vessels at the skull base follow   expected course and contour.    The paranasal sinuses demonstrate no signal abnormality. The mastoids   demonstrate no signal abnormality.  Status post left intraocular lens   implant.    Brain MRA:    There is flow-related signal in the bilateral intradural internal   carotid, anterior cerebral and middle cerebral arteries. No evidence of   large vessel occlusion.    There is flow-related signal in the bilateral posterior cerebral,   basilar, and intradural vertebral arteries. The branch vasculature of the   posterior circulation is within normal limits. No evidence of large   vessel occlusion.    No evidence of aneurysm. Tiny aneurysms can be beyond the resolution of   MRA technique. No evidence of arteriovenous malformation.      IMPRESSION:    Brain MRI:  1.  No acute infarct.  2.  A 7 x 5 x 5 mm lesion in the right temporal lobe with heterogeneous   internal T1 and T2 signal, T2 hypointense rim and susceptibility artifact   suggesting a cavernoma. Consider correlation with MRI brain with   contrast, if there is no contraindication.    Brain MRA: No large vessel occlusion or aneurysm.    < end of copied text >  < from: MR Angio Head No Cont (01.18.22 @ 09:30) >    Brain MRA:    There is flow-related signal in the bilateral intradural internal   carotid, anterior cerebral and middle cerebral arteries. No evidence of   large vessel occlusion.    There is flow-related signal in the bilateral posterior cerebral,   basilar, and intradural vertebral arteries. The branch vasculature of the   posterior circulation is within normal limits. No evidence of large   vessel occlusion.    No evidence of aneurysm. Tiny aneurysms can be beyond the resolution of   MRA technique. No evidence of arteriovenous malformation.      IMPRESSION:    Brain MRI:  1.  No acute infarct.  2.  A 7 x 5 x 5 mm lesion in the right temporal lobe with heterogeneous   internal T1 and T2 signal, T2 hypointense rim and susceptibility artifact   suggesting a cavernoma. Consider correlation with MRI brain with   contrast, if there is no contraindication.    Brain MRA: No large vessel occlusion or aneurysm.    < end of copied text >     reviewed elctronically  ASSESSMENT/PLAN: 	    25 minutes aggregate time was spent on this visit, 50% visit time spent in care co-ordination with other attendings and counselling patient .I have discussed care plan with patient / HCP/family member ,who expressed understanding of problems treatment and their effect and side effects, alternatives in details. I have asked if they have any questions and concerns and appropriately addressed them to best of my ability.  PROGRESS NOTE  Patient is a 86y old  Female who presents with a chief complaint of 86 female sent from [facility] for diarrhea x 3 days since starting antibiotics for UTI.   	Per patient she was being treated for UTI and began having diarrhea and RLQ pain.   	Denies fever, chills.   	Denies n/v.   	+ Abdominal pain , + Diarrhea  	Denies current urinary symptoms.  Denies CP, cough or SOBno abdominal distension, no blood in stool, no burning urination, no chills, no dysuria, no fever, no hematuria, no nausea, no vomiting++ RLQ abdominal pain, no bloating, no constipation, ++ diarrhea,   < from: CT Abdomen and Pelvis w/ IV Cont (01.16.22 @ 17:29) > (19 Jan 2022 10:36)    Chart and available morning labs /imaging are reviewed electronically , urgent issues addressed . More information  is being added upon completion of rounds , when more information is collected and management discussed with consultants , medical staff and social service/case management on the floor   OVERNIGHT  No new issues reported by medical staff . All above noted Patient is resting in a bed comfortably .Confused ,poor mentation .No distress noted   Seen by SLP and RD  HPI:  86 female sent from [facility] for diarrhea x 3 days since starting antibiotics for UTI.   	Per patient she was being treated for UTI and began having diarrhea and RLQ pain.   	Denies fever, chills.   	Denies n/v.   	+ Abdominal pain , + Diarrhea  	Denies current urinary symptoms.    CONTRAST/COMPLICATIONS:  IV Contrast: Omnipaque 350  90cc administered   10 cc discarded  Oral Contrast: NONE  Complications: None reported at time of study completion    PROCEDURE:  CT of the Abdomen and Pelvis was performed.  Sagittal and coronal reformats were performed.    FINDINGS:  LOWER CHEST: Possible filling defect in a branch of the right lower lobe   pulmonary artery (series 2, image 7), suspicious for pulmonary embolism.   Bibasilar subsegmental atelectasis.    LIVER: Within normal limits.  BILE DUCTS: Normal caliber.  GALLBLADDER: Withinnormal limits.  SPLEEN: Subcentimeter hypodense splenic lesions, too small to   characterize.  PANCREAS: Within normal limits.  ADRENALS: Within normal limits.  KIDNEYS/URETERS: No hydronephrosis. Left upper pole renal cyst.   Additional subcentimeter hypodense bilateral renal lesions, too small to   characterize.    BLADDER: Within normal limits.  REPRODUCTIVE ORGANS: Hysterectomy.    BOWEL: Colonic diverticulosis. Long segment mural thickening of the colon   extending from the ascending colon to the level of the rectum. Small   bowel anastomosis. No bowel obstruction. Appendix is normal.  PERITONEUM: No ascites.  VESSELS: Atherosclerotic changes.  RETROPERITONEUM/LYMPH NODES: No lymphadenopathy.  ABDOMINAL WALL: Within normal limits.  BONES: Degenerative changes.    IMPRESSION:  Possible filling defect in a branch of the right lower lobe pulmonary   artery, suspicious for pulmonary embolism. Further evaluation with CTA   chest (pulmonary artery protocol) is recommended.    Long segment proctocolitis.    Findings were discussed with MARLON FERRER on 1/16/2022 at 6:28 PM   by Dr. Jean with read back confirmation.    --- End of Report ---    PAST MEDICAL & SURGICAL HISTORY:  No pertinent past medical history    MEDICATIONS  (STANDING):  dextrose 5% + sodium chloride 0.45% with potassium chloride 20 mEq/L 1000 milliLiter(s) (60 mL/Hr) IV Continuous <Continuous>  enoxaparin Injectable 50 milliGRAM(s) SubCutaneous every 12 hours  lactobacillus acidophilus 1 Tablet(s) Oral two times a day  losartan 25 milliGRAM(s) Oral daily  pantoprazole  Injectable 40 milliGRAM(s) IV Push daily  potassium chloride    Tablet ER 40 milliEquivalent(s) Oral once  vancomycin    Solution 125 milliGRAM(s) Oral every 6 hours    MEDICATIONS  (PRN):  acetaminophen     Tablet .. 650 milliGRAM(s) Oral every 6 hours PRN Temp greater or equal to 38C (100.4F), Mild Pain (1 - 3)  aluminum hydroxide/magnesium hydroxide/simethicone Suspension 30 milliLiter(s) Oral every 4 hours PRN Dyspepsia  LORazepam   Injectable 1 milliGRAM(s) IV Push once PRN mri  LORazepam   Injectable 1 milliGRAM(s) IV Push once PRN mri  melatonin 3 milliGRAM(s) Oral at bedtime PRN Insomnia  OLANZapine Injectable 2.5 milliGRAM(s) IntraMuscular every 6 hours PRN agitation  ondansetron Injectable 4 milliGRAM(s) IV Push every 8 hours PRN Nausea and/or Vomiting  traMADol 50 milliGRAM(s) Oral four times a day PRN Moderate Pain (4 - 6)    Vital Signs Last 24 Hrs  T(C): 36.7 (21 Jan 2022 17:11), Max: 36.8 (20 Jan 2022 20:53)  T(F): 98 (21 Jan 2022 17:11), Max: 98.3 (20 Jan 2022 20:53)  HR: 86 (21 Jan 2022 17:11) (72 - 86)  BP: 116/69 (21 Jan 2022 17:11) (116/69 - 162/75)  RR: 16 (21 Jan 2022 17:11) (16 - 18)  SpO2: 95% (21 Jan 2022 17:11) (92% - 95%)    REVIEW OF SYSTEMS:  CONSTITUTIONAL: No fever, weight loss, or fatigue  EYES: No eye pain, visual disturbances, or discharge  ENMT:   No sinus or throat pain  NECK: No pain or stiffness  RESPIRATORY: No cough, wheezing, chills or hemoptysis; No shortness of breath  CARDIOVASCULAR: No chest pain, palpitations, dizziness, or leg swelling  GASTROINTESTINAL: No abdominal pain. No nausea, vomiting; No diarrhea or constipation. No melena or hematochezia.  GENITOURINARY: No dysuria, frequency, hematuria, or incontinence  NEUROLOGICAL: No headaches, memory loss, loss of strength, numbness, or tremors  SKIN: No itching, burning, rashes, or lesions   MUSCULOSKELETAL: No joint pain or swelling; No muscle, back, or extremity pain    PHYSICAL EXAM:  Appearance: NAD. VS past 24 hrs -as above   HEENT:   Moist oral mucosa. Conjunctiva clear b/l.   Neck : supple  Respiratory: Lungs CTAB.  Gastrointestinal:  Soft, nontender. No rebound. No rigidity. BS present	  Cardiovascular: RRR ,S1S2 present  Neurologic: Non-focal. Moving all extremities.  Extremities: No edema. No erythema. No calf tenderness.  Skin: No rashes, No ecchymoses, No cyanosis.	  wounds ,skin lesions-See skin assesment flow sheet   LABS:                        12.4   8.57  )-----------( 276      ( 19 Jan 2022 07:57 )             38.7     01-19    143  |  109<H>  |  11  ----------------------------<  119<H>  3.4<L>   |  27  |  0.78    Ca    8.8      19 Jan 2022 07:57  Phos  2.5     01-19  Mg     1.9     01-19    TPro  5.8<L>  /  Alb  2.3<L>  /  TBili  0.3  /  DBili  x   /  AST  16  /  ALT  15  /  AlkPhos  68  01-18    CAPILLARY BLOOD GLUCOSE        PT/INR - ( 19 Jan 2022 07:57 )   PT: 13.5 sec;   INR: 1.16 ratio               Culture - Blood (collected 17 Jan 2022 16:27)  Source: .Blood Blood  Preliminary Report (18 Jan 2022 17:02):    No growth to date.    Culture - Blood (collected 17 Jan 2022 16:27)  Source: .Blood Blood  Preliminary Report (18 Jan 2022 17:02):    No growth to date.    Culture - Urine (collected 16 Jan 2022 20:54)  Source: Clean Catch Clean Catch (Midstream)  Final Report (17 Jan 2022 16:32):    No growth      RADIOLOGY & ADDITIONAL TESTS: < from: MR Head No Cont (01.18.22 @ 09:39) >  ACC: [id number] EXAM:  MR BRAIN                        ACC: [id number] EXAM:  MR ANGIO BRAIN                          PROCEDURE DATE:  01/18/2022          INTERPRETATION:  CLINICAL INFORMATION: Acute mental status. PMR.   Admitting Dxs: K52.9 NONINFECTIVE GASTROENTERITIS AND COLITIS,   UNSPECIFIED.    TECHNIQUE: Multiplanar, multisequence MRI of the brain without gadolinium   contrast, 3-D time of flight brain MRA. 3-D reformats were obtained.    COMPARISON: No relevant prior studies.    Brain MRI:    There is a 7 x 5 x 5 mm lesion in the right temporal lobe with   heterogeneous internal T1 and T2 signal, T2 hypointense rim and   susceptibility artifact suggesting a cavernoma.    There is no evidence of acute infarct. Scattered foci of T2/FLAIR  hyperintensity in the bilateral hemispheric white matter are nonspecific   but likely related to chronic white matter microvascular ischemic   disease. There is cerebral volume loss. Chronic lacunar infarct within   the right basal ganglia.    Flowvoids of the major intracranial vessels at the skull base follow   expected course and contour.    The paranasal sinuses demonstrate no signal abnormality. The mastoids   demonstrate no signal abnormality.  Status post left intraocular lens   implant.    Brain MRA:    There is flow-related signal in the bilateral intradural internal   carotid, anterior cerebral and middle cerebral arteries. No evidence of   large vessel occlusion.    There is flow-related signal in the bilateral posterior cerebral,   basilar, and intradural vertebral arteries. The branch vasculature of the   posterior circulation is within normal limits. No evidence of large   vessel occlusion.    No evidence of aneurysm. Tiny aneurysms can be beyond the resolution of   MRA technique. No evidence of arteriovenous malformation.      IMPRESSION:    Brain MRI:  1.  No acute infarct.  2.  A 7 x 5 x 5 mm lesion in the right temporal lobe with heterogeneous   internal T1 and T2 signal, T2 hypointense rim and susceptibility artifact   suggesting a cavernoma. Consider correlation with MRI brain with   contrast, if there is no contraindication.    Brain MRA: No large vessel occlusion or aneurysm.    < end of copied text >  < from: MR Angio Head No Cont (01.18.22 @ 09:30) >    Brain MRA:    There is flow-related signal in the bilateral intradural internal   carotid, anterior cerebral and middle cerebral arteries. No evidence of   large vessel occlusion.    There is flow-related signal in the bilateral posterior cerebral,   basilar, and intradural vertebral arteries. The branch vasculature of the   posterior circulation is within normal limits. No evidence of large   vessel occlusion.    No evidence of aneurysm. Tiny aneurysms can be beyond the resolution of   MRA technique. No evidence of arteriovenous malformation.      IMPRESSION:    Brain MRI:  1.  No acute infarct.  2.  A 7 x 5 x 5 mm lesion in the right temporal lobe with heterogeneous   internal T1 and T2 signal, T2 hypointense rim and susceptibility artifact   suggesting a cavernoma. Consider correlation with MRI brain with   contrast, if there is no contraindication.    Brain MRA: No large vessel occlusion or aneurysm.    < end of copied text >     reviewed elctronically  ASSESSMENT/PLAN: 	    25 minutes aggregate time was spent on this visit, 50% visit time spent in care co-ordination with other attendings and counselling patient .I have discussed care plan with patient / HCP/family member ,who expressed understanding of problems treatment and their effect and side effects, alternatives in details. I have asked if they have any questions and concerns and appropriately addressed them to best of my ability.

## 2022-01-21 NOTE — PROGRESS NOTE ADULT - REASON FOR ADMISSION
86 female sent from [facility] living for diarrhea x 3 days since starting antibiotics for UTI.   	Per patient she was being treated for UTI and began having diarrhea and RLQ pain.   	Denies fever, chills.   	Denies n/v.   	+ Abdominal pain , + Diarrhea  	Denies current urinary symptoms.  Denies CP, cough or SOBno abdominal distension, no blood in stool, no burning urination, no chills, no dysuria, no fever, no hematuria, no nausea, no vomiting++ RLQ abdominal pain, no bloating, no constipation, ++ diarrhea,   < from: CT Abdomen and Pelvis w/ IV Cont (01.16.22 @ 17:29) >

## 2022-01-21 NOTE — PROGRESS NOTE ADULT - SUBJECTIVE AND OBJECTIVE BOX
Date/Time Patient Seen:  		  Referring MD:   Data Reviewed	       Patient is a 86y old  Female who presents with a chief complaint of 86 female sent from [facility] for diarrhea x 3 days since starting antibiotics for UTI.   	Per patient she was being treated for UTI and began having diarrhea and RLQ pain.   	Denies fever, chills.   	Denies n/v.   	+ Abdominal pain , + Diarrhea  	Denies current urinary symptoms.  Denies CP, cough or SOBno abdominal distension, no blood in stool, no burning urination, no chills, no dysuria, no fever, no hematuria, no nausea, no vomiting++ RLQ abdominal pain, no bloating, no constipation, ++ diarrhea,   < from: CT Abdomen and Pelvis w/ IV Cont (01.16.22 @ 17:29) > (20 Jan 2022 14:12)      Subjective/HPI     PAST MEDICAL & SURGICAL HISTORY:  No pertinent past medical history    No pertinent past medical history          Medication list         MEDICATIONS  (STANDING):  dextrose 5% + sodium chloride 0.45% with potassium chloride 20 mEq/L 1000 milliLiter(s) (60 mL/Hr) IV Continuous <Continuous>  enoxaparin Injectable 50 milliGRAM(s) SubCutaneous every 12 hours  influenza  Vaccine (HIGH DOSE) 0.7 milliLiter(s) IntraMuscular once  lactobacillus acidophilus 1 Tablet(s) Oral two times a day  losartan 25 milliGRAM(s) Oral daily  pantoprazole  Injectable 40 milliGRAM(s) IV Push daily  vancomycin    Solution 125 milliGRAM(s) Oral every 6 hours    MEDICATIONS  (PRN):  acetaminophen     Tablet .. 650 milliGRAM(s) Oral every 6 hours PRN Temp greater or equal to 38C (100.4F), Mild Pain (1 - 3)  aluminum hydroxide/magnesium hydroxide/simethicone Suspension 30 milliLiter(s) Oral every 4 hours PRN Dyspepsia  LORazepam   Injectable 1 milliGRAM(s) IV Push once PRN mri  LORazepam   Injectable 1 milliGRAM(s) IV Push once PRN mri  melatonin 3 milliGRAM(s) Oral at bedtime PRN Insomnia  OLANZapine Injectable 2.5 milliGRAM(s) IntraMuscular every 6 hours PRN agitation  ondansetron Injectable 4 milliGRAM(s) IV Push every 8 hours PRN Nausea and/or Vomiting  traMADol 50 milliGRAM(s) Oral four times a day PRN Moderate Pain (4 - 6)         Vitals log        ICU Vital Signs Last 24 Hrs  T(C): 36.5 (21 Jan 2022 05:03), Max: 36.8 (20 Jan 2022 20:53)  T(F): 97.7 (21 Jan 2022 05:03), Max: 98.3 (20 Jan 2022 20:53)  HR: 81 (21 Jan 2022 05:03) (72 - 90)  BP: 154/85 (21 Jan 2022 05:03) (150/74 - 162/75)  BP(mean): --  ABP: --  ABP(mean): --  RR: 18 (21 Jan 2022 05:03) (18 - 19)  SpO2: 92% (21 Jan 2022 05:03) (90% - 93%)           Input and Output:  I&O's Detail    19 Jan 2022 07:01  -  20 Jan 2022 07:00  --------------------------------------------------------  IN:    dextrose 5% + sodium chloride 0.45% w/ Additives: 1380 mL    Oral Fluid: 350 mL  Total IN: 1730 mL    OUT:  Total OUT: 0 mL    Total NET: 1730 mL      20 Jan 2022 07:01  -  21 Jan 2022 06:11  --------------------------------------------------------  IN:    Oral Fluid: 360 mL  Total IN: 360 mL    OUT:  Total OUT: 0 mL    Total NET: 360 mL          Lab Data                        11.3   12.57 )-----------( 315      ( 20 Jan 2022 07:32 )             36.1     01-20    141  |  108  |  14  ----------------------------<  113<H>  3.8   |  27  |  0.89    Ca    9.0      20 Jan 2022 07:32  Phos  2.5     01-19  Mg     1.9     01-19              Review of Systems	      Objective     Physical Examination  heart s1s2  lung dec BS  abd soft        Pertinent Lab findings & Imaging      Víctor:  NO   Adequate UO     I&O's Detail    19 Jan 2022 07:01  -  20 Jan 2022 07:00  --------------------------------------------------------  IN:    dextrose 5% + sodium chloride 0.45% w/ Additives: 1380 mL    Oral Fluid: 350 mL  Total IN: 1730 mL    OUT:  Total OUT: 0 mL    Total NET: 1730 mL      20 Jan 2022 07:01  -  21 Jan 2022 06:11  --------------------------------------------------------  IN:    Oral Fluid: 360 mL  Total IN: 360 mL    OUT:  Total OUT: 0 mL    Total NET: 360 mL               Discussed with:     Cultures:	        Radiology

## 2022-01-21 NOTE — PROGRESS NOTE ADULT - REASON FOR ADMISSION
86 female sent from [facility] living for diarrhea x 3 days since starting antibiotics for UTI.   	Per patient she was being treated for UTI and began having diarrhea and RLQ pain.   	Denies fever, chills.   	Denies n/v.   	+ Abdominal pain , + Diarrhea  	Denies current urinary symptoms.  Denies CP, cough or SOBno abdominal distension, no blood in stool, no burning urination, no chills, no dysuria, no fever, no hematuria, no nausea, no vomiting++ RLQ abdominal pain, no bloating, no constipation, ++ diarrhea,   < from: CT Abdomen and Pelvis w/ IV Cont (01.16.22 @ 17:29) >

## 2022-01-21 NOTE — PROGRESS NOTE ADULT - REASON FOR ADMISSION
86 female sent from [facility] living for diarrhea x 3 days since starting antibiotics for UTI.   	Per patient she was being treated for UTI and began having diarrhea and RLQ pain.   	Denies fever, chills.   	Denies n/v.   	+ Abdominal pain , + Diarrhea  	Denies current urinary symptoms.  Denies CP, cough or SOBno abdominal distension, no blood in stool, no burning urination, no chills, no dysuria, no fever, no hematuria, no nausea, no vomiting++ RLQ abdominal pain, no bloating, no constipation, ++ diarrhea,   < from: CT Abdomen and Pelvis w/ IV Cont (01.16.22 @ 17:29) >

## 2022-01-22 LAB
CULTURE RESULTS: SIGNIFICANT CHANGE UP
CULTURE RESULTS: SIGNIFICANT CHANGE UP
SPECIMEN SOURCE: SIGNIFICANT CHANGE UP
SPECIMEN SOURCE: SIGNIFICANT CHANGE UP

## 2022-01-22 RX ADMIN — Medication 125 MILLIGRAM(S): at 05:03

## 2022-01-22 RX ADMIN — Medication 1 TABLET(S): at 05:02

## 2022-01-22 RX ADMIN — APIXABAN 10 MILLIGRAM(S): 2.5 TABLET, FILM COATED ORAL at 17:20

## 2022-01-22 RX ADMIN — DEXTROSE MONOHYDRATE, SODIUM CHLORIDE, AND POTASSIUM CHLORIDE 60 MILLILITER(S): 50; .745; 4.5 INJECTION, SOLUTION INTRAVENOUS at 11:52

## 2022-01-22 RX ADMIN — Medication 125 MILLIGRAM(S): at 11:52

## 2022-01-22 RX ADMIN — Medication 125 MILLIGRAM(S): at 23:10

## 2022-01-22 RX ADMIN — Medication 1 TABLET(S): at 17:21

## 2022-01-22 RX ADMIN — LOSARTAN POTASSIUM 25 MILLIGRAM(S): 100 TABLET, FILM COATED ORAL at 05:02

## 2022-01-22 RX ADMIN — DEXTROSE MONOHYDRATE, SODIUM CHLORIDE, AND POTASSIUM CHLORIDE 60 MILLILITER(S): 50; .745; 4.5 INJECTION, SOLUTION INTRAVENOUS at 00:46

## 2022-01-22 RX ADMIN — PANTOPRAZOLE SODIUM 40 MILLIGRAM(S): 20 TABLET, DELAYED RELEASE ORAL at 11:52

## 2022-01-22 RX ADMIN — Medication 125 MILLIGRAM(S): at 17:19

## 2022-01-22 RX ADMIN — APIXABAN 10 MILLIGRAM(S): 2.5 TABLET, FILM COATED ORAL at 05:02

## 2022-01-22 NOTE — PROGRESS NOTE ADULT - REASON FOR ADMISSION
86 female sent from [facility] living for diarrhea x 3 days since starting antibiotics for UTI.   	Per patient she was being treated for UTI and began having diarrhea and RLQ pain.   	Denies fever, chills.   	Denies n/v.   	+ Abdominal pain , + Diarrhea  	Denies current urinary symptoms.  Denies CP, cough or SOBno abdominal distension, no blood in stool, no burning urination, no chills, no dysuria, no fever, no hematuria, no nausea, no vomiting++ RLQ abdominal pain, no bloating, no constipation, ++ diarrhea,   < from: CT Abdomen and Pelvis w/ IV Cont (01.16.22 @ 17:29) >

## 2022-01-22 NOTE — PROGRESS NOTE ADULT - SUBJECTIVE AND OBJECTIVE BOX
GINA GOLD    PLV TELN [address] W1    Allergies    No Known Allergies    Intolerances        PAST MEDICAL & SURGICAL HISTORY:  No pertinent past medical history        FAMILY HISTORY:      Home Medications:  amLODIPine 5 mg oral tablet: 1 tab(s) orally once a day (18 Jan 2022 13:44)  Aspirin Enteric Coated 81 mg oral delayed release tablet: 1 tab(s) orally once a day (18 Jan 2022 13:44)  hydrALAZINE 10 mg oral tablet: 1 tab(s) orally 2 times a day (18 Jan 2022 13:44)  Nasal Saline 0.65% nasal spray: 2 spray(s) nasal 2 times a day for 15 days. Start: 01/06/22 (18 Jan 2022 13:44)  sertraline 50 mg oral tablet: 1 tab(s) orally once a day (18 Jan 2022 13:44)      MEDICATIONS  (STANDING):  apixaban 10 milliGRAM(s) Oral every 12 hours  dextrose 5% + sodium chloride 0.45% with potassium chloride 20 mEq/L 1000 milliLiter(s) (60 mL/Hr) IV Continuous <Continuous>  influenza  Vaccine (HIGH DOSE) 0.7 milliLiter(s) IntraMuscular once  lactobacillus acidophilus 1 Tablet(s) Oral two times a day  losartan 25 milliGRAM(s) Oral daily  pantoprazole  Injectable 40 milliGRAM(s) IV Push daily  vancomycin    Solution 125 milliGRAM(s) Oral every 6 hours    MEDICATIONS  (PRN):  acetaminophen     Tablet .. 650 milliGRAM(s) Oral every 6 hours PRN Temp greater or equal to 38C (100.4F), Mild Pain (1 - 3)  aluminum hydroxide/magnesium hydroxide/simethicone Suspension 30 milliLiter(s) Oral every 4 hours PRN Dyspepsia  LORazepam   Injectable 1 milliGRAM(s) IV Push once PRN mri  LORazepam   Injectable 1 milliGRAM(s) IV Push once PRN mri  melatonin 3 milliGRAM(s) Oral at bedtime PRN Insomnia  OLANZapine Injectable 2.5 milliGRAM(s) IntraMuscular every 6 hours PRN agitation  ondansetron Injectable 4 milliGRAM(s) IV Push every 8 hours PRN Nausea and/or Vomiting  traMADol 50 milliGRAM(s) Oral four times a day PRN Moderate Pain (4 - 6)      Diet, Low Fiber:   Supplement Feeding Modality:  Oral  Ensure Clear Cans or Servings Per Day:  1       Frequency:  Three Times a day (01-19-22 @ 09:18) [Active]          Vital Signs Last 24 Hrs  T(C): 36.3 (22 Jan 2022 05:00), Max: 36.7 (21 Jan 2022 17:11)  T(F): 97.4 (22 Jan 2022 05:00), Max: 98 (21 Jan 2022 17:11)  HR: 66 (22 Jan 2022 05:00) (66 - 86)  BP: 147/81 (22 Jan 2022 05:00) (116/69 - 147/81)  BP(mean): --  RR: 16 (22 Jan 2022 05:00) (16 - 16)  SpO2: 94% (22 Jan 2022 05:00) (94% - 96%)      01-21-22 @ 07:01  -  01-22-22 @ 07:00  --------------------------------------------------------  IN: 1380 mL / OUT: 900 mL / NET: 480 mL              LABS:                        11.5   13.06 )-----------( 346      ( 21 Jan 2022 07:34 )             36.7     01-21    141  |  108  |  13  ----------------------------<  132<H>  4.5   |  28  |  0.87    Ca    8.9      21 Jan 2022 07:34                WBC:  WBC Count: 13.06 K/uL (01-21 @ 07:34)  WBC Count: 12.57 K/uL (01-20 @ 07:32)  WBC Count: 8.57 K/uL (01-19 @ 07:57)      MICROBIOLOGY:  RECENT CULTURES:  01-17 .Blood Blood XXXX XXXX   No growth to date.    01-16 Clean Catch Clean Catch (Midstream) XXXX XXXX   No growth                    Sodium:  Sodium, Serum: 141 mmol/L (01-21 @ 07:34)  Sodium, Serum: 141 mmol/L (01-20 @ 07:32)  Sodium, Serum: 143 mmol/L (01-19 @ 07:57)      0.87 mg/dL 01-21 @ 07:34  0.89 mg/dL 01-20 @ 07:32  0.78 mg/dL 01-19 @ 07:57      Hemoglobin:  Hemoglobin: 11.5 g/dL (01-21 @ 07:34)  Hemoglobin: 11.3 g/dL (01-20 @ 07:32)  Hemoglobin: 12.4 g/dL (01-19 @ 07:57)      Platelets: Platelet Count - Automated: 346 K/uL (01-21 @ 07:34)  Platelet Count - Automated: 315 K/uL (01-20 @ 07:32)  Platelet Count - Automated: 276 K/uL (01-19 @ 07:57)              RADIOLOGY & ADDITIONAL STUDIES:      MICROBIOLOGY:  RECENT CULTURES:  01-17 .Blood Blood XXXX XXXX   No growth to date.    01-16 Clean Catch Clean Catch (Midstream) XXXX XXXX   No growth

## 2022-01-22 NOTE — PROGRESS NOTE ADULT - SUBJECTIVE AND OBJECTIVE BOX
Date/Time Patient Seen:  		  Referring MD:   Data Reviewed	       Patient is a 86y old  Female who presents with a chief complaint of 86 female sent from [facility] for diarrhea x 3 days since starting antibiotics for UTI.   	Per patient she was being treated for UTI and began having diarrhea and RLQ pain.   	Denies fever, chills.   	Denies n/v.   	+ Abdominal pain , + Diarrhea  	Denies current urinary symptoms.  Denies CP, cough or SOBno abdominal distension, no blood in stool, no burning urination, no chills, no dysuria, no fever, no hematuria, no nausea, no vomiting++ RLQ abdominal pain, no bloating, no constipation, ++ diarrhea,   < from: CT Abdomen and Pelvis w/ IV Cont (01.16.22 @ 17:29) > (21 Jan 2022 13:21)      Subjective/HPI     PAST MEDICAL & SURGICAL HISTORY:  No pertinent past medical history    No pertinent past medical history          Medication list         MEDICATIONS  (STANDING):  apixaban 10 milliGRAM(s) Oral every 12 hours  dextrose 5% + sodium chloride 0.45% with potassium chloride 20 mEq/L 1000 milliLiter(s) (60 mL/Hr) IV Continuous <Continuous>  influenza  Vaccine (HIGH DOSE) 0.7 milliLiter(s) IntraMuscular once  lactobacillus acidophilus 1 Tablet(s) Oral two times a day  losartan 25 milliGRAM(s) Oral daily  pantoprazole  Injectable 40 milliGRAM(s) IV Push daily  vancomycin    Solution 125 milliGRAM(s) Oral every 6 hours    MEDICATIONS  (PRN):  acetaminophen     Tablet .. 650 milliGRAM(s) Oral every 6 hours PRN Temp greater or equal to 38C (100.4F), Mild Pain (1 - 3)  aluminum hydroxide/magnesium hydroxide/simethicone Suspension 30 milliLiter(s) Oral every 4 hours PRN Dyspepsia  LORazepam   Injectable 1 milliGRAM(s) IV Push once PRN mri  LORazepam   Injectable 1 milliGRAM(s) IV Push once PRN mri  melatonin 3 milliGRAM(s) Oral at bedtime PRN Insomnia  OLANZapine Injectable 2.5 milliGRAM(s) IntraMuscular every 6 hours PRN agitation  ondansetron Injectable 4 milliGRAM(s) IV Push every 8 hours PRN Nausea and/or Vomiting  traMADol 50 milliGRAM(s) Oral four times a day PRN Moderate Pain (4 - 6)         Vitals log        ICU Vital Signs Last 24 Hrs  T(C): 36.3 (22 Jan 2022 05:00), Max: 36.7 (21 Jan 2022 17:11)  T(F): 97.4 (22 Jan 2022 05:00), Max: 98 (21 Jan 2022 17:11)  HR: 66 (22 Jan 2022 05:00) (66 - 86)  BP: 147/81 (22 Jan 2022 05:00) (116/69 - 147/81)  BP(mean): --  ABP: --  ABP(mean): --  RR: 16 (22 Jan 2022 05:00) (16 - 16)  SpO2: 94% (22 Jan 2022 05:00) (94% - 96%)           Input and Output:  I&O's Detail    20 Jan 2022 07:01  -  21 Jan 2022 07:00  --------------------------------------------------------  IN:    Oral Fluid: 360 mL  Total IN: 360 mL    OUT:  Total OUT: 0 mL    Total NET: 360 mL      21 Jan 2022 07:01  -  22 Jan 2022 06:41  --------------------------------------------------------  IN:    dextrose 5% + sodium chloride 0.45% w/ Additives: 660 mL  Total IN: 660 mL    OUT:    Voided (mL): 900 mL  Total OUT: 900 mL    Total NET: -240 mL          Lab Data                        11.5   13.06 )-----------( 346      ( 21 Jan 2022 07:34 )             36.7     01-21    141  |  108  |  13  ----------------------------<  132<H>  4.5   |  28  |  0.87    Ca    8.9      21 Jan 2022 07:34              Review of Systems	      Objective     Physical Examination  heart s1s2  lung dec BS  abd soft  head nc        Pertinent Lab findings & Imaging      Víctor:  NO   Adequate UO     I&O's Detail    20 Jan 2022 07:01  -  21 Jan 2022 07:00  --------------------------------------------------------  IN:    Oral Fluid: 360 mL  Total IN: 360 mL    OUT:  Total OUT: 0 mL    Total NET: 360 mL      21 Jan 2022 07:01  -  22 Jan 2022 06:41  --------------------------------------------------------  IN:    dextrose 5% + sodium chloride 0.45% w/ Additives: 660 mL  Total IN: 660 mL    OUT:    Voided (mL): 900 mL  Total OUT: 900 mL    Total NET: -240 mL               Discussed with:     Cultures:	        Radiology

## 2022-01-22 NOTE — PROGRESS NOTE ADULT - REASON FOR ADMISSION
86 female sent from [facility] living for diarrhea x 3 days since starting antibiotics for UTI.   	Per patient she was being treated for UTI and began having diarrhea and RLQ pain.   	Denies fever, chills.   	Denies n/v.   	+ Abdominal pain , + Diarrhea  	Denies current urinary symptoms.  Denies CP, cough or SOBno abdominal distension, no blood in stool, no burning urination, no chills, no dysuria, no fever, no hematuria, no nausea, no vomiting++ RLQ abdominal pain, no bloating, no constipation, ++ diarrhea,   < from: CT Abdomen and Pelvis w/ IV Cont (01.16.22 @ 17:29) >

## 2022-01-22 NOTE — PROGRESS NOTE ADULT - SUBJECTIVE AND OBJECTIVE BOX
[facility] [place]       HPI:  86 female sent from [facility] for diarrhea x 3 days since starting antibiotics for UTI.   	Per patient she was being treated for UTI and began having diarrhea and RLQ pain.   	Denies fever, chills.   	Denies n/v.   	+ Abdominal pain , + Diarrhea  	Denies current urinary symptoms.  Denies CP, cough or SOBno abdominal distension, no blood in stool, no burning urination, no chills, no dysuria, no fever, no hematuria, no nausea, no vomiting++ RLQ abdominal pain, no bloating, no constipation, ++ diarrhea,   < from: CT Abdomen and Pelvis w/ IV Cont (01.16.22 @ 17:29) >    ACC: [id number] EXAM:  CT ABDOMEN AND PELVIS IC                          PROCEDURE DATE:  01/16/2022          INTERPRETATION:  CLINICAL INFORMATION: Acute abdominal pain.    COMPARISON: None.    CONTRAST/COMPLICATIONS:  IV Contrast: Omnipaque 350  90cc administered   10 cc discarded  Oral Contrast: NONE  Complications: None reported at time of study completion    PROCEDURE:  CT of the Abdomen and Pelvis was performed.  Sagittal and coronal reformats were performed.    FINDINGS:  LOWER CHEST: Possible filling defect in a branch of the right lower lobe   pulmonary artery (series 2, image 7), suspicious for pulmonary embolism.   Bibasilar subsegmental atelectasis.    LIVER: Within normal limits.  BILE DUCTS: Normal caliber.  GALLBLADDER: Withinnormal limits.  SPLEEN: Subcentimeter hypodense splenic lesions, too small to   characterize.  PANCREAS: Within normal limits.  ADRENALS: Within normal limits.  KIDNEYS/URETERS: No hydronephrosis. Left upper pole renal cyst.   Additional subcentimeter hypodense bilateral renal lesions, too small to   characterize.    BLADDER: Within normal limits.  REPRODUCTIVE ORGANS: Hysterectomy.    BOWEL: Colonic diverticulosis. Long segment mural thickening of the colon   extending from the ascending colon to the level of the rectum. Small   bowel anastomosis. No bowel obstruction. Appendix is normal.  PERITONEUM: No ascites.  VESSELS: Atherosclerotic changes.  RETROPERITONEUM/LYMPH NODES: No lymphadenopathy.  ABDOMINAL WALL: Within normal limits.  BONES: Degenerative changes.    IMPRESSION:  Possible filling defect in a branch of the right lower lobe pulmonary   artery, suspicious for pulmonary embolism. Further evaluation with CTA   chest (pulmonary artery protocol) is recommended.    Long segment proctocolitis.    Findings were discussed with MARLON FERRER on 1/16/2022 at 6:28 PM   by Dr. Wagner with read back confirmation.    --- End of Report ---            ANABEL WAGNER MD; Attending Radiologist  This document has been electronically signed. Jan 16 2022  6:29PM    < end of copied text >   (16 Jan 2022 18:47)        SUBJECTIVE:      ALLERGIES:  Allergies    No Known Allergies    Intolerances          MEDICATIONS  (STANDING):  apixaban 10 milliGRAM(s) Oral every 12 hours  dextrose 5% + sodium chloride 0.45% with potassium chloride 20 mEq/L 1000 milliLiter(s) (60 mL/Hr) IV Continuous <Continuous>  influenza  Vaccine (HIGH DOSE) 0.7 milliLiter(s) IntraMuscular once  lactobacillus acidophilus 1 Tablet(s) Oral two times a day  losartan 25 milliGRAM(s) Oral daily  pantoprazole  Injectable 40 milliGRAM(s) IV Push daily  vancomycin    Solution 125 milliGRAM(s) Oral every 6 hours    MEDICATIONS  (PRN):  acetaminophen     Tablet .. 650 milliGRAM(s) Oral every 6 hours PRN Temp greater or equal to 38C (100.4F), Mild Pain (1 - 3)  aluminum hydroxide/magnesium hydroxide/simethicone Suspension 30 milliLiter(s) Oral every 4 hours PRN Dyspepsia  LORazepam   Injectable 1 milliGRAM(s) IV Push once PRN mri  LORazepam   Injectable 1 milliGRAM(s) IV Push once PRN mri  melatonin 3 milliGRAM(s) Oral at bedtime PRN Insomnia  OLANZapine Injectable 2.5 milliGRAM(s) IntraMuscular every 6 hours PRN agitation  ondansetron Injectable 4 milliGRAM(s) IV Push every 8 hours PRN Nausea and/or Vomiting  traMADol 50 milliGRAM(s) Oral four times a day PRN Moderate Pain (4 - 6)      REVIEW OF SYSTEMS:  CONSTITUTIONAL: No fever,  RESPIRATORY: No cough, wheezing, shortness of breath  CARDIOVASCULAR: No chest pain, dyspnea, palpitations, dizziness, syncope, paroxysmal nocturnal dyspnea, orthopnea, or arm or leg swelling  GASTROINTESTINAL: No abdominal  or epigastric pain, nausea, vomiting,  diarrhea  NEUROLOGICAL: No headaches,  loss of strength, numbness, or tremors    Vital Signs Last 24 Hrs  T(C): 36.6 (22 Jan 2022 21:19), Max: 36.6 (22 Jan 2022 21:19)  T(F): 97.8 (22 Jan 2022 21:19), Max: 97.8 (22 Jan 2022 21:19)  HR: 80 (22 Jan 2022 21:19) (66 - 80)  BP: 138/62 (22 Jan 2022 21:19) (129/67 - 147/81)  BP(mean): --  RR: 18 (22 Jan 2022 21:19) (16 - 18)  SpO2: 94% (22 Jan 2022 21:19) (93% - 94%)    PHYSICAL EXAM:  HEAD:  Atraumatic, Normocephalic  NECK: Supple and normal thyroid.  No JVD or carotid bruit.   HEART: S1, S2 regular , 1/6 soft ejection systolic murmur in mitral area , no thrill and no gallops .  PULMONARY: Bilateral vesicular breathing , few scattered ronchi and few scattered rales are present .  ABDOMEN: Soft nontender and positive bowl sounds   EXTREMITIES:  No clubbing, cyanosis, or pedal  edema  NEUROLOGICAL: AAOX3 , no focal deficit .    LABS:                        11.5   13.06 )-----------( 346      ( 21 Jan 2022 07:34 )             36.7     01-21    141  |  108  |  13  ----------------------------<  132<H>  4.5   |  28  |  0.87    Ca    8.9      21 Jan 2022 07:34              BNP      EKG:  ECHO:  IMAGING:    Assessment/Plan    Will continue to follow during hospital course and give further recommendations as needed . Thanks for your referral . if any questions please contact me at office ([phone])cell [phone])  JAMILA RODRIGUEZ MD [facility]  [address] [id number]  [address]  OFFICE : [phone]  CELL : [phone]    CARDIOLOGY F/U  :       HPI:  86 female sent from [facility] living for diarrhea x 3 days since starting antibiotics for UTI.   	Per patient she was being treated for UTI and began having diarrhea and RLQ pain.   	Denies fever, chills.   	Denies n/v.   	+ Abdominal pain , + Diarrhea  	Denies current urinary symptoms.  Denies CP, cough or SOBno abdominal distension, no blood in stool, no burning urination, no chills, no dysuria, no fever, no hematuria, no nausea, no vomiting++ RLQ abdominal pain, no bloating, no constipation, ++ diarrhea,   < from: CT Abdomen and Pelvis w/ IV Cont (01.16.22 @ 17:29) >    ACC: [id number] EXAM:  CT ABDOMEN AND PELVIS IC                          PROCEDURE DATE:  01/16/2022          INTERPRETATION:  CLINICAL INFORMATION: Acute abdominal pain.    COMPARISON: None.    CONTRAST/COMPLICATIONS:  IV Contrast: Omnipaque 350  90cc administered   10 cc discarded  Oral Contrast: NONE  Complications: None reported at time of study completion    PROCEDURE:  CT of the Abdomen and Pelvis was performed.  Sagittal and coronal reformats were performed.    FINDINGS:  LOWER CHEST: Possible filling defect in a branch of the right lower lobe   pulmonary artery (series 2, image 7), suspicious for pulmonary embolism.   Bibasilar subsegmental atelectasis.    LIVER: Within normal limits.  BILE DUCTS: Normal caliber.  GALLBLADDER: Withinnormal limits.  SPLEEN: Subcentimeter hypodense splenic lesions, too small to   characterize.  PANCREAS: Within normal limits.  ADRENALS: Within normal limits.  KIDNEYS/URETERS: No hydronephrosis. Left upper pole renal cyst.   Additional subcentimeter hypodense bilateral renal lesions, too small to   characterize.    BLADDER: Within normal limits.  REPRODUCTIVE ORGANS: Hysterectomy.    BOWEL: Colonic diverticulosis. Long segment mural thickening of the colon   extending from the ascending colon to the level of the rectum. Small   bowel anastomosis. No bowel obstruction. Appendix is normal.  PERITONEUM: No ascites.  VESSELS: Atherosclerotic changes.  RETROPERITONEUM/LYMPH NODES: No lymphadenopathy.  ABDOMINAL WALL: Within normal limits.  BONES: Degenerative changes.    IMPRESSION:  Possible filling defect in a branch of the right lower lobe pulmonary   artery, suspicious for pulmonary embolism. Further evaluation with CTA   chest (pulmonary artery protocol) is recommended.    Long segment proctocolitis.    Findings were discussed with MARLON FERRER on 1/16/2022 at 6:28 PM   by Dr. Wagner with read back confirmation.    --- End of Report ---            ANABEL WAGNER MD; Attending Radiologist  This document has been electronically signed. Jan 16 2022  6:29PM    < end of copied text >   (16 Jan 2022 18:47)        SUBJECTIVE: Patient feeling better .      ALLERGIES:  Allergies    No Known Allergies    Intolerances          MEDICATIONS  (STANDING):  apixaban 10 milliGRAM(s) Oral every 12 hours  dextrose 5% + sodium chloride 0.45% with potassium chloride 20 mEq/L 1000 milliLiter(s) (60 mL/Hr) IV Continuous <Continuous>  influenza  Vaccine (HIGH DOSE) 0.7 milliLiter(s) IntraMuscular once  lactobacillus acidophilus 1 Tablet(s) Oral two times a day  losartan 25 milliGRAM(s) Oral daily  pantoprazole  Injectable 40 milliGRAM(s) IV Push daily  vancomycin    Solution 125 milliGRAM(s) Oral every 6 hours    MEDICATIONS  (PRN):  acetaminophen     Tablet .. 650 milliGRAM(s) Oral every 6 hours PRN Temp greater or equal to 38C (100.4F), Mild Pain (1 - 3)  aluminum hydroxide/magnesium hydroxide/simethicone Suspension 30 milliLiter(s) Oral every 4 hours PRN Dyspepsia  LORazepam   Injectable 1 milliGRAM(s) IV Push once PRN mri  LORazepam   Injectable 1 milliGRAM(s) IV Push once PRN mri  melatonin 3 milliGRAM(s) Oral at bedtime PRN Insomnia  OLANZapine Injectable 2.5 milliGRAM(s) IntraMuscular every 6 hours PRN agitation  ondansetron Injectable 4 milliGRAM(s) IV Push every 8 hours PRN Nausea and/or Vomiting  traMADol 50 milliGRAM(s) Oral four times a day PRN Moderate Pain (4 - 6)      REVIEW OF SYSTEMS:  CONSTITUTIONAL: No fever,  RESPIRATORY: No cough, wheezing, shortness of breath  CARDIOVASCULAR: No chest pain, dyspnea, palpitations, dizziness, syncope, paroxysmal nocturnal dyspnea, orthopnea, or arm or leg swelling  GASTROINTESTINAL: No abdominal  or epigastric pain, nausea, vomiting, and has improvement in  diarrhea  NEUROLOGICAL: No headaches,   numbness, or tremors  Skin : No itching .  Hematology : No active bleeding .  Endocrinology : No heat and cold intolerance .  Psychiatry : Patient is mild  confused .  Musculoskeletal : patient has mild arthritis .    Vital Signs Last 24 Hrs  T(C): 36.6 (22 Jan 2022 21:19), Max: 36.6 (22 Jan 2022 21:19)  T(F): 97.8 (22 Jan 2022 21:19), Max: 97.8 (22 Jan 2022 21:19)  HR: 80 (22 Jan 2022 21:19) (66 - 80)  BP: 138/62 (22 Jan 2022 21:19) (129/67 - 147/81)  BP(mean): --  RR: 18 (22 Jan 2022 21:19) (16 - 18)  SpO2: 94% (22 Jan 2022 21:19) (93% - 94%)    PHYSICAL EXAM:  HEAD:  Atraumatic, Normocephalic  NECK: Supple and normal thyroid.  No JVD or carotid bruit.   HEART: S1, S2 regular , 1/6 soft ejection systolic murmur in mitral area , no thrill and no gallops .  PULMONARY: Bilateral vesicular breathing , few scattered rhonchi and few scattered rales are present .  ABDOMEN: Soft nontender and positive bowl sounds   EXTREMITIES:  No clubbing, cyanosis, or pedal  edema  NEUROLOGICAL: AA and mild confused .   Skin : No rashes .  Musculoskeletal : No joint swellings .    LABS:                        11.5   13.06 )-----------( 346      ( 21 Jan 2022 07:34 )             36.7     01-21    141  |  108  |  13  ----------------------------<  132<H>  4.5   |  28  |  0.87    Ca    8.9      21 Jan 2022 07:34              Assessment/Plan  Patient has :  1) Diarrhea and S/P dehydration and better .  2) Pulmonary embolism .  3) Hypertension and BP better   4) Mild anemia .  Plan : 1) Monitor hemoglobin and electrolytes 2) rest of medications as such 3) Full anticoagulation as per pulmonary 4) Vancomycin PO as per ID 5) Prognosis guarded .  Will continue to follow during hospital course and give further recommendations as needed . Thanks for your referral . if any questions please contact me at office ([phone] cell [phone])  JAMILA RODRIGUEZ MD [facility]  [address] [id number]  [address]  OFFICE : [phone]  CELL : [phone]    CARDIOLOGY F/U  :       HPI:  86 female sent from [facility] living for diarrhea x 3 days since starting antibiotics for UTI.   	Per patient she was being treated for UTI and began having diarrhea and RLQ pain.   	Denies fever, chills.   	Denies n/v.   	+ Abdominal pain , + Diarrhea  	Denies current urinary symptoms.  Denies CP, cough or SOBno abdominal distension, no blood in stool, no burning urination, no chills, no dysuria, no fever, no hematuria, no nausea, no vomiting++ RLQ abdominal pain, no bloating, no constipation, ++ diarrhea,   < from: CT Abdomen and Pelvis w/ IV Cont (01.16.22 @ 17:29) >    ACC: [id number] EXAM:  CT ABDOMEN AND PELVIS IC                          PROCEDURE DATE:  01/16/2022          INTERPRETATION:  CLINICAL INFORMATION: Acute abdominal pain.    COMPARISON: None.    CONTRAST/COMPLICATIONS:  IV Contrast: Omnipaque 350  90cc administered   10 cc discarded  Oral Contrast: NONE  Complications: None reported at time of study completion    PROCEDURE:  CT of the Abdomen and Pelvis was performed.  Sagittal and coronal reformats were performed.    FINDINGS:  LOWER CHEST: Possible filling defect in a branch of the right lower lobe   pulmonary artery (series 2, image 7), suspicious for pulmonary embolism.   Bibasilar subsegmental atelectasis.    LIVER: Within normal limits.  BILE DUCTS: Normal caliber.  GALLBLADDER: Withinnormal limits.  SPLEEN: Subcentimeter hypodense splenic lesions, too small to   characterize.  PANCREAS: Within normal limits.  ADRENALS: Within normal limits.  KIDNEYS/URETERS: No hydronephrosis. Left upper pole renal cyst.   Additional subcentimeter hypodense bilateral renal lesions, too small to   characterize.    BLADDER: Within normal limits.  REPRODUCTIVE ORGANS: Hysterectomy.    BOWEL: Colonic diverticulosis. Long segment mural thickening of the colon   extending from the ascending colon to the level of the rectum. Small   bowel anastomosis. No bowel obstruction. Appendix is normal.  PERITONEUM: No ascites.  VESSELS: Atherosclerotic changes.  RETROPERITONEUM/LYMPH NODES: No lymphadenopathy.  ABDOMINAL WALL: Within normal limits.  BONES: Degenerative changes.    IMPRESSION:  Possible filling defect in a branch of the right lower lobe pulmonary   artery, suspicious for pulmonary embolism. Further evaluation with CTA   chest (pulmonary artery protocol) is recommended.    Long segment proctocolitis.    Findings were discussed with MARLON FERRER on 1/16/2022 at 6:28 PM   by Dr. Wagner with read back confirmation.    --- End of Report ---            ANABEL WAGNER MD; Attending Radiologist  This document has been electronically signed. Jan 16 2022  6:29PM    < end of copied text >   (16 Jan 2022 18:47)        SUBJECTIVE: Patient feeling better .      ALLERGIES:  Allergies    No Known Allergies    Intolerances          MEDICATIONS  (STANDING):  apixaban 10 milliGRAM(s) Oral every 12 hours  dextrose 5% + sodium chloride 0.45% with potassium chloride 20 mEq/L 1000 milliLiter(s) (60 mL/Hr) IV Continuous <Continuous>  influenza  Vaccine (HIGH DOSE) 0.7 milliLiter(s) IntraMuscular once  lactobacillus acidophilus 1 Tablet(s) Oral two times a day  losartan 25 milliGRAM(s) Oral daily  pantoprazole  Injectable 40 milliGRAM(s) IV Push daily  vancomycin    Solution 125 milliGRAM(s) Oral every 6 hours    MEDICATIONS  (PRN):  acetaminophen     Tablet .. 650 milliGRAM(s) Oral every 6 hours PRN Temp greater or equal to 38C (100.4F), Mild Pain (1 - 3)  aluminum hydroxide/magnesium hydroxide/simethicone Suspension 30 milliLiter(s) Oral every 4 hours PRN Dyspepsia  LORazepam   Injectable 1 milliGRAM(s) IV Push once PRN mri  LORazepam   Injectable 1 milliGRAM(s) IV Push once PRN mri  melatonin 3 milliGRAM(s) Oral at bedtime PRN Insomnia  OLANZapine Injectable 2.5 milliGRAM(s) IntraMuscular every 6 hours PRN agitation  ondansetron Injectable 4 milliGRAM(s) IV Push every 8 hours PRN Nausea and/or Vomiting  traMADol 50 milliGRAM(s) Oral four times a day PRN Moderate Pain (4 - 6)      REVIEW OF SYSTEMS:  CONSTITUTIONAL: No fever,  RESPIRATORY: No cough, wheezing, shortness of breath  CARDIOVASCULAR: No chest pain, dyspnea, palpitations, dizziness, syncope, paroxysmal nocturnal dyspnea, orthopnea, or arm or leg swelling  GASTROINTESTINAL: No abdominal  or epigastric pain, nausea, vomiting, and has improvement in  diarrhea  NEUROLOGICAL: No headaches,   numbness, or tremors  Skin : No itching .  Hematology : No active bleeding .  Endocrinology : No heat and cold intolerance .  Psychiatry : Patient is mild  confused .  Musculoskeletal : patient has mild arthritis .    Vital Signs Last 24 Hrs  T(C): 36.6 (22 Jan 2022 21:19), Max: 36.6 (22 Jan 2022 21:19)  T(F): 97.8 (22 Jan 2022 21:19), Max: 97.8 (22 Jan 2022 21:19)  HR: 80 (22 Jan 2022 21:19) (66 - 80)  BP: 138/62 (22 Jan 2022 21:19) (129/67 - 147/81)  BP(mean): --  RR: 18 (22 Jan 2022 21:19) (16 - 18)  SpO2: 94% (22 Jan 2022 21:19) (93% - 94%)    PHYSICAL EXAM:  HEAD:  Atraumatic, Normocephalic  NECK: Supple and normal thyroid.  No JVD or carotid bruit.   HEART: S1, S2 regular , 1/6 soft ejection systolic murmur in mitral area , no thrill and no gallops .  PULMONARY: Bilateral vesicular breathing , few scattered rhonchi and few scattered rales are present .  ABDOMEN: Soft nontender and positive bowl sounds   EXTREMITIES:  No clubbing, cyanosis, or pedal  edema  NEUROLOGICAL: AA and mild confused .   Skin : No rashes .  Musculoskeletal : No joint swellings .    LABS:                        11.5   13.06 )-----------( 346      ( 21 Jan 2022 07:34 )             36.7     01-21    141  |  108  |  13  ----------------------------<  132<H>  4.5   |  28  |  0.87    Ca    8.9      21 Jan 2022 07:34              Assessment/Plan  Patient has :  1) Diarrhea and S/P dehydration and better . C-diff colitis .  2) Pulmonary embolism .  3) Hypertension and BP better   4) Mild anemia .  Plan : 1) Monitor hemoglobin and electrolytes 2) rest of medications as such 3) Full anticoagulation as per pulmonary 4) Vancomycin PO as per PMD  5) Prognosis guarded .  Will continue to follow during hospital course and give further recommendations as needed . Thanks for your referral . if any questions please contact me at office ([phone] cell [phone])

## 2022-01-22 NOTE — PROGRESS NOTE ADULT - TIME BILLING
in direct care of patient , reviewing labs and other results and in discussion with other consultants , RN and PMD
as above

## 2022-01-22 NOTE — PROGRESS NOTE ADULT - SUBJECTIVE AND OBJECTIVE BOX
Neurology follow up note    GINA [id number]      Interval History:    Patient feels ok no new complaints.    Allergies    No Known Allergies    Intolerances        MEDICATIONS    acetaminophen     Tablet .. 650 milliGRAM(s) Oral every 6 hours PRN  aluminum hydroxide/magnesium hydroxide/simethicone Suspension 30 milliLiter(s) Oral every 4 hours PRN  apixaban 10 milliGRAM(s) Oral every 12 hours  dextrose 5% + sodium chloride 0.45% with potassium chloride 20 mEq/L 1000 milliLiter(s) IV Continuous <Continuous>  influenza  Vaccine (HIGH DOSE) 0.7 milliLiter(s) IntraMuscular once  lactobacillus acidophilus 1 Tablet(s) Oral two times a day  LORazepam   Injectable 1 milliGRAM(s) IV Push once PRN  LORazepam   Injectable 1 milliGRAM(s) IV Push once PRN  losartan 25 milliGRAM(s) Oral daily  melatonin 3 milliGRAM(s) Oral at bedtime PRN  OLANZapine Injectable 2.5 milliGRAM(s) IntraMuscular every 6 hours PRN  ondansetron Injectable 4 milliGRAM(s) IV Push every 8 hours PRN  pantoprazole  Injectable 40 milliGRAM(s) IV Push daily  traMADol 50 milliGRAM(s) Oral four times a day PRN  vancomycin    Solution 125 milliGRAM(s) Oral every 6 hours              Vital Signs Last 24 Hrs  T(C): 36.3 (22 Jan 2022 05:00), Max: 36.7 (21 Jan 2022 17:11)  T(F): 97.4 (22 Jan 2022 05:00), Max: 98 (21 Jan 2022 17:11)  HR: 66 (22 Jan 2022 05:00) (66 - 86)  BP: 147/81 (22 Jan 2022 05:00) (116/69 - 147/81)  BP(mean): --  RR: 16 (22 Jan 2022 05:00) (16 - 16)  SpO2: 94% (22 Jan 2022 05:00) (94% - 96%)        REVIEW OF SYSTEMS:  Limited secondary to the patient has forgetfulness, but constitutionally, she denies fevers, chills, or night sweats.  Head:  No headaches.  Eyes:  No double vision or blurry vision.  Ears:  No ringing in the ears.  Neck:  No neck pain.  Respiratory:  No shortness of breath.  Cardiovascular:  No chest pain.  Abdomen:  No nausea, vomiting, or abdominal pain.  Genitourinary:  Does have a history of urinary incontinence.  Extremities/Neurological:  No numbness or tingling.  Musculoskeletal:  Occasional joint pain.    PHYSICAL EXAMINATION:   HEENT:  Head:  Normocephalic, atraumatic.  Eyes:  No scleral icterus.  Ears:  Hearing bilaterally intact.  NECK:  Supple.  RESPIRATORY:  Good air entry bilaterally.  CARDIOVASCULAR:  S1 and S2 heard.  ABDOMEN:  Soft and nontender.  EXTREMITIES:  No clubbing or cyanosis was noted.      NEUROLOGIC:  The patient is awake and alert. positive forgetfulness  Location was hospital, She was able to follow complex commands, touch right hand and touched left ear, was able to name simple objects, was able to tell two daughters' name.  Extraocular movements were intact.  Speech was fluent.  Smile was symmetric.  Motor:  Bilateral upper were 4/5, bilateral lower were 3/5.  At present, is not ambulatory as per my conversation with the daughter.                  LABS:  CBC Full  -  ( 21 Jan 2022 07:34 )  WBC Count : 13.06 K/uL  RBC Count : 4.24 M/uL  Hemoglobin : 11.5 g/dL  Hematocrit : 36.7 %  Platelet Count - Automated : 346 K/uL  Mean Cell Volume : 86.6 fl  Mean Cell Hemoglobin : 27.1 pg  Mean Cell Hemoglobin Concentration : 31.3 gm/dL  Auto Neutrophil # : x  Auto Lymphocyte # : x  Auto Monocyte # : x  Auto Eosinophil # : x  Auto Basophil # : x  Auto Neutrophil % : x  Auto Lymphocyte % : x  Auto Monocyte % : x  Auto Eosinophil % : x  Auto Basophil % : x      01-21    141  |  108  |  13  ----------------------------<  132<H>  4.5   |  28  |  0.87    Ca    8.9      21 Jan 2022 07:34      Hemoglobin A1C:       Vitamin B12         RADIOLOGY    ANALYSIS AND PLAN:  An 86-year-old with an episode of memory issues.  For episode of memory issues, suspect most likely the patient does have mild cognitive impairment versus more probable underlying dementia.  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  If the patient is able to go for it, will give Ativan patient refusing to go for repeat mri -- spoke to nurse today 1/20  For mild cognitive impairment versus subtle dementia supportive treatment for now   For history of hypertension, monitor systolic blood pressure.  For periods of agitation, I would recommend a psychiatry follow-up.  spoke to outside neurologist DR Arceo  1/18  neurologic wise stable for discharge     Spoke with the daughter at the bedside, her name is Cris, her cell number is [phone], the home number is [phone]. 1/19  She does understand while in the hospital setting, she may become more disoriented.    Greater than 32 minutes of time was spent with the patient, plan of care, reviewing data, with greater than 50% of the visit was spent counseling and/or coordinating care with multidisciplinary healthcare team

## 2022-01-22 NOTE — PROGRESS NOTE ADULT - REASON FOR ADMISSION
86 female sent from [facility] living for diarrhea x 3 days since starting antibiotics for UTI.   	Per patient she was being treated for UTI and began having diarrhea and RLQ pain.   	Denies fever, chills.   	Denies n/v.   	+ Abdominal pain , + Diarrhea  	Denies current urinary symptoms.  Denies CP, cough or SOBno abdominal distension, no blood in stool, no burning urination, no chills, no dysuria, no fever, no hematuria, no nausea, no vomiting++ RLQ abdominal pain, no bloating, no constipation, ++ diarrhea,   < from: CT Abdomen and Pelvis w/ IV Cont (01.16.22 @ 17:29) >

## 2022-01-22 NOTE — PROGRESS NOTE ADULT - SUBJECTIVE AND OBJECTIVE BOX
PROGRESS NOTE  Patient is a 86y old  Female who presents with a chief complaint of 86 female sent from [facility] for diarrhea x 3 days since starting antibiotics for UTI.   	Per patient she was being treated for UTI and began having diarrhea and RLQ pain.   	Denies fever, chills.   	Denies n/v.   	+ Abdominal pain , + Diarrhea  	Denies current urinary symptoms.  Denies CP, cough or SOBno abdominal distension, no blood in stool, no burning urination, no chills, no dysuria, no fever, no hematuria, no nausea, no vomiting++ RLQ abdominal pain, no bloating, no constipation, ++ diarrhea,   < from: CT Abdomen and Pelvis w/ IV Cont (01.16.22 @ 17:29) > (19 Jan 2022 10:36)    Chart and available morning labs /imaging are reviewed electronically , urgent issues addressed . More information  is being added upon completion of rounds , when more information is collected and management discussed with consultants , medical staff and social service/case management on the floor   OVERNIGHT  No new issues reported by medical staff . All above noted Patient is resting in a bed comfortably .Confused ,poor mentation .No distress noted   Seen by SLP and RD  HPI:  86 female sent from [facility] for diarrhea x 3 days since starting antibiotics for UTI.   	Per patient she was being treated for UTI and began having diarrhea and RLQ pain.   	Denies fever, chills.   	Denies n/v.   	+ Abdominal pain , + Diarrhea  	Denies current urinary symptoms.    CONTRAST/COMPLICATIONS:  IV Contrast: Omnipaque 350  90cc administered   10 cc discarded  Oral Contrast: NONE  Complications: None reported at time of study completion    PROCEDURE:  CT of the Abdomen and Pelvis was performed.  Sagittal and coronal reformats were performed.    FINDINGS:  LOWER CHEST: Possible filling defect in a branch of the right lower lobe   pulmonary artery (series 2, image 7), suspicious for pulmonary embolism.   Bibasilar subsegmental atelectasis.    LIVER: Within normal limits.  BILE DUCTS: Normal caliber.  GALLBLADDER: Withinnormal limits.  SPLEEN: Subcentimeter hypodense splenic lesions, too small to   characterize.  PANCREAS: Within normal limits.  ADRENALS: Within normal limits.  KIDNEYS/URETERS: No hydronephrosis. Left upper pole renal cyst.   Additional subcentimeter hypodense bilateral renal lesions, too small to   characterize.    BLADDER: Within normal limits.  REPRODUCTIVE ORGANS: Hysterectomy.    BOWEL: Colonic diverticulosis. Long segment mural thickening of the colon   extending from the ascending colon to the level of the rectum. Small   bowel anastomosis. No bowel obstruction. Appendix is normal.  PERITONEUM: No ascites.  VESSELS: Atherosclerotic changes.  RETROPERITONEUM/LYMPH NODES: No lymphadenopathy.  ABDOMINAL WALL: Within normal limits.  BONES: Degenerative changes.    IMPRESSION:  Possible filling defect in a branch of the right lower lobe pulmonary   artery, suspicious for pulmonary embolism. Further evaluation with CTA   chest (pulmonary artery protocol) is recommended.    Long segment proctocolitis.    Findings were discussed with MARLON FERRER on 1/16/2022 at 6:28 PM   by Dr. Jean with read back confirmation.    --- End of Report ---    PAST MEDICAL & SURGICAL HISTORY:  No pertinent past medical history    MEDICATIONS  (STANDING):  dextrose 5% + sodium chloride 0.45% with potassium chloride 20 mEq/L 1000 milliLiter(s) (60 mL/Hr) IV Continuous <Continuous>  enoxaparin Injectable 50 milliGRAM(s) SubCutaneous every 12 hours  lactobacillus acidophilus 1 Tablet(s) Oral two times a day  losartan 25 milliGRAM(s) Oral daily  pantoprazole  Injectable 40 milliGRAM(s) IV Push daily  potassium chloride    Tablet ER 40 milliEquivalent(s) Oral once  vancomycin    Solution 125 milliGRAM(s) Oral every 6 hours    MEDICATIONS  (PRN):  acetaminophen     Tablet .. 650 milliGRAM(s) Oral every 6 hours PRN Temp greater or equal to 38C (100.4F), Mild Pain (1 - 3)  aluminum hydroxide/magnesium hydroxide/simethicone Suspension 30 milliLiter(s) Oral every 4 hours PRN Dyspepsia  LORazepam   Injectable 1 milliGRAM(s) IV Push once PRN mri  LORazepam   Injectable 1 milliGRAM(s) IV Push once PRN mri  melatonin 3 milliGRAM(s) Oral at bedtime PRN Insomnia  OLANZapine Injectable 2.5 milliGRAM(s) IntraMuscular every 6 hours PRN agitation  ondansetron Injectable 4 milliGRAM(s) IV Push every 8 hours PRN Nausea and/or Vomiting  traMADol 50 milliGRAM(s) Oral four times a day PRN Moderate Pain (4 - 6)    Vital Signs Last 24 Hrs  T(C): 36.6 (22 Jan 2022 21:19), Max: 36.6 (22 Jan 2022 21:19)  T(F): 97.8 (22 Jan 2022 21:19), Max: 97.8 (22 Jan 2022 21:19)  HR: 80 (22 Jan 2022 21:19) (66 - 80)  BP: 138/62 (22 Jan 2022 21:19) (129/67 - 147/81)  RR: 18 (22 Jan 2022 21:19) (16 - 18)  SpO2: 94% (22 Jan 2022 21:19) (93% - 94%)    REVIEW OF SYSTEMS:  CONSTITUTIONAL: No fever, weight loss, or fatigue  EYES: No eye pain, visual disturbances, or discharge  ENMT:   No sinus or throat pain  NECK: No pain or stiffness  RESPIRATORY: No cough, wheezing, chills or hemoptysis; No shortness of breath  CARDIOVASCULAR: No chest pain, palpitations, dizziness, or leg swelling  GASTROINTESTINAL: No abdominal pain. No nausea, vomiting; No diarrhea or constipation. No melena or hematochezia.  GENITOURINARY: No dysuria, frequency, hematuria, or incontinence  NEUROLOGICAL: No headaches, memory loss, loss of strength, numbness, or tremors  SKIN: No itching, burning, rashes, or lesions   MUSCULOSKELETAL: No joint pain or swelling; No muscle, back, or extremity pain    PHYSICAL EXAM:  Appearance: NAD. VS past 24 hrs -as above   HEENT:   Moist oral mucosa. Conjunctiva clear b/l.   Neck : supple  Respiratory: Lungs CTAB.  Gastrointestinal:  Soft, nontender. No rebound. No rigidity. BS present	  Cardiovascular: RRR ,S1S2 present  Neurologic: Non-focal. Moving all extremities.  Extremities: No edema. No erythema. No calf tenderness.  Skin: No rashes, No ecchymoses, No cyanosis.	  wounds ,skin lesions-See skin assessment flow sheet     LABS:                        11.5   13.06 )-----------( 346      ( 21 Jan 2022 07:34 )             36.7     21 Jan 2022 07:34    141    |  108    |  13     ----------------------------<  132    4.5     |  28     |  0.87     Ca    8.9        21 Jan 2022 07:34    CAPILLARY BLOOD GLUCOSE                        12.4   8.57  )-----------( 276      ( 19 Jan 2022 07:57 )             38.7     01-19    143  |  109<H>  |  11  ----------------------------<  119<H>  3.4<L>   |  27  |  0.78    Ca    8.8      19 Jan 2022 07:57  Phos  2.5     01-19  Mg     1.9     01-19    TPro  5.8<L>  /  Alb  2.3<L>  /  TBili  0.3  /  DBili  x   /  AST  16  /  ALT  15  /  AlkPhos  68  01-18    CAPILLARY BLOOD GLUCOSE        PT/INR - ( 19 Jan 2022 07:57 )   PT: 13.5 sec;   INR: 1.16 ratio               Culture - Blood (collected 17 Jan 2022 16:27)  Source: .Blood Blood  Preliminary Report (18 Jan 2022 17:02):    No growth to date.    Culture - Blood (collected 17 Jan 2022 16:27)  Source: .Blood Blood  Preliminary Report (18 Jan 2022 17:02):    No growth to date.    Culture - Urine (collected 16 Jan 2022 20:54)  Source: Clean Catch Clean Catch (Midstream)  Final Report (17 Jan 2022 16:32):    No growth      RADIOLOGY & ADDITIONAL TESTS: < from: MR Head No Cont (01.18.22 @ 09:39) >  ACC: [id number] EXAM:  MR BRAIN                        ACC: [id number] EXAM:  MR ANGIO BRAIN                          PROCEDURE DATE:  01/18/2022          INTERPRETATION:  CLINICAL INFORMATION: Acute mental status. PMR.   Admitting Dxs: K52.9 NONINFECTIVE GASTROENTERITIS AND COLITIS,   UNSPECIFIED.    TECHNIQUE: Multiplanar, multisequence MRI of the brain without gadolinium   contrast, 3-D time of flight brain MRA. 3-D reformats were obtained.    COMPARISON: No relevant prior studies.    Brain MRI:    There is a 7 x 5 x 5 mm lesion in the right temporal lobe with   heterogeneous internal T1 and T2 signal, T2 hypointense rim and   susceptibility artifact suggesting a cavernoma.    There is no evidence of acute infarct. Scattered foci of T2/FLAIR  hyperintensity in the bilateral hemispheric white matter are nonspecific   but likely related to chronic white matter microvascular ischemic   disease. There is cerebral volume loss. Chronic lacunar infarct within   the right basal ganglia.    Flowvoids of the major intracranial vessels at the skull base follow   expected course and contour.    The paranasal sinuses demonstrate no signal abnormality. The mastoids   demonstrate no signal abnormality.  Status post left intraocular lens   implant.    Brain MRA:    There is flow-related signal in the bilateral intradural internal   carotid, anterior cerebral and middle cerebral arteries. No evidence of   large vessel occlusion.    There is flow-related signal in the bilateral posterior cerebral,   basilar, and intradural vertebral arteries. The branch vasculature of the   posterior circulation is within normal limits. No evidence of large   vessel occlusion.    No evidence of aneurysm. Tiny aneurysms can be beyond the resolution of   MRA technique. No evidence of arteriovenous malformation.      IMPRESSION:    Brain MRI:  1.  No acute infarct.  2.  A 7 x 5 x 5 mm lesion in the right temporal lobe with heterogeneous   internal T1 and T2 signal, T2 hypointense rim and susceptibility artifact   suggesting a cavernoma. Consider correlation with MRI brain with   contrast, if there is no contraindication.    Brain MRA: No large vessel occlusion or aneurysm.    < end of copied text >  < from: MR Angio Head No Cont (01.18.22 @ 09:30) >    Brain MRA:    There is flow-related signal in the bilateral intradural internal   carotid, anterior cerebral and middle cerebral arteries. No evidence of   large vessel occlusion.    There is flow-related signal in the bilateral posterior cerebral,   basilar, and intradural vertebral arteries. The branch vasculature of the   posterior circulation is within normal limits. No evidence of large   vessel occlusion.    No evidence of aneurysm. Tiny aneurysms can be beyond the resolution of   MRA technique. No evidence of arteriovenous malformation.      IMPRESSION:    Brain MRI:  1.  No acute infarct.  2.  A 7 x 5 x 5 mm lesion in the right temporal lobe with heterogeneous   internal T1 and T2 signal, T2 hypointense rim and susceptibility artifact   suggesting a cavernoma. Consider correlation with MRI brain with   contrast, if there is no contraindication.    Brain MRA: No large vessel occlusion or aneurysm.    < end of copied text >     reviewed elctronically  ASSESSMENT/PLAN: 	    25 minutes aggregate time was spent on this visit, 50% visit time spent in care co-ordination with other attendings and counselling patient .I have discussed care plan with patient / HCP/family member ,who expressed understanding of problems treatment and their effect and side effects, alternatives in details. I have asked if they have any questions and concerns and appropriately addressed them to best of my ability.

## 2022-01-23 LAB — SARS-COV-2 RNA SPEC QL NAA+PROBE: SIGNIFICANT CHANGE UP

## 2022-01-23 RX ADMIN — Medication 125 MILLIGRAM(S): at 23:00

## 2022-01-23 RX ADMIN — PANTOPRAZOLE SODIUM 40 MILLIGRAM(S): 20 TABLET, DELAYED RELEASE ORAL at 11:47

## 2022-01-23 RX ADMIN — APIXABAN 10 MILLIGRAM(S): 2.5 TABLET, FILM COATED ORAL at 05:50

## 2022-01-23 RX ADMIN — Medication 125 MILLIGRAM(S): at 05:51

## 2022-01-23 RX ADMIN — LOSARTAN POTASSIUM 25 MILLIGRAM(S): 100 TABLET, FILM COATED ORAL at 05:51

## 2022-01-23 RX ADMIN — Medication 1 TABLET(S): at 05:50

## 2022-01-23 RX ADMIN — APIXABAN 10 MILLIGRAM(S): 2.5 TABLET, FILM COATED ORAL at 17:28

## 2022-01-23 RX ADMIN — Medication 1 TABLET(S): at 17:27

## 2022-01-23 RX ADMIN — Medication 125 MILLIGRAM(S): at 11:47

## 2022-01-23 RX ADMIN — Medication 125 MILLIGRAM(S): at 17:27

## 2022-01-23 NOTE — PROGRESS NOTE ADULT - REASON FOR ADMISSION
86 female sent from [facility] living for diarrhea x 3 days since starting antibiotics for UTI.   	Per patient she was being treated for UTI and began having diarrhea and RLQ pain.   	Denies fever, chills.   	Denies n/v.   	+ Abdominal pain , + Diarrhea  	Denies current urinary symptoms.  Denies CP, cough or SOBno abdominal distension, no blood in stool, no burning urination, no chills, no dysuria, no fever, no hematuria, no nausea, no vomiting++ RLQ abdominal pain, no bloating, no constipation, ++ diarrhea,   < from: CT Abdomen and Pelvis w/ IV Cont (01.16.22 @ 17:29) > 86 female sent from [facility] for diarrhea x 3 days since starting antibiotics for UTI.   	As per patient she was being treated for UTI and began having diarrhea and RLQ pain.   	Denies fever, chills.   	Denies n/v.   	+ Abdominal pain , + Diarrhea  	Denies current urinary symptoms.  Denies CP, cough or SOBno abdominal distension, no blood in stool, no burning urination, no chills, no dysuria, no fever, no hematuria, no nausea, no vomiting++ RLQ abdominal pain, no bloating, no constipation, ++ diarrhea,   < from: CT Abdomen and Pelvis w/ IV Cont (01.16.22 @ 17:29) >

## 2022-01-23 NOTE — PROGRESS NOTE ADULT - SUBJECTIVE AND OBJECTIVE BOX
Date/Time Patient Seen:  		  Referring MD:   Data Reviewed	       Patient is a 86y old  Female who presents with a chief complaint of 86 female sent from [facility] for diarrhea x 3 days since starting antibiotics for UTI.   	Per patient she was being treated for UTI and began having diarrhea and RLQ pain.   	Denies fever, chills.   	Denies n/v.   	+ Abdominal pain , + Diarrhea  	Denies current urinary symptoms.  Denies CP, cough or SOBno abdominal distension, no blood in stool, no burning urination, no chills, no dysuria, no fever, no hematuria, no nausea, no vomiting++ RLQ abdominal pain, no bloating, no constipation, ++ diarrhea,   < from: CT Abdomen and Pelvis w/ IV Cont (01.16.22 @ 17:29) > (22 Jan 2022 21:41)      Subjective/HPI     PAST MEDICAL & SURGICAL HISTORY:  No pertinent past medical history    No pertinent past medical history          Medication list         MEDICATIONS  (STANDING):  apixaban 10 milliGRAM(s) Oral every 12 hours  dextrose 5% + sodium chloride 0.45% with potassium chloride 20 mEq/L 1000 milliLiter(s) (60 mL/Hr) IV Continuous <Continuous>  influenza  Vaccine (HIGH DOSE) 0.7 milliLiter(s) IntraMuscular once  lactobacillus acidophilus 1 Tablet(s) Oral two times a day  losartan 25 milliGRAM(s) Oral daily  pantoprazole  Injectable 40 milliGRAM(s) IV Push daily  vancomycin    Solution 125 milliGRAM(s) Oral every 6 hours    MEDICATIONS  (PRN):  acetaminophen     Tablet .. 650 milliGRAM(s) Oral every 6 hours PRN Temp greater or equal to 38C (100.4F), Mild Pain (1 - 3)  aluminum hydroxide/magnesium hydroxide/simethicone Suspension 30 milliLiter(s) Oral every 4 hours PRN Dyspepsia  LORazepam   Injectable 1 milliGRAM(s) IV Push once PRN mri  LORazepam   Injectable 1 milliGRAM(s) IV Push once PRN mri  melatonin 3 milliGRAM(s) Oral at bedtime PRN Insomnia  OLANZapine Injectable 2.5 milliGRAM(s) IntraMuscular every 6 hours PRN agitation  ondansetron Injectable 4 milliGRAM(s) IV Push every 8 hours PRN Nausea and/or Vomiting  traMADol 50 milliGRAM(s) Oral four times a day PRN Moderate Pain (4 - 6)         Vitals log        ICU Vital Signs Last 24 Hrs  T(C): 36.4 (23 Jan 2022 06:10), Max: 36.6 (22 Jan 2022 21:19)  T(F): 97.6 (23 Jan 2022 06:10), Max: 97.8 (22 Jan 2022 21:19)  HR: 77 (23 Jan 2022 06:10) (76 - 80)  BP: 162/70 (23 Jan 2022 06:10) (129/67 - 162/70)  BP(mean): --  ABP: --  ABP(mean): --  RR: 18 (23 Jan 2022 06:10) (18 - 18)  SpO2: 92% (23 Jan 2022 06:10) (92% - 94%)           Input and Output:  I&O's Detail    21 Jan 2022 07:01  -  22 Jan 2022 07:00  --------------------------------------------------------  IN:    dextrose 5% + sodium chloride 0.45% w/ Additives: 1380 mL  Total IN: 1380 mL    OUT:    Voided (mL): 900 mL  Total OUT: 900 mL    Total NET: 480 mL      22 Jan 2022 07:01  -  23 Jan 2022 06:32  --------------------------------------------------------  IN:    dextrose 5% + sodium chloride 0.45% w/ Additives: 1440 mL    Oral Fluid: 540 mL  Total IN: 1980 mL    OUT:    Voided (mL): 800 mL  Total OUT: 800 mL    Total NET: 1180 mL          Lab Data                        11.5   13.06 )-----------( 346      ( 21 Jan 2022 07:34 )             36.7     01-21    141  |  108  |  13  ----------------------------<  132<H>  4.5   |  28  |  0.87    Ca    8.9      21 Jan 2022 07:34              Review of Systems	      Objective     Physical Examination    heart s1s2  lung dec BS  abd soft      Pertinent Lab findings & Imaging      Víctor:  NO   Adequate UO     I&O's Detail    21 Jan 2022 07:01  -  22 Jan 2022 07:00  --------------------------------------------------------  IN:    dextrose 5% + sodium chloride 0.45% w/ Additives: 1380 mL  Total IN: 1380 mL    OUT:    Voided (mL): 900 mL  Total OUT: 900 mL    Total NET: 480 mL      22 Jan 2022 07:01  -  23 Jan 2022 06:32  --------------------------------------------------------  IN:    dextrose 5% + sodium chloride 0.45% w/ Additives: 1440 mL    Oral Fluid: 540 mL  Total IN: 1980 mL    OUT:    Voided (mL): 800 mL  Total OUT: 800 mL    Total NET: 1180 mL               Discussed with:     Cultures:	        Radiology

## 2022-01-23 NOTE — PROGRESS NOTE ADULT - REASON FOR ADMISSION
86 female sent from [facility] living for diarrhea x 3 days since starting antibiotics for UTI.   	Per patient she was being treated for UTI and began having diarrhea and RLQ pain.   	Denies fever, chills.   	Denies n/v.   	+ Abdominal pain , + Diarrhea  	Denies current urinary symptoms.  Denies CP, cough or SOBno abdominal distension, no blood in stool, no burning urination, no chills, no dysuria, no fever, no hematuria, no nausea, no vomiting++ RLQ abdominal pain, no bloating, no constipation, ++ diarrhea,   < from: CT Abdomen and Pelvis w/ IV Cont (01.16.22 @ 17:29) >

## 2022-01-23 NOTE — PROGRESS NOTE ADULT - SUBJECTIVE AND OBJECTIVE BOX
Neurology follow up note    GINA [id number]      Interval History:    Patient feels ok no new complaints.    Allergies    No Known Allergies    Intolerances        MEDICATIONS    acetaminophen     Tablet .. 650 milliGRAM(s) Oral every 6 hours PRN  aluminum hydroxide/magnesium hydroxide/simethicone Suspension 30 milliLiter(s) Oral every 4 hours PRN  apixaban 10 milliGRAM(s) Oral every 12 hours  dextrose 5% + sodium chloride 0.45% with potassium chloride 20 mEq/L 1000 milliLiter(s) IV Continuous <Continuous>  influenza  Vaccine (HIGH DOSE) 0.7 milliLiter(s) IntraMuscular once  lactobacillus acidophilus 1 Tablet(s) Oral two times a day  LORazepam   Injectable 1 milliGRAM(s) IV Push once PRN  LORazepam   Injectable 1 milliGRAM(s) IV Push once PRN  losartan 25 milliGRAM(s) Oral daily  melatonin 3 milliGRAM(s) Oral at bedtime PRN  OLANZapine Injectable 2.5 milliGRAM(s) IntraMuscular every 6 hours PRN  ondansetron Injectable 4 milliGRAM(s) IV Push every 8 hours PRN  pantoprazole  Injectable 40 milliGRAM(s) IV Push daily  traMADol 50 milliGRAM(s) Oral four times a day PRN  vancomycin    Solution 125 milliGRAM(s) Oral every 6 hours              Vital Signs Last 24 Hrs  T(C): 36.4 (23 Jan 2022 06:10), Max: 36.6 (22 Jan 2022 21:19)  T(F): 97.6 (23 Jan 2022 06:10), Max: 97.8 (22 Jan 2022 21:19)  HR: 77 (23 Jan 2022 06:10) (76 - 80)  BP: 162/70 (23 Jan 2022 06:10) (129/67 - 162/70)  BP(mean): --  RR: 18 (23 Jan 2022 06:10) (18 - 18)  SpO2: 92% (23 Jan 2022 06:10) (92% - 94%)      REVIEW OF SYSTEMS:  Limited secondary to the patient has forgetfulness, but constitutionally, she denies fevers, chills, or night sweats.  Head:  No headaches.  Eyes:  No double vision or blurry vision.  Ears:  No ringing in the ears.  Neck:  No neck pain.  Respiratory:  No shortness of breath.  Cardiovascular:  No chest pain.  Abdomen:  No nausea, vomiting, or abdominal pain.  Genitourinary:  Does have a history of urinary incontinence.  Extremities/Neurological:  No numbness or tingling.  Musculoskeletal:  Occasional joint pain.    PHYSICAL EXAMINATION:   HEENT:  Head:  Normocephalic, atraumatic.  Eyes:  No scleral icterus.  Ears:  Hearing bilaterally intact.  NECK:  Supple.  RESPIRATORY:  Good air entry bilaterally.  CARDIOVASCULAR:  S1 and S2 heard.  ABDOMEN:  Soft and nontender.  EXTREMITIES:  No clubbing or cyanosis was noted.      NEUROLOGIC:  The patient is awake and alert. positive forgetfulness  Location was hospital, She was able to follow complex commands, touch right hand and touched left ear, was able to name simple objects, was able to tell two daughters' name.  Extraocular movements were intact.  Speech was fluent.  Smile was symmetric.  Motor:  Bilateral upper were 4/5, bilateral lower were 3/5.  At present, is not ambulatory as per my conversation with the daughter.                    LABS:            Hemoglobin A1C:       Vitamin B12         RADIOLOGY      ANALYSIS AND PLAN:  An 86-year-old with an episode of memory issues.  For episode of memory issues, suspect most likely the patient does have mild cognitive impairment versus more probable underlying dementia.  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  If the patient is able to go for it, will give Ativan patient refusing to go for repeat mri -- spoke to nurse today 1/20  For mild cognitive impairment versus subtle dementia supportive treatment for now   For history of hypertension, monitor systolic blood pressure.  For periods of agitation, I would recommend a psychiatry follow-up.  spoke to outside neurologist DR Arceo  1/18  neurologic wise stable for discharge     Spoke with the daughter at the bedside, her name is Cris, her cell number is [phone], the home number is [phone]. 1/19  She does understand while in the hospital setting, she may become more disoriented.    Greater than 30 minutes of time was spent with the patient, plan of care, reviewing data, with greater than 50% of the visit was spent counseling and/or coordinating care with multidisciplinary healthcare team

## 2022-01-23 NOTE — PROGRESS NOTE ADULT - REASON FOR ADMISSION
86 female sent from [facility] living for diarrhea x 3 days since starting antibiotics for UTI.   	Per patient she was being treated for UTI and began having diarrhea and RLQ pain.   	Denies fever, chills.   	Denies n/v.   	+ Abdominal pain , + Diarrhea  	Denies current urinary symptoms.  Denies CP, cough or SOBno abdominal distension, no blood in stool, no burning urination, no chills, no dysuria, no fever, no hematuria, no nausea, no vomiting++ RLQ abdominal pain, no bloating, no constipation, ++ diarrhea,   < from: CT Abdomen and Pelvis w/ IV Cont (01.16.22 @ 17:29) >

## 2022-01-23 NOTE — PROGRESS NOTE ADULT - REASON FOR ADMISSION
86 female sent from [facility] living for diarrhea x 3 days since starting antibiotics for UTI.   	Per patient she was being treated for UTI and began having diarrhea and RLQ pain.   	Denies fever, chills.   	Denies n/v.   	+ Abdominal pain , + Diarrhea  	Denies current urinary symptoms.  Denies CP, cough or SOBno abdominal distension, no blood in stool, no burning urination, no chills, no dysuria, no fever, no hematuria, no nausea, no vomiting++ RLQ abdominal pain, no bloating, no constipation, ++ diarrhea,   < from: CT Abdomen and Pelvis w/ IV Cont (01.16.22 @ 17:29) >

## 2022-01-23 NOTE — PROGRESS NOTE ADULT - SUBJECTIVE AND OBJECTIVE BOX
GINA GOLD    PLV TELN 334 W1    Allergies    No Known Allergies    Intolerances        PAST MEDICAL & SURGICAL HISTORY:  No pertinent past medical history        FAMILY HISTORY:      Home Medications:  amLODIPine 5 mg oral tablet: 1 tab(s) orally once a day (18 Jan 2022 13:44)  Aspirin Enteric Coated 81 mg oral delayed release tablet: 1 tab(s) orally once a day (18 Jan 2022 13:44)  hydrALAZINE 10 mg oral tablet: 1 tab(s) orally 2 times a day (18 Jan 2022 13:44)  Nasal Saline 0.65% nasal spray: 2 spray(s) nasal 2 times a day for 15 days. Start: 01/06/22 (18 Jan 2022 13:44)  sertraline 50 mg oral tablet: 1 tab(s) orally once a day (18 Jan 2022 13:44)      MEDICATIONS  (STANDING):  apixaban 10 milliGRAM(s) Oral every 12 hours  dextrose 5% + sodium chloride 0.45% with potassium chloride 20 mEq/L 1000 milliLiter(s) (60 mL/Hr) IV Continuous <Continuous>  influenza  Vaccine (HIGH DOSE) 0.7 milliLiter(s) IntraMuscular once  lactobacillus acidophilus 1 Tablet(s) Oral two times a day  losartan 25 milliGRAM(s) Oral daily  pantoprazole  Injectable 40 milliGRAM(s) IV Push daily  vancomycin    Solution 125 milliGRAM(s) Oral every 6 hours    MEDICATIONS  (PRN):  acetaminophen     Tablet .. 650 milliGRAM(s) Oral every 6 hours PRN Temp greater or equal to 38C (100.4F), Mild Pain (1 - 3)  aluminum hydroxide/magnesium hydroxide/simethicone Suspension 30 milliLiter(s) Oral every 4 hours PRN Dyspepsia  LORazepam   Injectable 1 milliGRAM(s) IV Push once PRN mri  LORazepam   Injectable 1 milliGRAM(s) IV Push once PRN mri  melatonin 3 milliGRAM(s) Oral at bedtime PRN Insomnia  OLANZapine Injectable 2.5 milliGRAM(s) IntraMuscular every 6 hours PRN agitation  ondansetron Injectable 4 milliGRAM(s) IV Push every 8 hours PRN Nausea and/or Vomiting  traMADol 50 milliGRAM(s) Oral four times a day PRN Moderate Pain (4 - 6)      Diet, Low Fiber:   Supplement Feeding Modality:  Oral  Ensure Clear Cans or Servings Per Day:  1       Frequency:  Three Times a day (01-19-22 @ 09:18) [Active]          Vital Signs Last 24 Hrs  T(C): 36.4 (23 Jan 2022 06:10), Max: 36.6 (22 Jan 2022 21:19)  T(F): 97.6 (23 Jan 2022 06:10), Max: 97.8 (22 Jan 2022 21:19)  HR: 77 (23 Jan 2022 06:10) (76 - 80)  BP: 162/70 (23 Jan 2022 06:10) (129/67 - 162/70)  BP(mean): --  RR: 18 (23 Jan 2022 06:10) (18 - 18)  SpO2: 92% (23 Jan 2022 06:10) (92% - 94%)      01-22-22 @ 07:01  -  01-23-22 @ 07:00  --------------------------------------------------------  IN: 1980 mL / OUT: 800 mL / NET: 1180 mL              LABS:                    WBC:  WBC Count: 13.06 K/uL (01-21 @ 07:34)  WBC Count: 12.57 K/uL (01-20 @ 07:32)      MICROBIOLOGY:  RECENT CULTURES:  01-17 .Blood Blood XXXX XXXX   No Growth Final    01-16 Clean Catch Clean Catch (Midstream) XXXX XXXX   No growth                    Sodium:  Sodium, Serum: 141 mmol/L (01-21 @ 07:34)  Sodium, Serum: 141 mmol/L (01-20 @ 07:32)      0.87 mg/dL 01-21 @ 07:34  0.89 mg/dL 01-20 @ 07:32      Hemoglobin:  Hemoglobin: 11.5 g/dL (01-21 @ 07:34)  Hemoglobin: 11.3 g/dL (01-20 @ 07:32)      Platelets: Platelet Count - Automated: 346 K/uL (01-21 @ 07:34)  Platelet Count - Automated: 315 K/uL (01-20 @ 07:32)              RADIOLOGY & ADDITIONAL STUDIES:      MICROBIOLOGY:  RECENT CULTURES:  01-17 .Blood Blood XXXX XXXX   No Growth Final    01-16 Clean Catch Clean Catch (Midstream) XXXX XXXX   No growth

## 2022-01-23 NOTE — PROGRESS NOTE ADULT - SUBJECTIVE AND OBJECTIVE BOX
PROGRESS NOTE  Patient is a 86y old  Female who presents with a chief complaint of 86 female sent from [place] for diarrhea x 3 days since starting antibiotics for UTI.   	Per patient she was being treated for UTI and began having diarrhea and RLQ pain.   	Denies fever, chills.   	Denies n/v.   	+ Abdominal pain , + Diarrhea  	Denies current urinary symptoms.  Denies CP, cough or SOBno abdominal distension, no blood in stool, no burning urination, no chills, no dysuria, no fever, no hematuria, no nausea, no vomiting++ RLQ abdominal pain, no bloating, no constipation, ++ diarrhea,   < from: CT Abdomen and Pelvis w/ IV Cont (01.16.22 @ 17:29) > (19 Jan 2022 10:36)    Chart and available morning labs /imaging are reviewed electronically , urgent issues addressed . More information  is being added upon completion of rounds , when more information is collected and management discussed with consultants , medical staff and social service/case management on the floor   OVERNIGHT  No new issues reported by medical staff . All above noted Patient is resting in a bed comfortably .Confused ,poor mentation .No distress noted   Seen by SLP and RD  HPI:  86 female sent from [place] for diarrhea x 3 days since starting antibiotics for UTI.   	Per patient she was being treated for UTI and began having diarrhea and RLQ pain.   	Denies fever, chills.   	Denies n/v.   	+ Abdominal pain , + Diarrhea  	Denies current urinary symptoms.    CONTRAST/COMPLICATIONS:  IV Contrast: Omnipaque 350  90cc administered   10 cc discarded  Oral Contrast: NONE  Complications: None reported at time of study completion    PROCEDURE:  CT of the Abdomen and Pelvis was performed.  Sagittal and coronal reformats were performed.    FINDINGS:  LOWER CHEST: Possible filling defect in a branch of the right lower lobe   pulmonary artery (series 2, image 7), suspicious for pulmonary embolism.   Bibasilar subsegmental atelectasis.    LIVER: Within normal limits.  BILE DUCTS: Normal caliber.  GALLBLADDER: Withinnormal limits.  SPLEEN: Subcentimeter hypodense splenic lesions, too small to   characterize.  PANCREAS: Within normal limits.  ADRENALS: Within normal limits.  KIDNEYS/URETERS: No hydronephrosis. Left upper pole renal cyst.   Additional subcentimeter hypodense bilateral renal lesions, too small to   characterize.    BLADDER: Within normal limits.  REPRODUCTIVE ORGANS: Hysterectomy.    BOWEL: Colonic diverticulosis. Long segment mural thickening of the colon   extending from the ascending colon to the level of the rectum. Small   bowel anastomosis. No bowel obstruction. Appendix is normal.  PERITONEUM: No ascites.  VESSELS: Atherosclerotic changes.  RETROPERITONEUM/LYMPH NODES: No lymphadenopathy.  ABDOMINAL WALL: Within normal limits.  BONES: Degenerative changes.    IMPRESSION:  Possible filling defect in a branch of the right lower lobe pulmonary   artery, suspicious for pulmonary embolism. Further evaluation with CTA   chest (pulmonary artery protocol) is recommended.    Long segment proctocolitis.    Findings were discussed with MARLON FERRER on 1/16/2022 at 6:28 PM   by Dr. Jean with read back confirmation.    --- End of Report ---    PAST MEDICAL & SURGICAL HISTORY:  No pertinent past medical history    MEDICATIONS  (STANDING):  dextrose 5% + sodium chloride 0.45% with potassium chloride 20 mEq/L 1000 milliLiter(s) (60 mL/Hr) IV Continuous <Continuous>  enoxaparin Injectable 50 milliGRAM(s) SubCutaneous every 12 hours  lactobacillus acidophilus 1 Tablet(s) Oral two times a day  losartan 25 milliGRAM(s) Oral daily  pantoprazole  Injectable 40 milliGRAM(s) IV Push daily  potassium chloride    Tablet ER 40 milliEquivalent(s) Oral once  vancomycin    Solution 125 milliGRAM(s) Oral every 6 hours    MEDICATIONS  (PRN):  acetaminophen     Tablet .. 650 milliGRAM(s) Oral every 6 hours PRN Temp greater or equal to 38C (100.4F), Mild Pain (1 - 3)  aluminum hydroxide/magnesium hydroxide/simethicone Suspension 30 milliLiter(s) Oral every 4 hours PRN Dyspepsia  LORazepam   Injectable 1 milliGRAM(s) IV Push once PRN mri  LORazepam   Injectable 1 milliGRAM(s) IV Push once PRN mri  melatonin 3 milliGRAM(s) Oral at bedtime PRN Insomnia  OLANZapine Injectable 2.5 milliGRAM(s) IntraMuscular every 6 hours PRN agitation  ondansetron Injectable 4 milliGRAM(s) IV Push every 8 hours PRN Nausea and/or Vomiting  traMADol 50 milliGRAM(s) Oral four times a day PRN Moderate Pain (4 - 6)    Vital Signs Last 24 Hrs  T(C): 36.6 (22 Jan 2022 21:19), Max: 36.6 (22 Jan 2022 21:19)  T(F): 97.8 (22 Jan 2022 21:19), Max: 97.8 (22 Jan 2022 21:19)  HR: 80 (22 Jan 2022 21:19) (66 - 80)  BP: 138/62 (22 Jan 2022 21:19) (129/67 - 147/81)  RR: 18 (22 Jan 2022 21:19) (16 - 18)  SpO2: 94% (22 Jan 2022 21:19) (93% - 94%)    REVIEW OF SYSTEMS:  CONSTITUTIONAL: No fever, weight loss, or fatigue  EYES: No eye pain, visual disturbances, or discharge  ENMT:   No sinus or throat pain  NECK: No pain or stiffness  RESPIRATORY: No cough, wheezing, chills or hemoptysis; No shortness of breath  CARDIOVASCULAR: No chest pain, palpitations, dizziness, or leg swelling  GASTROINTESTINAL: No abdominal pain. No nausea, vomiting; No diarrhea or constipation. No melena or hematochezia.  GENITOURINARY: No dysuria, frequency, hematuria, or incontinence  NEUROLOGICAL: No headaches, memory loss, loss of strength, numbness, or tremors  SKIN: No itching, burning, rashes, or lesions   MUSCULOSKELETAL: No joint pain or swelling; No muscle, back, or extremity pain    PHYSICAL EXAM:  Appearance: NAD. VS past 24 hrs -as above   HEENT:   Moist oral mucosa. Conjunctiva clear b/l.   Neck : supple  Respiratory: Lungs CTAB.  Gastrointestinal:  Soft, nontender. No rebound. No rigidity. BS present	  Cardiovascular: RRR ,S1S2 present  Neurologic: Non-focal. Moving all extremities.  Extremities: No edema. No erythema. No calf tenderness.  Skin: No rashes, No ecchymoses, No cyanosis.	  wounds ,skin lesions-See skin assessment flow sheet     LABS:                        11.5   13.06 )-----------( 346      ( 21 Jan 2022 07:34 )             36.7     21 Jan 2022 07:34    141    |  108    |  13     ----------------------------<  132    4.5     |  28     |  0.87     Ca    8.9        21 Jan 2022 07:34    CAPILLARY BLOOD GLUCOSE                        12.4   8.57  )-----------( 276      ( 19 Jan 2022 07:57 )             38.7     01-19    143  |  109<H>  |  11  ----------------------------<  119<H>  3.4<L>   |  27  |  0.78    Ca    8.8      19 Jan 2022 07:57  Phos  2.5     01-19  Mg     1.9     01-19    TPro  5.8<L>  /  Alb  2.3<L>  /  TBili  0.3  /  DBili  x   /  AST  16  /  ALT  15  /  AlkPhos  68  01-18    CAPILLARY BLOOD GLUCOSE        PT/INR - ( 19 Jan 2022 07:57 )   PT: 13.5 sec;   INR: 1.16 ratio               Culture - Blood (collected 17 Jan 2022 16:27)  Source: .Blood Blood  Preliminary Report (18 Jan 2022 17:02):    No growth to date.    Culture - Blood (collected 17 Jan 2022 16:27)  Source: .Blood Blood  Preliminary Report (18 Jan 2022 17:02):    No growth to date.    Culture - Urine (collected 16 Jan 2022 20:54)  Source: Clean Catch Clean Catch (Midstream)  Final Report (17 Jan 2022 16:32):    No growth      RADIOLOGY & ADDITIONAL TESTS: < from: MR Head No Cont (01.18.22 @ 09:39) >  ACC: [id number] EXAM:  MR BRAIN                        ACC: [id number] EXAM:  MR ANGIO BRAIN                          PROCEDURE DATE:  01/18/2022          INTERPRETATION:  CLINICAL INFORMATION: Acute mental status. PMR.   Admitting Dxs: K52.9 NONINFECTIVE GASTROENTERITIS AND COLITIS,   UNSPECIFIED.    TECHNIQUE: Multiplanar, multisequence MRI of the brain without gadolinium   contrast, 3-D time of flight brain MRA. 3-D reformats were obtained.    COMPARISON: No relevant prior studies.    Brain MRI:    There is a 7 x 5 x 5 mm lesion in the right temporal lobe with   heterogeneous internal T1 and T2 signal, T2 hypointense rim and   susceptibility artifact suggesting a cavernoma.    There is no evidence of acute infarct. Scattered foci of T2/FLAIR  hyperintensity in the bilateral hemispheric white matter are nonspecific   but likely related to chronic white matter microvascular ischemic   disease. There is cerebral volume loss. Chronic lacunar infarct within   the right basal ganglia.    Flowvoids of the major intracranial vessels at the skull base follow   expected course and contour.    The paranasal sinuses demonstrate no signal abnormality. The mastoids   demonstrate no signal abnormality.  Status post left intraocular lens   implant.    Brain MRA:    There is flow-related signal in the bilateral intradural internal   carotid, anterior cerebral and middle cerebral arteries. No evidence of   large vessel occlusion.    There is flow-related signal in the bilateral posterior cerebral,   basilar, and intradural vertebral arteries. The branch vasculature of the   posterior circulation is within normal limits. No evidence of large   vessel occlusion.    No evidence of aneurysm. Tiny aneurysms can be beyond the resolution of   MRA technique. No evidence of arteriovenous malformation.      IMPRESSION:    Brain MRI:  1.  No acute infarct.  2.  A 7 x 5 x 5 mm lesion in the right temporal lobe with heterogeneous   internal T1 and T2 signal, T2 hypointense rim and susceptibility artifact   suggesting a cavernoma. Consider correlation with MRI brain with   contrast, if there is no contraindication.    Brain MRA: No large vessel occlusion or aneurysm.    < end of copied text >  < from: MR Angio Head No Cont (01.18.22 @ 09:30) >    Brain MRA:    There is flow-related signal in the bilateral intradural internal   carotid, anterior cerebral and middle cerebral arteries. No evidence of   large vessel occlusion.    There is flow-related signal in the bilateral posterior cerebral,   basilar, and intradural vertebral arteries. The branch vasculature of the   posterior circulation is within normal limits. No evidence of large   vessel occlusion.    No evidence of aneurysm. Tiny aneurysms can be beyond the resolution of   MRA technique. No evidence of arteriovenous malformation.      IMPRESSION:    Brain MRI:  1.  No acute infarct.  2.  A 7 x 5 x 5 mm lesion in the right temporal lobe with heterogeneous   internal T1 and T2 signal, T2 hypointense rim and susceptibility artifact   suggesting a cavernoma. Consider correlation with MRI brain with   contrast, if there is no contraindication.    Brain MRA: No large vessel occlusion or aneurysm.    < end of copied text >     reviewed elctronically  ASSESSMENT/PLAN: 	    25 minutes aggregate time was spent on this visit, 50% visit time spent in care co-ordination with other attendings and counselling patient .I have discussed care plan with patient / HCP/family member ,who expressed understanding of problems treatment and their effect and side effects, alternatives in details. I have asked if they have any questions and concerns and appropriately addressed them to best of my ability.  PROGRESS NOTE  Patient is a 86y old  Female who presents with a chief complaint of 86 [place] x 3 days since starting antibiotics for UTI.   	Per patient she was being treated for UTI and began having diarrhea and RLQ pain.   	Denies fever, chills.   	Denies n/v.   	+ Abdominal pain , + Diarrhea  	Denies current urinary symptoms.  Denies CP, cough or SOBno abdominal distension, no blood in stool, no burning urination, no chills, no dysuria, no fever, no hematuria, no nausea, no vomiting++ RLQ abdominal pain, no bloating, no constipation, ++ diarrhea,   < from: CT Abdomen and Pelvis w/ IV Cont (01.16.22 @ 17:29) > (19 Jan 2022 10:36)    Chart and available morning labs /imaging are reviewed electronically , urgent issues addressed . More information  is being added upon completion of rounds , when more information is collected and management discussed with consultants , medical staff and social service/case management on the floor   OVERNIGHT  No new issues reported by medical staff . All above noted Patient is resting in a bed comfortably .Confused ,poor mentation .No distress noted   Seen by SLP and RD  HPI:  86 [place] [facility] [place] x 3 days since starting antibiotics for UTI.   	Per patient she was being treated for UTI and began having diarrhea and RLQ pain.   	Denies fever, chills.   	Denies n/v.   	+ Abdominal pain , + Diarrhea  	Denies current urinary symptoms.    CONTRAST/COMPLICATIONS:  IV Contrast: Omnipaque 350  90cc administered   10 cc discarded  Oral Contrast: NONE  Complications: None reported at time of study completion    PROCEDURE:  CT of the Abdomen and Pelvis was performed.  Sagittal and coronal reformats were performed.    FINDINGS:  LOWER CHEST: Possible filling defect in a branch of the right lower lobe   pulmonary artery (series 2, image 7), suspicious for pulmonary embolism.   Bibasilar subsegmental atelectasis.    LIVER: Within normal limits.  BILE DUCTS: Normal caliber.  GALLBLADDER: Withinnormal limits.  SPLEEN: Subcentimeter hypodense splenic lesions, too small to   characterize.  PANCREAS: Within normal limits.  ADRENALS: Within normal limits.  KIDNEYS/URETERS: No hydronephrosis. Left upper pole renal cyst.   Additional subcentimeter hypodense bilateral renal lesions, too small to   characterize.    BLADDER: Within normal limits.  REPRODUCTIVE ORGANS: Hysterectomy.    BOWEL: Colonic diverticulosis. Long segment mural thickening of the colon   extending from the ascending colon to the level of the rectum. Small   bowel anastomosis. No bowel obstruction. Appendix is normal.  PERITONEUM: No ascites.  VESSELS: Atherosclerotic changes.  RETROPERITONEUM/LYMPH NODES: No lymphadenopathy.  ABDOMINAL WALL: Within normal limits.  BONES: Degenerative changes.    IMPRESSION:  Possible filling defect in a branch of the right lower lobe pulmonary   artery, suspicious for pulmonary embolism. Further evaluation with CTA   chest (pulmonary artery protocol) is recommended.    Long segment proctocolitis.    Findings were discussed with MARLON FERRER on 1/16/2022 at 6:28 PM   by Dr. Jean with read back confirmation.    --- End of Report ---    PAST MEDICAL & SURGICAL HISTORY:  No pertinent past medical history    MEDICATIONS  (STANDING):  dextrose 5% + sodium chloride 0.45% with potassium chloride 20 mEq/L 1000 milliLiter(s) (60 mL/Hr) IV Continuous <Continuous>  enoxaparin Injectable 50 milliGRAM(s) SubCutaneous every 12 hours  lactobacillus acidophilus 1 Tablet(s) Oral two times a day  losartan 25 milliGRAM(s) Oral daily  pantoprazole  Injectable 40 milliGRAM(s) IV Push daily  potassium chloride    Tablet ER 40 milliEquivalent(s) Oral once  vancomycin    Solution 125 milliGRAM(s) Oral every 6 hours    MEDICATIONS  (PRN):  acetaminophen     Tablet .. 650 milliGRAM(s) Oral every 6 hours PRN Temp greater or equal to 38C (100.4F), Mild Pain (1 - 3)  aluminum hydroxide/magnesium hydroxide/simethicone Suspension 30 milliLiter(s) Oral every 4 hours PRN Dyspepsia  LORazepam   Injectable 1 milliGRAM(s) IV Push once PRN mri  LORazepam   Injectable 1 milliGRAM(s) IV Push once PRN mri  melatonin 3 milliGRAM(s) Oral at bedtime PRN Insomnia  OLANZapine Injectable 2.5 milliGRAM(s) IntraMuscular every 6 hours PRN agitation  ondansetron Injectable 4 milliGRAM(s) IV Push every 8 hours PRN Nausea and/or Vomiting  traMADol 50 milliGRAM(s) Oral four times a day PRN Moderate Pain (4 - 6)    Vital Signs Last 24 Hrs  T(C): 36.9 (23 Jan 2022 15:00), Max: 36.9 (23 Jan 2022 15:00)  T(F): 98.4 (23 Jan 2022 15:00), Max: 98.4 (23 Jan 2022 15:00)  HR: 86 (23 Jan 2022 15:00) (77 - 86)  BP: 148/80 (23 Jan 2022 15:00) (138/62 - 162/70)  BP(mean): --  RR: 18 (23 Jan 2022 15:00) (18 - 18)  SpO2: 92% (23 Jan 2022 15:00) (92% - 94%)    REVIEW OF SYSTEMS:  CONSTITUTIONAL: No fever, weight loss, or fatigue  EYES: No eye pain, visual disturbances, or discharge  ENMT:   No sinus or throat pain  NECK: No pain or stiffness  RESPIRATORY: No cough, wheezing, chills or hemoptysis; No shortness of breath  CARDIOVASCULAR: No chest pain, palpitations, dizziness, or leg swelling  GASTROINTESTINAL: No abdominal pain. No nausea, vomiting; No diarrhea or constipation. No melena or hematochezia.  GENITOURINARY: No dysuria, frequency, hematuria, or incontinence  NEUROLOGICAL: No headaches, memory loss, loss of strength, numbness, or tremors  SKIN: No itching, burning, rashes, or lesions   MUSCULOSKELETAL: No joint pain or swelling; No muscle, back, or extremity pain    PHYSICAL EXAM:  Appearance: NAD. VS past 24 hrs -as above   HEENT:   Moist oral mucosa. Conjunctiva clear b/l.   Neck : supple  Respiratory: Lungs CTAB.  Gastrointestinal:  Soft, nontender. No rebound. No rigidity. BS present	  Cardiovascular: RRR ,S1S2 present  Neurologic: Non-focal. Moving all extremities.  Extremities: No edema. No erythema. No calf tenderness.  Skin: No rashes, No ecchymoses, No cyanosis.	  wounds ,skin lesions-See skin assessment flow sheet     LABS:                       11.5   13.06 )-----------( 346      ( 21 Jan 2022 07:34 )             36.7     21 Jan 2022 07:34    141    |  108    |  13     ----------------------------<  132    4.5     |  28     |  0.87     Ca    8.9        21 Jan 2022 07:34    CAPILLARY BLOOD GLUCOSE                        12.4   8.57  )-----------( 276      ( 19 Jan 2022 07:57 )             38.7     01-19    143  |  109<H>  |  11  ----------------------------<  119<H>  3.4<L>   |  27  |  0.78    Ca    8.8      19 Jan 2022 07:57  Phos  2.5     01-19  Mg     1.9     01-19    TPro  5.8<L>  /  Alb  2.3<L>  /  TBili  0.3  /  DBili  x   /  AST  16  /  ALT  15  /  AlkPhos  68  01-18    CAPILLARY BLOOD GLUCOSE        PT/INR - ( 19 Jan 2022 07:57 )   PT: 13.5 sec;   INR: 1.16 ratio               Culture - Blood (collected 17 Jan 2022 16:27)  Source: .Blood Blood  Preliminary Report (18 Jan 2022 17:02):    No growth to date.    Culture - Blood (collected 17 Jan 2022 16:27)  Source: .Blood Blood  Preliminary Report (18 Jan 2022 17:02):    No growth to date.    Culture - Urine (collected 16 Jan 2022 20:54)  Source: Clean Catch Clean Catch (Midstream)  Final Report (17 Jan 2022 16:32):    No growth      RADIOLOGY & ADDITIONAL TESTS: < from: MR Head No Cont (01.18.22 @ 09:39) >  ACC: [id number] EXAM:  MR BRAIN                        ACC: [id number] EXAM:  MR ANGIO BRAIN                          PROCEDURE DATE:  01/18/2022          INTERPRETATION:  CLINICAL INFORMATION: Acute mental status. PMR.   Admitting Dxs: K52.9 NONINFECTIVE GASTROENTERITIS AND COLITIS,   UNSPECIFIED.    TECHNIQUE: Multiplanar, multisequence MRI of the brain without gadolinium   contrast, 3-D time of flight brain MRA. 3-D reformats were obtained.    COMPARISON: No relevant prior studies.    Brain MRI:    There is a 7 x 5 x 5 mm lesion in the right temporal lobe with   heterogeneous internal T1 and T2 signal, T2 hypointense rim and   susceptibility artifact suggesting a cavernoma.    There is no evidence of acute infarct. Scattered foci of T2/FLAIR  hyperintensity in the bilateral hemispheric white matter are nonspecific   but likely related to chronic white matter microvascular ischemic   disease. There is cerebral volume loss. Chronic lacunar infarct within   the right basal ganglia.    Flowvoids of the major intracranial vessels at the skull base follow   expected course and contour.    The paranasal sinuses demonstrate no signal abnormality. The mastoids   demonstrate no signal abnormality.  Status post left intraocular lens   implant.    Brain MRA:    There is flow-related signal in the bilateral intradural internal   carotid, anterior cerebral and middle cerebral arteries. No evidence of   large vessel occlusion.    There is flow-related signal in the bilateral posterior cerebral,   basilar, and intradural vertebral arteries. The branch vasculature of the   posterior circulation is within normal limits. No evidence of large   vessel occlusion.    No evidence of aneurysm. Tiny aneurysms can be beyond the resolution of   MRA technique. No evidence of arteriovenous malformation.      IMPRESSION:    Brain MRI:  1.  No acute infarct.  2.  A 7 x 5 x 5 mm lesion in the right temporal lobe with heterogeneous   internal T1 and T2 signal, T2 hypointense rim and susceptibility artifact   suggesting a cavernoma. Consider correlation with MRI brain with   contrast, if there is no contraindication.    Brain MRA: No large vessel occlusion or aneurysm.    < end of copied text >  < from: MR Angio Head No Cont (01.18.22 @ 09:30) >    Brain MRA:    There is flow-related signal in the bilateral intradural internal   carotid, anterior cerebral and middle cerebral arteries. No evidence of   large vessel occlusion.    There is flow-related signal in the bilateral posterior cerebral,   basilar, and intradural vertebral arteries. The branch vasculature of the   posterior circulation is within normal limits. No evidence of large   vessel occlusion.    No evidence of aneurysm. Tiny aneurysms can be beyond the resolution of   MRA technique. No evidence of arteriovenous malformation.      IMPRESSION:    Brain MRI:  1.  No acute infarct.  2.  A 7 x 5 x 5 mm lesion in the right temporal lobe with heterogeneous   internal T1 and T2 signal, T2 hypointense rim and susceptibility artifact   suggesting a cavernoma. Consider correlation with MRI brain with   contrast, if there is no contraindication.    Brain MRA: No large vessel occlusion or aneurysm.    < end of copied text >     reviewed elctronically  ASSESSMENT/PLAN: 	    25 minutes aggregate time was spent on this visit, 50% visit time spent in care co-ordination with other attendings and counselling patient .I have discussed care plan with patient / HCP/family member ,who expressed understanding of problems treatment and their effect and side effects, alternatives in details. I have asked if they have any questions and concerns and appropriately addressed them to best of my ability.

## 2022-01-24 DIAGNOSIS — R27.0 ATAXIA, UNSPECIFIED: ICD-10-CM

## 2022-01-24 PROCEDURE — 73130 X-RAY EXAM OF HAND: CPT | Mod: 26,LT

## 2022-01-24 RX ORDER — PANTOPRAZOLE SODIUM 20 MG/1
40 TABLET, DELAYED RELEASE ORAL
Refills: 0 | Status: DISCONTINUED | OUTPATIENT
Start: 2022-01-24 | End: 2022-01-26

## 2022-01-24 RX ADMIN — APIXABAN 10 MILLIGRAM(S): 2.5 TABLET, FILM COATED ORAL at 05:52

## 2022-01-24 RX ADMIN — APIXABAN 10 MILLIGRAM(S): 2.5 TABLET, FILM COATED ORAL at 17:29

## 2022-01-24 RX ADMIN — Medication 125 MILLIGRAM(S): at 11:47

## 2022-01-24 RX ADMIN — LOSARTAN POTASSIUM 25 MILLIGRAM(S): 100 TABLET, FILM COATED ORAL at 05:53

## 2022-01-24 RX ADMIN — Medication 125 MILLIGRAM(S): at 05:53

## 2022-01-24 RX ADMIN — Medication 125 MILLIGRAM(S): at 17:29

## 2022-01-24 RX ADMIN — Medication 1 TABLET(S): at 05:52

## 2022-01-24 RX ADMIN — Medication 1 TABLET(S): at 17:29

## 2022-01-24 NOTE — PROGRESS NOTE ADULT - REASON FOR ADMISSION
86 female sent from [facility] living for diarrhea x 3 days since starting antibiotics for UTI.   	Per patient she was being treated for UTI and began having diarrhea and RLQ pain.   	Denies fever, chills.   	Denies n/v.   	+ Abdominal pain , + Diarrhea  	Denies current urinary symptoms.  Denies CP, cough or SOBno abdominal distension, no blood in stool, no burning urination, no chills, no dysuria, no fever, no hematuria, no nausea, no vomiting++ RLQ abdominal pain, no bloating, no constipation, ++ diarrhea,   < from: CT Abdomen and Pelvis w/ IV Cont (01.16.22 @ 17:29) >

## 2022-01-24 NOTE — PROGRESS NOTE ADULT - SUBJECTIVE AND OBJECTIVE BOX
Date/Time Patient Seen:  		  Referring MD:   Data Reviewed	       Patient is a 86y old  Female who presents with a chief complaint of 86 female sent from [facility] for diarrhea x 3 days since starting antibiotics for UTI.   	As per patient she was being treated for UTI and began having diarrhea and RLQ pain.   	Denies fever, chills.   	Denies n/v.   	+ Abdominal pain , + Diarrhea  	Denies current urinary symptoms.  Denies CP, cough or SOBno abdominal distension, no blood in stool, no burning urination, no chills, no dysuria, no fever, no hematuria, no nausea, no vomiting++ RLQ abdominal pain, no bloating, no constipation, ++ diarrhea,   < from: CT Abdomen and Pelvis w/ IV Cont (01.16.22 @ 17:29) > (23 Jan 2022 13:24)      Subjective/HPI     PAST MEDICAL & SURGICAL HISTORY:  No pertinent past medical history    No pertinent past medical history          Medication list         MEDICATIONS  (STANDING):  apixaban 10 milliGRAM(s) Oral every 12 hours  dextrose 5% + sodium chloride 0.45% with potassium chloride 20 mEq/L 1000 milliLiter(s) (60 mL/Hr) IV Continuous <Continuous>  influenza  Vaccine (HIGH DOSE) 0.7 milliLiter(s) IntraMuscular once  lactobacillus acidophilus 1 Tablet(s) Oral two times a day  losartan 25 milliGRAM(s) Oral daily  pantoprazole  Injectable 40 milliGRAM(s) IV Push daily  vancomycin    Solution 125 milliGRAM(s) Oral every 6 hours    MEDICATIONS  (PRN):  acetaminophen     Tablet .. 650 milliGRAM(s) Oral every 6 hours PRN Temp greater or equal to 38C (100.4F), Mild Pain (1 - 3)  aluminum hydroxide/magnesium hydroxide/simethicone Suspension 30 milliLiter(s) Oral every 4 hours PRN Dyspepsia  LORazepam   Injectable 1 milliGRAM(s) IV Push once PRN mri  LORazepam   Injectable 1 milliGRAM(s) IV Push once PRN mri  melatonin 3 milliGRAM(s) Oral at bedtime PRN Insomnia  OLANZapine Injectable 2.5 milliGRAM(s) IntraMuscular every 6 hours PRN agitation  ondansetron Injectable 4 milliGRAM(s) IV Push every 8 hours PRN Nausea and/or Vomiting  traMADol 50 milliGRAM(s) Oral four times a day PRN Moderate Pain (4 - 6)         Vitals log        ICU Vital Signs Last 24 Hrs  T(C): 36.5 (24 Jan 2022 05:27), Max: 36.9 (23 Jan 2022 15:00)  T(F): 97.7 (24 Jan 2022 05:27), Max: 98.4 (23 Jan 2022 15:00)  HR: 73 (24 Jan 2022 05:27) (73 - 86)  BP: 121/68 (24 Jan 2022 05:27) (121/68 - 162/70)  BP(mean): --  ABP: --  ABP(mean): --  RR: 18 (24 Jan 2022 05:27) (18 - 18)  SpO2: 94% (24 Jan 2022 05:27) (92% - 94%)           Input and Output:  I&O's Detail    22 Jan 2022 07:01  -  23 Jan 2022 07:00  --------------------------------------------------------  IN:    dextrose 5% + sodium chloride 0.45% w/ Additives: 1440 mL    Oral Fluid: 540 mL  Total IN: 1980 mL    OUT:    Voided (mL): 800 mL  Total OUT: 800 mL    Total NET: 1180 mL      23 Jan 2022 07:01  -  24 Jan 2022 06:09  --------------------------------------------------------  IN:    dextrose 5% + sodium chloride 0.45% w/ Additives: 1260 mL    Oral Fluid: 360 mL  Total IN: 1620 mL    OUT:    Voided (mL): 1000 mL  Total OUT: 1000 mL    Total NET: 620 mL          Lab Data                  Review of Systems	      Objective     Physical Examination    heart s1s2  lung dec BS  abd soft      Pertinent Lab findings & Imaging      Víctor:  NO   Adequate UO     I&O's Detail    22 Jan 2022 07:01  -  23 Jan 2022 07:00  --------------------------------------------------------  IN:    dextrose 5% + sodium chloride 0.45% w/ Additives: 1440 mL    Oral Fluid: 540 mL  Total IN: 1980 mL    OUT:    Voided (mL): 800 mL  Total OUT: 800 mL    Total NET: 1180 mL      23 Jan 2022 07:01  -  24 Jan 2022 06:09  --------------------------------------------------------  IN:    dextrose 5% + sodium chloride 0.45% w/ Additives: 1260 mL    Oral Fluid: 360 mL  Total IN: 1620 mL    OUT:    Voided (mL): 1000 mL  Total OUT: 1000 mL    Total NET: 620 mL               Discussed with:     Cultures:	        Radiology                             patient refused

## 2022-01-24 NOTE — PROGRESS NOTE ADULT - REASON FOR ADMISSION
86 female sent from [facility] living for diarrhea x 3 days since starting antibiotics for UTI.   	Per patient she was being treated for UTI and began having diarrhea and RLQ pain.   	Denies fever, chills.   	Denies n/v.   	+ Abdominal pain , + Diarrhea  	Denies current urinary symptoms.  Denies CP, cough or SOBno abdominal distension, no blood in stool, no burning urination, no chills, no dysuria, no fever, no hematuria, no nausea, no vomiting++ RLQ abdominal pain, no bloating, no constipation, ++ diarrhea,   < from: CT Abdomen and Pelvis w/ IV Cont (01.16.22 @ 17:29) >

## 2022-01-24 NOTE — PROGRESS NOTE ADULT - REASON FOR ADMISSION
86 female sent from [facility] living for diarrhea x 3 days since starting antibiotics for UTI.   	Per patient she was being treated for UTI and began having diarrhea and RLQ pain.   	Denies fever, chills.   	Denies n/v.   	+ Abdominal pain , + Diarrhea  	Denies current urinary symptoms.  Denies CP, cough or SOBno abdominal distension, no blood in stool, no burning urination, no chills, no dysuria, no fever, no hematuria, no nausea, no vomiting++ RLQ abdominal pain, no bloating, no constipation, ++ diarrhea,   < from: CT Abdomen and Pelvis w/ IV Cont (01.16.22 @ 17:29) >

## 2022-01-24 NOTE — PROGRESS NOTE ADULT - SUBJECTIVE AND OBJECTIVE BOX
Neurology follow up note    GINA [id number]      Interval History:    Patient feels left hand pain     Allergies    No Known Allergies    Intolerances        MEDICATIONS    acetaminophen     Tablet .. 650 milliGRAM(s) Oral every 6 hours PRN  aluminum hydroxide/magnesium hydroxide/simethicone Suspension 30 milliLiter(s) Oral every 4 hours PRN  apixaban 10 milliGRAM(s) Oral every 12 hours  dextrose 5% + sodium chloride 0.45% with potassium chloride 20 mEq/L 1000 milliLiter(s) IV Continuous <Continuous>  influenza  Vaccine (HIGH DOSE) 0.7 milliLiter(s) IntraMuscular once  lactobacillus acidophilus 1 Tablet(s) Oral two times a day  LORazepam   Injectable 1 milliGRAM(s) IV Push once PRN  LORazepam   Injectable 1 milliGRAM(s) IV Push once PRN  losartan 25 milliGRAM(s) Oral daily  melatonin 3 milliGRAM(s) Oral at bedtime PRN  OLANZapine Injectable 2.5 milliGRAM(s) IntraMuscular every 6 hours PRN  ondansetron Injectable 4 milliGRAM(s) IV Push every 8 hours PRN  pantoprazole  Injectable 40 milliGRAM(s) IV Push daily  traMADol 50 milliGRAM(s) Oral four times a day PRN  vancomycin    Solution 125 milliGRAM(s) Oral every 6 hours              Vital Signs Last 24 Hrs  T(C): 36.5 (24 Jan 2022 05:27), Max: 36.9 (23 Jan 2022 15:00)  T(F): 97.7 (24 Jan 2022 05:27), Max: 98.4 (23 Jan 2022 15:00)  HR: 73 (24 Jan 2022 05:27) (73 - 86)  BP: 121/68 (24 Jan 2022 05:27) (121/68 - 158/77)  BP(mean): --  RR: 18 (24 Jan 2022 05:27) (18 - 18)  SpO2: 94% (24 Jan 2022 05:27) (92% - 94%)      REVIEW OF SYSTEMS:  Limited secondary to the patient has forgetfulness, but constitutionally, she denies fevers, chills, or night sweats.  Head:  No headaches.  Eyes:  No double vision or blurry vision.  Ears:  No ringing in the ears.  Neck:  No neck pain.  Respiratory:  No shortness of breath.  Cardiovascular:  No chest pain.  Abdomen:  No nausea, vomiting, or abdominal pain.  Genitourinary:  Does have a history of urinary incontinence.  Extremities/Neurological:  No numbness or tingling.  Musculoskeletal:  Occasional joint pain.    PHYSICAL EXAMINATION:   HEENT:  Head:  Normocephalic, atraumatic.  Eyes:  No scleral icterus.  Ears:  Hearing bilaterally intact.  NECK:  Supple.  RESPIRATORY:  Good air entry bilaterally.  CARDIOVASCULAR:  S1 and S2 heard.  ABDOMEN:  Soft and nontender.  EXTREMITIES:  No clubbing or cyanosis was noted.      NEUROLOGIC:  The patient is awake and alert. positive forgetfulness  Location was hospital, She was able to follow complex commands, touch right hand and touched left ear, was able to name simple objects, was able to tell two daughters' name.  Extraocular movements were intact.  Speech was fluent.  Smile was symmetric.  Motor:  Bilateral upper were 4/5, bilateral lower were 3/5.  At present, is not ambulatory as per my conversation with the daughter.                    LABS:            Hemoglobin A1C:       Vitamin B12         RADIOLOGY      ANALYSIS AND PLAN:  An 86-year-old with an episode of memory issues.  For episode of memory issues, suspect most likely the patient does have mild cognitive impairment versus more probable underlying dementia.  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  If the patient is able to go for it, will give Ativan patient refusing to go for repeat mri --   For mild cognitive impairment versus subtle dementia supportive treatment for now   For history of hypertension, monitor systolic blood pressure.  For periods of agitation, I would recommend a psychiatry follow-up.  spoke to outside neurologist DR Arceo  1/18  neurologic wise stable for discharge   left hand pain will check x ray ? OA vs gout     Spoke with the daughter at the bedside, her name is Cris, her cell number is [phone], the home number is [phone]. 1/24  She does understand while in the hospital setting, she may become more disoriented.    Greater than 30 minutes of time was spent with the patient, plan of care, reviewing data, with greater than 50% of the visit was spent counseling and/or coordinating care with multidisciplinary healthcare team

## 2022-01-24 NOTE — PROGRESS NOTE ADULT - SUBJECTIVE AND OBJECTIVE BOX
GINA GOLD    PLV TELN [address]    Allergies    No Known Allergies    Intolerances        PAST MEDICAL & SURGICAL HISTORY:  No pertinent past medical history        FAMILY HISTORY:      Home Medications:  amLODIPine 5 mg oral tablet: 1 tab(s) orally once a day (18 Jan 2022 13:44)  Aspirin Enteric Coated 81 mg oral delayed release tablet: 1 tab(s) orally once a day (18 Jan 2022 13:44)  hydrALAZINE 10 mg oral tablet: 1 tab(s) orally 2 times a day (18 Jan 2022 13:44)  Nasal Saline 0.65% nasal spray: 2 spray(s) nasal 2 times a day for 15 days. Start: 01/06/22 (18 Jan 2022 13:44)  sertraline 50 mg oral tablet: 1 tab(s) orally once a day (18 Jan 2022 13:44)      MEDICATIONS  (STANDING):  apixaban 10 milliGRAM(s) Oral every 12 hours  dextrose 5% + sodium chloride 0.45% with potassium chloride 20 mEq/L 1000 milliLiter(s) (60 mL/Hr) IV Continuous <Continuous>  influenza  Vaccine (HIGH DOSE) 0.7 milliLiter(s) IntraMuscular once  lactobacillus acidophilus 1 Tablet(s) Oral two times a day  losartan 25 milliGRAM(s) Oral daily  pantoprazole  Injectable 40 milliGRAM(s) IV Push daily  vancomycin    Solution 125 milliGRAM(s) Oral every 6 hours    MEDICATIONS  (PRN):  acetaminophen     Tablet .. 650 milliGRAM(s) Oral every 6 hours PRN Temp greater or equal to 38C (100.4F), Mild Pain (1 - 3)  aluminum hydroxide/magnesium hydroxide/simethicone Suspension 30 milliLiter(s) Oral every 4 hours PRN Dyspepsia  LORazepam   Injectable 1 milliGRAM(s) IV Push once PRN mri  LORazepam   Injectable 1 milliGRAM(s) IV Push once PRN mri  melatonin 3 milliGRAM(s) Oral at bedtime PRN Insomnia  OLANZapine Injectable 2.5 milliGRAM(s) IntraMuscular every 6 hours PRN agitation  ondansetron Injectable 4 milliGRAM(s) IV Push every 8 hours PRN Nausea and/or Vomiting  traMADol 50 milliGRAM(s) Oral four times a day PRN Moderate Pain (4 - 6)      Diet, Low Fiber:   Supplement Feeding Modality:  Oral  Ensure Clear Cans or Servings Per Day:  1       Frequency:  Three Times a day (01-19-22 @ 09:18) [Active]          Vital Signs Last 24 Hrs  T(C): 36.5 (24 Jan 2022 05:27), Max: 36.9 (23 Jan 2022 15:00)  T(F): 97.7 (24 Jan 2022 05:27), Max: 98.4 (23 Jan 2022 15:00)  HR: 73 (24 Jan 2022 05:27) (73 - 86)  BP: 121/68 (24 Jan 2022 05:27) (121/68 - 158/77)  BP(mean): --  RR: 18 (24 Jan 2022 05:27) (18 - 18)  SpO2: 94% (24 Jan 2022 05:27) (92% - 94%)      01-23-22 @ 07:01  -  01-24-22 @ 07:00  --------------------------------------------------------  IN: 1800 mL / OUT: 1600 mL / NET: 200 mL              LABS:                    WBC:  WBC Count: 13.06 K/uL (01-21 @ 07:34)      MICROBIOLOGY:  RECENT CULTURES:  01-17 .Blood Blood XXXX XXXX   No Growth Final                    Sodium:  Sodium, Serum: 141 mmol/L (01-21 @ 07:34)      0.87 mg/dL 01-21 @ 07:34      Hemoglobin:  Hemoglobin: 11.5 g/dL (01-21 @ 07:34)      Platelets: Platelet Count - Automated: 346 K/uL (01-21 @ 07:34)              RADIOLOGY & ADDITIONAL STUDIES:      MICROBIOLOGY:  RECENT CULTURES:  01-17 .Blood Blood XXXX XXXX   No Growth Final

## 2022-01-24 NOTE — PROGRESS NOTE ADULT - REASON FOR ADMISSION
86 female sent from [facility] living for diarrhea x 3 days since starting antibiotics for UTI.   	Per patient she was being treated for UTI and began having diarrhea and RLQ pain.   	Denies fever, chills.   	Denies n/v.   	+ Abdominal pain , + Diarrhea  	Denies current urinary symptoms.  Denies CP, cough or SOBno abdominal distension, no blood in stool, no burning urination, no chills, no dysuria, no fever, no hematuria, no nausea, no vomiting++ RLQ abdominal pain, no bloating, no constipation, ++ diarrhea,   < from: CT Abdomen and Pelvis w/ IV Cont (01.16.22 @ 17:29) >

## 2022-01-24 NOTE — PROGRESS NOTE ADULT - SUBJECTIVE AND OBJECTIVE BOX
PROGRESS NOTE  Patient is a 86y old  Female who presents with a chief complaint of 86 female sent from [facility] for diarrhea x 3 days since starting antibiotics for UTI.   	Per patient she was being treated for UTI and began having diarrhea and RLQ pain.   	Denies fever, chills.   	Denies n/v.   	+ Abdominal pain , + Diarrhea  	Denies current urinary symptoms.  Denies CP, cough or SOBno abdominal distension, no blood in stool, no burning urination, no chills, no dysuria, no fever, no hematuria, no nausea, no vomiting++ RLQ abdominal pain, no bloating, no constipation, ++ diarrhea,   < from: CT Abdomen and Pelvis w/ IV Cont (01.16.22 @ 17:29) > (24 Jan 2022 10:50)    Chart and available morning labs /imaging are reviewed electronically , urgent issues addressed . More information  is being added upon completion of rounds , when more information is collected and management discussed with consultants , medical staff and social service/case management on the floor   OVERNIGHT      HPI:  86 female sent from [facility] for diarrhea x 3 days since starting antibiotics for UTI.   	Per patient she was being treated for UTI and began having diarrhea and RLQ pain.   	Denies fever, chills.   	Denies n/v.   	+ Abdominal pain , + Diarrhea  	Denies current urinary symptoms.  Denies CP, cough or SOBno abdominal distension, no blood in stool, no burning urination, no chills, no dysuria, no fever, no hematuria, no nausea, no vomiting++ RLQ abdominal pain, no bloating, no constipation, ++ diarrhea,   < from: CT Abdomen and Pelvis w/ IV Cont (01.16.22 @ 17:29) >    ACC: [id number] EXAM:  CT ABDOMEN AND PELVIS IC                          PROCEDURE DATE:  01/16/2022          INTERPRETATION:  CLINICAL INFORMATION: Acute abdominal pain.    COMPARISON: None.    CONTRAST/COMPLICATIONS:  IV Contrast: Omnipaque 350  90cc administered   10 cc discarded  Oral Contrast: NONE  Complications: None reported at time of study completion    PROCEDURE:  CT of the Abdomen and Pelvis was performed.  Sagittal and coronal reformats were performed.    FINDINGS:  LOWER CHEST: Possible filling defect in a branch of the right lower lobe   pulmonary artery (series 2, image 7), suspicious for pulmonary embolism.   Bibasilar subsegmental atelectasis.    LIVER: Within normal limits.  BILE DUCTS: Normal caliber.  GALLBLADDER: Withinnormal limits.  SPLEEN: Subcentimeter hypodense splenic lesions, too small to   characterize.  PANCREAS: Within normal limits.  ADRENALS: Within normal limits.  KIDNEYS/URETERS: No hydronephrosis. Left upper pole renal cyst.   Additional subcentimeter hypodense bilateral renal lesions, too small to   characterize.    BLADDER: Within normal limits.  REPRODUCTIVE ORGANS: Hysterectomy.    BOWEL: Colonic diverticulosis. Long segment mural thickening of the colon   extending from the ascending colon to the level of the rectum. Small   bowel anastomosis. No bowel obstruction. Appendix is normal.  PERITONEUM: No ascites.  VESSELS: Atherosclerotic changes.  RETROPERITONEUM/LYMPH NODES: No lymphadenopathy.  ABDOMINAL WALL: Within normal limits.  BONES: Degenerative changes.    IMPRESSION:  Possible filling defect in a branch of the right lower lobe pulmonary   artery, suspicious for pulmonary embolism. Further evaluation with CTA   chest (pulmonary artery protocol) is recommended.    Long segment proctocolitis.    Findings were discussed with MARLON FERRER on 1/16/2022 at 6:28 PM   by Dr. Wagner with read back confirmation.    --- End of Report ---            ANABEL WAGNER MD; Attending Radiologist  This document has been electronically signed. Jan 16 2022  6:29PM    < end of copied text >   (16 Jan 2022 18:47)    PAST MEDICAL & SURGICAL HISTORY:  No pertinent past medical history        MEDICATIONS  (STANDING):  apixaban 10 milliGRAM(s) Oral every 12 hours  influenza  Vaccine (HIGH DOSE) 0.7 milliLiter(s) IntraMuscular once  lactobacillus acidophilus 1 Tablet(s) Oral two times a day  losartan 25 milliGRAM(s) Oral daily  pantoprazole    Tablet 40 milliGRAM(s) Oral before breakfast  vancomycin    Solution 125 milliGRAM(s) Oral every 6 hours    MEDICATIONS  (PRN):  acetaminophen     Tablet .. 650 milliGRAM(s) Oral every 6 hours PRN Temp greater or equal to 38C (100.4F), Mild Pain (1 - 3)  aluminum hydroxide/magnesium hydroxide/simethicone Suspension 30 milliLiter(s) Oral every 4 hours PRN Dyspepsia  LORazepam   Injectable 1 milliGRAM(s) IV Push once PRN mri  LORazepam   Injectable 1 milliGRAM(s) IV Push once PRN mri  melatonin 3 milliGRAM(s) Oral at bedtime PRN Insomnia  OLANZapine Injectable 2.5 milliGRAM(s) IntraMuscular every 6 hours PRN agitation  ondansetron Injectable 4 milliGRAM(s) IV Push every 8 hours PRN Nausea and/or Vomiting  traMADol 50 milliGRAM(s) Oral four times a day PRN Moderate Pain (4 - 6)      OBJECTIVE    T(C): 36.6 (01-24-22 @ 10:56), Max: 36.9 (01-23-22 @ 15:00)  HR: 74 (01-24-22 @ 10:56) (73 - 86)  BP: 151/68 (01-24-22 @ 10:56) (121/68 - 158/77)  RR: 18 (01-24-22 @ 10:56) (18 - 18)  SpO2: 95% (01-24-22 @ 10:56) (92% - 95%)  Wt(kg): --  I&O's Summary    23 Jan 2022 07:01  -  24 Jan 2022 07:00  --------------------------------------------------------  IN: 1800 mL / OUT: 1600 mL / NET: 200 mL          REVIEW OF SYSTEMS:  CONSTITUTIONAL: No fever, weight loss, or fatigue  EYES: No eye pain, visual disturbances, or discharge  ENMT:   No sinus or throat pain  NECK: No pain or stiffness  RESPIRATORY: No cough, wheezing, chills or hemoptysis; No shortness of breath  CARDIOVASCULAR: No chest pain, palpitations, dizziness, or leg swelling  GASTROINTESTINAL: No abdominal pain. No nausea, vomiting; No diarrhea or constipation. No melena or hematochezia.  GENITOURINARY: No dysuria, frequency, hematuria, or incontinence  NEUROLOGICAL: No headaches, memory loss, loss of strength, numbness, or tremors  SKIN: No itching, burning, rashes, or lesions   MUSCULOSKELETAL: No joint pain or swelling; No muscle, back, or extremity pain    PHYSICAL EXAM:  Appearance: NAD. VS past 24 hrs -as above   HEENT:   Moist oral mucosa. Conjunctiva clear b/l.   Neck : supple  Respiratory: Lungs CTAB.  Gastrointestinal:  Soft, nontender. No rebound. No rigidity. BS present	  Cardiovascular: RRR ,S1S2 present  Neurologic: Non-focal. Moving all extremities.  Extremities: No edema. No erythema. No calf tenderness.  Skin: No rashes, No ecchymoses, No cyanosis.	  wounds ,skin lesions-See skin assesment flow sheet   LABS:          CAPILLARY BLOOD GLUCOSE              Culture - Blood (collected 17 Jan 2022 16:27)  Source: .Blood Blood  Final Report (22 Jan 2022 17:00):    No Growth Final    Culture - Blood (collected 17 Jan 2022 16:27)  Source: .Blood Blood  Final Report (22 Jan 2022 17:00):    No Growth Final    Culture - Urine (collected 16 Jan 2022 20:54)  Source: Clean Catch Clean Catch (Midstream)  Final Report (17 Jan 2022 16:32):    No growth      RADIOLOGY & ADDITIONAL TESTS:   reviewed elctronically  ASSESSMENT/PLAN: 	     PROGRESS NOTE  Patient is a 86y old  Female who presents with a chief complaint of 86 female sent from [facility] for diarrhea x 3 days since starting antibiotics for UTI.   	Per patient she was being treated for UTI and began having diarrhea and RLQ pain.   	Denies fever, chills.   	Denies n/v.   	+ Abdominal pain , + Diarrhea  	Denies current urinary symptoms.  Denies CP, cough or SOBno abdominal distension, no blood in stool, no burning urination, no chills, no dysuria, no fever, no hematuria, no nausea, no vomiting++ RLQ abdominal pain, no bloating, no constipation, ++ diarrhea,   < from: CT Abdomen and Pelvis w/ IV Cont (01.16.22 @ 17:29) > (24 Jan 2022 10:50)    Chart and available morning labs /imaging are reviewed electronically , urgent issues addressed . More information  is being added upon completion of rounds , when more information is collected and management discussed with consultants , medical staff and social service/case management on the floor   OVERNIGHT  No new issues reported by medical staff . All above noted Patient is resting in a bed comfortably .Confused ,poor mentation .No distress noted     HPI:  86 female sent from [facility] for diarrhea x 3 days since starting antibiotics for UTI.   	Per patient she was being treated for UTI and began having diarrhea and RLQ pain.   	Denies fever, chills.   	Denies n/v.   	+ Abdominal pain , + Diarrhea  	Denies current urinary symptoms.  Denies CP, cough or SOBno abdominal distension, no blood in stool, no burning urination, no chills, no dysuria, no fever, no hematuria, no nausea, no vomiting++ RLQ abdominal pain, no bloating, no constipation, ++ diarrhea,   < from: CT Abdomen and Pelvis w/ IV Cont (01.16.22 @ 17:29) >    ACC: [id number] EXAM:  CT ABDOMEN AND PELVIS IC                          PROCEDURE DATE:  01/16/2022          INTERPRETATION:  CLINICAL INFORMATION: Acute abdominal pain.    COMPARISON: None.    CONTRAST/COMPLICATIONS:  IV Contrast: Omnipaque 350  90cc administered   10 cc discarded  Oral Contrast: NONE  Complications: None reported at time of study completion    PROCEDURE:  CT of the Abdomen and Pelvis was performed.  Sagittal and coronal reformats were performed.    FINDINGS:  LOWER CHEST: Possible filling defect in a branch of the right lower lobe   pulmonary artery (series 2, image 7), suspicious for pulmonary embolism.   Bibasilar subsegmental atelectasis.    LIVER: Within normal limits.  BILE DUCTS: Normal caliber.  GALLBLADDER: Withinnormal limits.  SPLEEN: Subcentimeter hypodense splenic lesions, too small to   characterize.  PANCREAS: Within normal limits.  ADRENALS: Within normal limits.  KIDNEYS/URETERS: No hydronephrosis. Left upper pole renal cyst.   Additional subcentimeter hypodense bilateral renal lesions, too small to   characterize.    BLADDER: Within normal limits.  REPRODUCTIVE ORGANS: Hysterectomy.    BOWEL: Colonic diverticulosis. Long segment mural thickening of the colon   extending from the ascending colon to the level of the rectum. Small   bowel anastomosis. No bowel obstruction. Appendix is normal.  PERITONEUM: No ascites.  VESSELS: Atherosclerotic changes.  RETROPERITONEUM/LYMPH NODES: No lymphadenopathy.  ABDOMINAL WALL: Within normal limits.  BONES: Degenerative changes.    IMPRESSION:  Possible filling defect in a branch of the right lower lobe pulmonary   artery, suspicious for pulmonary embolism. Further evaluation with CTA   chest (pulmonary artery protocol) is recommended.    Long segment proctocolitis.    Findings were discussed with MARLON FERRER on 1/16/2022 at 6:28 PM   by Dr. Wagner with read back confirmation.    --- End of Report ---            ANABEL WAGNER MD; Attending Radiologist  This document has been electronically signed. Jan 16 2022  6:29PM    < end of copied text >   (16 Jan 2022 18:47)    PAST MEDICAL & SURGICAL HISTORY:  No pertinent past medical history        MEDICATIONS  (STANDING):  apixaban 10 milliGRAM(s) Oral every 12 hours  influenza  Vaccine (HIGH DOSE) 0.7 milliLiter(s) IntraMuscular once  lactobacillus acidophilus 1 Tablet(s) Oral two times a day  losartan 25 milliGRAM(s) Oral daily  pantoprazole    Tablet 40 milliGRAM(s) Oral before breakfast  vancomycin    Solution 125 milliGRAM(s) Oral every 6 hours    MEDICATIONS  (PRN):  acetaminophen     Tablet .. 650 milliGRAM(s) Oral every 6 hours PRN Temp greater or equal to 38C (100.4F), Mild Pain (1 - 3)  aluminum hydroxide/magnesium hydroxide/simethicone Suspension 30 milliLiter(s) Oral every 4 hours PRN Dyspepsia  LORazepam   Injectable 1 milliGRAM(s) IV Push once PRN mri  LORazepam   Injectable 1 milliGRAM(s) IV Push once PRN mri  melatonin 3 milliGRAM(s) Oral at bedtime PRN Insomnia  OLANZapine Injectable 2.5 milliGRAM(s) IntraMuscular every 6 hours PRN agitation  ondansetron Injectable 4 milliGRAM(s) IV Push every 8 hours PRN Nausea and/or Vomiting  traMADol 50 milliGRAM(s) Oral four times a day PRN Moderate Pain (4 - 6)      OBJECTIVE    T(C): 36.6 (01-24-22 @ 10:56), Max: 36.9 (01-23-22 @ 15:00)  HR: 74 (01-24-22 @ 10:56) (73 - 86)  BP: 151/68 (01-24-22 @ 10:56) (121/68 - 158/77)  RR: 18 (01-24-22 @ 10:56) (18 - 18)  SpO2: 95% (01-24-22 @ 10:56) (92% - 95%)  Wt(kg): --  I&O's Summary    23 Jan 2022 07:01  -  24 Jan 2022 07:00  --------------------------------------------------------  IN: 1800 mL / OUT: 1600 mL / NET: 200 mL          REVIEW OF SYSTEMS:  CONSTITUTIONAL: No fever, weight loss, or fatigue  EYES: No eye pain, visual disturbances, or discharge  ENMT:   No sinus or throat pain  NECK: No pain or stiffness  RESPIRATORY: No cough, wheezing, chills or hemoptysis; No shortness of breath  CARDIOVASCULAR: No chest pain, palpitations, dizziness, or leg swelling  GASTROINTESTINAL: No abdominal pain. No nausea, vomiting; No diarrhea or constipation. No melena or hematochezia.  GENITOURINARY: No dysuria, frequency, hematuria, or incontinence  NEUROLOGICAL: No headaches, memory loss, loss of strength, numbness, or tremors  SKIN: No itching, burning, rashes, or lesions   MUSCULOSKELETAL: No joint pain or swelling; No muscle, back, or extremity pain    PHYSICAL EXAM:  Appearance: NAD. VS past 24 hrs -as above   HEENT:   Moist oral mucosa. Conjunctiva clear b/l.   Neck : supple  Respiratory: Lungs CTAB.  Gastrointestinal:  Soft, nontender. No rebound. No rigidity. BS present	  Cardiovascular: RRR ,S1S2 present  Neurologic: Non-focal. Moving all extremities.  Extremities: No edema. No erythema. No calf tenderness.  Skin: No rashes, No ecchymoses, No cyanosis.	  wounds ,skin lesions-See skin assesment flow sheet   LABS:          CAPILLARY BLOOD GLUCOSE              Culture - Blood (collected 17 Jan 2022 16:27)  Source: .Blood Blood  Final Report (22 Jan 2022 17:00):    No Growth Final    Culture - Blood (collected 17 Jan 2022 16:27)  Source: .Blood Blood  Final Report (22 Jan 2022 17:00):    No Growth Final    Culture - Urine (collected 16 Jan 2022 20:54)  Source: Clean Catch Clean Catch (Midstream)  Final Report (17 Jan 2022 16:32):    No growth      RADIOLOGY & ADDITIONAL TESTS:   reviewed elctronically  ASSESSMENT/PLAN: 	    45 minutes aggregate time was spent on this visit, 50% visit time spent in care co-ordination with other attendings and counselling patient .I have discussed care plan with patient / HCP/family member ,who expressed understanding of problems treatment and their effect and side effects, alternatives in details. I have asked if they have any questions and concerns and appropriately addressed them to best of my ability.

## 2022-01-25 LAB
ANION GAP SERPL CALC-SCNC: 5 MMOL/L — SIGNIFICANT CHANGE UP (ref 5–17)
BUN SERPL-MCNC: 21 MG/DL — SIGNIFICANT CHANGE UP (ref 7–23)
CALCIUM SERPL-MCNC: 10.3 MG/DL — HIGH (ref 8.5–10.1)
CHLORIDE SERPL-SCNC: 105 MMOL/L — SIGNIFICANT CHANGE UP (ref 96–108)
CO2 SERPL-SCNC: 28 MMOL/L — SIGNIFICANT CHANGE UP (ref 22–31)
CREAT SERPL-MCNC: 0.9 MG/DL — SIGNIFICANT CHANGE UP (ref 0.5–1.3)
GLUCOSE SERPL-MCNC: 108 MG/DL — HIGH (ref 70–99)
HCT VFR BLD CALC: 36.5 % — SIGNIFICANT CHANGE UP (ref 34.5–45)
HGB BLD-MCNC: 11.5 G/DL — SIGNIFICANT CHANGE UP (ref 11.5–15.5)
MAGNESIUM SERPL-MCNC: 2.3 MG/DL — SIGNIFICANT CHANGE UP (ref 1.6–2.6)
MCHC RBC-ENTMCNC: 27.1 PG — SIGNIFICANT CHANGE UP (ref 27–34)
MCHC RBC-ENTMCNC: 31.5 GM/DL — LOW (ref 32–36)
MCV RBC AUTO: 86.1 FL — SIGNIFICANT CHANGE UP (ref 80–100)
NRBC # BLD: 0 /100 WBCS — SIGNIFICANT CHANGE UP (ref 0–0)
PHOSPHATE SERPL-MCNC: 4.1 MG/DL — SIGNIFICANT CHANGE UP (ref 2.5–4.5)
PLATELET # BLD AUTO: 449 K/UL — HIGH (ref 150–400)
POTASSIUM SERPL-MCNC: 4.5 MMOL/L — SIGNIFICANT CHANGE UP (ref 3.5–5.3)
POTASSIUM SERPL-SCNC: 4.5 MMOL/L — SIGNIFICANT CHANGE UP (ref 3.5–5.3)
RBC # BLD: 4.24 M/UL — SIGNIFICANT CHANGE UP (ref 3.8–5.2)
RBC # FLD: 14.5 % — SIGNIFICANT CHANGE UP (ref 10.3–14.5)
SODIUM SERPL-SCNC: 138 MMOL/L — SIGNIFICANT CHANGE UP (ref 135–145)
WBC # BLD: 14.45 K/UL — HIGH (ref 3.8–10.5)
WBC # FLD AUTO: 14.45 K/UL — HIGH (ref 3.8–10.5)

## 2022-01-25 RX ORDER — SODIUM CHLORIDE 9 MG/ML
1000 INJECTION, SOLUTION INTRAVENOUS
Refills: 0 | Status: DISCONTINUED | OUTPATIENT
Start: 2022-01-25 | End: 2022-01-26

## 2022-01-25 RX ADMIN — Medication 125 MILLIGRAM(S): at 17:09

## 2022-01-25 RX ADMIN — Medication 125 MILLIGRAM(S): at 11:22

## 2022-01-25 RX ADMIN — APIXABAN 10 MILLIGRAM(S): 2.5 TABLET, FILM COATED ORAL at 05:16

## 2022-01-25 RX ADMIN — Medication 125 MILLIGRAM(S): at 05:16

## 2022-01-25 RX ADMIN — PANTOPRAZOLE SODIUM 40 MILLIGRAM(S): 20 TABLET, DELAYED RELEASE ORAL at 05:16

## 2022-01-25 RX ADMIN — SODIUM CHLORIDE 40 MILLILITER(S): 9 INJECTION, SOLUTION INTRAVENOUS at 12:24

## 2022-01-25 RX ADMIN — Medication 1 TABLET(S): at 05:16

## 2022-01-25 RX ADMIN — Medication 1 TABLET(S): at 17:09

## 2022-01-25 RX ADMIN — Medication 125 MILLIGRAM(S): at 00:03

## 2022-01-25 RX ADMIN — APIXABAN 10 MILLIGRAM(S): 2.5 TABLET, FILM COATED ORAL at 17:09

## 2022-01-25 NOTE — PROGRESS NOTE ADULT - REASON FOR ADMISSION
86 female sent from [facility] living for diarrhea x 3 days since starting antibiotics for UTI.   	Per patient she was being treated for UTI and began having diarrhea and RLQ pain.   	Denies fever, chills.   	Denies n/v.   	+ Abdominal pain , + Diarrhea  	Denies current urinary symptoms.  Denies CP, cough or SOBno abdominal distension, no blood in stool, no burning urination, no chills, no dysuria, no fever, no hematuria, no nausea, no vomiting++ RLQ abdominal pain, no bloating, no constipation, ++ diarrhea,   < from: CT Abdomen and Pelvis w/ IV Cont (01.16.22 @ 17:29) >

## 2022-01-25 NOTE — PROGRESS NOTE ADULT - SUBJECTIVE AND OBJECTIVE BOX
[facility]  Ty Sheldon PA-C  On license of [facility] [place] [address]  [phone]      INTERVAL HPI/OVERNIGHT EVENTS:  Pt s/e  Pt tired, states she is having less BMs    MEDICATIONS  (STANDING):  apixaban 10 milliGRAM(s) Oral every 12 hours  influenza  Vaccine (HIGH DOSE) 0.7 milliLiter(s) IntraMuscular once  lactobacillus acidophilus 1 Tablet(s) Oral two times a day  losartan 25 milliGRAM(s) Oral daily  pantoprazole    Tablet 40 milliGRAM(s) Oral before breakfast  vancomycin    Solution 125 milliGRAM(s) Oral every 6 hours    MEDICATIONS  (PRN):  acetaminophen     Tablet .. 650 milliGRAM(s) Oral every 6 hours PRN Temp greater or equal to 38C (100.4F), Mild Pain (1 - 3)  aluminum hydroxide/magnesium hydroxide/simethicone Suspension 30 milliLiter(s) Oral every 4 hours PRN Dyspepsia  LORazepam   Injectable 1 milliGRAM(s) IV Push once PRN mri  LORazepam   Injectable 1 milliGRAM(s) IV Push once PRN mri  melatonin 3 milliGRAM(s) Oral at bedtime PRN Insomnia  OLANZapine Injectable 2.5 milliGRAM(s) IntraMuscular every 6 hours PRN agitation  ondansetron Injectable 4 milliGRAM(s) IV Push every 8 hours PRN Nausea and/or Vomiting      Allergies:  No Known Allergies    PHYSICAL EXAM:   Vital Signs:  Vital Signs Last 24 Hrs  T(C): 36.5 (2022 11:15), Max: 36.7 (2022 04:46)  T(F): 97.7 (2022 11:15), Max: 98 (2022 04:46)  HR: 70 (2022 11:15) (70 - 82)  BP: 125/69 (2022 11:15) (113/70 - 125/69)  BP(mean): --  RR: 18 (2022 11:15) (16 - 18)  SpO2: 96% (2022 11:15) (92% - 96%)  Daily     Daily Weight in k.5 (2022 04:46)    GENERAL:  Appears stated age  ABDOMEN:  Soft, non-tender, non-distended

## 2022-01-25 NOTE — PROGRESS NOTE ADULT - REASON FOR ADMISSION
86 female sent from [facility] living for diarrhea x 3 days since starting antibiotics for UTI.   	Per patient she was being treated for UTI and began having diarrhea and RLQ pain.   	Denies fever, chills.   	Denies n/v.   	+ Abdominal pain , + Diarrhea  	Denies current urinary symptoms.  Denies CP, cough or SOBno abdominal distension, no blood in stool, no burning urination, no chills, no dysuria, no fever, no hematuria, no nausea, no vomiting++ RLQ abdominal pain, no bloating, no constipation, ++ diarrhea,   < from: CT Abdomen and Pelvis w/ IV Cont (01.16.22 @ 17:29) >

## 2022-01-25 NOTE — PROGRESS NOTE ADULT - SUBJECTIVE AND OBJECTIVE BOX
PROGRESS NOTE  Patient is a 86y old  Female who presents with a chief complaint of 86 female sent from [facility] for diarrhea x 3 days since starting antibiotics for UTI.   	Per patient she was being treated for UTI and began having diarrhea and RLQ pain.   	Denies fever, chills.   	Denies n/v.   	+ Abdominal pain , + Diarrhea  	Denies current urinary symptoms.  Denies CP, cough or SOBno abdominal distension, no blood in stool, no burning urination, no chills, no dysuria, no fever, no hematuria, no nausea, no vomiting++ RLQ abdominal pain, no bloating, no constipation, ++ diarrhea,   < from: CT Abdomen and Pelvis w/ IV Cont (01.16.22 @ 17:29) > (25 Jan 2022 11:32)  Chart and available morning labs /imaging are reviewed electronically , urgent issues addressed . More information  is being added upon completion of rounds , when more information is collected and management discussed with consultants , medical staff and social service/case management on the floor   OVERNIGHT  No new issues reported by medical staff . All above noted Patient is resting in a bed comfortably .Confused ,poor mentation .No distress noted   HPI:  86 female sent from [facility] for diarrhea x 3 days since starting antibiotics for UTI.   	Per patient she was being treated for UTI and began having diarrhea and RLQ pain.   	Denies fever, chills.   	Denies n/v.   	+ Abdominal pain , + Diarrhea  	Denies current urinary symptoms.  Denies CP, cough or SOBno abdominal distension, no blood in stool, no burning urination, no chills, no dysuria, no fever, no hematuria, no nausea, no vomiting++ RLQ abdominal pain, no bloating, no constipation, ++ diarrhea,   < from: CT Abdomen and Pelvis w/ IV Cont (01.16.22 @ 17:29) >    ACC: [id number] EXAM:  CT ABDOMEN AND PELVIS IC                          PROCEDURE DATE:  01/16/2022          INTERPRETATION:  CLINICAL INFORMATION: Acute abdominal pain.    COMPARISON: None.    CONTRAST/COMPLICATIONS:  IV Contrast: Omnipaque 350  90cc administered   10 cc discarded  Oral Contrast: NONE  Complications: None reported at time of study completion    PROCEDURE:  CT of the Abdomen and Pelvis was performed.  Sagittal and coronal reformats were performed.    FINDINGS:  LOWER CHEST: Possible filling defect in a branch of the right lower lobe   pulmonary artery (series 2, image 7), suspicious for pulmonary embolism.   Bibasilar subsegmental atelectasis.    LIVER: Within normal limits.  BILE DUCTS: Normal caliber.  GALLBLADDER: Withinnormal limits.  SPLEEN: Subcentimeter hypodense splenic lesions, too small to   characterize.  PANCREAS: Within normal limits.  ADRENALS: Within normal limits.  KIDNEYS/URETERS: No hydronephrosis. Left upper pole renal cyst.   Additional subcentimeter hypodense bilateral renal lesions, too small to   characterize.    BLADDER: Within normal limits.  REPRODUCTIVE ORGANS: Hysterectomy.    BOWEL: Colonic diverticulosis. Long segment mural thickening of the colon   extending from the ascending colon to the level of the rectum. Small   bowel anastomosis. No bowel obstruction. Appendix is normal.  PERITONEUM: No ascites.  VESSELS: Atherosclerotic changes.  RETROPERITONEUM/LYMPH NODES: No lymphadenopathy.  ABDOMINAL WALL: Within normal limits.  BONES: Degenerative changes.    IMPRESSION:  Possible filling defect in a branch of the right lower lobe pulmonary   artery, suspicious for pulmonary embolism. Further evaluation with CTA   chest (pulmonary artery protocol) is recommended.    Long segment proctocolitis.    Findings were discussed with MARLON FERRER on 1/16/2022 at 6:28 PM   by Dr. Wagner with read back confirmation.    --- End of Report ---            ANABEL WAGNER MD; Attending Radiologist  This document has been electronically signed. Jan 16 2022  6:29PM    < end of copied text >   (16 Jan 2022 18:47)    PAST MEDICAL & SURGICAL HISTORY:  No pertinent past medical history        MEDICATIONS  (STANDING):  apixaban 10 milliGRAM(s) Oral every 12 hours  dextrose 5% + sodium chloride 0.45%. 1000 milliLiter(s) (40 mL/Hr) IV Continuous <Continuous>  influenza  Vaccine (HIGH DOSE) 0.7 milliLiter(s) IntraMuscular once  lactobacillus acidophilus 1 Tablet(s) Oral two times a day  losartan 25 milliGRAM(s) Oral daily  pantoprazole    Tablet 40 milliGRAM(s) Oral before breakfast  vancomycin    Solution 125 milliGRAM(s) Oral every 6 hours    MEDICATIONS  (PRN):  acetaminophen     Tablet .. 650 milliGRAM(s) Oral every 6 hours PRN Temp greater or equal to 38C (100.4F), Mild Pain (1 - 3)  aluminum hydroxide/magnesium hydroxide/simethicone Suspension 30 milliLiter(s) Oral every 4 hours PRN Dyspepsia  LORazepam   Injectable 1 milliGRAM(s) IV Push once PRN mri  LORazepam   Injectable 1 milliGRAM(s) IV Push once PRN mri  melatonin 3 milliGRAM(s) Oral at bedtime PRN Insomnia  OLANZapine Injectable 2.5 milliGRAM(s) IntraMuscular every 6 hours PRN agitation  ondansetron Injectable 4 milliGRAM(s) IV Push every 8 hours PRN Nausea and/or Vomiting      OBJECTIVE    T(C): 36.5 (01-25-22 @ 11:15), Max: 36.7 (01-25-22 @ 04:46)  HR: 70 (01-25-22 @ 11:15) (70 - 82)  BP: 125/69 (01-25-22 @ 11:15) (113/70 - 125/69)  RR: 18 (01-25-22 @ 11:15) (16 - 18)  SpO2: 96% (01-25-22 @ 11:15) (92% - 96%)  Wt(kg): --  I&O's Summary    24 Jan 2022 07:01  -  25 Jan 2022 07:00  --------------------------------------------------------  IN: 0 mL / OUT: 400 mL / NET: -400 mL    25 Jan 2022 07:01  -  25 Jan 2022 16:50  --------------------------------------------------------  IN: 160 mL / OUT: 0 mL / NET: 160 mL          REVIEW OF SYSTEMS:  CONSTITUTIONAL: No fever, weight loss, or fatigue  EYES: No eye pain, visual disturbances, or discharge  ENMT:   No sinus or throat pain  NECK: No pain or stiffness  RESPIRATORY: No cough, wheezing, chills or hemoptysis; No shortness of breath  CARDIOVASCULAR: No chest pain, palpitations, dizziness, or leg swelling  GASTROINTESTINAL: No abdominal pain. No nausea, vomiting; No diarrhea or constipation. No melena or hematochezia.  GENITOURINARY: No dysuria, frequency, hematuria, or incontinence  NEUROLOGICAL: No headaches, memory loss, loss of strength, numbness, or tremors  SKIN: No itching, burning, rashes, or lesions   MUSCULOSKELETAL: No joint pain or swelling; No muscle, back, or extremity pain    PHYSICAL EXAM:  Appearance: NAD. VS past 24 hrs -as above   HEENT:   Moist oral mucosa. Conjunctiva clear b/l.   Neck : supple  Respiratory: Lungs CTAB.  Gastrointestinal:  Soft, nontender. No rebound. No rigidity. BS present	  Cardiovascular: RRR ,S1S2 present  Neurologic: Non-focal. Moving all extremities.  Extremities: No edema. No erythema. No calf tenderness.  Skin: No rashes, No ecchymoses, No cyanosis.	  wounds ,skin lesions-See skin assesment flow sheet   LABS:                        11.5   14.45 )-----------( 449      ( 25 Jan 2022 13:30 )             36.5     01-25    138  |  105  |  21  ----------------------------<  108<H>  4.5   |  28  |  0.90    Ca    10.3<H>      25 Jan 2022 13:30  Phos  4.1     01-25  Mg     2.3     01-25      CAPILLARY BLOOD GLUCOSE              Culture - Blood (collected 17 Jan 2022 16:27)  Source: .Blood Blood  Final Report (22 Jan 2022 17:00):    No Growth Final    Culture - Blood (collected 17 Jan 2022 16:27)  Source: .Blood Blood  Final Report (22 Jan 2022 17:00):    No Growth Final    Culture - Urine (collected 16 Jan 2022 20:54)  Source: Clean Catch Clean Catch (Midstream)  Final Report (17 Jan 2022 16:32):    No growth      RADIOLOGY & ADDITIONAL TESTS:   reviewed elctronically  ASSESSMENT/PLAN: 	  25 minutes aggregate time was spent on this visit, 50% visit time spent in care co-ordination with other attendings and counselling patient .I have discussed care plan with patient / HCP/family member ,who expressed understanding of problems treatment and their effect and side effects, alternatives in details. I have asked if they have any questions and concerns and appropriately addressed them to best of my ability. Advance care planning was discussed , pallitaive care issues ,CMO ,hospice levels of care were discussed in details , forms ,advance directives were reviewed .All questions were answered to the best of my knowledge .25 min spent.

## 2022-01-25 NOTE — PROGRESS NOTE ADULT - REASON FOR ADMISSION
86 female sent from [facility] living for diarrhea x 3 days since starting antibiotics for UTI.   	Per patient she was being treated for UTI and began having diarrhea and RLQ pain.   	Denies fever, chills.   	Denies n/v.   	+ Abdominal pain , + Diarrhea  	Denies current urinary symptoms.  Denies CP, cough or SOBno abdominal distension, no blood in stool, no burning urination, no chills, no dysuria, no fever, no hematuria, no nausea, no vomiting++ RLQ abdominal pain, no bloating, no constipation, ++ diarrhea,   < from: CT Abdomen and Pelvis w/ IV Cont (01.16.22 @ 17:29) >

## 2022-01-25 NOTE — PROGRESS NOTE ADULT - SUBJECTIVE AND OBJECTIVE BOX
Date/Time Patient Seen:  		  Referring MD:   Data Reviewed	       Patient is a 86y old  Female who presents with a chief complaint of 86 female sent from [facility] for diarrhea x 3 days since starting antibiotics for UTI.   	Per patient she was being treated for UTI and began having diarrhea and RLQ pain.   	Denies fever, chills.   	Denies n/v.   	+ Abdominal pain , + Diarrhea  	Denies current urinary symptoms.  Denies CP, cough or SOBno abdominal distension, no blood in stool, no burning urination, no chills, no dysuria, no fever, no hematuria, no nausea, no vomiting++ RLQ abdominal pain, no bloating, no constipation, ++ diarrhea,   < from: CT Abdomen and Pelvis w/ IV Cont (01.16.22 @ 17:29) > (24 Jan 2022 11:19)      Subjective/HPI     PAST MEDICAL & SURGICAL HISTORY:  No pertinent past medical history    No pertinent past medical history          Medication list         MEDICATIONS  (STANDING):  apixaban 10 milliGRAM(s) Oral every 12 hours  influenza  Vaccine (HIGH DOSE) 0.7 milliLiter(s) IntraMuscular once  lactobacillus acidophilus 1 Tablet(s) Oral two times a day  losartan 25 milliGRAM(s) Oral daily  pantoprazole    Tablet 40 milliGRAM(s) Oral before breakfast  vancomycin    Solution 125 milliGRAM(s) Oral every 6 hours    MEDICATIONS  (PRN):  acetaminophen     Tablet .. 650 milliGRAM(s) Oral every 6 hours PRN Temp greater or equal to 38C (100.4F), Mild Pain (1 - 3)  aluminum hydroxide/magnesium hydroxide/simethicone Suspension 30 milliLiter(s) Oral every 4 hours PRN Dyspepsia  LORazepam   Injectable 1 milliGRAM(s) IV Push once PRN mri  LORazepam   Injectable 1 milliGRAM(s) IV Push once PRN mri  melatonin 3 milliGRAM(s) Oral at bedtime PRN Insomnia  OLANZapine Injectable 2.5 milliGRAM(s) IntraMuscular every 6 hours PRN agitation  ondansetron Injectable 4 milliGRAM(s) IV Push every 8 hours PRN Nausea and/or Vomiting         Vitals log        ICU Vital Signs Last 24 Hrs  T(C): 36.7 (25 Jan 2022 04:46), Max: 36.7 (25 Jan 2022 04:46)  T(F): 98 (25 Jan 2022 04:46), Max: 98 (25 Jan 2022 04:46)  HR: 78 (25 Jan 2022 04:46) (74 - 82)  BP: 115/74 (25 Jan 2022 04:46) (113/70 - 151/68)  BP(mean): --  ABP: --  ABP(mean): --  RR: 16 (25 Jan 2022 04:46) (16 - 18)  SpO2: 92% (25 Jan 2022 04:46) (92% - 95%)           Input and Output:  I&O's Detail    23 Jan 2022 07:01  -  24 Jan 2022 07:00  --------------------------------------------------------  IN:    dextrose 5% + sodium chloride 0.45% w/ Additives: 1440 mL    Oral Fluid: 360 mL  Total IN: 1800 mL    OUT:    Voided (mL): 1600 mL  Total OUT: 1600 mL    Total NET: 200 mL      24 Jan 2022 07:01  -  25 Jan 2022 05:59  --------------------------------------------------------  IN:  Total IN: 0 mL    OUT:    Voided (mL): 400 mL  Total OUT: 400 mL    Total NET: -400 mL          Lab Data                  Review of Systems	      Objective     Physical Examination    heart s1s2  lung dec BS  abd soft      Pertinent Lab findings & Imaging      Víctor:  NO   Adequate UO     I&O's Detail    23 Jan 2022 07:01  -  24 Jan 2022 07:00  --------------------------------------------------------  IN:    dextrose 5% + sodium chloride 0.45% w/ Additives: 1440 mL    Oral Fluid: 360 mL  Total IN: 1800 mL    OUT:    Voided (mL): 1600 mL  Total OUT: 1600 mL    Total NET: 200 mL      24 Jan 2022 07:01  -  25 Jan 2022 05:59  --------------------------------------------------------  IN:  Total IN: 0 mL    OUT:    Voided (mL): 400 mL  Total OUT: 400 mL    Total NET: -400 mL               Discussed with:     Cultures:	        Radiology

## 2022-01-25 NOTE — PROGRESS NOTE ADULT - REASON FOR ADMISSION
86 female sent from [facility] living for diarrhea x 3 days since starting antibiotics for UTI.   	Per patient she was being treated for UTI and began having diarrhea and RLQ pain.   	Denies fever, chills.   	Denies n/v.   	+ Abdominal pain , + Diarrhea  	Denies current urinary symptoms.  Denies CP, cough or SOBno abdominal distension, no blood in stool, no burning urination, no chills, no dysuria, no fever, no hematuria, no nausea, no vomiting++ RLQ abdominal pain, no bloating, no constipation, ++ diarrhea,   < from: CT Abdomen and Pelvis w/ IV Cont (01.16.22 @ 17:29) >

## 2022-01-25 NOTE — PROGRESS NOTE ADULT - SUBJECTIVE AND OBJECTIVE BOX
GINA GOLD    PLV TELN [address]    Allergies    No Known Allergies    Intolerances        PAST MEDICAL & SURGICAL HISTORY:  No pertinent past medical history        FAMILY HISTORY:      Home Medications:  amLODIPine 5 mg oral tablet: 1 tab(s) orally once a day (18 Jan 2022 13:44)  Aspirin Enteric Coated 81 mg oral delayed release tablet: 1 tab(s) orally once a day (18 Jan 2022 13:44)  hydrALAZINE 10 mg oral tablet: 1 tab(s) orally 2 times a day (18 Jan 2022 13:44)  Nasal Saline 0.65% nasal spray: 2 spray(s) nasal 2 times a day for 15 days. Start: 01/06/22 (18 Jan 2022 13:44)  sertraline 50 mg oral tablet: 1 tab(s) orally once a day (18 Jan 2022 13:44)      MEDICATIONS  (STANDING):  apixaban 10 milliGRAM(s) Oral every 12 hours  influenza  Vaccine (HIGH DOSE) 0.7 milliLiter(s) IntraMuscular once  lactobacillus acidophilus 1 Tablet(s) Oral two times a day  losartan 25 milliGRAM(s) Oral daily  pantoprazole    Tablet 40 milliGRAM(s) Oral before breakfast  vancomycin    Solution 125 milliGRAM(s) Oral every 6 hours    MEDICATIONS  (PRN):  acetaminophen     Tablet .. 650 milliGRAM(s) Oral every 6 hours PRN Temp greater or equal to 38C (100.4F), Mild Pain (1 - 3)  aluminum hydroxide/magnesium hydroxide/simethicone Suspension 30 milliLiter(s) Oral every 4 hours PRN Dyspepsia  LORazepam   Injectable 1 milliGRAM(s) IV Push once PRN mri  LORazepam   Injectable 1 milliGRAM(s) IV Push once PRN mri  melatonin 3 milliGRAM(s) Oral at bedtime PRN Insomnia  OLANZapine Injectable 2.5 milliGRAM(s) IntraMuscular every 6 hours PRN agitation  ondansetron Injectable 4 milliGRAM(s) IV Push every 8 hours PRN Nausea and/or Vomiting      Diet, Low Fiber:   Supplement Feeding Modality:  Oral  Ensure Clear Cans or Servings Per Day:  1       Frequency:  Three Times a day (01-19-22 @ 09:18) [Active]          Vital Signs Last 24 Hrs  T(C): 36.7 (25 Jan 2022 04:46), Max: 36.7 (25 Jan 2022 04:46)  T(F): 98 (25 Jan 2022 04:46), Max: 98 (25 Jan 2022 04:46)  HR: 78 (25 Jan 2022 04:46) (74 - 82)  BP: 115/74 (25 Jan 2022 04:46) (113/70 - 151/68)  BP(mean): --  RR: 16 (25 Jan 2022 04:46) (16 - 18)  SpO2: 92% (25 Jan 2022 04:46) (92% - 95%)      01-24-22 @ 07:01  -  01-25-22 @ 07:00  --------------------------------------------------------  IN: 0 mL / OUT: 400 mL / NET: -400 mL              LABS:                    WBC:      MICROBIOLOGY:  RECENT CULTURES:                  Sodium:          Hemoglobin:      Platelets:             RADIOLOGY & ADDITIONAL STUDIES:      MICROBIOLOGY:  RECENT CULTURES:

## 2022-01-26 ENCOUNTER — TRANSCRIPTION ENCOUNTER (OUTPATIENT)
Age: 87
End: 2022-01-26

## 2022-01-26 VITALS
TEMPERATURE: 99 F | RESPIRATION RATE: 18 BRPM | OXYGEN SATURATION: 95 % | DIASTOLIC BLOOD PRESSURE: 79 MMHG | HEART RATE: 77 BPM | SYSTOLIC BLOOD PRESSURE: 135 MMHG

## 2022-01-26 PROCEDURE — 85027 COMPLETE CBC AUTOMATED: CPT

## 2022-01-26 PROCEDURE — 70551 MRI BRAIN STEM W/O DYE: CPT

## 2022-01-26 PROCEDURE — 81001 URINALYSIS AUTO W/SCOPE: CPT

## 2022-01-26 PROCEDURE — 74177 CT ABD & PELVIS W/CONTRAST: CPT | Mod: ME

## 2022-01-26 PROCEDURE — 93308 TTE F-UP OR LMTD: CPT

## 2022-01-26 PROCEDURE — 85610 PROTHROMBIN TIME: CPT

## 2022-01-26 PROCEDURE — 87637 SARSCOV2&INF A&B&RSV AMP PRB: CPT

## 2022-01-26 PROCEDURE — 97116 GAIT TRAINING THERAPY: CPT

## 2022-01-26 PROCEDURE — 87040 BLOOD CULTURE FOR BACTERIA: CPT

## 2022-01-26 PROCEDURE — 80048 BASIC METABOLIC PNL TOTAL CA: CPT

## 2022-01-26 PROCEDURE — 97110 THERAPEUTIC EXERCISES: CPT

## 2022-01-26 PROCEDURE — 82607 VITAMIN B-12: CPT

## 2022-01-26 PROCEDURE — 87635 SARS-COV-2 COVID-19 AMP PRB: CPT

## 2022-01-26 PROCEDURE — 99285 EMERGENCY DEPT VISIT HI MDM: CPT

## 2022-01-26 PROCEDURE — 36415 COLL VENOUS BLD VENIPUNCTURE: CPT

## 2022-01-26 PROCEDURE — 71275 CT ANGIOGRAPHY CHEST: CPT | Mod: ME

## 2022-01-26 PROCEDURE — 92610 EVALUATE SWALLOWING FUNCTION: CPT

## 2022-01-26 PROCEDURE — 71045 X-RAY EXAM CHEST 1 VIEW: CPT

## 2022-01-26 PROCEDURE — 87493 C DIFF AMPLIFIED PROBE: CPT

## 2022-01-26 PROCEDURE — 84100 ASSAY OF PHOSPHORUS: CPT

## 2022-01-26 PROCEDURE — 93970 EXTREMITY STUDY: CPT

## 2022-01-26 PROCEDURE — 82746 ASSAY OF FOLIC ACID SERUM: CPT

## 2022-01-26 PROCEDURE — 85025 COMPLETE CBC W/AUTO DIFF WBC: CPT

## 2022-01-26 PROCEDURE — 97530 THERAPEUTIC ACTIVITIES: CPT

## 2022-01-26 PROCEDURE — 73130 X-RAY EXAM OF HAND: CPT

## 2022-01-26 PROCEDURE — 84443 ASSAY THYROID STIM HORMONE: CPT

## 2022-01-26 PROCEDURE — 80053 COMPREHEN METABOLIC PANEL: CPT

## 2022-01-26 PROCEDURE — G1004: CPT

## 2022-01-26 PROCEDURE — 93005 ELECTROCARDIOGRAM TRACING: CPT

## 2022-01-26 PROCEDURE — 83735 ASSAY OF MAGNESIUM: CPT

## 2022-01-26 PROCEDURE — 70544 MR ANGIOGRAPHY HEAD W/O DYE: CPT

## 2022-01-26 PROCEDURE — 97163 PT EVAL HIGH COMPLEX 45 MIN: CPT

## 2022-01-26 PROCEDURE — 87086 URINE CULTURE/COLONY COUNT: CPT

## 2022-01-26 RX ORDER — APIXABAN 2.5 MG/1
2 TABLET, FILM COATED ORAL
Qty: 0 | Refills: 0 | DISCHARGE
Start: 2022-01-26

## 2022-01-26 RX ORDER — VANCOMYCIN HCL 1 G
1 VIAL (EA) INTRAVENOUS
Qty: 0 | Refills: 0 | DISCHARGE
Start: 2022-01-26

## 2022-01-26 RX ORDER — LACTOBACILLUS ACIDOPHILUS 100MM CELL
0 CAPSULE ORAL
Qty: 0 | Refills: 0 | DISCHARGE
Start: 2022-01-26

## 2022-01-26 RX ORDER — LOSARTAN POTASSIUM 100 MG/1
1 TABLET, FILM COATED ORAL
Qty: 0 | Refills: 0 | DISCHARGE
Start: 2022-01-26

## 2022-01-26 RX ORDER — NYSTATIN CREAM 100000 [USP'U]/G
1 CREAM TOPICAL
Refills: 0 | Status: DISCONTINUED | OUTPATIENT
Start: 2022-01-26 | End: 2022-01-26

## 2022-01-26 RX ORDER — NYSTATIN CREAM 100000 [USP'U]/G
0 CREAM TOPICAL
Qty: 0 | Refills: 0 | DISCHARGE
Start: 2022-01-26

## 2022-01-26 RX ORDER — ACETAMINOPHEN 500 MG
2 TABLET ORAL
Qty: 0 | Refills: 0 | DISCHARGE
Start: 2022-01-26

## 2022-01-26 RX ORDER — PANTOPRAZOLE SODIUM 20 MG/1
1 TABLET, DELAYED RELEASE ORAL
Qty: 0 | Refills: 0 | DISCHARGE
Start: 2022-01-26

## 2022-01-26 RX ADMIN — LOSARTAN POTASSIUM 25 MILLIGRAM(S): 100 TABLET, FILM COATED ORAL at 06:25

## 2022-01-26 RX ADMIN — Medication 1 TABLET(S): at 06:25

## 2022-01-26 RX ADMIN — PANTOPRAZOLE SODIUM 40 MILLIGRAM(S): 20 TABLET, DELAYED RELEASE ORAL at 06:25

## 2022-01-26 RX ADMIN — Medication 125 MILLIGRAM(S): at 11:37

## 2022-01-26 RX ADMIN — Medication 125 MILLIGRAM(S): at 06:25

## 2022-01-26 RX ADMIN — APIXABAN 10 MILLIGRAM(S): 2.5 TABLET, FILM COATED ORAL at 06:26

## 2022-01-26 RX ADMIN — Medication 125 MILLIGRAM(S): at 00:01

## 2022-01-26 NOTE — PROGRESS NOTE ADULT - SUBJECTIVE AND OBJECTIVE BOX
[facility]  Ty Sheldon PA-C  [address] [place] [address]  [phone]      INTERVAL HPI/OVERNIGHT EVENTS:  Pt s/e  Pt appears tired and weak but states BMs are less frequent, but still soft  Denies further GI complaints    MEDICATIONS  (STANDING):  apixaban 10 milliGRAM(s) Oral every 12 hours  dextrose 5% + sodium chloride 0.45%. 1000 milliLiter(s) (40 mL/Hr) IV Continuous <Continuous>  influenza  Vaccine (HIGH DOSE) 0.7 milliLiter(s) IntraMuscular once  lactobacillus acidophilus 1 Tablet(s) Oral two times a day  losartan 25 milliGRAM(s) Oral daily  pantoprazole    Tablet 40 milliGRAM(s) Oral before breakfast  vancomycin    Solution 125 milliGRAM(s) Oral every 6 hours    MEDICATIONS  (PRN):  acetaminophen     Tablet .. 650 milliGRAM(s) Oral every 6 hours PRN Temp greater or equal to 38C (100.4F), Mild Pain (1 - 3)  aluminum hydroxide/magnesium hydroxide/simethicone Suspension 30 milliLiter(s) Oral every 4 hours PRN Dyspepsia  LORazepam   Injectable 1 milliGRAM(s) IV Push once PRN mri  melatonin 3 milliGRAM(s) Oral at bedtime PRN Insomnia  OLANZapine Injectable 2.5 milliGRAM(s) IntraMuscular every 6 hours PRN agitation  ondansetron Injectable 4 milliGRAM(s) IV Push every 8 hours PRN Nausea and/or Vomiting      Allergies    No Known Allergies    PHYSICAL EXAM:   Vital Signs:  Vital Signs Last 24 Hrs  T(C): 36.6 (2022 05:34), Max: 36.6 (2022 20:24)  T(F): 97.8 (2022 05:34), Max: 97.8 (2022 20:24)  HR: 68 (2022 05:34) (68 - 72)  BP: 143/76 (2022 05:34) (125/69 - 143/76)  BP(mean): --  RR: 17 (2022 05:34) (17 - 18)  SpO2: 95% (2022 05:34) (95% - 96%)  Daily     Daily Weight in k.3 (2022 05:34)    GENERAL:  Appears stated age,   HEENT:  NC/AT,    CHEST:  Full & symmetric excursion,   HEART:  Regular rhythm,  ABDOMEN:  Soft, non-tender, non-distended,  EXTEREMITIES:  no cyanosis  SKIN:  No rash  NEURO:  Appears tired      LABS:                        11.5   14.45 )-----------( 449      ( 2022 13:30 )             36.5         138  |  105  |  21  ----------------------------<  108<H>  4.5   |  28  |  0.90    Ca    10.3<H>      2022 13:30  Phos  4.1       Mg     2.3

## 2022-01-26 NOTE — PROGRESS NOTE ADULT - SUBJECTIVE AND OBJECTIVE BOX
Neurology Follow up note    GINA [id number]    HPI:  86 female sent from [facility] for diarrhea x 3 days since starting antibiotics for UTI.   	Per patient she was being treated for UTI and began having diarrhea and RLQ pain.   	Denies fever, chills.   	Denies n/v.   	+ Abdominal pain , + Diarrhea  	Denies current urinary symptoms.  Denies CP, cough or SOBno abdominal distension, no blood in stool, no burning urination, no chills, no dysuria, no fever, no hematuria, no nausea, no vomiting++ RLQ abdominal pain, no bloating, no constipation, ++ diarrhea,   < from: CT Abdomen and Pelvis w/ IV Cont (01.16.22 @ 17:29) >    ACC: [id number] EXAM:  CT ABDOMEN AND PELVIS IC                          PROCEDURE DATE:  01/16/2022          INTERPRETATION:  CLINICAL INFORMATION: Acute abdominal pain.    COMPARISON: None.    CONTRAST/COMPLICATIONS:  IV Contrast: Omnipaque 350  90cc administered   10 cc discarded  Oral Contrast: NONE  Complications: None reported at time of study completion    PROCEDURE:  CT of the Abdomen and Pelvis was performed.  Sagittal and coronal reformats were performed.    FINDINGS:  LOWER CHEST: Possible filling defect in a branch of the right lower lobe   pulmonary artery (series 2, image 7), suspicious for pulmonary embolism.   Bibasilar subsegmental atelectasis.    LIVER: Within normal limits.  BILE DUCTS: Normal caliber.  GALLBLADDER: Withinnormal limits.  SPLEEN: Subcentimeter hypodense splenic lesions, too small to   characterize.  PANCREAS: Within normal limits.  ADRENALS: Within normal limits.  KIDNEYS/URETERS: No hydronephrosis. Left upper pole renal cyst.   Additional subcentimeter hypodense bilateral renal lesions, too small to   characterize.    BLADDER: Within normal limits.  REPRODUCTIVE ORGANS: Hysterectomy.    BOWEL: Colonic diverticulosis. Long segment mural thickening of the colon   extending from the ascending colon to the level of the rectum. Small   bowel anastomosis. No bowel obstruction. Appendix is normal.  PERITONEUM: No ascites.  VESSELS: Atherosclerotic changes.  RETROPERITONEUM/LYMPH NODES: No lymphadenopathy.  ABDOMINAL WALL: Within normal limits.  BONES: Degenerative changes.    IMPRESSION:  Possible filling defect in a branch of the right lower lobe pulmonary   artery, suspicious for pulmonary embolism. Further evaluation with CTA   chest (pulmonary artery protocol) is recommended.    Long segment proctocolitis.    Findings were discussed with MARLON FERRER on 1/16/2022 at 6:28 PM   by Dr. Jean with read back confirmation.    ---            ANABEL JEAN MD; Attending Radiologist  This document has been electronically signed. Jan 16 2022  6:29PM        Interval History -no new events    Patient is seen, chart was reviewed and case was discussed with the treatment team.  Pt is not in any distress.   Lying on bed comfortably.     Vital Signs Last 24 Hrs  T(C): 37.1 (26 Jan 2022 11:03), Max: 37.1 (26 Jan 2022 11:03)  T(F): 98.7 (26 Jan 2022 11:03), Max: 98.7 (26 Jan 2022 11:03)  HR: 77 (26 Jan 2022 11:03) (68 - 77)  BP: 135/79 (26 Jan 2022 11:03) (135/79 - 143/76)  BP(mean): --  RR: 18 (26 Jan 2022 11:03) (17 - 18)  SpO2: 95% (26 Jan 2022 11:03) (95% - 95%)        REVIEW OF SYSTEMS:    Constitutional: No fever, w  Eyes: No eye pain, visual disturbances, or discharge  ENT:  No difficulty hearing, tinnitus, vertigo; No sinus or throat pain  Neck: No pain or stiffness  Respiratory: No cough, wheezing, chills or hemoptysis  Cardiovascular: No chest pain, palpitations, shortness of breath, dizziness or leg swelling  Gastrointestinal: No abdominal or epigastric pain. No nausea, vomiting or hematemesis;   Genitourinary: No dysuria, frequency, hematuria or incontinence  Neurological: No headaches, , loss of strength, numbness or tremors  Psychiatric: No depression, anxiety, mood swings or difficulty sleeping  Musculoskeletal: No joint pain or swelling; No muscle, back or extremity pain  Skin: No itching, burning, rashes or lesions   Lymph Nodes: No enlarged glands  Endocrine: No heat or cold intolerance; No hair loss,   Allergy and Immunologic: No hives or eczem    On Neurological Examination:    Mental Status - Pt is alert, awake, oriented X3. Follows commands well and able to answer questions appropriately.Mood and affect  normal    Speech -  Normal.    Cranial Nerves - Pupils 3 mm equal and reactive to light, extraocular eye movements intact. Pt has no visual field deficit.  Pt has no facial asymmetry. Facial sensation is intact.Tongue - is in midline.    Muscle tone - is normal    Motor Exam - 4+/5 all over, No drift. No shaking or tremors.    Sensory Exam - Pt withdraws all extremities equally on stimulation. No asymmetry seen. No complaints of tingling, numbness.  coordination:    Finger to nose: normal    Deep tendon Reflexes - 2 plus all over    Neck Supple -  Yes.     MEDICATIONS    acetaminophen     Tablet .. 650 milliGRAM(s) Oral every 6 hours PRN  aluminum hydroxide/magnesium hydroxide/simethicone Suspension 30 milliLiter(s) Oral every 4 hours PRN  apixaban 10 milliGRAM(s) Oral every 12 hours  influenza  Vaccine (HIGH DOSE) 0.7 milliLiter(s) IntraMuscular once  lactobacillus acidophilus 1 Tablet(s) Oral two times a day  LORazepam   Injectable 1 milliGRAM(s) IV Push once PRN  losartan 25 milliGRAM(s) Oral daily  melatonin 3 milliGRAM(s) Oral at bedtime PRN  nystatin Powder 1 Application(s) Topical two times a day  OLANZapine Injectable 2.5 milliGRAM(s) IntraMuscular every 6 hours PRN  ondansetron Injectable 4 milliGRAM(s) IV Push every 8 hours PRN  pantoprazole    Tablet 40 milliGRAM(s) Oral before breakfast  vancomycin    Solution 125 milliGRAM(s) Oral every 6 hours      Allergies    No Known Allergies    Intolerances        LABS:  CBC Full  -  ( 25 Jan 2022 13:30 )  WBC Count : 14.45 K/uL  RBC Count : 4.24 M/uL  Hemoglobin : 11.5 g/dL  Hematocrit : 36.5 %  Platelet Count - Automated : 449 K/uL  Mean Cell Volume : 86.1 fl  Mean Cell Hemoglobin : 27.1 pg  Mean Cell Hemoglobin Concentration : 31.5 gm/dL  Auto Neutrophil # : x        01-25    138  |  105  |  21  ----------------------------<  108<H>  4.5   |  28  |  0.90    Ca    10.3<H>      25 Jan 2022 13:30  Phos  4.1     01-25  Mg     2.3     01-25      Hemoglobin A1C:     Vitamin B12     RADIOLOGY  < from: MR Head No Cont (01.18.22 @ 09:39) >    ACC: [id number] EXAM:  MR BRAIN                        ACC: [id number] EXAM:  MR ANGIO BRAIN                          PROCEDURE DATE:  01/18/2022          INTERPRETATION:  CLINICAL INFORMATION: Acute mental status. PMR.   Admitting Dxs: K52.9 NONINFECTIVE GASTROENTERITIS AND COLITIS,   UNSPECIFIED.    TECHNIQUE: Multiplanar, multisequence MRI of the brain without gadolinium   contrast, 3-D time of flight brain MRA. 3-D reformats were obtained.    COMPARISON: No relevant prior studies.    Brain MRI:    There is a 7 x 5 x 5 mm lesion in the right temporal lobe with   heterogeneous internal T1 and T2 signal, T2 hypointense rim and   susceptibility artifact suggesting a cavernoma.    There is no evidence of acute infarct. Scattered foci of T2/FLAIR  hyperintensity in the bilateral hemispheric white matter are nonspecific   but likely related to chronic white matter microvascular ischemic   disease. There is cerebral volume loss. Chronic lacunar infarct within   the right basal ganglia.    Flowvoids of the major intracranial vessels at the skull base follow   expected course and contour.    The paranasal sinuses demonstrate no signal abnormality. The mastoids   demonstrate no signal abnormality.  Status post left intraocular lens   implant.    Brain MRA:    There is flow-related signal in the bilateral intradural internal   carotid, anterior cerebral and middle cerebral arteries. No evidence of   large vessel occlusion.    There is flow-related signal in the bilateral posterior cerebral,   basilar, and intradural vertebral arteries. The branch vasculature of the   posterior circulation is within normal limits. No evidence of large   vessel occlusion.    No evidence of aneurysm. Tiny aneurysms can be beyond the resolution of   MRA technique. No evidence of arteriovenous malformation.      IMPRESSION:    Brain MRI:  1.  No acute infarct.  2.  A 7 x 5 x 5 mm lesion in the right temporal lobe with heterogeneous   internal T1 and T2 signal, T2 hypointense rim and susceptibility artifact   suggesting a cavernoma. Consider correlation with MRI brain with   contrast, if there is no contraindication.    Brain MRA: No large vessel occlusion or aneurysm.    --        BERNADETTE MAGDALENO MD; Resident Radiologist  This document has been electronically signed.  LISA VILLAVICENCIO MD; Attending Radiologist  This document has been electronically signed. Jan 18 2022 10:29AM        ASSESSMENT AND PLAN:      seen for memory loss  MCI vs early dementia  left temporal cavernoma    no further neuro w/u  being dc today

## 2022-01-26 NOTE — PROGRESS NOTE ADULT - PROBLEM SELECTOR PROBLEM 4
Hypokalemia
Dementia
Hypokalemia

## 2022-01-26 NOTE — DISCHARGE NOTE PROVIDER - NSDCCPCAREPLAN_GEN_ALL_CORE_FT
PRINCIPAL DISCHARGE DIAGNOSIS  Diagnosis: Clostridium difficile colitis  Assessment and Plan of Treatment:       SECONDARY DISCHARGE DIAGNOSES  Diagnosis: Pulmonary embolism  Assessment and Plan of Treatment:

## 2022-01-26 NOTE — PROGRESS NOTE ADULT - SUBJECTIVE AND OBJECTIVE BOX
Date/Time Patient Seen:  		  Referring MD:   Data Reviewed	       Patient is a 86y old  Female who presents with a chief complaint of 86 female sent from [facility] for diarrhea x 3 days since starting antibiotics for UTI.   	Per patient she was being treated for UTI and began having diarrhea and RLQ pain.   	Denies fever, chills.   	Denies n/v.   	+ Abdominal pain , + Diarrhea  	Denies current urinary symptoms.  Denies CP, cough or SOBno abdominal distension, no blood in stool, no burning urination, no chills, no dysuria, no fever, no hematuria, no nausea, no vomiting++ RLQ abdominal pain, no bloating, no constipation, ++ diarrhea,   < from: CT Abdomen and Pelvis w/ IV Cont (01.16.22 @ 17:29) > (25 Jan 2022 11:49)      Subjective/HPI     PAST MEDICAL & SURGICAL HISTORY:  No pertinent past medical history    No pertinent past medical history          Medication list         MEDICATIONS  (STANDING):  apixaban 10 milliGRAM(s) Oral every 12 hours  dextrose 5% + sodium chloride 0.45%. 1000 milliLiter(s) (40 mL/Hr) IV Continuous <Continuous>  influenza  Vaccine (HIGH DOSE) 0.7 milliLiter(s) IntraMuscular once  lactobacillus acidophilus 1 Tablet(s) Oral two times a day  losartan 25 milliGRAM(s) Oral daily  pantoprazole    Tablet 40 milliGRAM(s) Oral before breakfast  vancomycin    Solution 125 milliGRAM(s) Oral every 6 hours    MEDICATIONS  (PRN):  acetaminophen     Tablet .. 650 milliGRAM(s) Oral every 6 hours PRN Temp greater or equal to 38C (100.4F), Mild Pain (1 - 3)  aluminum hydroxide/magnesium hydroxide/simethicone Suspension 30 milliLiter(s) Oral every 4 hours PRN Dyspepsia  LORazepam   Injectable 1 milliGRAM(s) IV Push once PRN mri  melatonin 3 milliGRAM(s) Oral at bedtime PRN Insomnia  OLANZapine Injectable 2.5 milliGRAM(s) IntraMuscular every 6 hours PRN agitation  ondansetron Injectable 4 milliGRAM(s) IV Push every 8 hours PRN Nausea and/or Vomiting         Vitals log        ICU Vital Signs Last 24 Hrs  T(C): 36.6 (26 Jan 2022 05:34), Max: 36.6 (25 Jan 2022 20:24)  T(F): 97.8 (26 Jan 2022 05:34), Max: 97.8 (25 Jan 2022 20:24)  HR: 68 (26 Jan 2022 05:34) (68 - 72)  BP: 143/76 (26 Jan 2022 05:34) (125/69 - 143/76)  BP(mean): --  ABP: --  ABP(mean): --  RR: 17 (26 Jan 2022 05:34) (17 - 18)  SpO2: 95% (26 Jan 2022 05:34) (95% - 96%)           Input and Output:  I&O's Detail    24 Jan 2022 07:01  -  25 Jan 2022 07:00  --------------------------------------------------------  IN:  Total IN: 0 mL    OUT:    Voided (mL): 400 mL  Total OUT: 400 mL    Total NET: -400 mL      25 Jan 2022 07:01  -  26 Jan 2022 06:13  --------------------------------------------------------  IN:    dextrose 5% + sodium chloride 0.45%: 320 mL  Total IN: 320 mL    OUT:  Total OUT: 0 mL    Total NET: 320 mL          Lab Data                        11.5   14.45 )-----------( 449      ( 25 Jan 2022 13:30 )             36.5     01-25    138  |  105  |  21  ----------------------------<  108<H>  4.5   |  28  |  0.90    Ca    10.3<H>      25 Jan 2022 13:30  Phos  4.1     01-25  Mg     2.3     01-25              Review of Systems	      Objective     Physical Examination    heart s1s2  lung dec BS  abd soft      Pertinent Lab findings & Imaging      Víctor:  NO   Adequate UO     I&O's Detail    24 Jan 2022 07:01  -  25 Jan 2022 07:00  --------------------------------------------------------  IN:  Total IN: 0 mL    OUT:    Voided (mL): 400 mL  Total OUT: 400 mL    Total NET: -400 mL      25 Jan 2022 07:01  -  26 Jan 2022 06:13  --------------------------------------------------------  IN:    dextrose 5% + sodium chloride 0.45%: 320 mL  Total IN: 320 mL    OUT:  Total OUT: 0 mL    Total NET: 320 mL               Discussed with:     Cultures:	        Radiology

## 2022-01-26 NOTE — PROGRESS NOTE ADULT - PROBLEM SELECTOR PROBLEM 5
Dementia
NPH (normal pressure hydrocephalus)
Dementia

## 2022-01-26 NOTE — DISCHARGE NOTE PROVIDER - HOSPITAL COURSE
DIARRHEA 2/2 TO COLITIS ,RULED IN FOR  C.DIFF -ON VANCO  ,STOOL GI CPR IS PENDING   Proctocolitis as per CT ABD -GI CONS ,ID CONS obtained   w 1/16/2022 w 16   Cr 1/16/2022 Cr .9   ua 1/16/2022 ua w 3-5   ct ap ic  1/16/2022 long segment proctocolitis Possible pe rll   flu ab 1/16/2022 (-)  rsv 1/16/2022 (-)   PULMONARY EMBOLISM SUSPECTED BUT NOT CONFIRMED DUE TO PATIENT'S REFUSAL OF TESTING -OAC plan as per pulmadi Kaur   ct ap ic  1/16/2022 long segment proctocolitis Possible pe rll   1/16/2022 Will hold off repeat cta with iv contrast 48 h to avoid ruthie   1/16/2022 Check v duplx   01/17/22- as per hematologist hold OAC due to reported bloody stools and not confirmed diagnosis     Problem/Plan - 1:  ·  Problem: Diarrhea.   ·  Plan: DIARRHEA 2/2 TO C.DIFF COLITIS ,poa -ON VANCOMYCYIN AND BACID  CONTACT ISOLATION ,ID AND GI CONS ARE FOLLOWING.    Problem/Plan - 2:  ·  Problem: Suspected pulmonary embolism.   ·  Plan: PE WAS RULED IN BY POSITIVE CTA FINDINGS AND POA-  management as per pulm Dr Kaur ,on ELIQUIS ,family is aware of risks vs benefits ,spoke to the daughter Joseline.    Problem/Plan - 3:  ·  Problem: Proctocolitis.   ·  Plan: Admitted for septic workup and evaluation,send blood and urine cx,serial lactate levels,monitor vitals closley,ivfs hydration,monitor urine output and renal profile,iv abx as per ID CONS .GI is following.    Problem/Plan - 4:  ·  Problem: Hypokalemia.   ·  Plan: replaced ,serial bmp ,monitor electrolytes closely.    Problem/Plan - 5:  ·  Problem: Dementia.   ·  Plan: Supportive care,frequent redirection,continue home medications,management of agitation as needed .Patient keeps refusing all tests and labs -daughter is aware .Psych cons requested.    Problem/Plan - 6:  ·  Problem: NPH (normal pressure hydrocephalus).   ·  Plan: family requests MRI BRAIN and neurology eval ,d/w Dr Preciado and daughter.    Problem/Plan - 7:  ·  Problem: Ataxia.   ·  Plan: Family requests ONEAL placement.    Problem/Plan - 8:  ·  Problem: Abnormal brain MRI.   ·  Plan: A 7 x 5 x 5 mm lesion in the right temporal lobe with heterogeneous   internal T1 and T2 signal, T2 hypointense rim and susceptibility artifact   suggesting a cavernoma .Neurology is following.    Problem/Plan - 9:  ·  Problem: Prophylactic measure.   ·  Plan: Gastrointestinal stress ulcer prophylaxis and DVT prophylaxis administered.

## 2022-01-26 NOTE — DISCHARGE NOTE PROVIDER - NSDCCAREPROVSEEN_GEN_ALL_CORE_FT
Arsalan, Fei Foss, Shadi Roa, Vance Srivastava, Mohsen Schmuter, Deidra Hernandez, Jun Kaur, Tha Abdi, Jacinto Love, Zion ZHENG

## 2022-01-26 NOTE — CHART NOTE - NSCHARTNOTEFT_GEN_A_CORE
Assessment:   Pt seen for nutrition follow up.  Chart reviewed, hospital course noted.     Brief History: Patient is a 86y old  Female who presents with a chief complaint of 86 female sent from [facility] for diarrhea x 3 days since starting antibiotics for UTI.  ADmitted for C.Diff coliltis.    Pt seen at bedside with CNA present. Pt confused.  CNA reports intake fair, ate a little less this morning.  Agreeable to yogurt daily.  CNA reports stools improving, more solid.       Factors impacting intake: [x] none [ ] nausea  [ ] vomiting [ ] diarrhea [ ] constipation  [ ]chewing problems [ ] swallowing issues  [ ] other:     Diet Prescription: Diet, Low Fiber:   Supplement Feeding Modality:  Oral  Ensure Clear Cans or Servings Per Day:  1       Frequency:  Three Times a day (01-19-22 @ 09:18)    Intake: fair    Current Weight: 1/20 105.8#, 1/24 122.3#, 1/26 121.9#      Pertinent Medications: MEDICATIONS  (STANDING):  apixaban 10 milliGRAM(s) Oral every 12 hours  dextrose 5% + sodium chloride 0.45%. 1000 milliLiter(s) (40 mL/Hr) IV Continuous <Continuous>  influenza  Vaccine (HIGH DOSE) 0.7 milliLiter(s) IntraMuscular once  lactobacillus acidophilus 1 Tablet(s) Oral two times a day  losartan 25 milliGRAM(s) Oral daily  pantoprazole    Tablet 40 milliGRAM(s) Oral before breakfast  vancomycin    Solution 125 milliGRAM(s) Oral every 6 hours    MEDICATIONS  (PRN):  acetaminophen     Tablet .. 650 milliGRAM(s) Oral every 6 hours PRN Temp greater or equal to 38C (100.4F), Mild Pain (1 - 3)  aluminum hydroxide/magnesium hydroxide/simethicone Suspension 30 milliLiter(s) Oral every 4 hours PRN Dyspepsia  LORazepam   Injectable 1 milliGRAM(s) IV Push once PRN mri  melatonin 3 milliGRAM(s) Oral at bedtime PRN Insomnia  OLANZapine Injectable 2.5 milliGRAM(s) IntraMuscular every 6 hours PRN agitation  ondansetron Injectable 4 milliGRAM(s) IV Push every 8 hours PRN Nausea and/or Vomiting    Pertinent Labs: 01-25 Na138 mmol/L Glu 108 mg/dL<H> K+ 4.5 mmol/L Cr  0.90 mg/dL BUN 21 mg/dL 01-25 Phos 4.1 mg/d    Skin:  no pressure injuries  Edema: no edema    Estimated Needs:   [x] no change since previous assessment on: 1/19 based on wt 112# at time of asmnt  [ ] recalculated: based on   kcal/day:  gms protein/day:    Previous Nutrition Diagnosis:   [x] Altered GI Function     Nutrition Diagnosis is [x] ongoing  [ ] resolved [ ] not applicable     New Nutrition Diagnosis: [x] not applicable       Interventions:   Recommend  [x] Continue current diet  [ ] Change Diet To:  [ ] Nutrition Supplement  [ ] Nutrition Support  [x] Other:  add greek yogurt qam    Monitoring and Evaluation:   [x] PO intake [ x ] Tolerance to diet prescription [ x ] weights [ x ] labs [ x ] follow up per protocol  [x] other: s/s GI distress, bowel function, skin integrity/ edema

## 2022-01-26 NOTE — DISCHARGE NOTE PROVIDER - REASON FOR ADMISSION
86 female sent from [facility] living for diarrhea x 3 days since starting antibiotics for UTI.   	Per patient she was being treated for UTI and began having diarrhea and RLQ pain.   	Denies fever, chills.   	Denies n/v.   	+ Abdominal pain , + Diarrhea  	Denies current urinary symptoms.  Denies CP, cough or SOBno abdominal distension, no blood in stool, no burning urination, no chills, no dysuria, no fever, no hematuria, no nausea, no vomiting++ RLQ abdominal pain, no bloating, no constipation, ++ diarrhea,   < from: CT Abdomen and Pelvis w/ IV Cont (01.16.22 @ 17:29) >

## 2022-01-26 NOTE — PROGRESS NOTE ADULT - PROBLEM SELECTOR PLAN 5
Supportive care,frequent redirection,continue home medications,management of agitation as needed .Patient keeps refusing all tests and labs -daughter is aware .Psych cons requested
family requests MRI BRAIN and neurology brent ,d/w Dr Preciado and daughter
Supportive care,frequent redirection,continue home medications,management of agitation as needed .Patient keeps refusing all tests and labs -daughter is aware .Psych cons requested
Supportive care,frequent redirection,continue home medications,management of agitation as needed .Patient keeps refusing all tests and labs -daughter is aware .Psych cons requested

## 2022-01-26 NOTE — PROGRESS NOTE ADULT - REASON FOR ADMISSION
86 female sent from [facility] living for diarrhea x 3 days since starting antibiotics for UTI.   	Per patient she was being treated for UTI and began having diarrhea and RLQ pain.   	Denies fever, chills.   	Denies n/v.   	+ Abdominal pain , + Diarrhea  	Denies current urinary symptoms.  Denies CP, cough or SOBno abdominal distension, no blood in stool, no burning urination, no chills, no dysuria, no fever, no hematuria, no nausea, no vomiting++ RLQ abdominal pain, no bloating, no constipation, ++ diarrhea,   < from: CT Abdomen and Pelvis w/ IV Cont (01.16.22 @ 17:29) >

## 2022-01-26 NOTE — PROGRESS NOTE ADULT - REASON FOR ADMISSION
86 female sent from [facility] living for diarrhea x 3 days since starting antibiotics for UTI.   	Per patient she was being treated for UTI and began having diarrhea and RLQ pain.   	Denies fever, chills.   	Denies n/v.   	+ Abdominal pain , + Diarrhea  	Denies current urinary symptoms.  Denies CP, cough or SOBno abdominal distension, no blood in stool, no burning urination, no chills, no dysuria, no fever, no hematuria, no nausea, no vomiting++ RLQ abdominal pain, no bloating, no constipation, ++ diarrhea,   < from: CT Abdomen and Pelvis w/ IV Cont (01.16.22 @ 17:29) >

## 2022-01-26 NOTE — PROGRESS NOTE ADULT - SUBJECTIVE AND OBJECTIVE BOX
GINA GOLD    PLV TELN 334 W1    Allergies    No Known Allergies    Intolerances        PAST MEDICAL & SURGICAL HISTORY:  No pertinent past medical history        FAMILY HISTORY:      Home Medications:  amLODIPine 5 mg oral tablet: 1 tab(s) orally once a day (18 Jan 2022 13:44)  Aspirin Enteric Coated 81 mg oral delayed release tablet: 1 tab(s) orally once a day (18 Jan 2022 13:44)  hydrALAZINE 10 mg oral tablet: 1 tab(s) orally 2 times a day (18 Jan 2022 13:44)  Nasal Saline 0.65% nasal spray: 2 spray(s) nasal 2 times a day for 15 days. Start: 01/06/22 (18 Jan 2022 13:44)  sertraline 50 mg oral tablet: 1 tab(s) orally once a day (18 Jan 2022 13:44)      MEDICATIONS  (STANDING):  apixaban 10 milliGRAM(s) Oral every 12 hours  dextrose 5% + sodium chloride 0.45%. 1000 milliLiter(s) (40 mL/Hr) IV Continuous <Continuous>  influenza  Vaccine (HIGH DOSE) 0.7 milliLiter(s) IntraMuscular once  lactobacillus acidophilus 1 Tablet(s) Oral two times a day  losartan 25 milliGRAM(s) Oral daily  pantoprazole    Tablet 40 milliGRAM(s) Oral before breakfast  vancomycin    Solution 125 milliGRAM(s) Oral every 6 hours    MEDICATIONS  (PRN):  acetaminophen     Tablet .. 650 milliGRAM(s) Oral every 6 hours PRN Temp greater or equal to 38C (100.4F), Mild Pain (1 - 3)  aluminum hydroxide/magnesium hydroxide/simethicone Suspension 30 milliLiter(s) Oral every 4 hours PRN Dyspepsia  LORazepam   Injectable 1 milliGRAM(s) IV Push once PRN mri  melatonin 3 milliGRAM(s) Oral at bedtime PRN Insomnia  OLANZapine Injectable 2.5 milliGRAM(s) IntraMuscular every 6 hours PRN agitation  ondansetron Injectable 4 milliGRAM(s) IV Push every 8 hours PRN Nausea and/or Vomiting      Diet, Low Fiber:   Supplement Feeding Modality:  Oral  Ensure Clear Cans or Servings Per Day:  1       Frequency:  Three Times a day (01-19-22 @ 09:18) [Active]          Vital Signs Last 24 Hrs  T(C): 36.6 (26 Jan 2022 05:34), Max: 36.6 (25 Jan 2022 20:24)  T(F): 97.8 (26 Jan 2022 05:34), Max: 97.8 (25 Jan 2022 20:24)  HR: 68 (26 Jan 2022 05:34) (68 - 72)  BP: 143/76 (26 Jan 2022 05:34) (125/69 - 143/76)  BP(mean): --  RR: 17 (26 Jan 2022 05:34) (17 - 18)  SpO2: 95% (26 Jan 2022 05:34) (95% - 96%)      01-24-22 @ 07:01  -  01-25-22 @ 07:00  --------------------------------------------------------  IN: 0 mL / OUT: 400 mL / NET: -400 mL    01-25-22 @ 07:01  -  01-26-22 @ 06:25  --------------------------------------------------------  IN: 320 mL / OUT: 0 mL / NET: 320 mL              LABS:                        11.5   14.45 )-----------( 449      ( 25 Jan 2022 13:30 )             36.5     01-25    138  |  105  |  21  ----------------------------<  108<H>  4.5   |  28  |  0.90    Ca    10.3<H>      25 Jan 2022 13:30  Phos  4.1     01-25  Mg     2.3     01-25                WBC:  WBC Count: 14.45 K/uL (01-25 @ 13:30)      MICROBIOLOGY:  RECENT CULTURES:                  Sodium:  Sodium, Serum: 138 mmol/L (01-25 @ 13:30)      0.90 mg/dL 01-25 @ 13:30      Hemoglobin:  Hemoglobin: 11.5 g/dL (01-25 @ 13:30)      Platelets: Platelet Count - Automated: 449 K/uL (01-25 @ 13:30)              RADIOLOGY & ADDITIONAL STUDIES:      MICROBIOLOGY:  RECENT CULTURES:

## 2022-01-26 NOTE — PROGRESS NOTE ADULT - ASSESSMENT
85 yo woman presented to the ER with GI symptoms including diarrhea, abdominal pain and reported blood in her stool, found incidentally on CT abd/Plv to have "possible filling defect in branch of RLL pulmonary artery" concerning for pulmonary embolism;   Dedicated CT angio of chest showed RLL segmental branch pulmonary embolism  Patient ultimately diagnosed with C-diff colitis and started on antibiotics    - continue full dose anticoagulation given definitive evidence of segmental clot noted; remains on Lovenox 50mg Q12 hours;   - suspect clot is provoked due to sedentary lifestyle  - when stable for transfer back to assisted living, can transition to [facility] and continue for 3-6 months  - no role for hypercoaguable work-up  - no additional hematology interventions indicated; will sign off; reconsult if needed  - case discussed with patient and Dr. Rodriguez (hospitalist)  
86 female sent from [facility] assisted living for diarrhea x 3 days since starting antibiotics for UTI.   Per patient she was being treated for UTI and began having diarrhea and RLQ pain.    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  
86 female sent from [facility] assisted living for diarrhea x 3 days since starting antibiotics for UTI.   Per patient she was being treated for UTI and began having diarrhea and RLQ pain.    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
86 female with hx of HTN, NPH, kidney stones with prior stent placement, sent from [facility] for diarrhea x 3 days. Found to have clostridium difficile colitis.    She remains afebrile and without leukocytosis. Creatinine stable. She says this is her first episode of clostridium difficile infection.    -continue vancomycin 125mg PO q6h x 10 days  -follow blood cultures to completion  -contact plus isolation  -will sign off, please call ID if any further questions. Thank you.    Aracely Brady MD  Division of Infectious Diseases   Cell [phone] between 8am and 6pm   After 6pm and weekends please call ID service at [phone].   
DIARRHEA 2/2 TO COLITIS ,RULE OUT C.DIFF ,STOOL GI CPR IS PENDING   Proctocolitis as per CT ABD -GI CONS ,ID CONS obtained   w 1/16/2022 w 16   Cr 1/16/2022 Cr .9   ua 1/16/2022 ua w 3-5   ct ap ic  1/16/2022 long segment proctocolitis Possible pe rll   flu ab 1/16/2022 (-)  rsv 1/16/2022 (-)   PULMONARY EMBOLISM SUSPECTED BUT NOT CONFIRMED DUE TO PATIENT'S REFUSAL OF TESTING -OAC plan as per pulm Dr Kaur   ct ap ic  1/16/2022 long segment proctocolitis Possible pe rll   1/16/2022 Will hold off repeat cta with iv contrast 48 h to avoid ruthie   1/16/2022 Check v duplx   01/17/22- as per hematologist hold OAC due to reported bloody stools and not confirmed diagnosis 
DIARRHEA 2/2 TO COLITIS ,RULED IN FOR  C.DIFF -ON VANCO  ,STOOL GI CPR IS PENDING   Proctocolitis as per CT ABD -GI CONS ,ID CONS obtained   w 1/16/2022 w 16   Cr 1/16/2022 Cr .9   ua 1/16/2022 ua w 3-5   ct ap ic  1/16/2022 long segment proctocolitis Possible pe rll   flu ab 1/16/2022 (-)  rsv 1/16/2022 (-)   PULMONARY EMBOLISM SUSPECTED BUT NOT CONFIRMED DUE TO PATIENT'S REFUSAL OF TESTING -[facility] plan as per pulm Dr Kaur   ct ap ic  1/16/2022 long segment proctocolitis Possible pe rll   1/16/2022 Will hold off repeat cta with iv contrast 48 h to avoid ruthie   1/16/2022 Check v duplx   01/17/22- as per hematologist hold OAC due to reported bloody stools and not confirmed diagnosis 
proctocolitis  diarrhea    C. diff positive  abx as per ID  diet as tolerated  will follow    Advanced care planning was discussed with patient and family.  Advanced care planning forms were reviewed and discussed.  Risks, benefits and alternatives of gastroenterologic procedures were discussed in detail and all questions were answered.    30 minutes spent.
proctocolitis  diarrhea    C. diff positive  abx as per ID  f/u GI pcr  diet as tolerated  will follow    Advanced care planning was discussed with patient and family.  Advanced care planning forms were reviewed and discussed.  Risks, benefits and alternatives of gastroenterologic procedures were discussed in detail and all questions were answered.    30 minutes spent.  
86 female sent from [facility] assisted [facility] for diarrhea x 3 days since starting antibiotics for UTI.   Per patient she was being treated for UTI and began having diarrhea and RLQ pain.    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
86 female sent from [facility] assisted [facility] for diarrhea x 3 days since starting antibiotics for UTI.   	Per patient she was being treated for UTI and began having diarrhea and RLQ pain.    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
86 female sent from [facility] assisted [facility] for diarrhea x 3 days since starting antibiotics for UTI.   Per patient she was being treated for UTI and began having diarrhea and RLQ pain.    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
86 female sent from [facility] assisted [facility] for diarrhea x 3 days since starting antibiotics for UTI.   	Per patient she was being treated for UTI and began having diarrhea and RLQ pain.    C diff positive  vs noted  GI eval noted  on VANCO    on IVF  labs reviewed  ct abd noted  I and O  monitor VS and HD and Sat  on emp ABX  cx in progress  biomarkers  assist with needs - ADL  GOC discussion in progress - full code - pt made the decision - see GOC documentation
proctocolitis  diarrhea    C. diff positive  abx as per ID  diet as tolerated  will follow    Advanced care planning was discussed with patient and family.  Advanced care planning forms were reviewed and discussed.  Risks, benefits and alternatives of gastroenterologic procedures were discussed in detail and all questions were answered.    30 minutes spent.
proctocolitis  diarrhea    C. diff positive  abx as per ID  diet as tolerated  will follow    Advanced care planning was discussed with patient and family.  Advanced care planning forms were reviewed and discussed.  Risks, benefits and alternatives of gastroenterologic procedures were discussed in detail and all questions were answered.    30 minutes spent.  
proctocolitis  diarrhea    C. diff positive  abx as per ID  f/u GI pcr  diet as tolerated  will follow    Advanced care planning was discussed with patient and family.  Advanced care planning forms were reviewed and discussed.  Risks, benefits and alternatives of gastroenterologic procedures were discussed in detail and all questions were answered.    30 minutes spent.
    ALLA FUENTES 86 f 1/16/2022 1936 DR RAJI MARC     REVIEW OF SYMPTOMS      Able to give ROS  Yes     RELIABLE +/-   CONSTITUTIONAL Weakness Yes  Chills No   ENDOCRINE  No heat or cold intolerance    ALLERGY No hives  Sore throat No stridor  RESP Coughing blood no  Shortness of breath YES   NEURO No Headache  Confusion Pain neck No   CARDIAC No Chest pain No Palpitations   GI  Pain abdomen NO   Vomiting NO     PHYSICAL EXAM    HEENT Unremarkable  atraumatic   RESP Fair air entry EXP prolonged    Harsh breath sound Resp distres mild   CARDIAC S1 S2 No S3     NO JVD    ABDOMEN SOFT BS PRESENT NOT DISTENDED No hepatosplenomegaly PEDAL EDEMA present No calf tenderness  NO rash       DOA/CC.  1/16/2022 86 f presented with pain abd and diarrhea x3 d ssince starting abio for uti at [place]    ctap showed poss pe and pulm consulted 1/16/2022         PROBLEMS  1/16/2022   admission.       DIARRHEA poa   Proctocolitis 1/16/2022 ctap ic   PULMONARY EMBOLISM poa 1/16/2022  FD LOVENOX Startd 1/16    C diff 1/17 Detectd                                COVID STATUS. 1/16/2022 scv2 (-)   ICU STAY. none  GOC.  1/17/2022 full code        BEST PRACTICE ISSUES.                                                  HEAD OF BED ELEVATION.   Yes  DVT PROPHYLAXIS.    1/17/2022 lvnx 50.2 (pe on cta ch done 1/17/2022)     GARCIA PROPHYLAXIS.    1/17/2022 protonix                                                                                       DIET.        1/17/2022 soft bite        INFECTION PROPHYLAXIS.      PATIENT DATA   VITALS/PO/IO/VENT/DRIPS.     1/17/2022 afeb 70 140/90      ASSESSMENT/RECOMMENDATIONS.    HEMODYNAMICS.   Monitor bp Target MAP 65 (+)    RESP.   Monitor po Target po 90-95%    OXYGEN REQUIREMENTS.   1/17/2022 ra 97%     INFECTION  Proctocolitis  C diff detected 1/17/2022   w 1/16-1/17/2022 w 16-10    Cr 1/16/2022 Cr .9   ua 1/16/2022 ua w 3-5   ct ap ic  1/16/2022 long segment proctocolitis Possible pe rll   flu ab 1/16/2022 (-)  rsv 1/16/2022 (-)   urine c 1/16 (-)   C diff 1/17/2022 (+)  A/R  1/17/2022 vanco 125.4 startd     ANEMIA.  Hb 1/16-1/17 Hb 12.5-11.4    PULMONARY EMBOLISM  cta ch 1/17/2022 filling defect segental rll   ct ap ic  1/16/2022 long segment proctocolitis Possible pe rll   1/17/2022   DW Dr Rodriguez and Dr Abdi  1/17/2022 Pt cleared for full dose ac  1/17/2022 Started lvnx     TIME SPENT   Over 25 minutes aggregate care time spent on encounter; activities included   direct patient care, counseling and/or coordinating care reviewing notes, lab data/ imaging , discussion with multidisciplinary team/ patient  /family and explaining in detail risks, benefits, alternatives  of the recommendations     ALLA FUENTES 86 f 1/16/2022 1936 DR RAJI MARC     
86 female sent from [facility] assisted [facility] for diarrhea x 3 days since starting antibiotics for UTI.   Per patient she was being treated for UTI and began having diarrhea and RLQ pain.    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
86 female sent from [facility] assisted [facility] for diarrhea x 3 days since starting antibiotics for UTI.   Per patient she was being treated for UTI and began having diarrhea and RLQ pain.    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
proctocolitis  diarrhea    C. diff positive  abx as per ID  diet as tolerated  will follow    Advanced care planning was discussed with patient and family.  Advanced care planning forms were reviewed and discussed.  Risks, benefits and alternatives of gastroenterologic procedures were discussed in detail and all questions were answered.    30 minutes spent.  
    ALLA FUENTES 86 f 1/16/2022 1936 DR RAIJ MARC     REVIEW OF SYMPTOMS      Able to give ROS  Yes     RELIABLE +/-   CONSTITUTIONAL Weakness Yes  Chills No   ENDOCRINE  No heat or cold intolerance    ALLERGY No hives  Sore throat No stridor  RESP Coughing blood no  Shortness of breath YES   NEURO No Headache  Confusion Pain neck No   CARDIAC No Chest pain No Palpitations   GI  Pain abdomen NO   Vomiting NO     PHYSICAL EXAM    HEENT Unremarkable  atraumatic   RESP Fair air entry EXP prolonged    Harsh breath sound Resp distres mild   CARDIAC S1 S2 No S3     NO JVD    ABDOMEN SOFT BS PRESENT NOT DISTENDED No hepatosplenomegaly PEDAL EDEMA present No calf tenderness  NO rash       DOA/CC.  1/16/2022 86 f presented with pain abd and diarrhea x3 d ssince starting abio for uti at [place]    ctap showed poss pe and pulm consulted 1/16/2022         PROBLEMS  1/16/2022   admission.       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 1/16/2022 scv2 (-)   ICU STAY. none  GOC.  1/17/2022 full code                                 BEST PRACTICE ISSUES.                                                  HEAD OF BED ELEVATION.   Yes  DVT PROPHYLAXIS.    1/17/2022 lvnx 50.2 (pe on cta ch done 1/17/2022)     GARCIA PROPHYLAXIS.    1/17/2022 protonix                                                                                       DIET.        1/17/2022 soft bite        INFECTION PROPHYLAXIS.    SPEECH SW RECOMMENDATIONS.          ASSESSMENT/RECOMMENDATIONS.    HEMODYNAMICS.   Monitor bp Target MAP 65 (+)    RESP.   Monitor po Target po 90-95%    IV FL.  1/17 d5 1/2 20k 60    OXYGEN REQUIREMENTS.   1/20/2022 ra 90%     INFECTION  Proctocolitis  C diff detected 1/17/2022   w 1/16-1/17/2022 w 16-10    1/17/2022 vanco 125.4 startd       ANEMIA.  Hb 1/16-1/17-1/18 Hb 12.5-11.4- 10.8     PULMONARY EMBOLISM  cta ch 1/17/2022 filling defect segental rll   1/17/2022 Started lvnx     TIME SPENT   Over 25 minutes aggregate care time spent on encounter; activities included   direct patient care, counseling and/or coordinating care reviewing notes, lab data/ imaging , discussion with multidisciplinary team/ patient  /family and explaining in detail risks, benefits, alternatives  of the recommendations     ALLA FUENTES 86 f 1/16/2022 1936 DR RAJI MARC   
    ALLA FUENTES 86 f 1/16/2022 1936 DR RAJI MARC     REVIEW OF SYMPTOMS      Able to give (reliable) ROS  NO     PHYSICAL EXAM    HEENT Unremarkable  atraumatic   RESP Fair air entry EXP prolonged    Harsh breath sound Resp distres mild   CARDIAC S1 S2 No S3     NO JVD    ABDOMEN SOFT BS PRESENT NOT DISTENDED No hepatosplenomegaly   PEDAL EDEMA present No calf tenderness  NO rash         PROBLEMS  1/16/2022   admission.       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 1/16/2022 scv2 (-)   ICU STAY. none  GOC.  1/17/2022 full code                                 BEST PRACTICE ISSUES.                                                  HEAD OF BED ELEVATION.   Yes  DVT PROPHYLAXIS.  1/21/2022 apixab 10.2 (pe 1/2022)  1/17/2022 lvnx 50.2 (pe on cta ch done 1/17/2022)     GARCIA PROPHYLAXIS.    1/17/2022 protonix                                                                                       DIET.        1/17/2022 soft bite        INFECTION PROPHYLAXIS.    SPEECH SW RECOMMENDATIONS.      ASSESSMENT/RECOMMENDATIONS.    HEMODYNAMICS.   Monitor bp Target MAP 65 (+)    RESP.   Monitor po Target po 90-95%    IV FL.  1/17 d5 1/2 20k 60    OXYGEN REQUIREMENTS.   1/21/2022 ra 96%     INFECTION  Proctocolitis  C diff detected 1/17/2022   w 1/16-1/17/2022 w 16-10    1/17/2022 vanco 125.4 startd       ANEMIA.  Hb 1/16-1/17-1/18 Hb 12.5-11.4- 10.8     PULMONARY EMBOLISM  cta ch 1/17/2022 filling defect segental rll   1/21/2022 changd to apixa     TIME SPENT   Over 25 minutes aggregate care time spent on encounter; activities included   direct patient care, counseling and/or coordinating care reviewing notes, lab data/ imaging , discussion with multidisciplinary team/ patient  /family and explaining in detail risks, benefits, alternatives  of the recommendations     ALLA FUENTES 86 f 1/16/2022 1936 DR RAJI MARC   
    ALLA FUENTES 86 f 1/16/2022 1936 DR RAJI MARC     REVIEW OF SYMPTOMS      Able to give (reliable) ROS  NO     PHYSICAL EXAM    HEENT Unremarkable  atraumatic   RESP Fair air entry EXP prolonged    Harsh breath sound Resp distres mild   CARDIAC S1 S2 No S3     NO JVD    ABDOMEN SOFT BS PRESENT NOT DISTENDED No hepatosplenomegaly   PEDAL EDEMA present No calf tenderness  NO rash       DOA/CC.  1/16/2022 86 f presented with pain abd and diarrhea x3 d ssince starting abio for uti at [place]    ctap showed poss pe and pulm consulted 1/16/2022     PROBLEMS  1/16/2022   admission.       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 1/16/2022 scv2 (-)   ICU STAY. none  GOC.  1/17/2022 full code      BEST PRACTICE ISSUES.                                                  HEAD OF BED ELEVATION.   Yes  DVT PROPHYLAXIS.  1/21/2022 apixab 10.2 (pe 1/2022)  1/17/2022 lvnx 50.2 (pe on cta ch done 1/17/2022)     GARCIA PROPHYLAXIS.    1/17/2022 protonix                                                                                       DIET.        1/17/2022 soft bite        INFECTION PROPHYLAXIS.    SPEECH SW RECOMMENDATIONS.        ASSESSMENT/RECOMMENDATIONS.    HEMODYNAMICS.   Monitor bp Target MAP 65 (+)    RESP.   Monitor po Target po 90-95%    IV FL.  1/17 d5 1/2 20k 60    OXYGEN REQUIREMENTS.   1/21/2022 ra 96%     INFECTION  Proctocolitis  C diff detected 1/17/2022   w 1/16-1/17/2022 w 16-10    1/17/2022 vanco 125.4 startd       ANEMIA.  Hb 1/16-1/17-1/18 Hb 12.5-11.4- 10.8     PULMONARY EMBOLISM  cta ch 1/17/2022 filling defect segental rll   1/21/2022 changd to apixa     TIME SPENT   Over 25 minutes aggregate care time spent on encounter; activities included   direct patient care, counseling and/or coordinating care reviewing notes, lab data/ imaging , discussion with multidisciplinary team/ patient  /family and explaining in detail risks, benefits, alternatives  of the recommendations     ALLA FUENTES 86 f 1/16/2022 1936 DR RAJI MARC   
    ALLA FUENTES 86 f 1/16/2022 1936 DR RAJI MARC     REVIEW OF SYMPTOMS      Able to give ROS  Yes     RELIABLE +/-   CONSTITUTIONAL Weakness Yes  Chills No   ENDOCRINE  No heat or cold intolerance    ALLERGY No hives  Sore throat No stridor  RESP Coughing blood no  Shortness of breath YES   NEURO No Headache  Confusion Pain neck No   CARDIAC No Chest pain No Palpitations   GI  Pain abdomen NO   Vomiting NO     PHYSICAL EXAM    HEENT Unremarkable  atraumatic   RESP Fair air entry EXP prolonged    Harsh breath sound Resp distres mild   CARDIAC S1 S2 No S3     NO JVD    ABDOMEN SOFT BS PRESENT NOT DISTENDED No hepatosplenomegaly PEDAL EDEMA present No calf tenderness  NO rash       DOA/CC.  1/16/2022 86 f presented with pain abd and diarrhea x3 d ssince starting abio for uti at [place]    ctap showed poss pe and pulm consulted 1/16/2022       PROBLEMS  1/16/2022   admission.       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 1/16/2022 scv2 (-)   ICU STAY. none  GOC.  1/17/2022 full code        BEST PRACTICE ISSUES.                                                  HEAD OF BED ELEVATION.   Yes  DVT PROPHYLAXIS.  1/21/2022 apixab 10.2 (pe 1/2022)  1/17/2022 lvnx 50.2 (pe on cta ch done 1/17/2022)     GARCIA PROPHYLAXIS.    1/17/2022 protonix                                                                                       DIET.        1/17/2022 soft bite           ASSESSMENT/RECOMMENDATIONS.    HEMODYNAMICS.   Monitor bp Target MAP 65 (+)  1/20 echo ef 60% mod mr dd pasp 55     RESP.   Monitor po Target po 90-95%    IV FL.  1/17 d5 1/2 20k 60    OXYGEN REQUIREMENTS.   1/21/2022 ra 96%     INFECTION  Proctocolitis  C diff detected 1/17/2022   w 1/16-1/17/2022 w 16-10    1/17/2022 vanco 125.4 startd       ANEMIA.  Hb 1/16-1/17-1/18 Hb 12.5-11.4- 10.8     PULMONARY EMBOLISM  cta ch 1/17/2022 filling defect segental rll   1/21/2022 changd to apixa     TIME SPENT   Over 25 minutes aggregate care time spent on encounter; activities included   direct patient care, counseling and/or coordinating care reviewing notes, lab data/ imaging , discussion with multidisciplinary team/ patient  /family and explaining in detail risks, benefits, alternatives  of the recommendations     ALLA FUENTES 86 f 1/16/2022 1936 DR RAJI MARC   
    ALLA FUENTES 86 f 1/16/2022 1936 DR RAJI MARC     REVIEW OF SYMPTOMS      Able to give ROS  Yes     RELIABLE +/-   CONSTITUTIONAL Weakness Yes  Chills No   ENDOCRINE  No heat or cold intolerance    ALLERGY No hives  Sore throat No stridor  RESP Coughing blood no  Shortness of breath YES   NEURO No Headache  Confusion Pain neck No   CARDIAC No Chest pain No Palpitations   GI  Pain abdomen NO   Vomiting NO     PHYSICAL EXAM    HEENT Unremarkable  atraumatic   RESP Fair air entry EXP prolonged    Harsh breath sound Resp distres mild   CARDIAC S1 S2 No S3     NO JVD    ABDOMEN SOFT BS PRESENT NOT DISTENDED No hepatosplenomegaly PEDAL EDEMA present No calf tenderness  NO rash       DOA/CC.  1/16/2022 86 f presented with pain abd and diarrhea x3 d ssince starting abio for uti at [place]    ctap showed poss pe and pulm consulted 1/16/2022       PROBLEMS  1/16/2022   admission.       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 1/16/2022 scv2 (-)   ICU STAY. none  GOC.  1/17/2022 full code        BEST PRACTICE ISSUES.                                                  HEAD OF BED ELEVATION.   Yes  DVT PROPHYLAXIS.  1/21/2022 apixab 10.2 (pe 1/2022)  1/17/2022 lvnx 50.2 (pe on cta ch done 1/17/2022)     GARCIA PROPHYLAXIS.    1/17/2022 protonix                                                                                       DIET.        1/17/2022 soft bite           ASSESSMENT/RECOMMENDATIONS.    HEMODYNAMICS.   Monitor bp Target MAP 65 (+)  1/20 echo ef 60% mod mr dd pasp 55     RESP.   Monitor po Target po 90-95%    IV FL.  1/17 d5 1/2 20k 60    OXYGEN REQUIREMENTS.   1/21/2022 ra 96%     INFECTION  Proctocolitis  C diff detected 1/17/2022   w 1/16-1/17/2022 w 16-10    1/17/2022 vanco 125.4 startd     ANEMIA.  Hb 1/16-1/17-1/18 Hb 12.5-11.4- 10.8     PULMONARY EMBOLISM  cta ch 1/17/2022 filling defect segental rll   1/21/2022 changd to apixa     TIME SPENT   Over 25 minutes aggregate care time spent on encounter; activities included   direct patient care, counseling and/or coordinating care reviewing notes, lab data/ imaging , discussion with multidisciplinary team/ patient  /family and explaining in detail risks, benefits, alternatives  of the recommendations     ALLA FUENTES 86 f 1/16/2022 1936 DR RAJI MARC   
    ALLA FUENTES 86 f 1/16/2022 1936 DR RAJI MARC     REVIEW OF SYMPTOMS      Able to give ROS  Yes     RELIABLE +/-   CONSTITUTIONAL Weakness Yes  Chills No   ENDOCRINE  No heat or cold intolerance    ALLERGY No hives  Sore throat No stridor  RESP Coughing blood no  Shortness of breath YES   NEURO No Headache  Confusion Pain neck No   CARDIAC No Chest pain No Palpitations   GI  Pain abdomen NO   Vomiting NO     PHYSICAL EXAM    HEENT Unremarkable  atraumatic   RESP Fair air entry EXP prolonged    Harsh breath sound Resp distres mild   CARDIAC S1 S2 No S3     NO JVD    ABDOMEN SOFT BS PRESENT NOT DISTENDED No hepatosplenomegaly PEDAL EDEMA present No calf tenderness  NO rash       DOA/CC.  1/16/2022 86 f presented with pain abd and diarrhea x3 d ssince starting abio for uti at [place]    ctap showed poss pe and pulm consulted 1/16/2022       PROBLEMS  1/16/2022   admission.       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 1/16/2022 scv2 (-)   ICU STAY. none  GOC.  1/17/2022 full code        BEST PRACTICE ISSUES.                                                  HEAD OF BED ELEVATION.   Yes  DVT PROPHYLAXIS.  1/21/2022 apixab 10.2 (pe 1/2022)  1/17/2022 lvnx 50.2 (pe on cta ch done 1/17/2022)     GARCIA PROPHYLAXIS.    1/17/2022 protonix                                                                                       DIET.        1/17/2022 soft bite        INFECTION PROPHYLAXIS.       ASSESSMENT/RECOMMENDATIONS.    HEMODYNAMICS.   Monitor bp Target MAP 65 (+)    RESP.   Monitor po Target po 90-95%    IV FL.  1/17 d5 1/2 20k 60    OXYGEN REQUIREMENTS.   1/21/2022 ra 96%     INFECTION  Proctocolitis  C diff detected 1/17/2022   w 1/16-1/17/2022 w 16-10    1/17/2022 vanco 125.4 startd       ANEMIA.  Hb 1/16-1/17-1/18 Hb 12.5-11.4- 10.8     PULMONARY EMBOLISM  cta ch 1/17/2022 filling defect segental rll   1/21/2022 changd to apixa     TIME SPENT   Over 25 minutes aggregate care time spent on encounter; activities included   direct patient care, counseling and/or coordinating care reviewing notes, lab data/ imaging , discussion with multidisciplinary team/ patient  /family and explaining in detail risks, benefits, alternatives  of the recommendations     ALLA FUENTES 86 f 1/16/2022 1936 DR RAJI MARC     
    ALLA FUENTES 86 f 1/16/2022 1936 DR RAJI MARC     REVIEW OF SYMPTOMS      Able to give ROS  Yes     RELIABLE +/-   CONSTITUTIONAL Weakness Yes  Chills No   ENDOCRINE  No heat or cold intolerance    ALLERGY No hives  Sore throat No stridor  RESP Coughing blood no  Shortness of breath YES   NEURO No Headache  Confusion Pain neck No   CARDIAC No Chest pain No Palpitations   GI  Pain abdomen NO   Vomiting NO     PHYSICAL EXAM    HEENT Unremarkable  atraumatic   RESP Fair air entry EXP prolonged    Harsh breath sound Resp distres mild   CARDIAC S1 S2 No S3     NO JVD    ABDOMEN SOFT BS PRESENT NOT DISTENDED No hepatosplenomegaly PEDAL EDEMA present No calf tenderness  NO rash       DOA/CC.  1/16/2022 86 f presented with pain abd and diarrhea x3 d ssince starting abio for uti at [place]    ctap showed poss pe and pulm consulted 1/16/2022         PROBLEMS  1/16/2022   admission.       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 1/16/2022 scv2 (-)   ICU STAY. none  GOC.  1/17/2022 full code                                 BEST PRACTICE ISSUES.                                                  HEAD OF BED ELEVATION.   Yes  DVT PROPHYLAXIS.    1/17/2022 lvnx 50.2 (pe on cta ch done 1/17/2022)     GARCIA PROPHYLAXIS.    1/17/2022 protonix                                                                                       DIET.        1/17/2022 soft bite        INFECTION PROPHYLAXIS.    SPEECH SW RECOMMENDATIONS.         PATIENT DATA   VITALS/PO/IO/VENT/DRIPS.     1/19/2022 afeb 70 140/70      ASSESSMENT/RECOMMENDATIONS.    HEMODYNAMICS.   Monitor bp Target MAP 65 (+)    RESP.   Monitor po Target po 90-95%    IV FL.  1/17 d5 1/2 20k 60    OXYGEN REQUIREMENTS.   1/19/2022 ra 93%    INFECTION  Proctocolitis  C diff detected 1/17/2022   w 1/16-1/17/2022 w 16-10    Cr 1/16/2022 Cr .9   ua 1/16/2022 ua w 3-5   ct ap ic  1/16/2022 long segment proctocolitis Possible pe rll   flu ab 1/16/2022 (-)  blod c 1/17 (-0   rsv 1/16/2022 (-)   urine c 1/16 (-)   C diff 1/17/2022 (+)  A/R  1/17/2022 vanco 125.4 startd     ANEMIA.  Hb 1/16-1/17-1/18 Hb 12.5-11.4- 10.8     PULMONARY EMBOLISM  cta ch 1/17/2022 filling defect segental rll   ct ap ic  1/16/2022 long segment proctocolitis Possible pe rll   1/17/2022   DW Dr Rodriguez and Dr Abdi  1/17/2022 Pt cleared for full dose ac  1/17/2022 Started lvnx     CAVERNOMA   1/18/2022 mr abn   neuro eval noted      TIME SPENT   Over 25 minutes aggregate care time spent on encounter; activities included   direct patient care, counseling and/or coordinating care reviewing notes, lab data/ imaging , discussion with multidisciplinary team/ patient  /family and explaining in detail risks, benefits, alternatives  of the recommendations     ALLA FUENTES 86 f 1/16/2022 1936 DR RAJI MARC   
    ALLA FUENTES 86 f 1/16/2022 1936 DR RAJI MARC     REVIEW OF SYMPTOMS      Able to give ROS  Yes     RELIABLE +/-   CONSTITUTIONAL Weakness Yes  Chills No   ENDOCRINE  No heat or cold intolerance    ALLERGY No hives  Sore throat No stridor  RESP Coughing blood no  Shortness of breath YES   NEURO No Headache  Confusion Pain neck No   CARDIAC No Chest pain No Palpitations   GI  Pain abdomen NO   Vomiting NO     PHYSICAL EXAM    HEENT Unremarkable  atraumatic   RESP Fair air entry EXP prolonged    Harsh breath sound Resp distres mild   CARDIAC S1 S2 No S3     NO JVD    ABDOMEN SOFT BS PRESENT NOT DISTENDED No hepatosplenomegaly PEDAL EDEMA present No calf tenderness  NO rash       DOA/CC.  1/16/2022 86 f presented with pain abd and diarrhea x3 d ssince starting abio for uti at [place]    ctap showed poss pe and pulm consulted 1/16/2022         PROBLEMS  1/16/2022   admission.       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 1/16/2022 scv2 (-)   ICU STAY. none  GOC.  1/17/2022 full code        BEST PRACTICE ISSUES.                                                  HEAD OF BED ELEVATION.   Yes  DVT PROPHYLAXIS.    1/17/2022 lvnx 50.2 (pe on cta ch done 1/17/2022)     GARCIA PROPHYLAXIS.    1/17/2022 protonix                                                                                       DIET.        1/17/2022 soft bite        INFECTION PROPHYLAXIS.      PATIENT DATA   VITALS/PO/IO/VENT/DRIPS.     1/18/2022 afeb 77 160/60      ASSESSMENT/RECOMMENDATIONS.    HEMODYNAMICS.   Monitor bp Target MAP 65 (+)    RESP.   Monitor po Target po 90-95%    IV FL.  1/17 d5 1/2 20k 60    OXYGEN REQUIREMENTS.   1/18/2022 ra 97%    INFECTION  Proctocolitis  C diff detected 1/17/2022   w 1/16-1/17/2022 w 16-10    Cr 1/16/2022 Cr .9   ua 1/16/2022 ua w 3-5   ct ap ic  1/16/2022 long segment proctocolitis Possible pe rll   flu ab 1/16/2022 (-)  blod c 1/17 (-0   rsv 1/16/2022 (-)   urine c 1/16 (-)   C diff 1/17/2022 (+)  A/R  1/17/2022 vanco 125.4 startd     ANEMIA.  Hb 1/16-1/17-1/18 Hb 12.5-11.4- 10.8     PULMONARY EMBOLISM  cta ch 1/17/2022 filling defect segental rll   ct ap ic  1/16/2022 long segment proctocolitis Possible pe rll   1/17/2022   DW Dr Rodriguez and Dr Abdi  1/17/2022 Pt cleared for full dose ac  1/17/2022 Started lvnx     CAVERNOMA   1/18/2022 mr abn   recommend neuro eval    TIME SPENT   Over 25 minutes aggregate care time spent on encounter; activities included   direct patient care, counseling and/or coordinating care reviewing notes, lab data/ imaging , discussion with multidisciplinary team/ patient  /family and explaining in detail risks, benefits, alternatives  of the recommendations     ALLA FUENTES 86 f 1/16/2022 1936 DR RAJI MARC     
    ALLA FUENTES 86 f 1/16/2022 1936 DR RAJI MARC     REVIEW OF SYMPTOMS      Able to give ROS  Yes     RELIABLE +/-   CONSTITUTIONAL Weakness Yes  Chills No   ENDOCRINE  No heat or cold intolerance    ALLERGY No hives  Sore throat No stridor  RESP Coughing blood no  Shortness of breath YES   NEURO No Headache  Confusion Pain neck No   CARDIAC No Chest pain No Palpitations   GI  Pain abdomen NO   Vomiting NO     PHYSICAL EXAM    HEENT Unremarkable  atraumatic   RESP Fair air entry EXP prolonged    Harsh breath sound Resp distres mild   CARDIAC S1 S2 No S3     NO JVD    ABDOMEN SOFT BS PRESENT NOT DISTENDED No hepatosplenomegaly PEDAL EDEMA present No calf tenderness  NO rash       PROBLEMS  1/16/2022   admission.       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 1/16/2022 scv2 (-)   ICU STAY. none  GOC.  1/17/2022 full code        BEST PRACTICE ISSUES.                                                  HEAD OF BED ELEVATION.   Yes  DVT PROPHYLAXIS.  1/21/2022 apixab 10.2 (pe 1/2022)  1/17/2022 lvnx 50.2 (pe on cta ch done 1/17/2022)     GARCIA PROPHYLAXIS.    1/17/2022 protonix                                                                                       DIET.        1/17/2022 soft bite        INFECTION PROPHYLAXIS.    SPEECH SW RECOMMENDATIONS.      ASSESSMENT/RECOMMENDATIONS.    HEMODYNAMICS.   Monitor bp Target MAP 65 (+)    RESP.   Monitor po Target po 90-95%    IV FL.  1/17 d5 1/2 20k 60    OXYGEN REQUIREMENTS.   1/21/2022 ra 96%     INFECTION  Proctocolitis  C diff detected 1/17/2022   w 1/16-1/17/2022 w 16-10    1/17/2022 vanco 125.4 startd       ANEMIA.  Hb 1/16-1/17-1/18 Hb 12.5-11.4- 10.8     PULMONARY EMBOLISM  cta ch 1/17/2022 filling defect segental rll   1/21/2022 changd to apixa     TIME SPENT   Over 25 minutes aggregate care time spent on encounter; activities included   direct patient care, counseling and/or coordinating care reviewing notes, lab data/ imaging , discussion with multidisciplinary team/ patient  /family and explaining in detail risks, benefits, alternatives  of the recommendations     ALLA FUENTES 86 f 1/16/2022 1936 DR RAJI MARC   
86 female sent from [place] assisted [facility] for diarrhea x 3 days since starting antibiotics for UTI.   Per patient she was being treated for UTI and began having diarrhea and RLQ pain.    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
DIARRHEA 2/2 TO COLITIS ,RULED IN FOR  C.DIFF -ON VANCO  ,STOOL GI CPR IS PENDING   Proctocolitis as per CT ABD -GI CONS ,ID CONS obtained   w 1/16/2022 w 16   Cr 1/16/2022 Cr .9   ua 1/16/2022 ua w 3-5   ct ap ic  1/16/2022 long segment proctocolitis Possible pe rll   flu ab 1/16/2022 (-)  rsv 1/16/2022 (-)   PULMONARY EMBOLISM SUSPECTED BUT NOT CONFIRMED DUE TO PATIENT'S REFUSAL OF TESTING -OAC plan as per pulm Dr Kaur   ct ap ic  1/16/2022 long segment proctocolitis Possible pe rll   1/16/2022 Will hold off repeat cta with iv contrast 48 h to avoid ruthie   1/16/2022 Check v duplx   01/17/22- as per hematologist hold OAC due to reported bloody stools and not confirmed diagnosis 

## 2022-01-26 NOTE — DISCHARGE NOTE NURSING/CASE MANAGEMENT/SOCIAL WORK - PATIENT PORTAL LINK FT
You can access the FollowMyHealth Patient Portal offered by [facility] by registering at the following website: [email or web address]. By joining [employer]’s FollowMyHealth portal, you will also be able to view your health information using other applications (apps) compatible with our system.

## 2022-01-26 NOTE — DISCHARGE NOTE PROVIDER - NSDCMRMEDTOKEN_GEN_ALL_CORE_FT
amLODIPine 5 mg oral tablet: 1 tab(s) orally once a day  Aspirin Enteric Coated 81 mg oral delayed release tablet: 1 tab(s) orally once a day  hydrALAZINE 10 mg oral tablet: 1 tab(s) orally 2 times a day  Nasal Saline 0.65% nasal spray: 2 spray(s) nasal 2 times a day for 15 days. Start: 01/06/22  sertraline 50 mg oral tablet: 1 tab(s) orally once a day   acetaminophen 325 mg oral tablet: 2 tab(s) orally every 6 hours, As needed, Temp greater or equal to 38C (100.4F), Mild Pain (1 - 3)  amLODIPine 5 mg oral tablet: 1 tab(s) orally once a day  apixaban 5 mg oral tablet: 2 tab(s) orally every 12 hours x 2 days and then 5 mg po bid   Aspirin Enteric Coated 81 mg oral delayed release tablet: 1 tab(s) orally once a day  hydrALAZINE 10 mg oral tablet: 1 tab(s) orally 2 times a day  lactobacillus acidophilus oral capsule: orally 2 times a day  losartan 25 mg oral tablet: 1 tab(s) orally once a day  Nasal Saline 0.65% nasal spray: 2 spray(s) nasal 2 times a day for 15 days. Start: 01/06/22  pantoprazole 40 mg oral delayed release tablet: 1 tab(s) orally once a day (before a meal)  sertraline 50 mg oral tablet: 1 tab(s) orally once a day  vancomycin 125 mg oral capsule: 1 cap(s) orally every 6 hours x 2 days    acetaminophen 325 mg oral tablet: 2 tab(s) orally every 6 hours, As needed, Temp greater or equal to 38C (100.4F), Mild Pain (1 - 3)  amLODIPine 5 mg oral tablet: 1 tab(s) orally once a day  apixaban 5 mg oral tablet: 2 tab(s) orally every 12 hours x 2 days and then 5 mg po bid   hydrALAZINE 10 mg oral tablet: 1 tab(s) orally 2 times a day, As Needed SBP ABOVE 160  lactobacillus acidophilus oral capsule: orally 2 times a day  losartan 25 mg oral tablet: 1 tab(s) orally once a day HOLD FOR SBP BELOW 100  Nasal Saline 0.65% nasal spray: 2 spray(s) nasal 2 times a day for 15 days. Start: 01/06/22  nystatin 100,000 units/g topical powder: Apply topically to affected area 2 times a day  pantoprazole 40 mg oral delayed release tablet: 1 tab(s) orally once a day (before a meal)  sertraline 50 mg oral tablet: 1 tab(s) orally once a day  vancomycin 125 mg oral capsule: 1 cap(s) orally every 6 hours x 2 days

## 2022-01-26 NOTE — PROGRESS NOTE ADULT - PROBLEM SELECTOR PLAN 8
A 7 x 5 x 5 mm lesion in the right temporal lobe with heterogeneous   internal T1 and T2 signal, T2 hypointense rim and susceptibility artifact   suggesting a cavernoma .Neurology is following
Gastrointestinal stress ulcer prophylaxis and DVT prophylaxis administered
Gastrointestinal stress ulcer prophylaxis and DVT prophylaxis administered
A 7 x 5 x 5 mm lesion in the right temporal lobe with heterogeneous   internal T1 and T2 signal, T2 hypointense rim and susceptibility artifact   suggesting a cavernoma .Neurology is following
Gastrointestinal stress ulcer prophylaxis and DVT prophylaxis administered
A 7 x 5 x 5 mm lesion in the right temporal lobe with heterogeneous   internal T1 and T2 signal, T2 hypointense rim and susceptibility artifact   suggesting a cavernoma .Neurology is following
Gastrointestinal stress ulcer prophylaxis and DVT prophylaxis administered
Gastrointestinal stress ulcer prophylaxis and DVT prophylaxis administered

## 2022-01-26 NOTE — PROGRESS NOTE ADULT - PROBLEM SELECTOR PROBLEM 7
Abnormal brain MRI
Ataxia
Abnormal brain MRI
Prophylactic measure
Ataxia
Abnormal brain MRI
Prophylactic measure
Abnormal brain MRI
Abnormal brain MRI
Ataxia

## 2022-01-26 NOTE — PROGRESS NOTE ADULT - PROBLEM SELECTOR PROBLEM 3
Proctocolitis

## 2022-01-26 NOTE — PROGRESS NOTE ADULT - PROBLEM SELECTOR PROBLEM 8
Prophylactic measure
Abnormal brain MRI
Abnormal brain MRI
Prophylactic measure
Abnormal brain MRI
Prophylactic measure

## 2022-01-26 NOTE — PROGRESS NOTE ADULT - PROBLEM SELECTOR PLAN 1
DIARRHEA 2/2 TO C.DIFF COLITIS ,poa -ON VANCOMYCYIN AND BACID  CONTACT ISOLATION ,ID AND GI CONS ARE FOLLOWING
DIARRHEA 2/2 TO COLITIS ,RULE OUT C.DIFF ,STOOL GI CPR IS PENDING   Proctocolitis as per CT ABD -GI CONS ,ID CONS obtained
DIARRHEA 2/2 TO C.DIFF COLITIS ,poa -ON VANCOMYCYIN AND BACID  CONTACT ISOLATION ,ID AND GI CONS ARE FOLLOWING
DIARRHEA 2/2 TO C.DIFF COLITIS ,poa -ON VANCOMYCYIN AND BACID  CONTACT ISOLATION ,ID AND GI CONS ARE FOLLOWING

## 2022-01-26 NOTE — PROGRESS NOTE ADULT - SUBJECTIVE AND OBJECTIVE BOX
PROGRESS NOTE  Patient is a 86y old  Female who presents with a chief complaint of 86 female sent from [facility] for diarrhea x 3 days since starting antibiotics for UTI.   	Per patient she was being treated for UTI and began having diarrhea and RLQ pain.   	Denies fever, chills.   	Denies n/v.   	+ Abdominal pain , + Diarrhea  	Denies current urinary symptoms.  Denies CP, cough or SOBno abdominal distension, no blood in stool, no burning urination, no chills, no dysuria, no fever, no hematuria, no nausea, no vomiting++ RLQ abdominal pain, no bloating, no constipation, ++ diarrhea,   < from: CT Abdomen and Pelvis w/ IV Cont (01.16.22 @ 17:29) > (26 Jan 2022 10:37)    Chart and available morning labs /imaging are reviewed electronically , urgent issues addressed . More information  is being added upon completion of rounds , when more information is collected and management discussed with consultants , medical staff and social service/case management on the floor   OVERNIGHT  No new issues reported by medical staff . All above noted Patient is resting in a bed comfortably .Confused ,poor mentation .No distress noted   Much more awake ,had formed bm and deneis complains   HPI:  86 female sent from [facility] for diarrhea x 3 days since starting antibiotics for UTI.   	Per patient she was being treated for UTI and began having diarrhea and RLQ pain.   	Denies fever, chills.   	Denies n/v.   	+ Abdominal pain , + Diarrhea  	Denies current urinary symptoms.  Denies CP, cough or SOBno abdominal distension, no blood in stool, no burning urination, no chills, no dysuria, no fever, no hematuria, no nausea, no vomiting++ RLQ abdominal pain, no bloating, no constipation, ++ diarrhea,   < from: CT Abdomen and Pelvis w/ IV Cont (01.16.22 @ 17:29) >    ACC: [id number] EXAM:  CT ABDOMEN AND PELVIS IC                          PROCEDURE DATE:  01/16/2022          INTERPRETATION:  CLINICAL INFORMATION: Acute abdominal pain.    COMPARISON: None.    CONTRAST/COMPLICATIONS:  IV Contrast: Omnipaque 350  90cc administered   10 cc discarded  Oral Contrast: NONE  Complications: None reported at time of study completion    PROCEDURE:  CT of the Abdomen and Pelvis was performed.  Sagittal and coronal reformats were performed.    FINDINGS:  LOWER CHEST: Possible filling defect in a branch of the right lower lobe   pulmonary artery (series 2, image 7), suspicious for pulmonary embolism.   Bibasilar subsegmental atelectasis.    LIVER: Within normal limits.  BILE DUCTS: Normal caliber.  GALLBLADDER: Withinnormal limits.  SPLEEN: Subcentimeter hypodense splenic lesions, too small to   characterize.  PANCREAS: Within normal limits.  ADRENALS: Within normal limits.  KIDNEYS/URETERS: No hydronephrosis. Left upper pole renal cyst.   Additional subcentimeter hypodense bilateral renal lesions, too small to   characterize.    BLADDER: Within normal limits.  REPRODUCTIVE ORGANS: Hysterectomy.    BOWEL: Colonic diverticulosis. Long segment mural thickening of the colon   extending from the ascending colon to the level of the rectum. Small   bowel anastomosis. No bowel obstruction. Appendix is normal.  PERITONEUM: No ascites.  VESSELS: Atherosclerotic changes.  RETROPERITONEUM/LYMPH NODES: No lymphadenopathy.  ABDOMINAL WALL: Within normal limits.  BONES: Degenerative changes.    IMPRESSION:  Possible filling defect in a branch of the right lower lobe pulmonary   artery, suspicious for pulmonary embolism. Further evaluation with CTA   chest (pulmonary artery protocol) is recommended.    Long segment proctocolitis.    Findings were discussed with MARLON FERRER on 1/16/2022 at 6:28 PM   by Dr. Wagner with read back confirmation.    --- End of Report ---            ANABEL WAGNER MD; Attending Radiologist  This document has been electronically signed. Jan 16 2022  6:29PM    < end of copied text >   (16 Jan 2022 18:47)    PAST MEDICAL & SURGICAL HISTORY:  No pertinent past medical history        MEDICATIONS  (STANDING):  apixaban 10 milliGRAM(s) Oral every 12 hours  influenza  Vaccine (HIGH DOSE) 0.7 milliLiter(s) IntraMuscular once  lactobacillus acidophilus 1 Tablet(s) Oral two times a day  losartan 25 milliGRAM(s) Oral daily  pantoprazole    Tablet 40 milliGRAM(s) Oral before breakfast  vancomycin    Solution 125 milliGRAM(s) Oral every 6 hours    MEDICATIONS  (PRN):  acetaminophen     Tablet .. 650 milliGRAM(s) Oral every 6 hours PRN Temp greater or equal to 38C (100.4F), Mild Pain (1 - 3)  aluminum hydroxide/magnesium hydroxide/simethicone Suspension 30 milliLiter(s) Oral every 4 hours PRN Dyspepsia  LORazepam   Injectable 1 milliGRAM(s) IV Push once PRN mri  melatonin 3 milliGRAM(s) Oral at bedtime PRN Insomnia  OLANZapine Injectable 2.5 milliGRAM(s) IntraMuscular every 6 hours PRN agitation  ondansetron Injectable 4 milliGRAM(s) IV Push every 8 hours PRN Nausea and/or Vomiting      OBJECTIVE    T(C): 37.1 (01-26-22 @ 11:03), Max: 37.1 (01-26-22 @ 11:03)  HR: 77 (01-26-22 @ 11:03) (68 - 77)  BP: 135/79 (01-26-22 @ 11:03) (135/79 - 143/76)  RR: 18 (01-26-22 @ 11:03) (17 - 18)  SpO2: 95% (01-26-22 @ 11:03) (95% - 95%)  Wt(kg): --  I&O's Summary    25 Jan 2022 07:01  -  26 Jan 2022 07:00  --------------------------------------------------------  IN: 320 mL / OUT: 300 mL / NET: 20 mL          REVIEW OF SYSTEMS:  CONSTITUTIONAL: No fever, weight loss, or fatigue  EYES: No eye pain, visual disturbances, or discharge  ENMT:   No sinus or throat pain  NECK: No pain or stiffness  RESPIRATORY: No cough, wheezing, chills or hemoptysis; No shortness of breath  CARDIOVASCULAR: No chest pain, palpitations, dizziness, or leg swelling  GASTROINTESTINAL: No abdominal pain. No nausea, vomiting; No diarrhea or constipation. No melena or hematochezia.  GENITOURINARY: No dysuria, frequency, hematuria, or incontinence  NEUROLOGICAL: No headaches, memory loss, loss of strength, numbness, or tremors  SKIN: No itching, burning, rashes, or lesions   MUSCULOSKELETAL: No joint pain or swelling; No muscle, back, or extremity pain    PHYSICAL EXAM:  Appearance: NAD. VS past 24 hrs -as above   HEENT:   Moist oral mucosa. Conjunctiva clear b/l.   Neck : supple  Respiratory: Lungs CTAB.  Gastrointestinal:  Soft, nontender. No rebound. No rigidity. BS present	  Cardiovascular: RRR ,S1S2 present  Neurologic: Non-focal. Moving all extremities.  Extremities: No edema. No erythema. No calf tenderness.  Skin: No rashes, No ecchymoses, No cyanosis.	  wounds ,skin lesions-See skin assesment flow sheet   LABS:                        11.5   14.45 )-----------( 449      ( 25 Jan 2022 13:30 )             36.5     01-25    138  |  105  |  21  ----------------------------<  108<H>  4.5   |  28  |  0.90    Ca    10.3<H>      25 Jan 2022 13:30  Phos  4.1     01-25  Mg     2.3     01-25      CAPILLARY BLOOD GLUCOSE              Culture - Blood (collected 17 Jan 2022 16:27)  Source: .Blood Blood  Final Report (22 Jan 2022 17:00):    No Growth Final    Culture - Blood (collected 17 Jan 2022 16:27)  Source: .Blood Blood  Final Report (22 Jan 2022 17:00):    No Growth Final    Culture - Urine (collected 16 Jan 2022 20:54)  Source: Clean Catch Clean Catch (Midstream)  Final Report (17 Jan 2022 16:32):    No growth  REFUSED LABS TODAY ,will be repeated in [facility]     RADIOLOGY & ADDITIONAL TESTS:   reviewed elctronically  ASSESSMENT/PLAN: 	    Patient was seen and examined on a day of discharge . Plan of care , discharge medications and recommendations discussed with consultants and clearance for discharge obtained .Social service , case management  and medical staff are aware of plan. Family is notified. Discharge summary  is  prepared electronically-see separate document prepared by me .75minutes spent on this visit, 50% visit time spent in care co-ordination with other attendings and counselling patient  I have discussed care plan with patient and HCP,expressed understanding of problems treatment and their effect and side effects, alternatives in detail,I have asked if they have any questions and concerns and appropriately addressed them to best of my ability

## 2022-01-26 NOTE — PROGRESS NOTE ADULT - REASON FOR ADMISSION
86 female sent from [facility] living for diarrhea x 3 days since starting antibiotics for UTI.   	Per patient she was being treated for UTI and began having diarrhea and RLQ pain.   	Denies fever, chills.   	Denies n/v.   	+ Abdominal pain , + Diarrhea  	Denies current urinary symptoms.  Denies CP, cough or SOBno abdominal distension, no blood in stool, no burning urination, no chills, no dysuria, no fever, no hematuria, no nausea, no vomiting++ RLQ abdominal pain, no bloating, no constipation, ++ diarrhea,   < from: CT Abdomen and Pelvis w/ IV Cont (01.16.22 @ 17:29) >

## 2022-01-26 NOTE — PROGRESS NOTE ADULT - PROBLEM SELECTOR PROBLEM 2
Suspected pulmonary embolism
Hypokalemia
Suspected pulmonary embolism

## 2022-01-26 NOTE — DISCHARGE NOTE NURSING/CASE MANAGEMENT/SOCIAL WORK - NSDCPEFALRISK_GEN_ALL_CORE
For information on Fall & Injury Prevention, visit: [email or web address] OR  [email or web address] OR  https://www.cdc.gov/steadi/patient.html

## 2022-01-26 NOTE — PROGRESS NOTE ADULT - PROBLEM SELECTOR PLAN 6
family requests MRI BRAIN and neurology brent ,d/w Dr Preciado and daughter
family requests MRI BRAIN and neurology brent ,d/w Dr Preciado and daughter
workup and management as per pulm Dr Kaur
family requests MRI BRAIN and neurology brent ,d/w Dr Preicado and daughter
family requests MRI BRAIN and neurology brent ,d/w Dr Preciado and daughter

## 2022-01-26 NOTE — DISCHARGE NOTE PROVIDER - PROVIDER TOKENS
PROVIDER:[TOKEN:[[id number]],FOLLOWUP:[2 weeks]],PROVIDER:[TOKEN:[[id number]],FOLLOWUP:[2 weeks]]

## 2022-01-26 NOTE — PROGRESS NOTE ADULT - PROBLEM SELECTOR PLAN 2
PE WAS RULED IN BY POSITIVE CTA FINDINGS AND POA-  management as per pulm Dr Kaur ,on ELIQUIS ,family is aware of risks vs benefits ,spoke to the daughter Joseline
PE WAS RULED IN BY POSITIVE CTA FINDINGS AND POA-  management as per pulm Dr Kaur ,on ELIQUIS ,family is aware of risks vs benefits ,spoke to the daughter Joseline
PE WAS RULED IN BY POSITIVE CTA FINDINGS AND POA-  management as per pulm Dr Kaur ,on full dose lovenox
replaced ,serial bmp ,monitor electrolytes closely
PE WAS RULED IN BY POSITIVE CTA FINDINGS AND POA-  management as per pulm Dr Kaur ,on full dose lovenox
PE WAS RULED IN BY POSITIVE CTA FINDINGS AND POA-  management as per pulm Dr Kaur ,on ELIQUIS ,family is aware of risks vs benefits ,spoke to the daughter Joseline

## 2022-01-26 NOTE — PROGRESS NOTE ADULT - REASON FOR ADMISSION
86 female sent from [facility] living for diarrhea x 3 days since starting antibiotics for UTI.   	Per patient she was being treated for UTI and began having diarrhea and RLQ pain.   	Denies fever, chills.   	Denies n/v.   	+ Abdominal pain , + Diarrhea  	Denies current urinary symptoms.  Denies CP, cough or SOBno abdominal distension, no blood in stool, no burning urination, no chills, no dysuria, no fever, no hematuria, no nausea, no vomiting++ RLQ abdominal pain, no bloating, no constipation, ++ diarrhea,   < from: CT Abdomen and Pelvis w/ IV Cont (01.16.22 @ 17:29) >

## 2022-01-26 NOTE — PROGRESS NOTE ADULT - PROBLEM SELECTOR PLAN 7
Family requests ONEAL placement
Gastrointestinal stress ulcer prophylaxis and DVT prophylaxis administered
A 7 x 5 x 5 mm lesion in the right temporal lobe with heterogeneous   internal T1 and T2 signal, T2 hypointense rim and susceptibility artifact   suggesting a cavernoma .Neurology is following
Family requests ONEAL placement
Family requests ONEAL placement
A 7 x 5 x 5 mm lesion in the right temporal lobe with heterogeneous   internal T1 and T2 signal, T2 hypointense rim and susceptibility artifact   suggesting a cavernoma .Neurology is following
Gastrointestinal stress ulcer prophylaxis and DVT prophylaxis administered
A 7 x 5 x 5 mm lesion in the right temporal lobe with heterogeneous   internal T1 and T2 signal, T2 hypointense rim and susceptibility artifact   suggesting a cavernoma .Neurology is following

## 2022-01-26 NOTE — PROGRESS NOTE ADULT - PROBLEM SELECTOR PLAN 4
replaced ,serial bmp ,monitor electrolytes closely
Supportive care,frequent redirection,continue home medications,management of agitation as needed .Patient keeps refusing all tests and labs -daughter is aware .Psych cons requested
replaced ,serial bmp ,monitor electrolytes closely

## 2022-01-26 NOTE — DISCHARGE NOTE PROVIDER - CARE PROVIDER_API CALL
Jacinto Abdi (DO)  Gastroenterology; Internal Medicine  [address] [place] [address]  Phone: [phone]  Fax: [phone]  Follow Up Time: 2 weeks    Zion Love  NEUROLOGY  [address] [place] [address]  Phone: [phone]  Fax: [phone]  Follow Up Time: 2 weeks

## 2022-01-26 NOTE — PROGRESS NOTE ADULT - REASON FOR ADMISSION
86 female sent from [facility] living for diarrhea x 3 days since starting antibiotics for UTI.   	Per patient she was being treated for UTI and began having diarrhea and RLQ pain.   	Denies fever, chills.   	Denies n/v.   	+ Abdominal pain , + Diarrhea  	Denies current urinary symptoms.  Denies CP, cough or SOBno abdominal distension, no blood in stool, no burning urination, no chills, no dysuria, no fever, no hematuria, no nausea, no vomiting++ RLQ abdominal pain, no bloating, no constipation, ++ diarrhea,   < from: CT Abdomen and Pelvis w/ IV Cont (01.16.22 @ 17:29) >

## 2022-01-26 NOTE — PROGRESS NOTE ADULT - PROVIDER SPECIALTY LIST ADULT
Gastroenterology
Neurology
Palliative Care
Pulmonology
Cardiology
Gastroenterology
Heme/Onc
Infectious Disease
Neurology
Palliative Care
Pulmonology
Gastroenterology
Hospitalist
Neurology
Neurology
Palliative Care
Pulmonology
Pulmonology
Gastroenterology
Palliative Care
Palliative Care
Hospitalist
Internal Medicine
Hospitalist

## 2022-06-13 RX ORDER — AMLODIPINE BESYLATE 2.5 MG/1
1 TABLET ORAL
Qty: 0 | Refills: 0 | DISCHARGE

## 2022-06-13 RX ORDER — ASPIRIN/CALCIUM CARB/MAGNESIUM 324 MG
1 TABLET ORAL
Qty: 0 | Refills: 0 | DISCHARGE

## 2022-06-13 RX ORDER — SERTRALINE 25 MG/1
1 TABLET, FILM COATED ORAL
Qty: 0 | Refills: 0 | DISCHARGE

## 2022-06-13 RX ORDER — HYDRALAZINE HCL 50 MG
1 TABLET ORAL
Qty: 0 | Refills: 0 | DISCHARGE

## 2022-06-13 RX ORDER — SODIUM CHLORIDE 0.65 %
2 AEROSOL, SPRAY (ML) NASAL
Qty: 0 | Refills: 0 | DISCHARGE

## 2022-08-20 ENCOUNTER — EMERGENCY (EMERGENCY)
Facility: HOSPITAL | Age: 87
LOS: 1 days | Discharge: HOME | End: 2022-08-20
Attending: EMERGENCY MEDICINE | Admitting: EMERGENCY MEDICINE
Payer: MEDICARE

## 2022-08-20 VITALS
OXYGEN SATURATION: 93 % | RESPIRATION RATE: 16 BRPM | DIASTOLIC BLOOD PRESSURE: 77 MMHG | SYSTOLIC BLOOD PRESSURE: 147 MMHG | TEMPERATURE: 98 F | WEIGHT: 104.94 LBS | HEIGHT: 60 IN | HEART RATE: 84 BPM

## 2022-08-20 PROCEDURE — 70450 CT HEAD/BRAIN W/O DYE: CPT | Mod: 26,MA

## 2022-08-20 PROCEDURE — 70486 CT MAXILLOFACIAL W/O DYE: CPT | Mod: 26,MA

## 2022-08-20 PROCEDURE — 72125 CT NECK SPINE W/O DYE: CPT | Mod: 26,MA

## 2022-08-20 PROCEDURE — 99284 EMERGENCY DEPT VISIT MOD MDM: CPT | Mod: FS

## 2022-08-20 PROCEDURE — 70450 CT HEAD/BRAIN W/O DYE: CPT | Mod: 26,MA,77

## 2022-08-20 NOTE — ED PROVIDER NOTE - ATTENDING APP SHARED VISIT CONTRIBUTION OF CARE
Rasta with MARLON Simons. 87 y/o F with PMH of Dementia,  HTN, HLD, on Eliquis was sent to the ED by [facility] or head CT for facial injury. Spoke with ROSETTE Fernandes from the facility, she reports pt woke up this morning with bruising to her face and forehead, she is unable to answer how she got the bruising. Pt was found in her bed and is at baseline mental status, they just sent her out for imaging due to the Eliquis.  Pt denies HA, dizziness, pain or all medical complaints. PE as noted above. CTs results reviewed, spoke with neurosurgery-- see above reccds. reassess    I performed a face to face bedside interview with patient regarding history of present illness, review of symptoms and past medical history. I completed an independent physical exam.  I have discussed the patient's plan of care with Physician Assistant (PA). I agree with note as stated above, having amended the EMR as needed to reflect my findings.   This includes History of Present Illness, HIV, Past Medical/Surgical/Family/Social History, Allergies and Home Medications, Review of Systems, Physical Exam, and any Progress Notes during the time I functioned as the attending physician for this patient.

## 2022-08-20 NOTE — ED PROVIDER NOTE - OBJECTIVE STATEMENT
87 y/o F with PMH of Dementia,  HTN, HLD, on Eliquis was sent to the ED by [facility] or head CT for facial injury. Spoke with ROSETTE Fernandes from the facility, she reports pt woke up this morning with bruising to her face and forehead, she is unable to answer how she got the bruising. Pt was found in her bed and is at baseline mental status.  Pt denies HA, dizziness, pain or all medical complaints.

## 2022-08-20 NOTE — ED PROVIDER NOTE - NS ED ATTENDING STATEMENT MOD
This was a shared visit with the DELFINA. I reviewed and verified the documentation and independently performed the documented:

## 2022-08-20 NOTE — ED PROVIDER NOTE - CLINICAL SUMMARY MEDICAL DECISION MAKING FREE TEXT BOX
85 y/o F with PMH of Dementia,  HTN, HLD, on Eliquis was sent to the ED by [facility] or head CT for facial injury. Spoke with ROSETTE Fernandes from the facility, she reports pt woke up this morning with bruising to her face and forehead, she is unable to answer how she got the bruising. Pt was found in her bed and is at baseline mental status, they just sent her out for imaging due to the Eliquis.  Pt denies HA, dizziness, pain or all medical complaints. PE as noted above. CTs pending, reassess 87 y/o F with PMH of Dementia,  HTN, HLD, on Eliquis was sent to the ED by [facility] or head CT for facial injury. Spoke with ROSETTE Fernandes from the facility, she reports pt woke up this morning with bruising to her face and forehead, she is unable to answer how she got the bruising. Pt was found in her bed and is at baseline mental status, they just sent her out for imaging due to the Eliquis.  Pt denies HA, dizziness, pain or all medical complaints. PE as noted above. CTs results reviewed, spoke with neurosurgery-- see above reccds. reassess    910pm: Rpt head CT results reviewed-- stable 5mm hyperdensity without edema, hemorrhage is less likely. Pt remained at baseline mental status in the ED. will dc back to the facility.

## 2022-08-20 NOTE — ED PROVIDER NOTE - OBTAINED AND REVIEWED OLD RECORDS MULTI-SELECT OPTIONS
The patient has been re-examined and I agree with the above assessment or I updated with my findings.
Skilled Nursing Facility Notes

## 2022-08-20 NOTE — ED PROVIDER NOTE - CONSULTANT FREE TEXT FOR MDM DISCUSSED CASE WITH QUESTION
[facility] called, spoke with neurosurgeon Dr. Nelson regarding head CT results. He reccds rpt CT in 4-6 hours, if stable pt can be dc'd

## 2022-08-20 NOTE — ED PROVIDER NOTE - PHYSICAL EXAMINATION
Gen: Well appearing in NAD.  AAOx2  Eyes: PERRLA  Head: +bruising to the mid forehead and nose   Heart: s1/s2, RRR  Lung: CTA b/l, no wheezing/rhonchi or rales.  Abd: soft, NT/ND, no rebound or guarding  Extremity: no pedal edema or calf pain,  Neuro: patient moving all extremity equally,  Skin: Normal for race

## 2022-08-21 VITALS
RESPIRATION RATE: 16 BRPM | HEART RATE: 79 BPM | SYSTOLIC BLOOD PRESSURE: 146 MMHG | DIASTOLIC BLOOD PRESSURE: 78 MMHG | TEMPERATURE: 98 F | OXYGEN SATURATION: 96 %

## 2022-08-21 PROCEDURE — 72125 CT NECK SPINE W/O DYE: CPT | Mod: MA

## 2022-08-21 PROCEDURE — 99284 EMERGENCY DEPT VISIT MOD MDM: CPT | Mod: 25

## 2022-08-21 PROCEDURE — 70486 CT MAXILLOFACIAL W/O DYE: CPT | Mod: MA

## 2022-08-21 PROCEDURE — 70450 CT HEAD/BRAIN W/O DYE: CPT | Mod: MA

## 2022-08-26 NOTE — H&P PST ADULT - I HAVE PERSONALLY SEEN, EXAMINED, AND PARTICIPATED IN THE CARE OF THIS PATIENT.  I HAVE REVIEWED ALL PERTINENT CLINICAL INFORMATION, INCLUDING HISTORY, PHYSICAL EXAM, PLAN AND THE MEDICAL/PA/NP STUDENT’S NOTE AND AGREE EXCEPT AS NOTED.
Mirvaso Pregnancy And Lactation Text: This medication has not been assigned a Pregnancy Risk Category. It is unknown if the medication is excreted in breast milk. Statement Selected

## 2022-11-02 NOTE — PHYSICAL THERAPY INITIAL EVALUATION ADULT - CRITERIA FOR SKILLED THERAPEUTIC INTERVENTIONS
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    Immunocompromised state ([facility] [address] D84.9    Coronary artery disease I25.10    Bilateral lower extremity edema R60.0    Essential hypertension I10    Lumbar spondylosis M47.816    Mitral valve insufficiency and aortic valve insufficiency I08.0    Mixed hyperlipidemia E78.2    Myopia of both eyes H52.13    Osteoporosis M81.0    Other chronic sinusitis J32.8    Primary open angle glaucoma (POAG) of both eyes, mild stage H40.1131    Primary osteoarthritis of right hip M16.11    Type 2 diabetes mellitus with unspecified diabetic retinopathy without macular edema ([facility]) E11.319    Type 2 diabetes mellitus with diabetic peripheral angiopathy without gangrene, with long-term current use of insulin ([facility]) E11.51, Z79.4    Pressure ulcer of coccygeal region, stage 4 ([facility]) L89.154    Mild malnutrition ([facility]) E44.1    Chronic diastolic congestive heart failure ([facility]) I50.32    Chronic obstructive pulmonary disease ([facility]) J44.9    Hypergranulation L92.9    Nonrheumatic mitral (valve) insufficiency I34.0    Chronic respiratory failure with hypoxia ([facility]) J96.11    Venous insufficiency of left lower extremity I87.2       Assessment of today's active condition(s): RA, decreased mobility, multiple other medical problems and hospital admissions last year, development of a stage 4 pressure ulcer, no Hx of osteo, getting close to being healed, still just a bit of evidence of moisture and friction, but overall we're getting there. Factors contributing to occurrence and/or persistence of the chronic ulcer include diabetes, chronic pressure, decreased mobility, shear force, and immunosuppression.  Medical necessity of today's impairments found

## 2023-03-10 ENCOUNTER — INPATIENT (INPATIENT)
Facility: HOSPITAL | Age: 88
LOS: 2 days | Discharge: NURSING, REHAB OR LONG-TERM CARE | DRG: 684 | End: 2023-03-13
Attending: HOSPITALIST | Admitting: HOSPITALIST
Payer: MEDICARE

## 2023-03-10 VITALS
HEART RATE: 86 BPM | WEIGHT: 160.06 LBS | TEMPERATURE: 98 F | OXYGEN SATURATION: 94 % | RESPIRATION RATE: 16 BRPM | SYSTOLIC BLOOD PRESSURE: 110 MMHG | DIASTOLIC BLOOD PRESSURE: 69 MMHG | HEIGHT: 65 IN

## 2023-03-10 DIAGNOSIS — N17.9 ACUTE KIDNEY FAILURE, UNSPECIFIED: ICD-10-CM

## 2023-03-10 DIAGNOSIS — Z98.890 OTHER SPECIFIED POSTPROCEDURAL STATES: Chronic | ICD-10-CM

## 2023-03-10 DIAGNOSIS — R11.2 NAUSEA WITH VOMITING, UNSPECIFIED: ICD-10-CM

## 2023-03-10 DIAGNOSIS — Z90.710 ACQUIRED ABSENCE OF BOTH CERVIX AND UTERUS: Chronic | ICD-10-CM

## 2023-03-10 DIAGNOSIS — K52.9 NONINFECTIVE GASTROENTERITIS AND COLITIS, UNSPECIFIED: ICD-10-CM

## 2023-03-10 DIAGNOSIS — R19.7 DIARRHEA, UNSPECIFIED: ICD-10-CM

## 2023-03-10 DIAGNOSIS — Z96.0 PRESENCE OF UROGENITAL IMPLANTS: Chronic | ICD-10-CM

## 2023-03-10 LAB
ALBUMIN SERPL ELPH-MCNC: 3.2 G/DL — LOW (ref 3.3–5)
ALP SERPL-CCNC: 90 U/L — SIGNIFICANT CHANGE UP (ref 40–120)
ALT FLD-CCNC: 21 U/L — SIGNIFICANT CHANGE UP (ref 10–45)
ANION GAP SERPL CALC-SCNC: 11 MMOL/L — SIGNIFICANT CHANGE UP (ref 5–17)
ANION GAP SERPL CALC-SCNC: 9 MMOL/L — SIGNIFICANT CHANGE UP (ref 5–17)
APPEARANCE UR: CLEAR — SIGNIFICANT CHANGE UP
APTT BLD: 37.5 SEC — HIGH (ref 27.5–35.5)
AST SERPL-CCNC: 22 U/L — SIGNIFICANT CHANGE UP (ref 10–40)
BACTERIA # UR AUTO: ABNORMAL /HPF
BASOPHILS # BLD AUTO: 0.04 K/UL — SIGNIFICANT CHANGE UP (ref 0–0.2)
BASOPHILS NFR BLD AUTO: 0.3 % — SIGNIFICANT CHANGE UP (ref 0–2)
BILIRUB SERPL-MCNC: 0.4 MG/DL — SIGNIFICANT CHANGE UP (ref 0.2–1.2)
BILIRUB UR-MCNC: ABNORMAL
BLD GP AB SCN SERPL QL: SIGNIFICANT CHANGE UP
BUN SERPL-MCNC: 56 MG/DL — HIGH (ref 7–23)
BUN SERPL-MCNC: 57 MG/DL — HIGH (ref 7–23)
CALCIUM SERPL-MCNC: 8.6 MG/DL — SIGNIFICANT CHANGE UP (ref 8.4–10.5)
CALCIUM SERPL-MCNC: 9.3 MG/DL — SIGNIFICANT CHANGE UP (ref 8.4–10.5)
CHLORIDE SERPL-SCNC: 105 MMOL/L — SIGNIFICANT CHANGE UP (ref 96–108)
CHLORIDE SERPL-SCNC: 110 MMOL/L — HIGH (ref 96–108)
CO2 SERPL-SCNC: 24 MMOL/L — SIGNIFICANT CHANGE UP (ref 22–31)
CO2 SERPL-SCNC: 26 MMOL/L — SIGNIFICANT CHANGE UP (ref 22–31)
COLOR SPEC: YELLOW — SIGNIFICANT CHANGE UP
CREAT SERPL-MCNC: 2.29 MG/DL — HIGH (ref 0.5–1.3)
CREAT SERPL-MCNC: 2.74 MG/DL — HIGH (ref 0.5–1.3)
DIFF PNL FLD: ABNORMAL
EGFR: 16 ML/MIN/1.73M2 — LOW
EGFR: 20 ML/MIN/1.73M2 — LOW
EOSINOPHIL # BLD AUTO: 0.04 K/UL — SIGNIFICANT CHANGE UP (ref 0–0.5)
EOSINOPHIL NFR BLD AUTO: 0.3 % — SIGNIFICANT CHANGE UP (ref 0–6)
EPI CELLS # UR: SIGNIFICANT CHANGE UP
FLUAV AG NPH QL: SIGNIFICANT CHANGE UP
FLUBV AG NPH QL: SIGNIFICANT CHANGE UP
GLUCOSE SERPL-MCNC: 113 MG/DL — HIGH (ref 70–99)
GLUCOSE SERPL-MCNC: 116 MG/DL — HIGH (ref 70–99)
GLUCOSE UR QL: NEGATIVE — SIGNIFICANT CHANGE UP
HCT VFR BLD CALC: 36.1 % — SIGNIFICANT CHANGE UP (ref 34.5–45)
HCT VFR BLD CALC: 39.2 % — SIGNIFICANT CHANGE UP (ref 34.5–45)
HGB BLD-MCNC: 11.1 G/DL — LOW (ref 11.5–15.5)
HGB BLD-MCNC: 12.2 G/DL — SIGNIFICANT CHANGE UP (ref 11.5–15.5)
IMM GRANULOCYTES NFR BLD AUTO: 0.5 % — SIGNIFICANT CHANGE UP (ref 0–0.9)
KETONES UR-MCNC: ABNORMAL
LEUKOCYTE ESTERASE UR-ACNC: ABNORMAL
LYMPHOCYTES # BLD AUTO: 1.27 K/UL — SIGNIFICANT CHANGE UP (ref 1–3.3)
LYMPHOCYTES # BLD AUTO: 8.4 % — LOW (ref 13–44)
MCHC RBC-ENTMCNC: 27.4 PG — SIGNIFICANT CHANGE UP (ref 27–34)
MCHC RBC-ENTMCNC: 27.5 PG — SIGNIFICANT CHANGE UP (ref 27–34)
MCHC RBC-ENTMCNC: 30.7 GM/DL — LOW (ref 32–36)
MCHC RBC-ENTMCNC: 31.1 GM/DL — LOW (ref 32–36)
MCV RBC AUTO: 88.1 FL — SIGNIFICANT CHANGE UP (ref 80–100)
MCV RBC AUTO: 89.6 FL — SIGNIFICANT CHANGE UP (ref 80–100)
MONOCYTES # BLD AUTO: 1.39 K/UL — HIGH (ref 0–0.9)
MONOCYTES NFR BLD AUTO: 9.2 % — SIGNIFICANT CHANGE UP (ref 2–14)
NEUTROPHILS # BLD AUTO: 12.31 K/UL — HIGH (ref 1.8–7.4)
NEUTROPHILS NFR BLD AUTO: 81.3 % — HIGH (ref 43–77)
NITRITE UR-MCNC: POSITIVE
NRBC # BLD: 0 /100 WBCS — SIGNIFICANT CHANGE UP (ref 0–0)
NRBC # BLD: 0 /100 WBCS — SIGNIFICANT CHANGE UP (ref 0–0)
OB PNL STL: POSITIVE
PH UR: 5 — SIGNIFICANT CHANGE UP (ref 5–8)
PLATELET # BLD AUTO: 286 K/UL — SIGNIFICANT CHANGE UP (ref 150–400)
PLATELET # BLD AUTO: 308 K/UL — SIGNIFICANT CHANGE UP (ref 150–400)
POTASSIUM SERPL-MCNC: 3.7 MMOL/L — SIGNIFICANT CHANGE UP (ref 3.5–5.3)
POTASSIUM SERPL-MCNC: 4.3 MMOL/L — SIGNIFICANT CHANGE UP (ref 3.5–5.3)
POTASSIUM SERPL-SCNC: 3.7 MMOL/L — SIGNIFICANT CHANGE UP (ref 3.5–5.3)
POTASSIUM SERPL-SCNC: 4.3 MMOL/L — SIGNIFICANT CHANGE UP (ref 3.5–5.3)
PROT SERPL-MCNC: 7.8 G/DL — SIGNIFICANT CHANGE UP (ref 6–8.3)
PROT UR-MCNC: 30 MG/DL
RBC # BLD: 4.03 M/UL — SIGNIFICANT CHANGE UP (ref 3.8–5.2)
RBC # BLD: 4.45 M/UL — SIGNIFICANT CHANGE UP (ref 3.8–5.2)
RBC # FLD: 15.7 % — HIGH (ref 10.3–14.5)
RBC # FLD: 15.7 % — HIGH (ref 10.3–14.5)
RBC CASTS # UR COMP ASSIST: ABNORMAL /HPF (ref 0–4)
RSV RNA NPH QL NAA+NON-PROBE: SIGNIFICANT CHANGE UP
SARS-COV-2 RNA SPEC QL NAA+PROBE: SIGNIFICANT CHANGE UP
SODIUM SERPL-SCNC: 142 MMOL/L — SIGNIFICANT CHANGE UP (ref 135–145)
SODIUM SERPL-SCNC: 143 MMOL/L — SIGNIFICANT CHANGE UP (ref 135–145)
SP GR SPEC: 1.02 — SIGNIFICANT CHANGE UP (ref 1.01–1.02)
UROBILINOGEN FLD QL: NEGATIVE — SIGNIFICANT CHANGE UP
WBC # BLD: 13.49 K/UL — HIGH (ref 3.8–10.5)
WBC # BLD: 15.12 K/UL — HIGH (ref 3.8–10.5)
WBC # FLD AUTO: 13.49 K/UL — HIGH (ref 3.8–10.5)
WBC # FLD AUTO: 15.12 K/UL — HIGH (ref 3.8–10.5)
WBC UR QL: SIGNIFICANT CHANGE UP /HPF (ref 0–5)

## 2023-03-10 PROCEDURE — 71045 X-RAY EXAM CHEST 1 VIEW: CPT | Mod: 26

## 2023-03-10 PROCEDURE — 99285 EMERGENCY DEPT VISIT HI MDM: CPT

## 2023-03-10 PROCEDURE — 99223 1ST HOSP IP/OBS HIGH 75: CPT

## 2023-03-10 PROCEDURE — 93010 ELECTROCARDIOGRAM REPORT: CPT

## 2023-03-10 RX ORDER — QUETIAPINE FUMARATE 200 MG/1
25 TABLET, FILM COATED ORAL
Refills: 0 | Status: DISCONTINUED | OUTPATIENT
Start: 2023-03-10 | End: 2023-03-13

## 2023-03-10 RX ORDER — ONDANSETRON 8 MG/1
4 TABLET, FILM COATED ORAL EVERY 8 HOURS
Refills: 0 | Status: DISCONTINUED | OUTPATIENT
Start: 2023-03-10 | End: 2023-03-13

## 2023-03-10 RX ORDER — ACETAMINOPHEN 500 MG
650 TABLET ORAL EVERY 6 HOURS
Refills: 0 | Status: DISCONTINUED | OUTPATIENT
Start: 2023-03-10 | End: 2023-03-13

## 2023-03-10 RX ORDER — PANTOPRAZOLE SODIUM 20 MG/1
40 TABLET, DELAYED RELEASE ORAL ONCE
Refills: 0 | Status: COMPLETED | OUTPATIENT
Start: 2023-03-10 | End: 2023-03-10

## 2023-03-10 RX ORDER — LANOLIN ALCOHOL/MO/W.PET/CERES
3 CREAM (GRAM) TOPICAL AT BEDTIME
Refills: 0 | Status: DISCONTINUED | OUTPATIENT
Start: 2023-03-10 | End: 2023-03-13

## 2023-03-10 RX ORDER — SODIUM CHLORIDE 9 MG/ML
1000 INJECTION INTRAMUSCULAR; INTRAVENOUS; SUBCUTANEOUS ONCE
Refills: 0 | Status: COMPLETED | OUTPATIENT
Start: 2023-03-10 | End: 2023-03-10

## 2023-03-10 RX ORDER — FAMOTIDINE 10 MG/ML
10 INJECTION INTRAVENOUS AT BEDTIME
Refills: 0 | Status: DISCONTINUED | OUTPATIENT
Start: 2023-03-10 | End: 2023-03-13

## 2023-03-10 RX ORDER — ONDANSETRON 8 MG/1
4 TABLET, FILM COATED ORAL ONCE
Refills: 0 | Status: COMPLETED | OUTPATIENT
Start: 2023-03-10 | End: 2023-03-10

## 2023-03-10 RX ORDER — SERTRALINE 25 MG/1
100 TABLET, FILM COATED ORAL DAILY
Refills: 0 | Status: DISCONTINUED | OUTPATIENT
Start: 2023-03-11 | End: 2023-03-13

## 2023-03-10 RX ORDER — SODIUM CHLORIDE 9 MG/ML
1000 INJECTION INTRAMUSCULAR; INTRAVENOUS; SUBCUTANEOUS
Refills: 0 | Status: DISCONTINUED | OUTPATIENT
Start: 2023-03-10 | End: 2023-03-12

## 2023-03-10 RX ORDER — LACTOBACILLUS ACIDOPHILUS 100MM CELL
1 CAPSULE ORAL
Refills: 0 | Status: DISCONTINUED | OUTPATIENT
Start: 2023-03-10 | End: 2023-03-13

## 2023-03-10 RX ORDER — ATORVASTATIN CALCIUM 80 MG/1
10 TABLET, FILM COATED ORAL AT BEDTIME
Refills: 0 | Status: DISCONTINUED | OUTPATIENT
Start: 2023-03-10 | End: 2023-03-13

## 2023-03-10 RX ADMIN — SODIUM CHLORIDE 1000 MILLILITER(S): 9 INJECTION INTRAMUSCULAR; INTRAVENOUS; SUBCUTANEOUS at 15:13

## 2023-03-10 RX ADMIN — ATORVASTATIN CALCIUM 10 MILLIGRAM(S): 80 TABLET, FILM COATED ORAL at 21:12

## 2023-03-10 RX ADMIN — QUETIAPINE FUMARATE 25 MILLIGRAM(S): 200 TABLET, FILM COATED ORAL at 18:41

## 2023-03-10 RX ADMIN — PANTOPRAZOLE SODIUM 40 MILLIGRAM(S): 20 TABLET, DELAYED RELEASE ORAL at 13:20

## 2023-03-10 RX ADMIN — Medication 1 TABLET(S): at 18:41

## 2023-03-10 RX ADMIN — SODIUM CHLORIDE 80 MILLILITER(S): 9 INJECTION INTRAMUSCULAR; INTRAVENOUS; SUBCUTANEOUS at 18:49

## 2023-03-10 RX ADMIN — Medication 7.5 MILLIGRAM(S): at 21:13

## 2023-03-10 RX ADMIN — SODIUM CHLORIDE 1000 MILLILITER(S): 9 INJECTION INTRAMUSCULAR; INTRAVENOUS; SUBCUTANEOUS at 14:13

## 2023-03-10 RX ADMIN — FAMOTIDINE 10 MILLIGRAM(S): 10 INJECTION INTRAVENOUS at 21:14

## 2023-03-10 RX ADMIN — ONDANSETRON 4 MILLIGRAM(S): 8 TABLET, FILM COATED ORAL at 13:20

## 2023-03-10 NOTE — ED ADULT NURSE NOTE - NSSUHOSCREENINGYN_ED_ALL_ED
No - the patient is unable to be screened due to medical condition
Minimal improvement with coping skills.  Did not attend any psych rehab groups. Isolative to room during admission.

## 2023-03-10 NOTE — ED PROVIDER NOTE - NS ED ATTENDING STATEMENT MOD
I have seen and examined this patient and fully participated in the care of this patient as the teaching attending.  The service was shared with the DELFINA.  I reviewed and verified the documentation and independently performed the documented:

## 2023-03-10 NOTE — H&P ADULT - ASSESSMENT
86 yo woman with history of Dementia, Hyperlipidemia, Hypertension, Renal stone s/p stent, and NPH sent from [facility] for nausea and vomiting x 24hrs.    Acute Renal Failure likely from Vomiting and Diarrhea  - 1 L NS given in the ED  - Start IVF NS @ 80ml/hour for 24hrs, then re-evaluate.  - F/U urinalysis, urine studies  - F/U Renal sonogram  - Monitor BUN/Creatinine levels daily.  - F/u c.diff as family reports history of c. diff  - Continue lactobacillus    Possible GI Bleed  - FOBT + in ED  - GI Consulted by ED  - Full liquid diet for now  - CBC q 6hrs  - Keep active type and screen     Hypertension  - Hold home amlodipine 5 mg, losartan 25mg     Dementia   - Continue buspirone BID, seroquel BID, and sertraline     Hyperlipidemia  - Continue atorvastatin in place of home pravastatin    GI Prophylaxis with famotidine    DVT HOLD eliquis     86 yo woman with history of Dementia, Hyperlipidemia, Hypertension, Renal stone s/p stent, and NPH sent from [facility] for nausea and vomiting x 24hrs.    Acute Renal Failure likely from Vomiting and Diarrhea  - 1 L NS given in the ED  - Start IVF NS @ 80ml/hour for 24hrs, then re-evaluate.  - F/U urinalysis, urine studies  - F/U Renal sonogram  - Monitor BUN/Creatinine levels daily.  - F/u c.diff as family reports history of c. diff  - Continue lactobacillus    Possible GI Bleed  - FOBT + in ED  - GI Consulted by ED  - Full liquid diet for now  - CBC q 6hrs  - Keep active type and screen     Hypertension  - Hold home amlodipine 5 mg, losartan 25mg     Dementia   - Continue buspirone BID, seroquel BID, and sertraline     Hyperlipidemia  - Continue atorvastatin in place of home pravastatin    GI Prophylaxis with famotidine    DVT Prophylaxis with eliquis on hold until f/u CBC is returned    Updated daughters Diane     Code: DNR/DNI as per MOLST form in chart. Reviewed with Mary and she did not agree with the do not hospitalize.

## 2023-03-10 NOTE — ED ADULT TRIAGE NOTE - ACCOMPANIED BY
[facility]  Pediatric Resident Note    Patient - Laymond Sicard. [place]   MRN -  [id number]   Acct # - [de-identified]   - 2017      Date of Admission -  2021 11:54 AM  Date of evaluation -  6/15/2021  [address] [facility] Day - 1  Primary Care Physician - HORTENCIA Corado - CNP    3 y.o male with PMH of seizure admitted for breakthrough seizure lasting 30 mins. Positive for rhino/entero virus. Subjective   Patient seen and examined at the bedside with grandma. Appears alert, active and playful. No seizure activity reported overnight  Tolerating increased AED dosage pretty well  VSS, good UOP     Current Medications   Current Medications    diazePAM  5 mg Rectal Once    cloBAZam  5 mg Oral BID    vitamin B-6  25 mg Oral Daily    topiramate  45 mg Oral BID    levETIRAcetam  500 mg Oral BID     lidocaine, sodium chloride flush, acetaminophen, ibuprofen, LORazepam    Diet/Nutrition   PEDIATRIC DIET; Regular    Allergies   Patient has no known allergies.     Vitals   Temperature Range: Temp: 97.5 °F (36.4 °C) Temp  Av.7 °F (36.5 °C)  Min: 97.2 °F (36.2 °C)  Max: 98.2 °F (36.8 °C)  BP Range:  Systolic (87MZM), GKV:938 , Min:106 , ZGS:177     Diastolic (49WND), OAF:00, Min:56, Max:56    Pulse Range: Pulse  Av.5  Min: 112  Max: 155  Respiration Range: Resp  Av.5  Min: 22  Max: 41    I/O (24 Hours)    Intake/Output Summary (Last 24 hours) at 6/15/2021 1350  Last data filed at 6/15/2021 3564  Gross per 24 hour   Intake 60 ml   Output 170 ml   Net -110 ml       Patient Vitals for the past 96 hrs (Last 3 readings):   Weight   21 2200 15.5 kg   21 1309 15.7 kg       Exam   GENERAL:  alert, active and cooperative  HEENT:  sclera clear, pupils equal and reactive, extra ocular muscles intact, oropharynx clear, mucus membranes moist, tympanic membranes clear bilaterally, no cervical lymphadenopathy noted and neck supple  RESPIRATORY:  no increased work of breathing, breath sounds clear to auscultation bilaterally, no crackles or wheezing and good air exchange  CARDIOVASCULAR:  regular rate and rhythm, normal S1, S2, no murmur noted, 2+ pulses throughout and capillary Refill less than 2 seconds  ABDOMEN:  soft, non-distended, non-tender, no rebound tenderness or guarding, normal active bowel sounds, no masses palpated and no hepatosplenomegaly  GENITALIA/ANUS:normal male genitalia  MUSCULOSKELETAL:  moving all extremities well and symmetrically and spine straight  NEUROLOGIC:  normal tone and strength and sensation intact  SKIN:  no rashes. Small superficial scratch on back. Data   Old records and images have been reviewed    Lab Results     CBC:   Lab Results   Component Value Date    WBC 7.3 01/15/2021    RBC 4.89 01/15/2021    HGB 11.5 01/15/2021    HCT 36.1 01/15/2021    MCV 73.8 01/15/2021    MCH 23.5 01/15/2021    MCHC 31.9 01/15/2021    RDW 14.8 01/15/2021     01/15/2021    MPV 10.8 01/15/2021       Cultures   none    Radiology     CT head:   No acute intracranial abnormality    (See actual reports for details)    Clinical Impression   3 y.o kid with prolonged seizure activity, different for his baseline minor seizures which he gets almost everyday. Reportedly compliant with meds. Entero/rhino positive, likely the cause of breakthrough seizure by lowering seizure threshold. Loaded with keppra and started on increased dose of keppra and same dose of topamax. Plan     -Continue Keppra 500mg BID  -Topamax 45mg BID  -Ativan prn for prolonged seizure >3mins  -Monitor for seizure-like activity and side effects of AEDs  -Neurology following  -If no more seizure activity, will DC later today after discussion with child support services.     The plan of care was discussed with the Attending Physician:   [] Dr. Armand Yap  [] Dr. Fei Wellington  [] Dr. Alexander Roque  [x] Dr. Jenny Davenport  [] Dr. Jack Chadwick  [] Attending doctor:     Oracio Price MD 1:50 PM      Total time spent in the care of this patient: 35 min    GC Modifier: I have performed the critical and key portions of the service  and I was directly involved in the management and treatment plan of the  patient. History as documented by resident Dr. Neema Doan on 6/15/2021 reviewed,  caregiver/patient interviewed and patient examined by me. I have seen and examined the patient on 6/15/2021. Agree with above with revisions as marked.     Arlene Vivas MD EMT/paramedic

## 2023-03-10 NOTE — ED ADULT NURSE REASSESSMENT NOTE - NS ED NURSE REASSESS COMMENT FT1
pt refusing renal US at this time. pt educated on importance of US but states she has "had a bunch before and its stupid to do it again." MD ortiz made aware at this time.

## 2023-03-10 NOTE — ED ADULT NURSE NOTE - CHIEF COMPLAINT
OCCUPATIONAL THERAPY Initial Assessment, Daily Note, and Discharge       OT Visit Days: 1  Acknowledge Orders  Time  OT Charge Capture  Rehab Caseload Tracker      Gricelda Lucas is a 59 y.o. male   PRIMARY DIAGNOSIS: Acute kidney injury (BEN) with acute tubular necrosis (ATN) (HCC)  Hypoxemia [R09.02]  Ureteral obstruction, right [N13.5]  Acute kidney injury ([address] [N17.9]  Poisoning by warfarin sodium, accidental or unintentional, initial encounter [T45.511A]  Urinary tract infection with hematuria, site unspecified [N39.0, R31.9]  Acute kidney injury (BEN) with acute tubular necrosis (ATN) ([address] [N17.0]       Reason for Referral: Generalized Muscle Weakness (M62.81)  Inpatient: Payor: [facility] MEDICARE / Plan: Jignesh Peer / Product Type: *No Product type* /     ASSESSMENT:     REHAB RECOMMENDATIONS:   Recommendation to date pending progress:  Setting:  No further skilled therapy after discharge from hospital    Equipment:    3 in 1 Bedside Commode     ASSESSMENT:  Mr. Jaems Hernandez was admitted with SOB and flank pain, post op CABG 2 months ago. Pt demonstrated independence with ADLs, SBA for functional mobility w/o an AD. Pt presented with decreased endurance and endorsed mild fatigue. Pt educated on energy conservation techniques and verbalized good understanding. Pt does not require further skilled OT services at this time as he remains independent w/ ADLs and goals were met during initial session.       [address] [facility] [address] [address] AM-PAC 6 Clicks Daily Activity Inpatient Short Form:    AM-PAC Daily Activity Inpatient   How much help for putting on and taking off regular lower body clothing?: None  How much help for Bathing?: None  How much help for Toileting?: None  How much help for putting on and taking off regular upper body clothing?: None  How much help for taking care of personal grooming?: None  How much help for eating meals?: None  AM-PAC Inpatient Daily Activity Raw Score: 24  AM-PAC Total NT Comments   BASIC ADLs:              Upper Body Bathing  [] [] [] [] [] [] [] [] [] []    Lower Body Bathing [] [] [] [] [] [] [] [] [] []    Toileting [] [] [] [] [] [] [] [] [] []    Upper Body Dressing [] [] [] [] [] [] [] [] [] []    Lower Body Dressing [] [] [] [] [] [] [] [] [] []    Feeding [] [] [] [] [] [] [] [] [] []    Grooming [x] [] [] [] [] [] [] [] [] [] Standing at sink   [address] [] [] [] [] [] [] [] [] [] []    Functional Mobility [] [] [] [x] [] [] [] [] [] []    I=Independent, Mod I=Modified Independent, S=Supervision/Setup, SBA=Standby Assistance, CGA=Contact Guard Assistance, Min=Minimal Assistance, Mod=Moderate Assistance, Max=Maximal Assistance, Total=Total Assistance, NT=Not Tested    PLAN:     FREQUENCY/DURATION     for duration of hospital stay or until stated goals are met, whichever comes first.    ACUTE OCCUPATIONAL THERAPY GOALS:   (Developed with and agreed upon by patient and/or caregiver.)  1. Pt will complete functional mobility for ADLs with SBA  2. Pt will complete grooming and hygiene at sink independently  3. Pt will independently demonstrate/ verbalize 2+ energy conservation techniques to promote increased endurance for ADLs      Goals met         TREATMENT:     EVALUATION: LOW COMPLEXITY: (Untimed Charge)    TREATMENT:   Self Care (8 minutes): Patient participated in grooming ADLs in supine with no verbal cueing to increase independence. Patient also participated in energy conservation training to increase independence.      TREATMENT GRID:  N/A    AFTER TREATMENT PRECAUTIONS: Chair, Needs within reach, and RN notified    INTERDISCIPLINARY COLLABORATION:  RN/ PCT    EDUCATION:  Education Given To: Patient  Education Provided: Role of Therapy;Plan of Care;Energy Conservation    TOTAL TREATMENT DURATION AND TIME:  Time In: 0935  Time Out: [address]  Minutes: [facility] [place], OT The patient is a 87y Female complaining of vomiting.

## 2023-03-10 NOTE — CONSULT NOTE ADULT - NS ATTEND AMEND GEN_ALL_CORE FT
Patient seen and examined at the bedside. Agree with the assessment and plan of NP Yaya.  Reported hematemesis, diarrhea. In NAD at present and denies complaints at present.  CBC - not anemic.  Advise monitoring only at present. Endoscopic testing if warranted but would defer for now.

## 2023-03-10 NOTE — ED ADULT NURSE REASSESSMENT NOTE - NS ED NURSE REASSESS COMMENT FT1
Attempted to give report to [address], nurse Pillar to call back. Patient denies complaints. Safety maintained.

## 2023-03-10 NOTE — ED PROVIDER NOTE - CLINICAL SUMMARY MEDICAL DECISION MAKING FREE TEXT BOX
vomiting and diarrhea, with blood reported  MILLICENT, with creatine 2.47 (1.19 in 12/22)  to be admitted  IV hydration gi consult

## 2023-03-10 NOTE — CONSULT NOTE ADULT - PROBLEM SELECTOR RECOMMENDATION 2
Monitor bowel movement  If diarrhea do stool testing to R/O Bacterial cause  Keep well hydrated Monitor bowel movements  If diarrhea do stool testing to R/O infectious cause given history of c.difficile  Keep well hydrated

## 2023-03-10 NOTE — H&P ADULT - NSHPLABSRESULTS_GEN_ALL_CORE
LABS:                        12.2   15.12 )-----------( 308      ( 10 Mar 2023 13:24 )             39.2     03-10    142  |  105  |  57<H>  ----------------------------<  113<H>  3.7   |  26  |  2.74<H>    Ca    9.3      10 Mar 2023 13:24    TPro  7.8  /  Alb  3.2<L>  /  TBili  0.4  /  DBili  x   /  AST  22  /  ALT  21  /  AlkPhos  90  03-10    PTT - ( 10 Mar 2023 13:24 )  PTT:37.5 sec    RADIOLOGY & ADDITIONAL TESTS:  Chest 1 View- PORTABLE-Urgent (Xray Chest 1 View- PORTABLE-Urgent .) (03.10.23 @ 14:15) > IMPRESSION: Small bibasilar infiltrates.  EKG (3/10/23) iLBBB, PVCs, qtc 487    Care Discussed with Consultants/Other Providers [ x] YES  [ ] NO  Imaging Personally Reviewed:  [x ] YES  [ ] NO

## 2023-03-10 NOTE — CONSULT NOTE ADULT - PROBLEM SELECTOR RECOMMENDATION 9
Monitor for vomiting  Keep her well hydrated with IV fluids  Monitor CBC and electrolyte daily Monitor for vomiting for blood  Keep her well hydrated with IV fluids  Monitor CBC and electrolyte daily  Possible EGD if symptoms persist Currently appears well and not vomiting or nauseous.  Monitor for hematemesis.  Keep well hydrated with IV fluids  Monitor CBC and electrolyte daily  Possible EGD if symptomatic

## 2023-03-10 NOTE — PATIENT PROFILE ADULT - FALL HARM RISK - HARM RISK INTERVENTIONS
Communicate Risk of Fall with Harm to all staff/Reinforce activity limits and safety measures with patient and family/Tailored Fall Risk Interventions/Visual Cue: Yellow wristband and red socks/Bed in lowest position, wheels locked, appropriate side rails in place/Call bell, personal items and telephone in reach/Instruct patient to call for assistance before getting out of bed or chair/Non-slip footwear when patient is out of bed/[place] to call system/Physically safe environment - no spills, clutter or unnecessary equipment/Purposeful Proactive Rounding/Room/bathroom lighting operational, light cord in reach Assistance with ambulation/Assistance OOB with selected safe patient handling equipment/Communicate Risk of Fall with Harm to all staff/Reinforce activity limits and safety measures with patient and family/Tailored Fall Risk Interventions/Visual Cue: Yellow wristband and red socks/Bed in lowest position, wheels locked, appropriate side rails in place/Call bell, personal items and telephone in reach/Instruct patient to call for assistance before getting out of bed or chair/Non-slip footwear when patient is out of bed/[place] to call system/Physically safe environment - no spills, clutter or unnecessary equipment/Purposeful Proactive Rounding/Room/bathroom lighting operational, light cord in reach

## 2023-03-10 NOTE — ED ADULT NURSE NOTE - OBJECTIVE STATEMENT
pt sent in from the [facility] for c/o of vomiting, diarrhea, chills x 2 days. Patient reports vomit is dark brown, occurs 2-3 times a day and worsened after eating. Diarrhea contains little bit of blood red/brown in color, non foul smelling, occurs 2-3 times a day. Pt reports weakness and some nausea on arrival but feels like shes doing better overall. Denies CP, SOB, abdominal pain, numbness/tingling, headache, dizziness. pt with PMH of HTN, HLD, dementia, on Eliquis, s/p ex lap for obstruction.

## 2023-03-10 NOTE — CONSULT NOTE ADULT - SUBJECTIVE AND OBJECTIVE BOX
INTERVAL HPI/OVERNIGHT EVENTS:  HPI:  87 year old female with Hx of dementia, HTN HLD presented from rehab with dehydration.  GI Consultation called for Nausea and diarrhea. Patient seen and examined at bed side in ER.   She is poor historian, as per patient Vomited 2 days before and had diarrhea yesterday. She denies abdominal pain nausea, vomiting.   As per patient had Endoscopy and colonoscopy in the city she do not remember who did procedure.   No family at bed side. I called her daughter Cris [phone] got voice mail. Information obtained from admitting team and chart.   She is feeling fine and not sure why she is not going back to her facility.     MEDICATIONS  (STANDING):  sodium chloride 0.9%. 1000 milliLiter(s) (80 mL/Hr) IV Continuous <Continuous>    MEDICATIONS  (PRN):  acetaminophen     Tablet .. 650 milliGRAM(s) Oral every 6 hours PRN Temp greater or equal to 38C (100.4F), Mild Pain (1 - 3)  melatonin 3 milliGRAM(s) Oral at bedtime PRN Insomnia  ondansetron Injectable 4 milliGRAM(s) IV Push every 8 hours PRN Nausea and/or Vomiting      Allergies    No Known Allergies    Intolerances        PAST MEDICAL & SURGICAL HISTORY:  HTN (hypertension)      HLD (hyperlipidemia)      Dementia      No significant past surgical history	  	    PHYSICAL EXAM:   Vital Signs:  Vital Signs Last 24 Hrs  T(C): 36.7 (10 Mar 2023 12:36), Max: 36.7 (10 Mar 2023 12:36)  T(F): 98 (10 Mar 2023 12:36), Max: 98 (10 Mar 2023 12:36)  HR: 86 (10 Mar 2023 12:36) (86 - 86)  BP: 110/69 (10 Mar 2023 12:36) (110/69 - 110/69)  BP(mean): --  RR: 16 (10 Mar 2023 12:36) (16 - 16)  SpO2: 94% (10 Mar 2023 12:36) (94% - 94%)    Parameters below as of 10 Mar 2023 12:36  Patient On (Oxygen Delivery Method): room air      Daily Height in cm: 165.1 (10 Mar 2023 12:36)    Daily I&O's Summary      GENERAL:  Appears stated age, no distress  HEENT:  NC/AT,  conjunctivae clear and pink   CHEST:  Full & symmetric excursion,  HEART:  Regular rhythm, S1, S2  ABDOMEN:  Soft, non-tender, non-distended, normoactive bowel sounds   EXTREMITIES  no cyanosis, clubbing or edema  SKIN:  No rash/warm/dry  NEURO:  Awake,       LABS:                        12.2   15.12 )-----------( 308      ( 10 Mar 2023 13:24 )             39.2     03-10    142  |  105  |  57<H>  ----------------------------<  113<H>  3.7   |  26  |  2.74<H>    Ca    9.3      10 Mar 2023 13:24    TPro  7.8  /  Alb  3.2<L>  /  TBili  0.4  /  DBili  x   /  AST  22  /  ALT  21  /  AlkPhos  90  03-10    PTT - ( 10 Mar 2023 13:24 )  PTT:37.5 sec    amylase   lipase  RADIOLOGY & ADDITIONAL TESTS:   INTERVAL HPI/OVERNIGHT EVENTS:  HPI:  87 year old female with Hx of dementia, HTN HLD presented from rehab with dehydration.  GI Consultation called for Nausea and diarrhea. Patient seen and examined at bed side in ER.   She is poor historian, as per patient Vomited 2 days before may be some blood and had diarrhea yesterday no blood. She denies abdominal pain nausea, vomiting.   As per patient had Endoscopy and colonoscopy in the city she do not remember who did procedure.   No family at bed side. I called her daughter Cris [phone] got voice mail. Information obtained from admitting team and chart.   She is feeling fine and not sure why she is not going back to her facility.     MEDICATIONS  (STANDING):  sodium chloride 0.9%. 1000 milliLiter(s) (80 mL/Hr) IV Continuous <Continuous>    MEDICATIONS  (PRN):  acetaminophen     Tablet .. 650 milliGRAM(s) Oral every 6 hours PRN Temp greater or equal to 38C (100.4F), Mild Pain (1 - 3)  melatonin 3 milliGRAM(s) Oral at bedtime PRN Insomnia  ondansetron Injectable 4 milliGRAM(s) IV Push every 8 hours PRN Nausea and/or Vomiting      Allergies    No Known Allergies    Intolerances        PAST MEDICAL & SURGICAL HISTORY:  HTN (hypertension)      HLD (hyperlipidemia)      Dementia      No significant past surgical history	  	    PHYSICAL EXAM:   Vital Signs:  Vital Signs Last 24 Hrs  T(C): 36.7 (10 Mar 2023 12:36), Max: 36.7 (10 Mar 2023 12:36)  T(F): 98 (10 Mar 2023 12:36), Max: 98 (10 Mar 2023 12:36)  HR: 86 (10 Mar 2023 12:36) (86 - 86)  BP: 110/69 (10 Mar 2023 12:36) (110/69 - 110/69)  BP(mean): --  RR: 16 (10 Mar 2023 12:36) (16 - 16)  SpO2: 94% (10 Mar 2023 12:36) (94% - 94%)    Parameters below as of 10 Mar 2023 12:36  Patient On (Oxygen Delivery Method): room air      Daily Height in cm: 165.1 (10 Mar 2023 12:36)    Daily I&O's Summary      GENERAL:  Appears stated age, no distress  HEENT:  NC/AT,  conjunctivae clear and pink   CHEST:  Full & symmetric excursion,  HEART:  Regular rhythm, S1, S2  ABDOMEN:  Soft, non-tender, non-distended, normoactive bowel sounds   EXTREMITIES  no cyanosis, clubbing or edema  SKIN:  No rash/warm/dry  NEURO:  Awake,       LABS:                        12.2   15.12 )-----------( 308      ( 10 Mar 2023 13:24 )             39.2     03-10    142  |  105  |  57<H>  ----------------------------<  113<H>  3.7   |  26  |  2.74<H>    Ca    9.3      10 Mar 2023 13:24    TPro  7.8  /  Alb  3.2<L>  /  TBili  0.4  /  DBili  x   /  AST  22  /  ALT  21  /  AlkPhos  90  03-10    PTT - ( 10 Mar 2023 13:24 )  PTT:37.5 sec    amylase   lipase  RADIOLOGY & ADDITIONAL TESTS:   GI Consult    87 year old female patient with Hx of dementia, HTN, HLD presented from rehab with dehydration. GI Consultation called for Nausea and diarrhea.   Patient seen and examined at bed side in ER.  She is a poor historian. She reports she vomited 2 days before and recalled that there may have been some blood. Also reports a diarrheal stool yesterday, no blood. She denies abdominal pain nausea, vomiting.   She states that she had an EGD and colonoscopy 'in the city' but recalls no details about this.  No family at bedside. I called her daughter Cris [phone] but cedric got voice mail. Information obtained from admitting team and chart.   She reports she is feeling 'fine' and expresses that she wants to go back to her facility.     MEDICATIONS  (STANDING):  sodium chloride 0.9%. 1000 milliLiter(s) (80 mL/Hr) IV Continuous <Continuous>    MEDICATIONS  (PRN):  acetaminophen     Tablet .. 650 milliGRAM(s) Oral every 6 hours PRN Temp greater or equal to 38C (100.4F), Mild Pain (1 - 3)  melatonin 3 milliGRAM(s) Oral at bedtime PRN Insomnia  ondansetron Injectable 4 milliGRAM(s) IV Push every 8 hours PRN Nausea and/or Vomiting      Allergies    No Known Allergies    Intolerances        PAST MEDICAL & SURGICAL HISTORY:  HTN (hypertension)      HLD (hyperlipidemia)      Dementia    c.difficile, treated      No significant past surgical history	  	    PHYSICAL EXAM:   Vital Signs:  Vital Signs Last 24 Hrs  T(C): 36.7 (10 Mar 2023 12:36), Max: 36.7 (10 Mar 2023 12:36)  T(F): 98 (10 Mar 2023 12:36), Max: 98 (10 Mar 2023 12:36)  HR: 86 (10 Mar 2023 12:36) (86 - 86)  BP: 110/69 (10 Mar 2023 12:36) (110/69 - 110/69)  BP(mean): --  RR: 16 (10 Mar 2023 12:36) (16 - 16)  SpO2: 94% (10 Mar 2023 12:36) (94% - 94%)    Parameters below as of 10 Mar 2023 12:36  Patient On (Oxygen Delivery Method): room air      Daily Height in cm: 165.1 (10 Mar 2023 12:36)    Daily I&O's Summary      GENERAL:  Appears stated age, no distress  HEENT:  NC/AT,  conjunctivae clear and pink   CHEST:  Full & symmetric excursion,  HEART:  Regular rhythm, S1, S2  ABDOMEN:  Soft, non-tender, non-distended, normoactive bowel sounds   EXTREMITIES  no cyanosis, clubbing or edema  SKIN:  No rash/warm/dry  NEURO:  Awake,       LABS:                        12.2   15.12 )-----------( 308      ( 10 Mar 2023 13:24 )             39.2     03-10    142  |  105  |  57<H>  ----------------------------<  113<H>  3.7   |  26  |  2.74<H>    Ca    9.3      10 Mar 2023 13:24    TPro  7.8  /  Alb  3.2<L>  /  TBili  0.4  /  DBili  x   /  AST  22  /  ALT  21  /  AlkPhos  90  03-10    PTT - ( 10 Mar 2023 13:24 )  PTT:37.5 sec

## 2023-03-10 NOTE — H&P ADULT - NSHPPHYSICALEXAM_GEN_ALL_CORE
T(C): 36.7 (03-10-23 @ 12:36), Max: 36.7 (03-10-23 @ 12:36)  HR: 84 (03-10-23 @ 16:35) (84 - 86)  BP: 127/72 (03-10-23 @ 16:35) (110/69 - 127/72)  RR: 17 (03-10-23 @ 16:35) (16 - 17)  SpO2: 95% (03-10-23 @ 16:35) (94% - 95%)  Wt(kg): --Vital Signs Last 24 Hrs  T(C): 36.7 (10 Mar 2023 12:36), Max: 36.7 (10 Mar 2023 12:36)  T(F): 98 (10 Mar 2023 12:36), Max: 98 (10 Mar 2023 12:36)  HR: 84 (10 Mar 2023 16:35) (84 - 86)  BP: 127/72 (10 Mar 2023 16:35) (110/69 - 127/72)  BP(mean): --  RR: 17 (10 Mar 2023 16:35) (16 - 17)  SpO2: 95% (10 Mar 2023 16:35) (94% - 95%)    Parameters below as of 10 Mar 2023 16:35  Patient On (Oxygen Delivery Method): room air    PHYSICAL EXAM:  GENERAL: NAD  HENT:  Atraumatic, Normocephalic; No tonsillar erythema, exudates, or enlargement; Moist mucous membranes;   EYES: EOMI, PERRLA, conjunctiva and sclera clear, no lid-lag  NECK: Supple, No JVD, Normal thyroid  NERVOUS SYSTEM:  CN II - XII intact; Sensation intact; Motor Strength 4-5/5 B/L upper and lower extremities  CHEST/LUNG: Clear to percussion bilaterally; No rales, rhonchi, wheezing, or rubs; normal respiratory effort, no intercostal retractions; No pitting edema  HEART: Regular rate and rhythm; No murmurs, rubs, or gallops  ABDOMEN: Soft, Nontender, Nondistended; Bowel sounds present; No HSM  MUSCULOSKELETAL/EXTREMITIES:  2+ Peripheral Pulses, No clubbing, or digital cyanosis  SKIN: No rashes or lesions; normal texture and temperature  PSYCH: Appropriate affect, Alert x 1

## 2023-03-10 NOTE — CONSULT NOTE ADULT - ASSESSMENT
86 year old female with Hx of HLD  HTN G I Consultation done for nausea 2 days before and diarrhea yesterday.  86 year old female with Hx of HLD  HTN G I Consultation done for nausea 2 days before with some blood and diarrhea no blood yesterday.  86 year old female with Hx of dementia, HLD, HTN. GI consultation for nausea 2 days before with reported blood,  and  reported non-bloody diarrheal stool.

## 2023-03-10 NOTE — ED PROVIDER NOTE - ENMT, MLM
Airway patent, Nasal mucosa clear. Throat has no vesicles, no oropharyngeal exudates and uvula is midline. Palpebral conjunctiva pallor b/l.

## 2023-03-10 NOTE — ED PROVIDER NOTE - OBJECTIVE STATEMENT
87 year old female with PMH significant for HTN, HLD, dementia, on Eliquis, s/p ex lap for obstruction presents complaining of vomiting, diarrhea, chills x 2 days. Patient reports vomit is dark brown, occurs 2-3 times a day and worsened after eating. Diarrhea contains little bit of blood red/brown in color, non foul smelling, occurs 2-3 times a day. Patient admits to being nauseous currently but feels like shes doing better overall. She admits to overall weakness. Denies CP, SOB, abdominal pain. Patient denies smoking, alcohol, drug use. 87 year old female with PMH significant for HTN, HLD, dementia, on Eliquis, s/p ex lap for obstruction presents complaining of vomiting, diarrhea, chills x 2 days. Patient reports vomit is dark brown, occurs 2-3 times a day and worsened after eating. Diarrhea contains little bit of blood red/brown in color, non foul smelling, occurs 2-3 times a day. Patient admits to being nauseous currently but feels like shes doing better overall. She admits to overall weakness. Denies CP, SOB, abdominal pain, numbness/tingling, headache, dizziness. Patient denies smoking, alcohol, drug use.

## 2023-03-10 NOTE — H&P ADULT - HISTORY OF PRESENT ILLNESS
88 yo woman with history of Dementia, Hyperlipidemia, Hypertension, Renal stone s/p stent, and NPH sent from The [facility] for nausea and vomiting since yesterday. Staff at the [facility] sent patient since in general she does not drink water but will drink coffee. Patient looked pale and dehydrated as per staff. Family members report s history of c. diff. Patient has Dementia and has no complaints. She is not able to contribute to the history due to her Dementia     As per documents from [facility], patient is alert, disoriented but can follow simple instructions. In the ED, temp 98, HR 86, /69, RR 16, O2 sat 94% ra. H/H stable. SCr elevated to 2.7 and FOBT is +. GI consulted in the ED.

## 2023-03-11 LAB
ALBUMIN SERPL ELPH-MCNC: 2.5 G/DL — LOW (ref 3.3–5)
ALP SERPL-CCNC: 78 U/L — SIGNIFICANT CHANGE UP (ref 40–120)
ALT FLD-CCNC: 14 U/L — SIGNIFICANT CHANGE UP (ref 10–45)
ANION GAP SERPL CALC-SCNC: 9 MMOL/L — SIGNIFICANT CHANGE UP (ref 5–17)
AST SERPL-CCNC: 23 U/L — SIGNIFICANT CHANGE UP (ref 10–40)
BASOPHILS # BLD AUTO: 0.03 K/UL — SIGNIFICANT CHANGE UP (ref 0–0.2)
BASOPHILS NFR BLD AUTO: 0.3 % — SIGNIFICANT CHANGE UP (ref 0–2)
BILIRUB SERPL-MCNC: 0.3 MG/DL — SIGNIFICANT CHANGE UP (ref 0.2–1.2)
BUN SERPL-MCNC: 45 MG/DL — HIGH (ref 7–23)
CALCIUM SERPL-MCNC: 7.9 MG/DL — LOW (ref 8.4–10.5)
CHLORIDE SERPL-SCNC: 111 MMOL/L — HIGH (ref 96–108)
CO2 SERPL-SCNC: 24 MMOL/L — SIGNIFICANT CHANGE UP (ref 22–31)
CREAT ?TM UR-MCNC: 210 MG/DL — SIGNIFICANT CHANGE UP
CREAT SERPL-MCNC: 1.81 MG/DL — HIGH (ref 0.5–1.3)
EGFR: 27 ML/MIN/1.73M2 — LOW
EOSINOPHIL # BLD AUTO: 0.25 K/UL — SIGNIFICANT CHANGE UP (ref 0–0.5)
EOSINOPHIL NFR BLD AUTO: 2.6 % — SIGNIFICANT CHANGE UP (ref 0–6)
GLUCOSE SERPL-MCNC: 81 MG/DL — SIGNIFICANT CHANGE UP (ref 70–99)
HCT VFR BLD CALC: 36 % — SIGNIFICANT CHANGE UP (ref 34.5–45)
HCT VFR BLD CALC: 36.1 % — SIGNIFICANT CHANGE UP (ref 34.5–45)
HGB BLD-MCNC: 10.9 G/DL — LOW (ref 11.5–15.5)
HGB BLD-MCNC: 10.9 G/DL — LOW (ref 11.5–15.5)
IMM GRANULOCYTES NFR BLD AUTO: 0.5 % — SIGNIFICANT CHANGE UP (ref 0–0.9)
LYMPHOCYTES # BLD AUTO: 1.11 K/UL — SIGNIFICANT CHANGE UP (ref 1–3.3)
LYMPHOCYTES # BLD AUTO: 11.7 % — LOW (ref 13–44)
MAGNESIUM SERPL-MCNC: 1.8 MG/DL — SIGNIFICANT CHANGE UP (ref 1.6–2.6)
MCHC RBC-ENTMCNC: 27.3 PG — SIGNIFICANT CHANGE UP (ref 27–34)
MCHC RBC-ENTMCNC: 27.3 PG — SIGNIFICANT CHANGE UP (ref 27–34)
MCHC RBC-ENTMCNC: 30.2 GM/DL — LOW (ref 32–36)
MCHC RBC-ENTMCNC: 30.3 GM/DL — LOW (ref 32–36)
MCV RBC AUTO: 90.2 FL — SIGNIFICANT CHANGE UP (ref 80–100)
MCV RBC AUTO: 90.3 FL — SIGNIFICANT CHANGE UP (ref 80–100)
MONOCYTES # BLD AUTO: 1.27 K/UL — HIGH (ref 0–0.9)
MONOCYTES NFR BLD AUTO: 13.4 % — SIGNIFICANT CHANGE UP (ref 2–14)
NEUTROPHILS # BLD AUTO: 6.74 K/UL — SIGNIFICANT CHANGE UP (ref 1.8–7.4)
NEUTROPHILS NFR BLD AUTO: 71.5 % — SIGNIFICANT CHANGE UP (ref 43–77)
NRBC # BLD: 0 /100 WBCS — SIGNIFICANT CHANGE UP (ref 0–0)
NRBC # BLD: 0 /100 WBCS — SIGNIFICANT CHANGE UP (ref 0–0)
OSMOLALITY UR: 576 MOSM/KG — SIGNIFICANT CHANGE UP (ref 50–1200)
PHOSPHATE SERPL-MCNC: 2.9 MG/DL — SIGNIFICANT CHANGE UP (ref 2.5–4.5)
PLATELET # BLD AUTO: 267 K/UL — SIGNIFICANT CHANGE UP (ref 150–400)
PLATELET # BLD AUTO: 282 K/UL — SIGNIFICANT CHANGE UP (ref 150–400)
POTASSIUM SERPL-MCNC: 3.7 MMOL/L — SIGNIFICANT CHANGE UP (ref 3.5–5.3)
POTASSIUM SERPL-SCNC: 3.7 MMOL/L — SIGNIFICANT CHANGE UP (ref 3.5–5.3)
PROT ?TM UR-MCNC: 30 MG/DL — HIGH (ref 0–12)
PROT SERPL-MCNC: 6.3 G/DL — SIGNIFICANT CHANGE UP (ref 6–8.3)
PROT/CREAT UR-RTO: 0.1 RATIO — SIGNIFICANT CHANGE UP (ref 0–0.2)
RBC # BLD: 3.99 M/UL — SIGNIFICANT CHANGE UP (ref 3.8–5.2)
RBC # BLD: 4 M/UL — SIGNIFICANT CHANGE UP (ref 3.8–5.2)
RBC # FLD: 15.5 % — HIGH (ref 10.3–14.5)
RBC # FLD: 15.7 % — HIGH (ref 10.3–14.5)
SODIUM SERPL-SCNC: 144 MMOL/L — SIGNIFICANT CHANGE UP (ref 135–145)
SODIUM UR-SCNC: 36 MMOL/L — SIGNIFICANT CHANGE UP
URATE UR-MCNC: 48.5 MG/DL — SIGNIFICANT CHANGE UP
WBC # BLD: 9 K/UL — SIGNIFICANT CHANGE UP (ref 3.8–10.5)
WBC # BLD: 9.45 K/UL — SIGNIFICANT CHANGE UP (ref 3.8–10.5)
WBC # FLD AUTO: 9 K/UL — SIGNIFICANT CHANGE UP (ref 3.8–10.5)
WBC # FLD AUTO: 9.45 K/UL — SIGNIFICANT CHANGE UP (ref 3.8–10.5)

## 2023-03-11 PROCEDURE — 99233 SBSQ HOSP IP/OBS HIGH 50: CPT | Mod: FS

## 2023-03-11 PROCEDURE — 76775 US EXAM ABDO BACK WALL LIM: CPT | Mod: 26

## 2023-03-11 PROCEDURE — 99232 SBSQ HOSP IP/OBS MODERATE 35: CPT

## 2023-03-11 RX ADMIN — Medication 7.5 MILLIGRAM(S): at 21:13

## 2023-03-11 RX ADMIN — ATORVASTATIN CALCIUM 10 MILLIGRAM(S): 80 TABLET, FILM COATED ORAL at 21:14

## 2023-03-11 RX ADMIN — Medication 1 TABLET(S): at 17:29

## 2023-03-11 RX ADMIN — QUETIAPINE FUMARATE 25 MILLIGRAM(S): 200 TABLET, FILM COATED ORAL at 17:29

## 2023-03-11 RX ADMIN — SERTRALINE 100 MILLIGRAM(S): 25 TABLET, FILM COATED ORAL at 11:34

## 2023-03-11 RX ADMIN — Medication 7.5 MILLIGRAM(S): at 08:05

## 2023-03-11 RX ADMIN — QUETIAPINE FUMARATE 25 MILLIGRAM(S): 200 TABLET, FILM COATED ORAL at 05:53

## 2023-03-11 RX ADMIN — FAMOTIDINE 10 MILLIGRAM(S): 10 INJECTION INTRAVENOUS at 21:14

## 2023-03-11 RX ADMIN — Medication 1 TABLET(S): at 08:04

## 2023-03-11 NOTE — PROGRESS NOTE ADULT - SUBJECTIVE AND OBJECTIVE BOX
Patient is a 87y old  Female who presents with a chief complaint of Dehydration (10 Mar 2023 16:24)    demented.     Patient seen and examined at bedside. No overnight events reported.     ALLERGIES:  No Known Allergies    MEDICATIONS  (STANDING):  atorvastatin 10 milliGRAM(s) Oral at bedtime  busPIRone 7.5 milliGRAM(s) Oral <User Schedule>  famotidine    Tablet 10 milliGRAM(s) Oral at bedtime  lactobacillus acidophilus 1 Tablet(s) Oral two times a day with meals  QUEtiapine 25 milliGRAM(s) Oral two times a day  sertraline 100 milliGRAM(s) Oral daily  sodium chloride 0.9%. 1000 milliLiter(s) (80 mL/Hr) IV Continuous <Continuous>    MEDICATIONS  (PRN):  acetaminophen     Tablet .. 650 milliGRAM(s) Oral every 6 hours PRN Temp greater or equal to 38C (100.4F), Mild Pain (1 - 3)  melatonin 3 milliGRAM(s) Oral at bedtime PRN Insomnia  ondansetron Injectable 4 milliGRAM(s) IV Push every 8 hours PRN Nausea and/or Vomiting    Vital Signs Last 24 Hrs  T(F): 97.4 (11 Mar 2023 05:56), Max: 98.1 (10 Mar 2023 18:45)  HR: 73 (11 Mar 2023 05:56) (73 - 86)  BP: 116/60 (11 Mar 2023 05:56) (110/69 - 127/72)  RR: 16 (11 Mar 2023 05:56) (16 - 17)  SpO2: 92% (11 Mar 2023 05:56) (92% - 95%)  I&O's Summary    10 Mar 2023 07:01  -  11 Mar 2023 07:00  --------------------------------------------------------  IN: 880 mL / OUT: 0 mL / NET: 880 mL      PHYSICAL EXAM:  General: NAD, Alert  ENT: No gross hearing impairment, Moist mucous membranes, no thrush  Neck: Supple, No JVD  Lungs: Clear to auscultation bilaterally, good air entry, non-labored breathing  Cardio: RRR, S1/S2, No murmur  Abdomen: Soft, Nontender, Nondistended; Bowel sounds present  Extremities: No calf tenderness, No cyanosis, No pitting edema  Psych: alert but confused    LABS:                        10.9   9.45  )-----------( 267      ( 11 Mar 2023 07:15 )             36.1     03-10    143  |  110  |  56  ----------------------------<  116  4.3   |  24  |  2.29    Ca    8.6      10 Mar 2023 19:15    TPro  7.8  /  Alb  3.2  /  TBili  0.4  /  DBili  x   /  AST  22  /  ALT  21  /  AlkPhos  90  03-10    PTT - ( 10 Mar 2023 13:24 )  PTT:37.5 sec    Urinalysis Basic - ( 10 Mar 2023 16:37 )    Color: Yellow / Appearance: Clear / S.020 / pH: x  Gluc: x / Ketone: Trace  / Bili: Small / Urobili: Negative   Blood: x / Protein: 30 mg/dL / Nitrite: Positive   Leuk Esterase: Moderate / RBC: 5-10 /HPF / WBC 3-5 /HPF   Sq Epi: x / Non Sq Epi: Neg.-Few / Bacteria: Many /HPF    RADIOLOGY & ADDITIONAL TESTS:    Care Discussed with Consultants/Other Providers:

## 2023-03-11 NOTE — PROGRESS NOTE ADULT - PROBLEM SELECTOR PLAN 1
Currently stable   Continue to monitor Currently stable, no observed hematemesis.  Continue to monitor

## 2023-03-11 NOTE — PROGRESS NOTE ADULT - SUBJECTIVE AND OBJECTIVE BOX
MEDICATIONS  (STANDING):  atorvastatin 10 milliGRAM(s) Oral at bedtime  busPIRone 7.5 milliGRAM(s) Oral <User Schedule>  famotidine    Tablet 10 milliGRAM(s) Oral at bedtime  lactobacillus acidophilus 1 Tablet(s) Oral two times a day with meals  QUEtiapine 25 milliGRAM(s) Oral two times a day  sertraline 100 milliGRAM(s) Oral daily  sodium chloride 0.9%. 1000 milliLiter(s) (80 mL/Hr) IV Continuous <Continuous>    MEDICATIONS  (PRN):  acetaminophen     Tablet .. 650 milliGRAM(s) Oral every 6 hours PRN Temp greater or equal to 38C (100.4F), Mild Pain (1 - 3)  melatonin 3 milliGRAM(s) Oral at bedtime PRN Insomnia  ondansetron Injectable 4 milliGRAM(s) IV Push every 8 hours PRN Nausea and/or Vomiting      Allergies    No Known Allergies        PHYSICAL EXAM:   Vital Signs:  Vital Signs Last 24 Hrs  T(C): 36.3 (11 Mar 2023 05:56), Max: 36.7 (10 Mar 2023 18:45)  T(F): 97.4 (11 Mar 2023 05:56), Max: 98.1 (10 Mar 2023 18:45)  HR: 73 (11 Mar 2023 05:56) (73 - 84)  BP: 116/60 (11 Mar 2023 05:56) (116/60 - 127/72)  BP(mean): --  RR: 16 (11 Mar 2023 05:56) (16 - 17)  SpO2: 92% (11 Mar 2023 05:56) (92% - 95%)    Parameters below as of 11 Mar 2023 05:56  Patient On (Oxygen Delivery Method): room air      Daily Height in cm: 152.4 (10 Mar 2023 18:45)    Daily Weight in k.5 (10 Mar 2023 18:45)I&O's Summary    10 Mar 2023 07:01  -  11 Mar 2023 07:00  --------------------------------------------------------  IN: 880 mL / OUT: 0 mL / NET: 880 mL        GENERAL: not oreinted  CHEST:   no increased effort, breath sounds clear  HEART:  Regular rhythm, S1, S2,  ABDOMEN:  Soft, non-tender, non-distended, normoactive bowel sounds,  no masses  NEURO:  Alert, oriented, no asterixis, no tremor, no encephalopathy      LABS:                        10.9   9.00  )-----------( 282      ( 11 Mar 2023 12:30 )             36.0     03-11    144  |  111<H>  |  45<H>  ----------------------------<  81  3.7   |  24  |  1.81<H>    Ca    7.9<L>      11 Mar 2023 07:15  Phos  2.9     -11  Mg     1.8     -    TPro  6.3  /  Alb  2.5<L>  /  TBili  0.3  /  DBili  x   /  AST  23  /  ALT  14  /  AlkPhos  78  03-11    PTT - ( 10 Mar 2023 13:24 )  PTT:37.5 sec  Urinalysis Basic - ( 10 Mar 2023 16:37 )    Color: Yellow / Appearance: Clear / S.020 / pH: x  Gluc: x / Ketone: Trace  / Bili: Small / Urobili: Negative   Blood: x / Protein: 30 mg/dL / Nitrite: Positive   Leuk Esterase: Moderate / RBC: 5-10 /HPF / WBC 3-5 /HPF   Sq Epi: x / Non Sq Epi: Neg.-Few / Bacteria: Many /HPF         Pt seen and examined at the bedside.  Resting comfortably. Denies vomiting, nausea, abdominal pain.      MEDICATIONS  (STANDING):  atorvastatin 10 milliGRAM(s) Oral at bedtime  busPIRone 7.5 milliGRAM(s) Oral <User Schedule>  famotidine    Tablet 10 milliGRAM(s) Oral at bedtime  lactobacillus acidophilus 1 Tablet(s) Oral two times a day with meals  QUEtiapine 25 milliGRAM(s) Oral two times a day  sertraline 100 milliGRAM(s) Oral daily  sodium chloride 0.9%. 1000 milliLiter(s) (80 mL/Hr) IV Continuous <Continuous>    MEDICATIONS  (PRN):  acetaminophen     Tablet .. 650 milliGRAM(s) Oral every 6 hours PRN Temp greater or equal to 38C (100.4F), Mild Pain (1 - 3)  melatonin 3 milliGRAM(s) Oral at bedtime PRN Insomnia  ondansetron Injectable 4 milliGRAM(s) IV Push every 8 hours PRN Nausea and/or Vomiting      Allergies    No Known Allergies        PHYSICAL EXAM:   Vital Signs:  Vital Signs Last 24 Hrs  T(C): 36.3 (11 Mar 2023 05:56), Max: 36.7 (10 Mar 2023 18:45)  T(F): 97.4 (11 Mar 2023 05:56), Max: 98.1 (10 Mar 2023 18:45)  HR: 73 (11 Mar 2023 05:56) (73 - 84)  BP: 116/60 (11 Mar 2023 05:56) (116/60 - 127/72)  BP(mean): --  RR: 16 (11 Mar 2023 05:56) (16 - 17)  SpO2: 92% (11 Mar 2023 05:56) (92% - 95%)    Parameters below as of 11 Mar 2023 05:56  Patient On (Oxygen Delivery Method): room air      Daily Height in cm: 152.4 (10 Mar 2023 18:45)    Daily Weight in k.5 (10 Mar 2023 18:45)I&O's Summary    10 Mar 2023 07:01  -  11 Mar 2023 07:00  --------------------------------------------------------  IN: 880 mL / OUT: 0 mL / NET: 880 mL        GENERAL: NAD  CHEST:   no increased effort, breath sounds clear  HEART:  RRR  ABDOMEN:  Soft, non-tender, non-distended, normoactive bowel sounds,  no masses  NEURO:  Alert, not agitated, answers questions.     LABS:                        10.9   9.00  )-----------( 282      ( 11 Mar 2023 12:30 )             36.0     03-11    144  |  111<H>  |  45<H>  ----------------------------<  81  3.7   |  24  |  1.81<H>    Ca    7.9<L>      11 Mar 2023 07:15  Phos  2.9     -  Mg     1.8         TPro  6.3  /  Alb  2.5<L>  /  TBili  0.3  /  DBili  x   /  AST  23  /  ALT  14  /  AlkPhos  78  03-11    PTT - ( 10 Mar 2023 13:24 )  PTT:37.5 sec  Urinalysis Basic - ( 10 Mar 2023 16:37 )    Color: Yellow / Appearance: Clear / S.020 / pH: x  Gluc: x / Ketone: Trace  / Bili: Small / Urobili: Negative   Blood: x / Protein: 30 mg/dL / Nitrite: Positive   Leuk Esterase: Moderate / RBC: 5-10 /HPF / WBC 3-5 /HPF   Sq Epi: x / Non Sq Epi: Neg.-Few / Bacteria: Many /HPF

## 2023-03-11 NOTE — PROGRESS NOTE ADULT - ASSESSMENT
86 year old female with Hx of dementia, HLD, HTN. GI consultation for nausea 2 days before with reported blood,  and  reported non-bloody diarrheal stool. Currently no vomiting  86 year old female with Hx of dementia, HLD, HTN. GI consultation for nausea 2 days before with reported blood,  and  reported non-bloody diarrheal stool. Currently no vomiting, hematemesis, or diarrhea.

## 2023-03-11 NOTE — PROGRESS NOTE ADULT - PROBLEM SELECTOR PLAN 2
Currently stable   Continue to monitor Currently stable. No melena or hematochezia.  Continue to monitor    No indication for inpatient EGD or colonoscopy at present.  Suspect declining Hb is related to aggressive IV hydration, renal insufficiency.

## 2023-03-11 NOTE — PROGRESS NOTE ADULT - ASSESSMENT
86 yo woman with history of Dementia, Hyperlipidemia, Hypertension, Renal stone s/p stent, and NPH sent from [facility] for nausea and vomiting x 24hrs.      Acute kidney injury  - unclear baseline  - improved today after IV fluids overnight  - suspect prerenal azotemia  - UA does not appear likely UTI   - F/U Renal sonogram    Leukocytosis  - suspect secondary to marisol  - wbc normalized now    GI Bleed  - FOBT + in ED  - hemoglobin stable after IV fluids   - GI following  - Full liquid diet for now  - CBC q 6hrs  - Keep active type and screen     Hypertension  - Hold home amlodipine 5 mg, losartan 25mg     Dementia   - Continue buspirone BID  - seroquel BID  - sertraline     Hyperlipidemia  - Continue atorvastatin in place of home pravastatin    GI Prophylaxis with famotidine    DVT Prophylaxis with eliquis on hold until f/u CBC is returned    Updated daughters Diane     Code: DNR/DNI as per EMI

## 2023-03-12 LAB
ANION GAP SERPL CALC-SCNC: 9 MMOL/L — SIGNIFICANT CHANGE UP (ref 5–17)
BUN SERPL-MCNC: 26 MG/DL — HIGH (ref 7–23)
CALCIUM SERPL-MCNC: 8.2 MG/DL — LOW (ref 8.4–10.5)
CHLORIDE SERPL-SCNC: 112 MMOL/L — HIGH (ref 96–108)
CO2 SERPL-SCNC: 23 MMOL/L — SIGNIFICANT CHANGE UP (ref 22–31)
CREAT SERPL-MCNC: 1.24 MG/DL — SIGNIFICANT CHANGE UP (ref 0.5–1.3)
EGFR: 43 ML/MIN/1.73M2 — LOW
GLUCOSE SERPL-MCNC: 106 MG/DL — HIGH (ref 70–99)
HCT VFR BLD CALC: 35.8 % — SIGNIFICANT CHANGE UP (ref 34.5–45)
HGB BLD-MCNC: 10.9 G/DL — LOW (ref 11.5–15.5)
MCHC RBC-ENTMCNC: 27.5 PG — SIGNIFICANT CHANGE UP (ref 27–34)
MCHC RBC-ENTMCNC: 30.4 GM/DL — LOW (ref 32–36)
MCV RBC AUTO: 90.2 FL — SIGNIFICANT CHANGE UP (ref 80–100)
NRBC # BLD: 0 /100 WBCS — SIGNIFICANT CHANGE UP (ref 0–0)
PLATELET # BLD AUTO: 256 K/UL — SIGNIFICANT CHANGE UP (ref 150–400)
POTASSIUM SERPL-MCNC: 4.1 MMOL/L — SIGNIFICANT CHANGE UP (ref 3.5–5.3)
POTASSIUM SERPL-SCNC: 4.1 MMOL/L — SIGNIFICANT CHANGE UP (ref 3.5–5.3)
RBC # BLD: 3.97 M/UL — SIGNIFICANT CHANGE UP (ref 3.8–5.2)
RBC # FLD: 15.1 % — HIGH (ref 10.3–14.5)
SODIUM SERPL-SCNC: 144 MMOL/L — SIGNIFICANT CHANGE UP (ref 135–145)
WBC # BLD: 9.01 K/UL — SIGNIFICANT CHANGE UP (ref 3.8–10.5)
WBC # FLD AUTO: 9.01 K/UL — SIGNIFICANT CHANGE UP (ref 3.8–10.5)

## 2023-03-12 PROCEDURE — 99233 SBSQ HOSP IP/OBS HIGH 50: CPT | Mod: FS

## 2023-03-12 PROCEDURE — 99232 SBSQ HOSP IP/OBS MODERATE 35: CPT

## 2023-03-12 RX ORDER — APIXABAN 2.5 MG/1
5 TABLET, FILM COATED ORAL
Refills: 0 | Status: DISCONTINUED | OUTPATIENT
Start: 2023-03-12 | End: 2023-03-13

## 2023-03-12 RX ADMIN — Medication 1 TABLET(S): at 17:51

## 2023-03-12 RX ADMIN — QUETIAPINE FUMARATE 25 MILLIGRAM(S): 200 TABLET, FILM COATED ORAL at 17:50

## 2023-03-12 RX ADMIN — APIXABAN 5 MILLIGRAM(S): 2.5 TABLET, FILM COATED ORAL at 17:52

## 2023-03-12 RX ADMIN — Medication 7.5 MILLIGRAM(S): at 21:44

## 2023-03-12 RX ADMIN — SERTRALINE 100 MILLIGRAM(S): 25 TABLET, FILM COATED ORAL at 17:45

## 2023-03-12 RX ADMIN — Medication 7.5 MILLIGRAM(S): at 07:49

## 2023-03-12 RX ADMIN — FAMOTIDINE 10 MILLIGRAM(S): 10 INJECTION INTRAVENOUS at 21:44

## 2023-03-12 RX ADMIN — QUETIAPINE FUMARATE 25 MILLIGRAM(S): 200 TABLET, FILM COATED ORAL at 05:51

## 2023-03-12 RX ADMIN — ATORVASTATIN CALCIUM 10 MILLIGRAM(S): 80 TABLET, FILM COATED ORAL at 21:44

## 2023-03-12 RX ADMIN — Medication 1 TABLET(S): at 07:48

## 2023-03-12 NOTE — PROGRESS NOTE ADULT - PROBLEM SELECTOR PLAN 2
Currently stable. No melena or hematochezia.  Continue to monitor    No indication for inpatient EGD or colonoscopy at present.  Suspect declining Hb is related to aggressive IV hydration, renal insufficiency. Currently stable. No melena or hematochezia.  Continue to monitor    No indication for inpatient EGD or colonoscopy at present.  Suspect decline in Hb is related to aggressive IV hydration, renal insufficiency.

## 2023-03-12 NOTE — PROGRESS NOTE ADULT - ASSESSMENT
86 yo woman with history of Dementia, Hyperlipidemia, Hypertension, Renal stone s/p stent, and NPH sent from [facility] for nausea and vomiting x 24hrs.      Acute kidney injury - baseline creatinine .8-.9 (different MRN)  - follow up AM labs  - suspect prerenal azotemia  - UA does not appear likely UTI   - F/U Renal sonogram    Leukocytosis - resolved   - suspect secondary to marisol / hemoconcentration  - wbc normalized now    GI Bleed - ruled out  - hemoglobin stable after IV fluids   - GI following  - advance to regular    Hypertension  - Hold home amlodipine 5 mg, losartan 25mg     Dementia   - Continue buspirone BID  - seroquel BID  - sertraline     Hyperlipidemia  - Continue atorvastatin in place of home pravastatin    GI Prophylaxis with famotidine    DVT Prophylaxis with eliquis on hold until f/u CBC is returned    Will update candido Contreras     Code: DNR/DNI as per EMI

## 2023-03-12 NOTE — PROGRESS NOTE ADULT - ASSESSMENT
86 year old female with Hx of dementia, HLD, HTN. GI consultation for nausea 2 days before with reported blood,  and  reported non-bloody diarrheal stool. Currently no vomiting, hematemesis.  86 year old female with Hx of dementia, HLD, HTN.   GI consultation for nausea 2 days before with reported blood,  and  reported non-bloody diarrheal stool. Currently no vomiting, hematemesis. CBC stable.

## 2023-03-12 NOTE — PROGRESS NOTE ADULT - SUBJECTIVE AND OBJECTIVE BOX
Patient is a 87y old  Female who presents with a chief complaint of MILLICENT (11 Mar 2023 14:05)    Feels okay.  No complaints.     Patient seen and examined at bedside. No overnight events reported.     ALLERGIES:  No Known Allergies    MEDICATIONS  (STANDING):  atorvastatin 10 milliGRAM(s) Oral at bedtime  busPIRone 7.5 milliGRAM(s) Oral <User Schedule>  famotidine    Tablet 10 milliGRAM(s) Oral at bedtime  lactobacillus acidophilus 1 Tablet(s) Oral two times a day with meals  QUEtiapine 25 milliGRAM(s) Oral two times a day  sertraline 100 milliGRAM(s) Oral daily  sodium chloride 0.9%. 1000 milliLiter(s) (80 mL/Hr) IV Continuous <Continuous>    MEDICATIONS  (PRN):  acetaminophen     Tablet .. 650 milliGRAM(s) Oral every 6 hours PRN Temp greater or equal to 38C (100.4F), Mild Pain (1 - 3)  melatonin 3 milliGRAM(s) Oral at bedtime PRN Insomnia  ondansetron Injectable 4 milliGRAM(s) IV Push every 8 hours PRN Nausea and/or Vomiting    Vital Signs Last 24 Hrs  T(F): 98.6 (12 Mar 2023 06:38), Max: 98.6 (12 Mar 2023 06:38)  HR: 74 (12 Mar 2023 06:38) (70 - 74)  BP: 123/70 (12 Mar 2023 06:38) (115/62 - 123/70)  RR: 16 (12 Mar 2023 06:38) (16 - 17)  SpO2: 92% (12 Mar 2023 06:38) (92% - 95%)  I&O's Summary    11 Mar 2023 06:01  -  12 Mar 2023 07:00  --------------------------------------------------------  IN: 0 mL / OUT: 300 mL / NET: -300 mL    PHYSICAL EXAM:  General: NAD, A/O x 2  ENT: No gross hearing impairment, Moist mucous membranes, no thrush  Neck: Supple, No JVD  Lungs: Clear to auscultation bilaterally, good air entry, non-labored breathing  Cardio: RRR, S1/S2, No murmur  Abdomen: Soft, Nontender, Nondistended; Bowel sounds present  Extremities: No calf tenderness, No cyanosis, No pitting edema  Psych: Appropriate mood and affect    LABS:                        10.9   9.00  )-----------( 282      ( 11 Mar 2023 12:30 )             36.0     03-11    144  |  111  |  45  ----------------------------<  81  3.7   |  24  |  1.81    Ca    7.9      11 Mar 2023 07:15  Phos  2.9     03-11  Mg     1.8     03-11    TPro  6.3  /  Alb  2.5  /  TBili  0.3  /  DBili  x   /  AST  23  /  ALT  14  /  AlkPhos  78  03-11          PTT - ( 10 Mar 2023 13:24 )  PTT:37.5 sec    Urinalysis Basic - ( 10 Mar 2023 16:37 )    Color: Yellow / Appearance: Clear / S.020 / pH: x  Gluc: x / Ketone: Trace  / Bili: Small / Urobili: Negative   Blood: x / Protein: 30 mg/dL / Nitrite: Positive   Leuk Esterase: Moderate / RBC: 5-10 /HPF / WBC 3-5 /HPF   Sq Epi: x / Non Sq Epi: Neg.-Few / Bacteria: Many /HPF    RADIOLOGY & ADDITIONAL TESTS:    Care Discussed with Consultants/Other Providers:

## 2023-03-12 NOTE — PROGRESS NOTE ADULT - NS ATTEND AMEND GEN_ALL_CORE FT
Patient seen and examined at the bedside. Agree with the assessment and plan of MARLON Carolina.  No bleeding, vomiting, diarrhea noted during admission. CBC stable.  No indication at present for endoscopic testing.  Will sign off. Please reconsult as needed. Thank you. Patient seen and examined at the bedside. Agree with the assessment and plan of MARLON Carolina.  No bleeding, vomiting, diarrhea noted during admission. CBC stable.  No indication at present for endoscopic testing. Recommend outpatient GI follow up after discharge.  Will sign off. Please reconsult as needed. Thank you.

## 2023-03-12 NOTE — PROGRESS NOTE ADULT - SUBJECTIVE AND OBJECTIVE BOX
4 liquid brown stools X 24 hrs, tolerating PO. denies abd pain, N/V, C/P, SOB       MEDICATIONS  (STANDING):  apixaban 5 milliGRAM(s) Oral two times a day  atorvastatin 10 milliGRAM(s) Oral at bedtime  busPIRone 7.5 milliGRAM(s) Oral <User Schedule>  famotidine    Tablet 10 milliGRAM(s) Oral at bedtime  lactobacillus acidophilus 1 Tablet(s) Oral two times a day with meals  QUEtiapine 25 milliGRAM(s) Oral two times a day  sertraline 100 milliGRAM(s) Oral daily    MEDICATIONS  (PRN):  acetaminophen     Tablet .. 650 milliGRAM(s) Oral every 6 hours PRN Temp greater or equal to 38C (100.4F), Mild Pain (1 - 3)  melatonin 3 milliGRAM(s) Oral at bedtime PRN Insomnia  ondansetron Injectable 4 milliGRAM(s) IV Push every 8 hours PRN Nausea and/or Vomiting      Allergies    No Known Allergies        PHYSICAL EXAM:   Vital Signs:  Vital Signs Last 24 Hrs  T(C): 37 (12 Mar 2023 06:38), Max: 37 (12 Mar 2023 06:38)  T(F): 98.6 (12 Mar 2023 06:38), Max: 98.6 (12 Mar 2023 06:38)  HR: 74 (12 Mar 2023 06:38) (70 - 74)  BP: 123/70 (12 Mar 2023 06:38) (115/62 - 123/70)  BP(mean): --  RR: 16 (12 Mar 2023 06:38) (16 - 17)  SpO2: 92% (12 Mar 2023 06:38) (92% - 95%)    Parameters below as of 12 Mar 2023 06:38  Patient On (Oxygen Delivery Method): room air      Daily     Daily I&O's Summary    11 Mar 2023 06:01  -  12 Mar 2023 07:00  --------------------------------------------------------  IN: 0 mL / OUT: 300 mL / NET: -300 mL        GENERAL:  Appears stated age, well-groomed, well-nourished, no distress  CHEST:  Full & symmetric excursion, no increased effort, breath sounds clear  HEART:  Regular rhythm, S1, S2,   ABDOMEN:  Soft, non-tender, non-distended, normoactive bowel sounds,    NEURO:  Alert, oriented,       LABS:                        10.9   9.01  )-----------( 256      ( 12 Mar 2023 06:40 )             35.8     03-12    144  |  112<H>  |  26<H>  ----------------------------<  106<H>  4.1   |  23  |  1.24    Ca    8.2<L>      12 Mar 2023 06:40  Phos  2.9     03-11  Mg     1.8     03-11    TPro  6.3  /  Alb  2.5<L>  /  TBili  0.3  /  DBili  x   /  AST  23  /  ALT  14  /  AlkPhos  78  03-11      Urinalysis Basic - ( 10 Mar 2023 16:37 )    Color: Yellow / Appearance: Clear / S.020 / pH: x  Gluc: x / Ketone: Trace  / Bili: Small / Urobili: Negative   Blood: x / Protein: 30 mg/dL / Nitrite: Positive   Leuk Esterase: Moderate / RBC: 5-10 /HPF / WBC 3-5 /HPF   Sq Epi: x / Non Sq Epi: Neg.-Few / Bacteria: Many /HPF       Patient seen and examined at the bedside. Tolerating PO. Denies abd pain, N/V, C/P, SOB       MEDICATIONS  (STANDING):  apixaban 5 milliGRAM(s) Oral two times a day  atorvastatin 10 milliGRAM(s) Oral at bedtime  busPIRone 7.5 milliGRAM(s) Oral <User Schedule>  famotidine    Tablet 10 milliGRAM(s) Oral at bedtime  lactobacillus acidophilus 1 Tablet(s) Oral two times a day with meals  QUEtiapine 25 milliGRAM(s) Oral two times a day  sertraline 100 milliGRAM(s) Oral daily    MEDICATIONS  (PRN):  acetaminophen     Tablet .. 650 milliGRAM(s) Oral every 6 hours PRN Temp greater or equal to 38C (100.4F), Mild Pain (1 - 3)  melatonin 3 milliGRAM(s) Oral at bedtime PRN Insomnia  ondansetron Injectable 4 milliGRAM(s) IV Push every 8 hours PRN Nausea and/or Vomiting      Allergies    No Known Allergies        PHYSICAL EXAM:   Vital Signs:  Vital Signs Last 24 Hrs  T(C): 37 (12 Mar 2023 06:38), Max: 37 (12 Mar 2023 06:38)  T(F): 98.6 (12 Mar 2023 06:38), Max: 98.6 (12 Mar 2023 06:38)  HR: 74 (12 Mar 2023 06:38) (70 - 74)  BP: 123/70 (12 Mar 2023 06:38) (115/62 - 123/70)  BP(mean): --  RR: 16 (12 Mar 2023 06:38) (16 - 17)  SpO2: 92% (12 Mar 2023 06:38) (92% - 95%)    Parameters below as of 12 Mar 2023 06:38  Patient On (Oxygen Delivery Method): room air      Daily     Daily I&O's Summary    11 Mar 2023 06:01  -  12 Mar 2023 07:00  --------------------------------------------------------  IN: 0 mL / OUT: 300 mL / NET: -300 mL        GENERAL:  NAD  CHEST:  clear  HEART:  RRR  ABDOMEN:  Soft, non-tender, non-distended, normoactive bowel sounds,    NEURO:  Alert, oriented      LABS:                        10.9   9.01  )-----------( 256      ( 12 Mar 2023 06:40 )             35.8     03-12    144  |  112<H>  |  26<H>  ----------------------------<  106<H>  4.1   |  23  |  1.24    Ca    8.2<L>      12 Mar 2023 06:40  Phos  2.9     03-11  Mg     1.8     03-11    TPro  6.3  /  Alb  2.5<L>  /  TBili  0.3  /  DBili  x   /  AST  23  /  ALT  14  /  AlkPhos  78  03-11

## 2023-03-13 ENCOUNTER — TRANSCRIPTION ENCOUNTER (OUTPATIENT)
Age: 88
End: 2023-03-13

## 2023-03-13 VITALS
TEMPERATURE: 98 F | SYSTOLIC BLOOD PRESSURE: 148 MMHG | DIASTOLIC BLOOD PRESSURE: 91 MMHG | HEART RATE: 72 BPM | RESPIRATION RATE: 17 BRPM | OXYGEN SATURATION: 94 %

## 2023-03-13 LAB
ANION GAP SERPL CALC-SCNC: 10 MMOL/L — SIGNIFICANT CHANGE UP (ref 5–17)
BUN SERPL-MCNC: 20 MG/DL — SIGNIFICANT CHANGE UP (ref 7–23)
CALCIUM SERPL-MCNC: 9 MG/DL — SIGNIFICANT CHANGE UP (ref 8.4–10.5)
CHLORIDE SERPL-SCNC: 111 MMOL/L — HIGH (ref 96–108)
CO2 SERPL-SCNC: 23 MMOL/L — SIGNIFICANT CHANGE UP (ref 22–31)
CREAT SERPL-MCNC: 1.1 MG/DL — SIGNIFICANT CHANGE UP (ref 0.5–1.3)
EGFR: 49 ML/MIN/1.73M2 — LOW
GLUCOSE SERPL-MCNC: 101 MG/DL — HIGH (ref 70–99)
HCT VFR BLD CALC: 38.2 % — SIGNIFICANT CHANGE UP (ref 34.5–45)
HGB BLD-MCNC: 11.6 G/DL — SIGNIFICANT CHANGE UP (ref 11.5–15.5)
MCHC RBC-ENTMCNC: 27.2 PG — SIGNIFICANT CHANGE UP (ref 27–34)
MCHC RBC-ENTMCNC: 30.4 GM/DL — LOW (ref 32–36)
MCV RBC AUTO: 89.7 FL — SIGNIFICANT CHANGE UP (ref 80–100)
NRBC # BLD: 0 /100 WBCS — SIGNIFICANT CHANGE UP (ref 0–0)
PLATELET # BLD AUTO: 312 K/UL — SIGNIFICANT CHANGE UP (ref 150–400)
POTASSIUM SERPL-MCNC: 4.4 MMOL/L — SIGNIFICANT CHANGE UP (ref 3.5–5.3)
POTASSIUM SERPL-SCNC: 4.4 MMOL/L — SIGNIFICANT CHANGE UP (ref 3.5–5.3)
RBC # BLD: 4.26 M/UL — SIGNIFICANT CHANGE UP (ref 3.8–5.2)
RBC # FLD: 14.7 % — HIGH (ref 10.3–14.5)
SODIUM SERPL-SCNC: 144 MMOL/L — SIGNIFICANT CHANGE UP (ref 135–145)
WBC # BLD: 8.68 K/UL — SIGNIFICANT CHANGE UP (ref 3.8–10.5)
WBC # FLD AUTO: 8.68 K/UL — SIGNIFICANT CHANGE UP (ref 3.8–10.5)

## 2023-03-13 PROCEDURE — 96361 HYDRATE IV INFUSION ADD-ON: CPT

## 2023-03-13 PROCEDURE — 80048 BASIC METABOLIC PNL TOTAL CA: CPT

## 2023-03-13 PROCEDURE — 84156 ASSAY OF PROTEIN URINE: CPT

## 2023-03-13 PROCEDURE — 84300 ASSAY OF URINE SODIUM: CPT

## 2023-03-13 PROCEDURE — 99285 EMERGENCY DEPT VISIT HI MDM: CPT | Mod: 25

## 2023-03-13 PROCEDURE — 36415 COLL VENOUS BLD VENIPUNCTURE: CPT

## 2023-03-13 PROCEDURE — 86901 BLOOD TYPING SEROLOGIC RH(D): CPT

## 2023-03-13 PROCEDURE — 93005 ELECTROCARDIOGRAM TRACING: CPT

## 2023-03-13 PROCEDURE — 84560 ASSAY OF URINE/URIC ACID: CPT

## 2023-03-13 PROCEDURE — 80053 COMPREHEN METABOLIC PANEL: CPT

## 2023-03-13 PROCEDURE — 84100 ASSAY OF PHOSPHORUS: CPT

## 2023-03-13 PROCEDURE — 83935 ASSAY OF URINE OSMOLALITY: CPT

## 2023-03-13 PROCEDURE — 85027 COMPLETE CBC AUTOMATED: CPT

## 2023-03-13 PROCEDURE — 71045 X-RAY EXAM CHEST 1 VIEW: CPT

## 2023-03-13 PROCEDURE — 76775 US EXAM ABDO BACK WALL LIM: CPT

## 2023-03-13 PROCEDURE — 99239 HOSP IP/OBS DSCHRG MGMT >30: CPT

## 2023-03-13 PROCEDURE — 86900 BLOOD TYPING SEROLOGIC ABO: CPT

## 2023-03-13 PROCEDURE — 86850 RBC ANTIBODY SCREEN: CPT

## 2023-03-13 PROCEDURE — 96375 TX/PRO/DX INJ NEW DRUG ADDON: CPT

## 2023-03-13 PROCEDURE — 81001 URINALYSIS AUTO W/SCOPE: CPT

## 2023-03-13 PROCEDURE — 83735 ASSAY OF MAGNESIUM: CPT

## 2023-03-13 PROCEDURE — 82272 OCCULT BLD FECES 1-3 TESTS: CPT

## 2023-03-13 PROCEDURE — 82570 ASSAY OF URINE CREATININE: CPT

## 2023-03-13 PROCEDURE — 87637 SARSCOV2&INF A&B&RSV AMP PRB: CPT

## 2023-03-13 PROCEDURE — 85730 THROMBOPLASTIN TIME PARTIAL: CPT

## 2023-03-13 PROCEDURE — 96374 THER/PROPH/DIAG INJ IV PUSH: CPT

## 2023-03-13 PROCEDURE — 85025 COMPLETE CBC W/AUTO DIFF WBC: CPT

## 2023-03-13 RX ORDER — ATORVASTATIN CALCIUM 80 MG/1
1 TABLET, FILM COATED ORAL
Qty: 0 | Refills: 0 | DISCHARGE
Start: 2023-03-13

## 2023-03-13 RX ORDER — LANOLIN ALCOHOL/MO/W.PET/CERES
1 CREAM (GRAM) TOPICAL
Qty: 0 | Refills: 0 | DISCHARGE
Start: 2023-03-13

## 2023-03-13 RX ADMIN — Medication 1 TABLET(S): at 08:55

## 2023-03-13 RX ADMIN — QUETIAPINE FUMARATE 25 MILLIGRAM(S): 200 TABLET, FILM COATED ORAL at 06:44

## 2023-03-13 RX ADMIN — Medication 7.5 MILLIGRAM(S): at 08:55

## 2023-03-13 RX ADMIN — APIXABAN 5 MILLIGRAM(S): 2.5 TABLET, FILM COATED ORAL at 06:44

## 2023-03-13 RX ADMIN — SERTRALINE 100 MILLIGRAM(S): 25 TABLET, FILM COATED ORAL at 12:59

## 2023-03-13 NOTE — DISCHARGE NOTE PROVIDER - HOSPITAL COURSE
Hospital Course  86 yo woman with history of Dementia, Hyperlipidemia, Hypertension, Renal stone s/p stent, and NPH sent from [facility] for nausea and vomiting x 24hrs, noted to have MILLICENT, prerenal due to acute dehydration/volume loss. Renal function improved s/p IVF. Pt noted to have borderline low to normal BP therefore home Meds of Amlodipine and Losartan held. Once medically stabilized and tolerating PO, IVF discontinued. Vitals were stable as well. Pt noted to have appropriate BP within normal range for pt's age while off meds. Will recommend keeping her off Amlodipine + Losartan. Close monitoring as outpatient to ensure normotensive. If need be, can restart.  Hospital course also noted to for positive FOBT however, H/H has remained stable. Evaluated by GI with recommendations for outpatient follow up.       Palliative Care / Advanced Care Planning  Code Status: DNR/DNI    Discharging Provider:  Vivek Selby MD  Contact Info: [phone] - Please call with any questions or concerns.    Outpatient Provider: Dr. Barclay

## 2023-03-13 NOTE — DISCHARGE NOTE PROVIDER - CARE PROVIDER_API CALL
Cesar Barclay (DO)  Internal Medicine  [address] [place] [address]  Phone: [phone]  Fax: [phone]  Established Patient  Follow Up Time: 1 week

## 2023-03-13 NOTE — DISCHARGE NOTE PROVIDER - NSDCQMPCI_CARD_ALL_CORE
"Patient with 4/10 headache, dry cough and \"itchy throat\" since Friday. No recent travel or known covid exposure.   " No

## 2023-03-13 NOTE — DISCHARGE NOTE PROVIDER - NSDCMRMEDTOKEN_GEN_ALL_CORE_FT
Acidophilus with Pectin oral capsule: 1 cap(s) orally 2 times a day  amLODIPine 5 mg oral tablet: 1 tab(s) orally once a day  busPIRone 7.5 mg oral tablet: 1 tab(s) orally 2 times a day  Cranberry oral capsule: 400 milligram(s) orally once a day (at bedtime)  Eliquis 5 mg oral tablet: 1 tab(s) orally 2 times a day  famotidine 20 mg oral tablet: 1 tab(s) orally once a day (at bedtime)  losartan 25 mg oral tablet: 1 tab(s) orally once a day  pravastatin 20 mg oral tablet: 1 tab(s) orally once a day  QUEtiapine 25 mg oral tablet: 1 tab(s) orally 2 times a day  sertraline 100 mg oral tablet: 1 tab(s) orally once a day  Vitamin D2 1.25 mg (50,000 intl units) oral capsule: 1 cap(s) orally once a week   Acidophilus with Pectin oral capsule: 1 cap(s) orally 2 times a day  atorvastatin 10 mg oral tablet: 1 tab(s) orally once a day (at bedtime)  busPIRone 7.5 mg oral tablet: 1 tab(s) orally 2 times a day  Eliquis 5 mg oral tablet: 1 tab(s) orally 2 times a day  famotidine 20 mg oral tablet: 1 tab(s) orally once a day (at bedtime)  melatonin 3 mg oral tablet: 1 tab(s) orally once a day (at bedtime), As needed, Insomnia  QUEtiapine 25 mg oral tablet: 1 tab(s) orally 2 times a day  sertraline 100 mg oral tablet: 1 tab(s) orally once a day

## 2023-03-13 NOTE — DISCHARGE NOTE PROVIDER - NSDCCPCAREPLAN_GEN_ALL_CORE_FT
PRINCIPAL DISCHARGE DIAGNOSIS  Diagnosis: MILLICENT (acute kidney injury)  Assessment and Plan of Treatment: you presented with nausea and vomiting which lead to you becoming dehydrated which effected your kidney function. After receiving IV fluids, your blood work has normalized.      SECONDARY DISCHARGE DIAGNOSES  Diagnosis: GI bleed  Assessment and Plan of Treatment:

## 2023-03-13 NOTE — DISCHARGE NOTE NURSING/CASE MANAGEMENT/SOCIAL WORK - PATIENT PORTAL LINK FT
You can access the FollowMyHealth Patient Portal offered by [facility] by registering at the following website: [email or web address]. By joining [employer]’s FollowMyHealth portal, you will also be able to view your health information using other applications (apps) compatible with our system.

## 2023-03-14 RX ORDER — LANOLIN ALCOHOL/MO/W.PET/CERES
1 CREAM (GRAM) TOPICAL
Qty: 30 | Refills: 0
Start: 2023-03-14 | End: 2023-04-12

## 2023-03-14 RX ORDER — FAMOTIDINE 10 MG/ML
1 INJECTION INTRAVENOUS
Qty: 30 | Refills: 0
Start: 2023-03-14 | End: 2023-04-12

## 2023-03-14 RX ORDER — ATORVASTATIN CALCIUM 80 MG/1
1 TABLET, FILM COATED ORAL
Qty: 30 | Refills: 0
Start: 2023-03-14 | End: 2023-04-12

## 2023-05-25 ENCOUNTER — EMERGENCY (EMERGENCY)
Facility: HOSPITAL | Age: 88
LOS: 1 days | Discharge: ANOTHER HOSPITAL | End: 2023-05-25
Attending: EMERGENCY MEDICINE | Admitting: EMERGENCY MEDICINE
Payer: MEDICARE

## 2023-05-25 VITALS
OXYGEN SATURATION: 95 % | SYSTOLIC BLOOD PRESSURE: 156 MMHG | DIASTOLIC BLOOD PRESSURE: 73 MMHG | HEIGHT: 60 IN | TEMPERATURE: 97 F | RESPIRATION RATE: 18 BRPM | HEART RATE: 75 BPM | WEIGHT: 104.94 LBS

## 2023-05-25 DIAGNOSIS — Z96.0 PRESENCE OF UROGENITAL IMPLANTS: Chronic | ICD-10-CM

## 2023-05-25 DIAGNOSIS — Z90.710 ACQUIRED ABSENCE OF BOTH CERVIX AND UTERUS: Chronic | ICD-10-CM

## 2023-05-25 LAB
ANION GAP SERPL CALC-SCNC: 9 MMOL/L — SIGNIFICANT CHANGE UP (ref 5–17)
APPEARANCE UR: ABNORMAL
BASOPHILS # BLD AUTO: 0.05 K/UL — SIGNIFICANT CHANGE UP (ref 0–0.2)
BASOPHILS NFR BLD AUTO: 0.5 % — SIGNIFICANT CHANGE UP (ref 0–2)
BILIRUB UR-MCNC: NEGATIVE — SIGNIFICANT CHANGE UP
BUN SERPL-MCNC: 19 MG/DL — SIGNIFICANT CHANGE UP (ref 7–23)
CALCIUM SERPL-MCNC: 9.7 MG/DL — SIGNIFICANT CHANGE UP (ref 8.4–10.5)
CHLORIDE SERPL-SCNC: 107 MMOL/L — SIGNIFICANT CHANGE UP (ref 96–108)
CO2 SERPL-SCNC: 29 MMOL/L — SIGNIFICANT CHANGE UP (ref 22–31)
COLOR SPEC: YELLOW — SIGNIFICANT CHANGE UP
CREAT SERPL-MCNC: 1.03 MG/DL — SIGNIFICANT CHANGE UP (ref 0.5–1.3)
DIFF PNL FLD: NEGATIVE — SIGNIFICANT CHANGE UP
EGFR: 53 ML/MIN/1.73M2 — LOW
EOSINOPHIL # BLD AUTO: 0.49 K/UL — SIGNIFICANT CHANGE UP (ref 0–0.5)
EOSINOPHIL NFR BLD AUTO: 4.4 % — SIGNIFICANT CHANGE UP (ref 0–6)
GLUCOSE SERPL-MCNC: 141 MG/DL — HIGH (ref 70–99)
GLUCOSE UR QL: NEGATIVE MG/DL — SIGNIFICANT CHANGE UP
HCT VFR BLD CALC: 40.1 % — SIGNIFICANT CHANGE UP (ref 34.5–45)
HGB BLD-MCNC: 12 G/DL — SIGNIFICANT CHANGE UP (ref 11.5–15.5)
IMM GRANULOCYTES NFR BLD AUTO: 0.4 % — SIGNIFICANT CHANGE UP (ref 0–0.9)
KETONES UR-MCNC: NEGATIVE MG/DL — SIGNIFICANT CHANGE UP
LEUKOCYTE ESTERASE UR-ACNC: NEGATIVE — SIGNIFICANT CHANGE UP
LYMPHOCYTES # BLD AUTO: 1.59 K/UL — SIGNIFICANT CHANGE UP (ref 1–3.3)
LYMPHOCYTES # BLD AUTO: 14.4 % — SIGNIFICANT CHANGE UP (ref 13–44)
MCHC RBC-ENTMCNC: 26.7 PG — LOW (ref 27–34)
MCHC RBC-ENTMCNC: 29.9 GM/DL — LOW (ref 32–36)
MCV RBC AUTO: 89.1 FL — SIGNIFICANT CHANGE UP (ref 80–100)
MONOCYTES # BLD AUTO: 0.97 K/UL — HIGH (ref 0–0.9)
MONOCYTES NFR BLD AUTO: 8.8 % — SIGNIFICANT CHANGE UP (ref 2–14)
NEUTROPHILS # BLD AUTO: 7.92 K/UL — HIGH (ref 1.8–7.4)
NEUTROPHILS NFR BLD AUTO: 71.5 % — SIGNIFICANT CHANGE UP (ref 43–77)
NITRITE UR-MCNC: POSITIVE
NRBC # BLD: 0 /100 WBCS — SIGNIFICANT CHANGE UP (ref 0–0)
PH UR: 5 — SIGNIFICANT CHANGE UP (ref 5–8)
PLATELET # BLD AUTO: 343 K/UL — SIGNIFICANT CHANGE UP (ref 150–400)
POTASSIUM SERPL-MCNC: 3.8 MMOL/L — SIGNIFICANT CHANGE UP (ref 3.5–5.3)
POTASSIUM SERPL-SCNC: 3.8 MMOL/L — SIGNIFICANT CHANGE UP (ref 3.5–5.3)
PROT UR-MCNC: NEGATIVE MG/DL — SIGNIFICANT CHANGE UP
RBC # BLD: 4.5 M/UL — SIGNIFICANT CHANGE UP (ref 3.8–5.2)
RBC # FLD: 14.2 % — SIGNIFICANT CHANGE UP (ref 10.3–14.5)
SODIUM SERPL-SCNC: 145 MMOL/L — SIGNIFICANT CHANGE UP (ref 135–145)
SP GR SPEC: 1.02 — SIGNIFICANT CHANGE UP (ref 1–1.03)
UROBILINOGEN FLD QL: 0.2 MG/DL — SIGNIFICANT CHANGE UP (ref 0.2–1)
WBC # BLD: 11.06 K/UL — HIGH (ref 3.8–10.5)
WBC # FLD AUTO: 11.06 K/UL — HIGH (ref 3.8–10.5)

## 2023-05-25 PROCEDURE — 71045 X-RAY EXAM CHEST 1 VIEW: CPT

## 2023-05-25 PROCEDURE — 36415 COLL VENOUS BLD VENIPUNCTURE: CPT

## 2023-05-25 PROCEDURE — 71045 X-RAY EXAM CHEST 1 VIEW: CPT | Mod: 26

## 2023-05-25 PROCEDURE — 85025 COMPLETE CBC W/AUTO DIFF WBC: CPT

## 2023-05-25 PROCEDURE — 99285 EMERGENCY DEPT VISIT HI MDM: CPT | Mod: 25

## 2023-05-25 PROCEDURE — 93010 ELECTROCARDIOGRAM REPORT: CPT

## 2023-05-25 PROCEDURE — 80048 BASIC METABOLIC PNL TOTAL CA: CPT

## 2023-05-25 PROCEDURE — 81001 URINALYSIS AUTO W/SCOPE: CPT

## 2023-05-25 PROCEDURE — 70450 CT HEAD/BRAIN W/O DYE: CPT | Mod: 26,MA

## 2023-05-25 PROCEDURE — 87086 URINE CULTURE/COLONY COUNT: CPT

## 2023-05-25 PROCEDURE — 99284 EMERGENCY DEPT VISIT MOD MDM: CPT

## 2023-05-25 PROCEDURE — 70450 CT HEAD/BRAIN W/O DYE: CPT | Mod: MA

## 2023-05-25 PROCEDURE — 93005 ELECTROCARDIOGRAM TRACING: CPT

## 2023-05-25 RX ADMIN — Medication 1 TABLET(S): at 23:49

## 2023-05-25 NOTE — ED ADULT TRIAGE NOTE - CHIEF COMPLAINT QUOTE
She is sent here from the [facility] for increased AMS for the last 3 days" as per [facility]

## 2023-05-25 NOTE — ED ADULT NURSE NOTE - NSFALLHARMRISKINTERV_ED_ALL_ED

## 2023-05-25 NOTE — ED ADULT NURSE NOTE - CHIEF COMPLAINT QUOTE
She is sent here from the [facility] for increased AMS for the last 3 days" as per [facility]

## 2023-05-25 NOTE — ED PROVIDER NOTE - NSFOLLOWUPINSTRUCTIONS_ED_ALL_ED_FT
please follow up with your doctor in 2-3 days for urology referral.  take medication as prescribed.   drink plenty of fluids.   return to ED for any concerns

## 2023-05-25 NOTE — ED ADULT NURSE REASSESSMENT NOTE - NSFALLHARMRISKINTERV_ED_ALL_ED

## 2023-05-25 NOTE — ED ADULT NURSE NOTE - OBJECTIVE STATEMENT
She is sent here from the [facility] for increased AMS for the last 3 days" as per [facility], pt. awake, alert, oerintedx3, breathing with ease on RA, pt. doesn't know why she is here., pt. very clam and cooperative.

## 2023-05-25 NOTE — ED PROVIDER NOTE - OBJECTIVE STATEMENT
87-year-old female with history of dementia, hypertension, hyperlipidemia sent from skilled nursing facility for evaluation of altered mental status.  On evaluation, the patient is alert and oriented to self and [facility].  Patient states that the year is 2024.  The patient denies any complaints at this time but states that she frequently gets urinary tract infections.  Patient specifically denies headache, chest pain, shortness of breath, abdominal pain, nausea/vomiting/diarrhea.

## 2023-05-25 NOTE — ED PROVIDER NOTE - PROGRESS NOTE DETAILS
Interpretation of CT scan of head noted.  The calcification that was seen on the CT scan was noted to be unchanged compared to 2022.  Given this finding and the patient's exam, I do not feel that she has  an intracranial hemorrhage.  Brain Lindsay, DO pt told of results.   states had frequent UTIs.   advised to take abx and to f./u with urology.

## 2023-05-25 NOTE — ED PROVIDER NOTE - PATIENT PORTAL LINK FT
You can access the FollowMyHealth Patient Portal offered by [facility] by registering at the following website: [email or web address]. By joining [employer]’s FollowMyHealth portal, you will also be able to view your health information using other applications (apps) compatible with our system.

## 2023-05-25 NOTE — ED PROVIDER NOTE - CLINICAL SUMMARY MEDICAL DECISION MAKING FREE TEXT BOX
86 y/o F with h/o dementia sent from SNF for AMS.  Pt is alert and oriented without focal deficits on exam.  Pt doesn't meet SIRS criteria at triage and is afebrile.  She reports frequent UTIs.  Pt doesn't have abdominal or flank pain.  Negative e/o trauma on exam.  will get CT head, UA/Cx, CBC, BMP and CXR. 86 y/o F with h/o dementia sent from SNF for AMS.  Pt is alert and oriented without focal deficits on exam.  Pt doesn't meet SIRS criteria at triage and is afebrile.  She reports frequent UTIs.  Pt doesn't have abdominal or flank pain.  Negative e/o trauma on exam.  will get CT head, UA/Cx, CBC, BMP and CXR.  Pt signed out to Dr. Tee with UA pending.   Brain Lindsay, DO

## 2023-05-26 VITALS
OXYGEN SATURATION: 99 % | RESPIRATION RATE: 18 BRPM | HEART RATE: 72 BPM | DIASTOLIC BLOOD PRESSURE: 78 MMHG | SYSTOLIC BLOOD PRESSURE: 146 MMHG

## 2023-05-27 LAB
CULTURE RESULTS: SIGNIFICANT CHANGE UP
SPECIMEN SOURCE: SIGNIFICANT CHANGE UP

## 2023-06-02 ENCOUNTER — INPATIENT (INPATIENT)
Facility: HOSPITAL | Age: 88
LOS: 3 days | Discharge: NURSING, REHAB OR LONG-TERM CARE | DRG: 690 | End: 2023-06-06
Attending: STUDENT IN AN ORGANIZED HEALTH CARE EDUCATION/TRAINING PROGRAM | Admitting: STUDENT IN AN ORGANIZED HEALTH CARE EDUCATION/TRAINING PROGRAM
Payer: MEDICARE

## 2023-06-02 VITALS
SYSTOLIC BLOOD PRESSURE: 131 MMHG | DIASTOLIC BLOOD PRESSURE: 60 MMHG | HEIGHT: 60 IN | OXYGEN SATURATION: 100 % | WEIGHT: 160.06 LBS | RESPIRATION RATE: 18 BRPM | TEMPERATURE: 98 F | HEART RATE: 63 BPM

## 2023-06-02 DIAGNOSIS — Z90.710 ACQUIRED ABSENCE OF BOTH CERVIX AND UTERUS: Chronic | ICD-10-CM

## 2023-06-02 DIAGNOSIS — E86.0 DEHYDRATION: ICD-10-CM

## 2023-06-02 DIAGNOSIS — Z96.0 PRESENCE OF UROGENITAL IMPLANTS: Chronic | ICD-10-CM

## 2023-06-02 LAB
ALBUMIN SERPL ELPH-MCNC: 3.2 G/DL — LOW (ref 3.3–5)
ALP SERPL-CCNC: 104 U/L — SIGNIFICANT CHANGE UP (ref 40–120)
ALT FLD-CCNC: 38 U/L — SIGNIFICANT CHANGE UP (ref 10–45)
ANION GAP SERPL CALC-SCNC: 10 MMOL/L — SIGNIFICANT CHANGE UP (ref 5–17)
APPEARANCE UR: CLEAR — SIGNIFICANT CHANGE UP
AST SERPL-CCNC: 46 U/L — HIGH (ref 10–40)
BACTERIA # UR AUTO: ABNORMAL /HPF
BASOPHILS # BLD AUTO: 0.03 K/UL — SIGNIFICANT CHANGE UP (ref 0–0.2)
BASOPHILS NFR BLD AUTO: 0.3 % — SIGNIFICANT CHANGE UP (ref 0–2)
BILIRUB SERPL-MCNC: 0.6 MG/DL — SIGNIFICANT CHANGE UP (ref 0.2–1.2)
BILIRUB UR-MCNC: NEGATIVE — SIGNIFICANT CHANGE UP
BUN SERPL-MCNC: 25 MG/DL — HIGH (ref 7–23)
CALCIUM SERPL-MCNC: 9.6 MG/DL — SIGNIFICANT CHANGE UP (ref 8.4–10.5)
CHLORIDE SERPL-SCNC: 106 MMOL/L — SIGNIFICANT CHANGE UP (ref 96–108)
CO2 SERPL-SCNC: 23 MMOL/L — SIGNIFICANT CHANGE UP (ref 22–31)
COLOR SPEC: YELLOW — SIGNIFICANT CHANGE UP
CREAT SERPL-MCNC: 1.52 MG/DL — HIGH (ref 0.5–1.3)
DIFF PNL FLD: NEGATIVE — SIGNIFICANT CHANGE UP
EGFR: 33 ML/MIN/1.73M2 — LOW
EOSINOPHIL # BLD AUTO: 0.32 K/UL — SIGNIFICANT CHANGE UP (ref 0–0.5)
EOSINOPHIL NFR BLD AUTO: 2.8 % — SIGNIFICANT CHANGE UP (ref 0–6)
EPI CELLS # UR: 1 — SIGNIFICANT CHANGE UP
GLUCOSE SERPL-MCNC: 120 MG/DL — HIGH (ref 70–99)
GLUCOSE UR QL: NEGATIVE MG/DL — SIGNIFICANT CHANGE UP
HCT VFR BLD CALC: 39.2 % — SIGNIFICANT CHANGE UP (ref 34.5–45)
HGB BLD-MCNC: 12 G/DL — SIGNIFICANT CHANGE UP (ref 11.5–15.5)
IMM GRANULOCYTES NFR BLD AUTO: 0.5 % — SIGNIFICANT CHANGE UP (ref 0–0.9)
KETONES UR-MCNC: NEGATIVE MG/DL — SIGNIFICANT CHANGE UP
LEUKOCYTE ESTERASE UR-ACNC: ABNORMAL
LYMPHOCYTES # BLD AUTO: 0.85 K/UL — LOW (ref 1–3.3)
LYMPHOCYTES # BLD AUTO: 7.4 % — LOW (ref 13–44)
MCHC RBC-ENTMCNC: 26.8 PG — LOW (ref 27–34)
MCHC RBC-ENTMCNC: 30.6 GM/DL — LOW (ref 32–36)
MCV RBC AUTO: 87.7 FL — SIGNIFICANT CHANGE UP (ref 80–100)
MONOCYTES # BLD AUTO: 0.64 K/UL — SIGNIFICANT CHANGE UP (ref 0–0.9)
MONOCYTES NFR BLD AUTO: 5.6 % — SIGNIFICANT CHANGE UP (ref 2–14)
NEUTROPHILS # BLD AUTO: 9.59 K/UL — HIGH (ref 1.8–7.4)
NEUTROPHILS NFR BLD AUTO: 83.4 % — HIGH (ref 43–77)
NITRITE UR-MCNC: POSITIVE
NRBC # BLD: 0 /100 WBCS — SIGNIFICANT CHANGE UP (ref 0–0)
NT-PROBNP SERPL-SCNC: 358 PG/ML — HIGH (ref 0–300)
PH UR: 6.5 — SIGNIFICANT CHANGE UP (ref 5–8)
PLATELET # BLD AUTO: 336 K/UL — SIGNIFICANT CHANGE UP (ref 150–400)
POTASSIUM SERPL-MCNC: 5.2 MMOL/L — SIGNIFICANT CHANGE UP (ref 3.5–5.3)
POTASSIUM SERPL-SCNC: 5.2 MMOL/L — SIGNIFICANT CHANGE UP (ref 3.5–5.3)
PROCALCITONIN SERPL-MCNC: 0.08 NG/ML — SIGNIFICANT CHANGE UP
PROT SERPL-MCNC: 7.2 G/DL — SIGNIFICANT CHANGE UP (ref 6–8.3)
PROT UR-MCNC: NEGATIVE MG/DL — SIGNIFICANT CHANGE UP
RBC # BLD: 4.47 M/UL — SIGNIFICANT CHANGE UP (ref 3.8–5.2)
RBC # FLD: 14.7 % — HIGH (ref 10.3–14.5)
RBC CASTS # UR COMP ASSIST: 1 /HPF — SIGNIFICANT CHANGE UP (ref 0–4)
SODIUM SERPL-SCNC: 139 MMOL/L — SIGNIFICANT CHANGE UP (ref 135–145)
SP GR SPEC: 1.01 — SIGNIFICANT CHANGE UP (ref 1–1.03)
TROPONIN I, HIGH SENSITIVITY RESULT: 14.1 NG/L — SIGNIFICANT CHANGE UP
UROBILINOGEN FLD QL: 0.2 MG/DL — SIGNIFICANT CHANGE UP (ref 0.2–1)
WBC # BLD: 11.49 K/UL — HIGH (ref 3.8–10.5)
WBC # FLD AUTO: 11.49 K/UL — HIGH (ref 3.8–10.5)
WBC UR QL: 1 /HPF — SIGNIFICANT CHANGE UP (ref 0–5)

## 2023-06-02 PROCEDURE — 99285 EMERGENCY DEPT VISIT HI MDM: CPT

## 2023-06-02 PROCEDURE — 99223 1ST HOSP IP/OBS HIGH 75: CPT

## 2023-06-02 PROCEDURE — 71045 X-RAY EXAM CHEST 1 VIEW: CPT | Mod: 26

## 2023-06-02 PROCEDURE — 93010 ELECTROCARDIOGRAM REPORT: CPT

## 2023-06-02 RX ORDER — LANOLIN ALCOHOL/MO/W.PET/CERES
3 CREAM (GRAM) TOPICAL AT BEDTIME
Refills: 0 | Status: DISCONTINUED | OUTPATIENT
Start: 2023-06-02 | End: 2023-06-06

## 2023-06-02 RX ORDER — SODIUM CHLORIDE 9 MG/ML
2200 INJECTION INTRAMUSCULAR; INTRAVENOUS; SUBCUTANEOUS ONCE
Refills: 0 | Status: COMPLETED | OUTPATIENT
Start: 2023-06-02 | End: 2023-06-02

## 2023-06-02 RX ORDER — CEFTRIAXONE 500 MG/1
1000 INJECTION, POWDER, FOR SOLUTION INTRAMUSCULAR; INTRAVENOUS ONCE
Refills: 0 | Status: COMPLETED | OUTPATIENT
Start: 2023-06-02 | End: 2023-06-02

## 2023-06-02 RX ORDER — SERTRALINE 25 MG/1
100 TABLET, FILM COATED ORAL DAILY
Refills: 0 | Status: DISCONTINUED | OUTPATIENT
Start: 2023-06-02 | End: 2023-06-06

## 2023-06-02 RX ORDER — CEFTRIAXONE 500 MG/1
INJECTION, POWDER, FOR SOLUTION INTRAMUSCULAR; INTRAVENOUS
Refills: 0 | Status: COMPLETED | OUTPATIENT
Start: 2023-06-02 | End: 2023-06-06

## 2023-06-02 RX ORDER — ATORVASTATIN CALCIUM 80 MG/1
10 TABLET, FILM COATED ORAL AT BEDTIME
Refills: 0 | Status: DISCONTINUED | OUTPATIENT
Start: 2023-06-02 | End: 2023-06-06

## 2023-06-02 RX ORDER — QUETIAPINE FUMARATE 200 MG/1
25 TABLET, FILM COATED ORAL
Refills: 0 | Status: DISCONTINUED | OUTPATIENT
Start: 2023-06-02 | End: 2023-06-06

## 2023-06-02 RX ORDER — ACETAMINOPHEN 500 MG
650 TABLET ORAL EVERY 6 HOURS
Refills: 0 | Status: DISCONTINUED | OUTPATIENT
Start: 2023-06-02 | End: 2023-06-06

## 2023-06-02 RX ORDER — HEPARIN SODIUM 5000 [USP'U]/ML
5000 INJECTION INTRAVENOUS; SUBCUTANEOUS EVERY 8 HOURS
Refills: 0 | Status: DISCONTINUED | OUTPATIENT
Start: 2023-06-02 | End: 2023-06-06

## 2023-06-02 RX ORDER — LACTOBACILLUS ACIDOPHILUS 100MM CELL
1 CAPSULE ORAL
Refills: 0 | Status: DISCONTINUED | OUTPATIENT
Start: 2023-06-02 | End: 2023-06-06

## 2023-06-02 RX ORDER — CEFTRIAXONE 500 MG/1
1000 INJECTION, POWDER, FOR SOLUTION INTRAMUSCULAR; INTRAVENOUS EVERY 24 HOURS
Refills: 0 | Status: COMPLETED | OUTPATIENT
Start: 2023-06-03 | End: 2023-06-05

## 2023-06-02 RX ORDER — FAMOTIDINE 10 MG/ML
20 INJECTION INTRAVENOUS DAILY
Refills: 0 | Status: DISCONTINUED | OUTPATIENT
Start: 2023-06-02 | End: 2023-06-06

## 2023-06-02 RX ADMIN — SODIUM CHLORIDE 2200 MILLILITER(S): 9 INJECTION INTRAMUSCULAR; INTRAVENOUS; SUBCUTANEOUS at 15:23

## 2023-06-02 RX ADMIN — SODIUM CHLORIDE 2200 MILLILITER(S): 9 INJECTION INTRAMUSCULAR; INTRAVENOUS; SUBCUTANEOUS at 16:23

## 2023-06-02 RX ADMIN — ATORVASTATIN CALCIUM 10 MILLIGRAM(S): 80 TABLET, FILM COATED ORAL at 21:13

## 2023-06-02 RX ADMIN — Medication 7.5 MILLIGRAM(S): at 18:43

## 2023-06-02 RX ADMIN — QUETIAPINE FUMARATE 25 MILLIGRAM(S): 200 TABLET, FILM COATED ORAL at 17:49

## 2023-06-02 RX ADMIN — CEFTRIAXONE 1000 MILLIGRAM(S): 500 INJECTION, POWDER, FOR SOLUTION INTRAMUSCULAR; INTRAVENOUS at 20:18

## 2023-06-02 RX ADMIN — HEPARIN SODIUM 5000 UNIT(S): 5000 INJECTION INTRAVENOUS; SUBCUTANEOUS at 21:14

## 2023-06-02 NOTE — PATIENT PROFILE ADULT - FALL HARM RISK - DEVICES
Walker

## 2023-06-02 NOTE — PATIENT PROFILE ADULT - DO YOU LACK THE NECESSARY SUPPORT TO HELP YOU COPE WITH LIFE CHALLENGES?
Left detailed message for patient. Samples set aside in fridge upstairs. Provided phone number should they have additional questions.   no

## 2023-06-02 NOTE — PATIENT PROFILE ADULT - FALL HARM RISK - HARM RISK INTERVENTIONS

## 2023-06-02 NOTE — ED ADULT TRIAGE NOTE - CHIEF COMPLAINT QUOTE
Pt BIBA from the [facility] with c/o hypoxia. As per EMS pt was O2 was in the 80s. Pt c/o dizziness. Denies cp.

## 2023-06-02 NOTE — ED ADULT NURSE NOTE - CHIEF COMPLAINT QUOTE
Pt BIBA from the [facility] with c/o hypoxia. As per EMS pt was O2 was in the 80s. Pt c/o dizziness. Denies cp.

## 2023-06-02 NOTE — H&P ADULT - NSHPPHYSICALEXAM_GEN_ALL_CORE
T(C): 36.5 (06-02-23 @ 14:33), Max: 36.5 (06-02-23 @ 14:33)  HR: 61 (06-02-23 @ 14:48) (61 - 63)  BP: 151/67 (06-02-23 @ 14:48) (131/60 - 151/67)  RR: 20 (06-02-23 @ 14:48) (18 - 20)  SpO2: 96% (06-02-23 @ 14:48) (96% - 100%)  Wt(kg): --Vital Signs Last 24 Hrs  T(C): 36.5 (02 Jun 2023 14:33), Max: 36.5 (02 Jun 2023 14:33)  T(F): 97.7 (02 Jun 2023 14:33), Max: 97.7 (02 Jun 2023 14:33)  HR: 61 (02 Jun 2023 14:48) (61 - 63)  BP: 151/67 (02 Jun 2023 14:48) (131/60 - 151/67)  BP(mean): --  RR: 20 (02 Jun 2023 14:48) (18 - 20)  SpO2: 96% (02 Jun 2023 14:48) (96% - 100%)    Parameters below as of 02 Jun 2023 14:48  Patient On (Oxygen Delivery Method): room air        PHYSICAL EXAM:  GENERAL: NAD  HENT:  Atraumatic, Normocephalic; No tonsillar erythema, exudates, or enlargement; Moist mucous membranes;   EYES: EOMI, PERRLA, conjunctiva and sclera clear, no lid-lag  NECK: Supple, No JVD, Normal thyroid  NERVOUS SYSTEM:  CN II - XII intact; Sensation intact; Motor Strength 4-5/5 B/L upper and lower extremities  CHEST/LUNG: Clear to percussion bilaterally; No rales, rhonchi, wheezing, or rubs; normal respiratory effort, no intercostal retractions; No pitting edema  HEART: Regular rate and rhythm; No murmurs, rubs, or gallops  ABDOMEN: Soft, Nontender, Nondistended; Bowel sounds present; No HSM  MUSCULOSKELETAL/EXTREMITIES:  2+ Peripheral Pulses, No clubbing, or digital cyanosis  SKIN: No rashes or lesions; normal texture and temperature  PSYCH: Appropriate affect, Alert x 1 T(C): 36.5 (06-02-23 @ 14:33), Max: 36.5 (06-02-23 @ 14:33)  HR: 61 (06-02-23 @ 14:48) (61 - 63)  BP: 151/67 (06-02-23 @ 14:48) (131/60 - 151/67)  RR: 20 (06-02-23 @ 14:48) (18 - 20)  SpO2: 96% (06-02-23 @ 14:48) (96% - 100%)  Wt(kg): --Vital Signs Last 24 Hrs  T(C): 36.5 (02 Jun 2023 14:33), Max: 36.5 (02 Jun 2023 14:33)  T(F): 97.7 (02 Jun 2023 14:33), Max: 97.7 (02 Jun 2023 14:33)  HR: 61 (02 Jun 2023 14:48) (61 - 63)  BP: 151/67 (02 Jun 2023 14:48) (131/60 - 151/67)  BP(mean): --  RR: 20 (02 Jun 2023 14:48) (18 - 20)  SpO2: 96% (02 Jun 2023 14:48) (96% - 100%)    Parameters below as of 02 Jun 2023 14:48  Patient On (Oxygen Delivery Method): room air        PHYSICAL EXAM:  GENERAL: NAD  HENT:  Atraumatic, Normocephalic; No tonsillar erythema, exudates, or enlargement; Moist mucous membranes;   EYES: EOMI, PERRLA, conjunctiva and sclera clear, no lid-lag  NECK: Supple, No JVD, Normal thyroid  NERVOUS SYSTEM:  CN II - XII intact; Sensation intact; Motor Strength 4-5/5 B/L upper and lower extremities  CHEST/LUNG: Clear to percussion bilaterally; No rales, rhonchi, wheezing, or rubs; normal respiratory effort, no intercostal retractions; No pitting edema  HEART: Regular rate and rhythm; No murmurs, rubs, or gallops  ABDOMEN: Soft, Nontender, Nondistended; Bowel sounds present; No HSM  MUSCULOSKELETAL/EXTREMITIES:  2+ Peripheral Pulses, No clubbing, or digital cyanosis  SKIN: No rashes or lesions; normal texture and temperature  PSYCH: Appropriate affect, Alert, oriented to self, somewhat to place (knows she is in a hospital) but not to time.

## 2023-06-02 NOTE — H&P ADULT - NSHPLABSRESULTS_GEN_ALL_CORE
LABS:                        12.0   11.49 )-----------( 336      ( 02 Jun 2023 15:10 )             39.2     06-02    139  |  106  |  25<H>  ----------------------------<  120<H>  5.2   |  23  |  1.52<H>    Ca    9.6      02 Jun 2023 15:10    TPro  7.2  /  Alb  3.2<L>  /  TBili  0.6  /  DBili  x   /  AST  46<H>  /  ALT  38  /  AlkPhos  104  06-02         CAPILLARY BLOOD GLUCOSE    RADIOLOGY & ADDITIONAL TESTS:  < from: Xray Chest 1 View AP/PA (06.02.23 @ 15:22) >      IMPRESSION: Increasing linear retrocardiac atelectasis.    < end of copied text >      Consultant(s) Notes Reviewed:  [x ] YES  [ ] NO  Care Discussed with Consultants/Other Providers [ x] YES  [ ] NO  Imaging Personally Reviewed:  [x ] YES  [ ] NO  Prior records reviewed and summed up: [ x] YES [ ] No

## 2023-06-02 NOTE — ED PROVIDER NOTE - CLINICAL SUMMARY MEDICAL DECISION MAKING FREE TEXT BOX
87-year-old female presents to the ED for dizziness and weakness.  Patient with altered mental status.  According to chart decreased p.o. intake.  Triage note states shortness of breath however patient denies shortness of breath.  Understanding that she has dementia, will continue to assess.  Triage note also states hypoxia however patient not hypoxic.  We will observe. Pt awake and pleasant.  It is noted that patient was here a week ago and diagnosed with UTI.  Discharged on Bactrim.  Exam as stated. Very dehydrated. Plan for labs and reassessment.     Results reviewed. Plan for admission. d/w Daughter Cris. Agrees with plan.

## 2023-06-02 NOTE — ED ADULT NURSE NOTE - OBJECTIVE STATEMENT
Patient came by EMS from [facility] with complaint of SOB, pulse ox on 80s. Patient switched to RA, 95%. Patient denies any pain, nausea, vomit, fever or chills. Patient complaint of dizziness. PMH of HTN and Dementia.

## 2023-06-02 NOTE — ED ADULT NURSE NOTE - NSFALLHARMRISKINTERV_ED_ALL_ED
Clothing
Assistance OOB with selected safe patient handling equipment if applicable/Assistance with ambulation/Communicate risk of Fall with Harm to all staff, patient, and family/Monitor gait and stability/Monitor for mental status changes and reorient to person, place, and time, as needed/Provide patient with walking aids/Provide visual cue: red socks, yellow wristband, yellow gown, etc/Reinforce activity limits and safety measures with patient and family/Toileting schedule using arm’s reach rule for commode and bathroom/Use of alarms - bed, stretcher, chair and/or video monitoring/Bed in lowest position, wheels locked, appropriate side rails in place/Call bell, personal items and telephone in reach/Instruct patient to call for assistance before getting out of bed/chair/stretcher/Non-slip footwear applied when patient is off stretcher/[place] to call system/Physically safe environment - no spills, clutter or unnecessary equipment/Purposeful Proactive Rounding/Room/bathroom lighting operational, light cord in reach

## 2023-06-02 NOTE — ED PROVIDER NOTE - OBJECTIVE STATEMENT
87-year-old female presents to the ED for dizziness and weakness.  Patient with altered mental status.  According to chart decreased p.o. intake.  Triage note states shortness of breath however patient denies shortness of breath.  Understanding that she has dementia, will continue to assess.  Triage note also states hypoxia however patient not hypoxic.  We will observe. Pt awake and pleasant.  It is noted that patient was here a week ago and diagnosed with UTI.  Discharged on Bactrim.

## 2023-06-02 NOTE — PATIENT PROFILE ADULT - PUBLIC BENEFITS
08/29/19 2031   Charting Type   Charting Type Shift assessment   Neurological   Neuro (WDL) X   Level of Consciousness Alert/awake   Orientation Level Oriented X4   Cognition Follows commands   Facial Symmetry Symmetrical   Tongue Deviation Midline   Speech Clear   R Hand  Strong   L Hand  Strong   Neuro Symptoms Forgetful   Abhijit Coma Scale   Eye Opening 4   Best Verbal Response 5   Best Motor Response 6   [place] Coma Scale Score 15   HEENT   HEENT (WDL) X   Head and Face Symmetrical   R Eye Mildly impaired vision   L Eye Mildly impaired vision   Respiratory   Respiratory (WDL) X   Respiratory Pattern Normal   Bilateral Breath Sounds Diminished   Cardiac   Cardiac (WDL) WDL   Cardiac Regularity Regular   Pain Assessment   Pain Assessment 0-10   Pain Score No Pain   Peripheral Vascular   Peripheral Vascular (WDL) X   Cyanosis None   Capillary Refill Greater than 2 seconds (All extremities)   Pulses L pedal;R pedal;R radial;L radial   Edema Right lower extremity; Left lower extremity   RLE Edema Trace   LLE Edema Trace   RLE Neurovascular Assessment   R Pedal Pulse +1   LLE Neurovascular Assessment   L Pedal Pulse +1   Integumentary   Integumentary (WDL) X   Skin Color Appropriate for ethnicity   Skin Condition/Temp Warm;Dry   Skin Integrity Bruising   Skin Location right hip/rib   Skin Turgor Non-tenting   David Scale   Sensory Perceptions 4   Moisture 4   Activity 3   Mobility 3   Nutrition 2   Friction and Shear 3   David Scale Score 19   Wound 08/21/19 Pressure Injury Sacrum   Date First Assessed/Time First Assessed: 08/21/19 1600   Pre-Existing Wound: Yes  Primary Wound Type: Pressure Injury  Location: Sacrum   Wound Description GAL   Dressing Foam, Silicon (eg  Allevyn, etc)   Dressing Status Clean;Dry; Intact   Musculoskeletal   Musculoskeletal (WDL) X   RUE Full movement   LUE Full movement   RLE Full movement   LLE Full movement   Gastrointestinal   Abdomen Inspection Soft;Nondistended Genitourinary   Genitourinary (WDL) X   Genitourinary Symptoms Indwelling catheter   Urine Assessment   Urinary Incontinence No   Urine Color Yellow/straw no

## 2023-06-02 NOTE — H&P ADULT - HISTORY OF PRESENT ILLNESS
88 yo F w/ advanced dementia comes from [facility] with dizziness and "looked pale"        Patient was in the ED on 5/25/2023 with a UTI and given bactrim. UCx contaminated,  Daughter says mom gets frequent UTIs and thinks that the patient was not compliant with the abx.    In the ED, provider noted she looked markedly dehydrated.    ED course:  patient given 2200cc IVF.    # MILLICENT on CKD st. III  dec PO intake unclear why, possible ongoing UTI  - f/u catheterized UA w/ Cx    Contact: Cris [phone] 86 yo F w/ advanced dementia, HLD HTN, renal stone s/p stent, and NPH comes from [facility] because she was "not eating and very tired." The patient is unable to give a history due to dementia. I called daughter for collateral info. Daughter says the pt c/o dizziness and "looked pale" to staff members before being sent to hospital.    Of note, the patient was in the ED on 5/25/2023 with a UTI and given bactrim. UCx contaminated,  Daughter says mom gets frequent UTIs and thinks that the patient was not compliant with the abx.    In the ED, provider noted she looked markedly dehydrated.    ED course:  patient given 2200cc IVF.   88 yo F w/ advanced dementia, HLD HTN, renal stone s/p stent, and NPH comes from [facility] because she was "not eating and very tired." The patient is unable to give a history due to dementia. I called daughter for collateral info. Daughter says the pt c/o dizziness and "looked pale" to staff members before being sent to hospital. Pt tells me also she was dizzy this AM.    Of note, the patient was in the ED on 5/25/2023 with a UTI and given bactrim. UCx contaminated,  Daughter says mom gets frequent UTIs and thinks that the patient was not compliant with the abx.    In the ED, provider noted she looked markedly dehydrated.    ED course:  patient given 2200cc IVF.

## 2023-06-02 NOTE — H&P ADULT - ASSESSMENT
86 yo woman with history of advanced dementia, Hyperlipidemia, Hypertension, Renal stone s/p stent, and NPH sent from [facility] because of dec PO intake and lethargy. Seen in ED 1 week prior for UTI.    # MILLICENT on CKD st. III  # Leukocytosis  - 2.2 L NS given in the ED  - trend BMP  - dec PO intake unclear why, possible ongoing UTI  - f/u catheterized UA w/ Cx  - will start abx ceftriaxone    Contact: Cris [phone]         86 yo woman with history of advanced dementia, Hyperlipidemia, Hypertension, Renal stone s/p stent, and NPH sent from [facility] because of dec PO intake and lethargy. Seen in ED 1 week prior for UTI.    # MILLICENT on CKD st. III likely from poor PO intake  # mild Leukocytosis  # Hx of UTIs  - 2.2 L NS given in the ED  - trend BMP  - dec PO intake unclear why, possible ongoing UTI  - f/u catheterized UA w/ Cx  - will start abx ceftriaxone  - pt is on cranberry, probiotics and D Mannose supplements as OP    #hyperlipidemia  - Continue home atorvastatin 10 mg qhs    # Dementia: cont home quetiapine 25 mg BID and sertraline 100 mg qd    GI Prophylaxis with famotidine 20 mg qhs  DVT Prophylaxis with eliquis on hold until f/u CBC is returned  Updated daughter Cris   Code: DNR/DNI confirmed, MOLST in chart. 88 yo woman with history of advanced dementia, Hyperlipidemia, Hypertension, Renal stone s/p stent, and NPH sent from [facility] because of dec PO intake and lethargy. Pt c/o dizziness. Seen in ED 1 week prior for UTI. S/p 2.2 L NS in the ED    # MILLICENT on CKD st. III likely from poor PO intake  # Dizziness  # mild Leukocytosis  # Hx of recurrent UTIs  - BUN/Cr 25/1.52, baseline is around 1.1  - 2.2 L NS given in the ED  - trend BMP  - dec PO intake unclear why, possible ongoing UTI? f/u catheterized UA w/ Cx. Will start abx if positive.  - f/u orthostatic VS  - pt is on cranberry, probiotics and D Mannose supplements as OP    #hyperlipidemia  - Continue home atorvastatin 10 mg qhs    # Dementia  - cont home quetiapine 25 mg BID and sertraline 100 mg qd    # Hx of HTN  - previously on anti HTN meds, no longer on  med list    GI Prophylaxis with famotidine 20 mg qhs  DVT Prophylaxis with eliquis on hold until f/u CBC is returned  Updated daughter Cris   Code: DNR/DNI confirmed, MOLST in chart. 88 yo woman with history of advanced dementia, Hyperlipidemia, Hypertension, Renal stone s/p stent, and NPH sent from [facility] because of dec PO intake and lethargy. Pt c/o dizziness. Seen in ED 1 week prior for UTI. S/p 2.2 L NS in the ED    # MILLICENT on CKD st. III likely from poor PO intake  # Dizziness  # mild Leukocytosis  # Hx of recurrent UTIs  - BUN/Cr 25/1.52, baseline is around 1.1  - 2.2 L NS given in the ED  - trend BMP  - dec PO intake unclear why, possible ongoing UTI? f/u catheterized UA w/ Cx. Will start abx if positive.  - f/u orthostatic VS  - pt is on cranberry, probiotics and D Mannose supplements as OP    #hyperlipidemia  - Continue home atorvastatin 10 mg qhs    # Dementia w/ mood disturbance.  # anxiety  - cont home quetiapine 25 mg BID, buspirone 7.5 mg BID and sertraline 100 mg qd    # Hx of HTN  - previously on anti HTN meds, last noted to be amlodipine 5 mg and losartan 25 mg, no longer on her list. Will hold losartan in setting of MILLICENT and amlodipine in s/o dizziness, possible orthostasis    GI Prophylaxis with famotidine 20 mg qhs  DVT Prophylaxis : heparin. Pt is on eliquis on home med list,but unclear why. Daughter says she does not know about a hx of a fib or clots.  Updated daughter Cris   Code: DNR/DNI confirmed, MOLST in chart. 86 yo woman with history of advanced dementia, Hyperlipidemia, Hypertension, Renal stone s/p stent, and NPH sent from [facility] because of dec PO intake and lethargy. Pt c/o dizziness. Seen in ED 1 week prior for UTI. S/p 2.2 L NS in the ED    # MILLICENT on CKD st. III likely from poor PO intake  # Dizziness  # mild Leukocytosis  # Hx of recurrent UTIs  - dec PO intake unclear why, possible ongoing UTI? f/u catheterized UA w/ Cx. Will start abx if positive. Abdomen benign on exam.   - blood cultures x2, f/u procalcitonin  - BUN/Cr 25/1.52, baseline is around 1.1  - 2.2 L NS given in the ED  - trend BMP  - f/u orthostatic VS  - pt is on cranberry, probiotics and D Mannose supplements as OP    #hyperlipidemia  - Continue home atorvastatin 10 mg qhs    # Dementia w/ mood disturbance.  # anxiety  - cont home quetiapine 25 mg BID, buspirone 7.5 mg BID and sertraline 100 mg qd    # Hx of HTN  - previously on anti HTN meds, last noted to be amlodipine 5 mg and losartan 25 mg, no longer on her list. Will hold losartan in setting of MILLICENT and amlodipine in s/o dizziness, possible orthostasis    GI Prophylaxis with famotidine 20 mg qhs  DVT Prophylaxis : will place on heparin for now. Pt is on eliquis on home med list,but unclear why. Daughter says she does not know about a hx of a fib or clots.  Updated daughter Cris   Code: DNR/DNI confirmed, MOLST in chart. 86 yo woman with history of advanced dementia, Hyperlipidemia, Hypertension, Renal stone s/p stent, and NPH sent from [facility] because of dec PO intake and lethargy. Pt c/o dizziness. Seen in ED 1 week prior for UTI. S/p 2.2 L NS in the ED    # MILLICENT on CKD st. III likely from poor PO intake  # Dizziness  # mild Leukocytosis  # Hx of recurrent UTIs  - dec PO intake unclear why, possible ongoing UTI? f/u catheterized UA w/ Cx. Will start abx if positive. Abdomen benign on exam.   - blood cultures x2, f/u procalcitonin  - BUN/Cr 25/1.52, baseline is around 1.1  - 2.2 L NS given in the ED  - trend BMP  - f/u orthostatic VS  - pt is on cranberry, probiotics and D Mannose supplements as OP  - CT head without acute findings (see above)    #hyperlipidemia  - Continue home atorvastatin 10 mg qhs    # Dementia w/ mood disturbance.  # anxiety  - cont home quetiapine 25 mg BID, buspirone 7.5 mg BID and sertraline 100 mg qd    # Hx of HTN  - previously on anti HTN meds, last noted to be amlodipine 5 mg and losartan 25 mg, no longer on her list. Will hold losartan in setting of MILLICENT and amlodipine in s/o dizziness, possible orthostasis    GI Prophylaxis with famotidine 20 mg qhs  DVT Prophylaxis : will place on heparin for now. Pt is on eliquis on home med list,but unclear why. Daughter says she does not know about a hx of a fib or clots.  Updated daughter Cris   Code: DNR/DNI confirmed, MOLST in chart.

## 2023-06-03 LAB
ANION GAP SERPL CALC-SCNC: 11 MMOL/L — SIGNIFICANT CHANGE UP (ref 5–17)
BASOPHILS # BLD AUTO: 0.04 K/UL — SIGNIFICANT CHANGE UP (ref 0–0.2)
BASOPHILS NFR BLD AUTO: 0.5 % — SIGNIFICANT CHANGE UP (ref 0–2)
BUN SERPL-MCNC: 16 MG/DL — SIGNIFICANT CHANGE UP (ref 7–23)
CALCIUM SERPL-MCNC: 9 MG/DL — SIGNIFICANT CHANGE UP (ref 8.4–10.5)
CHLORIDE SERPL-SCNC: 110 MMOL/L — HIGH (ref 96–108)
CO2 SERPL-SCNC: 21 MMOL/L — LOW (ref 22–31)
CREAT SERPL-MCNC: 1.23 MG/DL — SIGNIFICANT CHANGE UP (ref 0.5–1.3)
EGFR: 43 ML/MIN/1.73M2 — LOW
EOSINOPHIL # BLD AUTO: 0.45 K/UL — SIGNIFICANT CHANGE UP (ref 0–0.5)
EOSINOPHIL NFR BLD AUTO: 5.8 % — SIGNIFICANT CHANGE UP (ref 0–6)
GLUCOSE SERPL-MCNC: 83 MG/DL — SIGNIFICANT CHANGE UP (ref 70–99)
HCT VFR BLD CALC: 39.2 % — SIGNIFICANT CHANGE UP (ref 34.5–45)
HGB BLD-MCNC: 11.8 G/DL — SIGNIFICANT CHANGE UP (ref 11.5–15.5)
IMM GRANULOCYTES NFR BLD AUTO: 0.3 % — SIGNIFICANT CHANGE UP (ref 0–0.9)
LYMPHOCYTES # BLD AUTO: 1.36 K/UL — SIGNIFICANT CHANGE UP (ref 1–3.3)
LYMPHOCYTES # BLD AUTO: 17.6 % — SIGNIFICANT CHANGE UP (ref 13–44)
MCHC RBC-ENTMCNC: 26.8 PG — LOW (ref 27–34)
MCHC RBC-ENTMCNC: 30.1 GM/DL — LOW (ref 32–36)
MCV RBC AUTO: 88.9 FL — SIGNIFICANT CHANGE UP (ref 80–100)
MONOCYTES # BLD AUTO: 0.75 K/UL — SIGNIFICANT CHANGE UP (ref 0–0.9)
MONOCYTES NFR BLD AUTO: 9.7 % — SIGNIFICANT CHANGE UP (ref 2–14)
NEUTROPHILS # BLD AUTO: 5.09 K/UL — SIGNIFICANT CHANGE UP (ref 1.8–7.4)
NEUTROPHILS NFR BLD AUTO: 66.1 % — SIGNIFICANT CHANGE UP (ref 43–77)
NRBC # BLD: 0 /100 WBCS — SIGNIFICANT CHANGE UP (ref 0–0)
PLATELET # BLD AUTO: 321 K/UL — SIGNIFICANT CHANGE UP (ref 150–400)
POTASSIUM SERPL-MCNC: 4.4 MMOL/L — SIGNIFICANT CHANGE UP (ref 3.5–5.3)
POTASSIUM SERPL-SCNC: 4.4 MMOL/L — SIGNIFICANT CHANGE UP (ref 3.5–5.3)
RAPID RVP RESULT: SIGNIFICANT CHANGE UP
RBC # BLD: 4.41 M/UL — SIGNIFICANT CHANGE UP (ref 3.8–5.2)
RBC # FLD: 14.6 % — HIGH (ref 10.3–14.5)
SARS-COV-2 RNA SPEC QL NAA+PROBE: SIGNIFICANT CHANGE UP
SODIUM SERPL-SCNC: 142 MMOL/L — SIGNIFICANT CHANGE UP (ref 135–145)
WBC # BLD: 7.71 K/UL — SIGNIFICANT CHANGE UP (ref 3.8–10.5)
WBC # FLD AUTO: 7.71 K/UL — SIGNIFICANT CHANGE UP (ref 3.8–10.5)

## 2023-06-03 PROCEDURE — 99233 SBSQ HOSP IP/OBS HIGH 50: CPT

## 2023-06-03 RX ADMIN — ATORVASTATIN CALCIUM 10 MILLIGRAM(S): 80 TABLET, FILM COATED ORAL at 21:13

## 2023-06-03 RX ADMIN — QUETIAPINE FUMARATE 25 MILLIGRAM(S): 200 TABLET, FILM COATED ORAL at 05:58

## 2023-06-03 RX ADMIN — Medication 7.5 MILLIGRAM(S): at 05:59

## 2023-06-03 RX ADMIN — SERTRALINE 100 MILLIGRAM(S): 25 TABLET, FILM COATED ORAL at 11:03

## 2023-06-03 RX ADMIN — QUETIAPINE FUMARATE 25 MILLIGRAM(S): 200 TABLET, FILM COATED ORAL at 17:32

## 2023-06-03 RX ADMIN — HEPARIN SODIUM 5000 UNIT(S): 5000 INJECTION INTRAVENOUS; SUBCUTANEOUS at 06:00

## 2023-06-03 RX ADMIN — CEFTRIAXONE 100 MILLIGRAM(S): 500 INJECTION, POWDER, FOR SOLUTION INTRAMUSCULAR; INTRAVENOUS at 17:33

## 2023-06-03 RX ADMIN — Medication 7.5 MILLIGRAM(S): at 17:32

## 2023-06-03 RX ADMIN — HEPARIN SODIUM 5000 UNIT(S): 5000 INJECTION INTRAVENOUS; SUBCUTANEOUS at 21:12

## 2023-06-03 RX ADMIN — Medication 1 TABLET(S): at 07:03

## 2023-06-03 RX ADMIN — Medication 1 TABLET(S): at 17:31

## 2023-06-03 RX ADMIN — HEPARIN SODIUM 5000 UNIT(S): 5000 INJECTION INTRAVENOUS; SUBCUTANEOUS at 11:03

## 2023-06-03 RX ADMIN — FAMOTIDINE 20 MILLIGRAM(S): 10 INJECTION INTRAVENOUS at 11:04

## 2023-06-03 NOTE — PROGRESS NOTE ADULT - SUBJECTIVE AND OBJECTIVE BOX
Patient is a 87y old  Female who presents with a chief complaint of dizziness and weakness.. (2023 16:38)      Patient seen and examined at bedside. No overnight events reported.     ALLERGIES:  No Known Allergies    MEDICATIONS  (STANDING):  atorvastatin 10 milliGRAM(s) Oral at bedtime  busPIRone 7.5 milliGRAM(s) Oral two times a day  cefTRIAXone   IVPB 1000 milliGRAM(s) IV Intermittent every 24 hours  cefTRIAXone   IVPB      famotidine    Tablet 20 milliGRAM(s) Oral daily  heparin   Injectable 5000 Unit(s) SubCutaneous every 8 hours  lactobacillus acidophilus 1 Tablet(s) Oral two times a day with meals  QUEtiapine 25 milliGRAM(s) Oral two times a day  sertraline 100 milliGRAM(s) Oral daily    MEDICATIONS  (PRN):  acetaminophen     Tablet .. 650 milliGRAM(s) Oral every 6 hours PRN Temp greater or equal to 38C (100.4F), Mild Pain (1 - 3)  aluminum hydroxide/magnesium hydroxide/simethicone Suspension 30 milliLiter(s) Oral every 4 hours PRN Dyspepsia  melatonin 3 milliGRAM(s) Oral at bedtime PRN Insomnia    Vital Signs Last 24 Hrs  T(F): 97.9 (2023 05:19), Max: 97.9 (2023 05:19)  HR: 89 (2023 05:19) (61 - 89)  BP: 126/69 (2023 05:19) (126/69 - 151/67)  RR: 16 (2023 05:19) (16 - 20)  SpO2: 94% (2023 05:19) (94% - 100%)  I&O's Summary    2023 07:01  -  2023 07:00  --------------------------------------------------------  IN: 50 mL / OUT: 0 mL / NET: 50 mL      PHYSICAL EXAM:  General: NAD, A/O x 3  ENT: No gross hearing impairment, Moist mucous membranes, no thrush  Neck: Supple, No JVD  Lungs: Clear to auscultation bilaterally, good air entry, non-labored breathing  Cardio: RRR, S1/S2, No murmur  Abdomen: Soft, Nontender, Nondistended; Bowel sounds present  Extremities: No calf tenderness, No cyanosis, No pitting edema  Psych: Appropriate mood and affect    LABS:                        11.8   7.71  )-----------( 321      ( 2023 05:50 )             39.2     06-    142  |  110  |  16  ----------------------------<  83  4.4   |  21  |  1.23    Ca    9.0      2023 05:50    TPro  7.2  /  Alb  3.2  /  TBili  0.6  /  DBili  x   /  AST  46  /  ALT  38  /  AlkPhos  104  -              Procalcitonin, Serum: 0.08 ng/mL (23 @ 15:10)    CARDIAC MARKERS ( 2023 15:10 )  x     / 14.1 ng/L / x     / x     / x                            Urinalysis Basic - ( 2023 16:55 )    Color: Yellow / Appearance: Clear / S.009 / pH: x  Gluc: x / Ketone: Negative mg/dL  / Bili: Negative / Urobili: 0.2 mg/dL   Blood: x / Protein: Negative mg/dL / Nitrite: Positive   Leuk Esterase: Trace / RBC: 1 /HPF / WBC 1 /HPF   Sq Epi: x / Non Sq Epi: x / Bacteria: Too Numerous to count /HPF          RADIOLOGY & ADDITIONAL TESTS:    Care Discussed with Consultants/Other Providers:    Patient is a 87y old  Female who presents with a chief complaint of dizziness and weakness.. (2023 16:38)      Patient seen and examined at bedside. No overnight events reported.     ALLERGIES:  No Known Allergies    MEDICATIONS  (STANDING):  atorvastatin 10 milliGRAM(s) Oral at bedtime  busPIRone 7.5 milliGRAM(s) Oral two times a day  cefTRIAXone   IVPB 1000 milliGRAM(s) IV Intermittent every 24 hours  cefTRIAXone   IVPB      famotidine    Tablet 20 milliGRAM(s) Oral daily  heparin   Injectable 5000 Unit(s) SubCutaneous every 8 hours  lactobacillus acidophilus 1 Tablet(s) Oral two times a day with meals  QUEtiapine 25 milliGRAM(s) Oral two times a day  sertraline 100 milliGRAM(s) Oral daily    MEDICATIONS  (PRN):  acetaminophen     Tablet .. 650 milliGRAM(s) Oral every 6 hours PRN Temp greater or equal to 38C (100.4F), Mild Pain (1 - 3)  aluminum hydroxide/magnesium hydroxide/simethicone Suspension 30 milliLiter(s) Oral every 4 hours PRN Dyspepsia  melatonin 3 milliGRAM(s) Oral at bedtime PRN Insomnia    Vital Signs Last 24 Hrs  T(F): 97.9 (2023 05:19), Max: 97.9 (2023 05:19)  HR: 89 (2023 05:19) (61 - 89)  BP: 126/69 (2023 05:19) (126/69 - 151/67)  RR: 16 (2023 05:19) (16 - 20)  SpO2: 94% (2023 05:19) (94% - 100%)  I&O's Summary    2023 07:01  -  2023 07:00  --------------------------------------------------------  IN: 50 mL / OUT: 0 mL / NET: 50 mL      PHYSICAL EXAM:  General: NAD, A/O x 1/2  ENT: No gross hearing impairment, Moist mucous membranes, no thrush  Neck: Supple, No JVD  Lungs: Clear to auscultation bilaterally, good air entry, non-labored breathing  Cardio: RRR, S1/S2, No murmur  Abdomen: Soft, Nontender, Nondistended; Bowel sounds present  Extremities: No calf tenderness, No cyanosis, No pitting edema    LABS:                        11.8   7.71  )-----------( 321      ( 2023 05:50 )             39.2     06-    142  |  110  |  16  ----------------------------<  83  4.4   |  21  |  1.23    Ca    9.0      2023 05:50    TPro  7.2  /  Alb  3.2  /  TBili  0.6  /  DBili  x   /  AST  46  /  ALT  38  /  AlkPhos  104  -              Procalcitonin, Serum: 0.08 ng/mL (23 @ 15:10)    CARDIAC MARKERS ( 2023 15:10 )  x     / 14.1 ng/L / x     / x     / x                            Urinalysis Basic - ( 2023 16:55 )    Color: Yellow / Appearance: Clear / S.009 / pH: x  Gluc: x / Ketone: Negative mg/dL  / Bili: Negative / Urobili: 0.2 mg/dL   Blood: x / Protein: Negative mg/dL / Nitrite: Positive   Leuk Esterase: Trace / RBC: 1 /HPF / WBC 1 /HPF   Sq Epi: x / Non Sq Epi: x / Bacteria: Too Numerous to count /HPF          RADIOLOGY & ADDITIONAL TESTS:    Care Discussed with Consultants/Other Providers:

## 2023-06-03 NOTE — PHYSICAL THERAPY INITIAL EVALUATION ADULT - LEVEL OF CONSCIOUSNESS, REHAB EVAL
alert Consent (Ear)/Introductory Paragraph: The rationale for Mohs was explained to the patient and consent was obtained. The risks, benefits and alternatives to therapy were discussed in detail. Specifically, the risks of ear deformity, infection, scarring, bleeding, prolonged wound healing, incomplete removal, allergy to anesthesia, nerve injury and recurrence were addressed. Prior to the procedure, the treatment site was clearly identified and confirmed by the patient. All components of Universal Protocol/PAUSE Rule completed.

## 2023-06-03 NOTE — PHYSICAL THERAPY INITIAL EVALUATION ADULT - NSACTIVITYREC_GEN_A_PT
amb to bathroom with rw c assist x 1 Gabapentin Counseling: I discussed with the patient the risks of gabapentin including but not limited to dizziness, somnolence, fatigue and ataxia.

## 2023-06-03 NOTE — PROGRESS NOTE ADULT - ASSESSMENT
88 yo woman with history of advanced dementia, Hyperlipidemia, Hypertension, Renal stone s/p stent, and NPH sent from [facility] because of dec PO intake and lethargy. Pt c/o dizziness. Seen in ED 1 week prior for UTI. S/p 2.2 L NS in the ED    # MILLICENT on CKD st. III likely from poor PO intake  # Dizziness  # mild Leukocytosis  # Hx of recurrent UTIs  #UTI  -Patient started on IV CTX, UA positive, follow up urine cx  - dec PO intake recently  - f/u blood cultures x2,    - procalcitonin negative  - BUN/Cr improved appears near baseline this am  - 2.2 L NS given in the ED  - trend BMP  - f/u orthostatic VS  - pt is on cranberry, probiotics and D Mannose supplements as OP  - CT head without acute findings (see above)    #hyperlipidemia  - Continue home atorvastatin 10 mg qhs    # Dementia w/ mood disturbance.  # anxiety  - cont home quetiapine 25 mg BID, buspirone 7.5 mg BID and sertraline 100 mg qd    # Hx of HTN  - previously on anti HTN meds, last noted to be amlodipine 5 mg and losartan 25 mg, no longer on her list. Held losartan in setting of MILLICENT and amlodipine in s/o dizziness, possible orthostasis  -Monitor bp to see if need to restart bp meds    GI Prophylaxis with famotidine 20 mg qhs  DVT Prophylaxis : Continue heparin for now. Pt is on eliquis on home med list, but unclear why. Daughter says she does not know about a hx of a fib or clots.  Updated daughter Cris   Code: DNR/DNI confirmed, EMI in chart. 88 yo woman with history of advanced dementia, Hyperlipidemia, Hypertension, Renal stone s/p stent, and NPH sent from [facility] because of dec PO intake and lethargy. Pt c/o dizziness. Seen in ED 1 week prior for UTI. S/p 2.2 L NS in the ED    # MILLICENT on CKD st. III likely from poor PO intake  # Dizziness  # mild Leukocytosis-resolved  # Hx of recurrent UTIs  #UTI  -Patient started on IV CTX, UA positive, follow up urine cx  - dec PO intake recently  - f/u blood cultures x2,    - procalcitonin negative  - BUN/Cr improved appears near baseline this am  - 2.2 L NS given in the ED  - trend BMP  - f/u orthostatic VS  - pt is on cranberry, probiotics and D Mannose supplements as OP  - CT head without acute findings (see above)    #hyperlipidemia  - Continue home atorvastatin 10 mg qhs    # Dementia w/ mood disturbance.  # anxiety  - cont home quetiapine 25 mg BID, buspirone 7.5 mg BID and sertraline 100 mg qd    # Hx of HTN  - previously on anti HTN meds, last noted to be amlodipine 5 mg and losartan 25 mg, no longer on her list. Held losartan in setting of MILLICENT and amlodipine in s/o dizziness, possible orthostasis  -Monitor bp to see if need to restart bp meds    GI Prophylaxis with famotidine 20 mg qhs  DVT Prophylaxis : Continue heparin for now. Pt is on eliquis on home med list, but unclear why. Daughter says she does not know about a hx of a fib or clots.  Updated daughter Cris   Code: DNR/DNI confirmed, EMI in chart.

## 2023-06-03 NOTE — PHYSICAL THERAPY INITIAL EVALUATION ADULT - PERTINENT HX OF CURRENT PROBLEM, REHAB EVAL
86 yo woman with history of advanced dementia, Hyperlipidemia, Hypertension, Renal stone s/p stent, and NPH sent from [facility] because of dec PO intake and lethargy. Pt c/o dizziness. Seen in ED 1 week prior for UTI. S/p 2.2 L NS in the ED

## 2023-06-04 LAB
ANION GAP SERPL CALC-SCNC: 8 MMOL/L — SIGNIFICANT CHANGE UP (ref 5–17)
BUN SERPL-MCNC: 23 MG/DL — SIGNIFICANT CHANGE UP (ref 7–23)
CALCIUM SERPL-MCNC: 9 MG/DL — SIGNIFICANT CHANGE UP (ref 8.4–10.5)
CHLORIDE SERPL-SCNC: 109 MMOL/L — HIGH (ref 96–108)
CO2 SERPL-SCNC: 23 MMOL/L — SIGNIFICANT CHANGE UP (ref 22–31)
CREAT SERPL-MCNC: 1.6 MG/DL — HIGH (ref 0.5–1.3)
EGFR: 31 ML/MIN/1.73M2 — LOW
GLUCOSE SERPL-MCNC: 99 MG/DL — SIGNIFICANT CHANGE UP (ref 70–99)
HCT VFR BLD CALC: 36.9 % — SIGNIFICANT CHANGE UP (ref 34.5–45)
HGB BLD-MCNC: 11.4 G/DL — LOW (ref 11.5–15.5)
MCHC RBC-ENTMCNC: 27.2 PG — SIGNIFICANT CHANGE UP (ref 27–34)
MCHC RBC-ENTMCNC: 30.9 GM/DL — LOW (ref 32–36)
MCV RBC AUTO: 88.1 FL — SIGNIFICANT CHANGE UP (ref 80–100)
NRBC # BLD: 0 /100 WBCS — SIGNIFICANT CHANGE UP (ref 0–0)
PLATELET # BLD AUTO: 305 K/UL — SIGNIFICANT CHANGE UP (ref 150–400)
POTASSIUM SERPL-MCNC: 4.5 MMOL/L — SIGNIFICANT CHANGE UP (ref 3.5–5.3)
POTASSIUM SERPL-SCNC: 4.5 MMOL/L — SIGNIFICANT CHANGE UP (ref 3.5–5.3)
RBC # BLD: 4.19 M/UL — SIGNIFICANT CHANGE UP (ref 3.8–5.2)
RBC # FLD: 14.6 % — HIGH (ref 10.3–14.5)
SODIUM SERPL-SCNC: 140 MMOL/L — SIGNIFICANT CHANGE UP (ref 135–145)
WBC # BLD: 6.82 K/UL — SIGNIFICANT CHANGE UP (ref 3.8–10.5)
WBC # FLD AUTO: 6.82 K/UL — SIGNIFICANT CHANGE UP (ref 3.8–10.5)

## 2023-06-04 PROCEDURE — 99233 SBSQ HOSP IP/OBS HIGH 50: CPT

## 2023-06-04 RX ORDER — SODIUM CHLORIDE 9 MG/ML
1000 INJECTION INTRAMUSCULAR; INTRAVENOUS; SUBCUTANEOUS
Refills: 0 | Status: DISCONTINUED | OUTPATIENT
Start: 2023-06-04 | End: 2023-06-06

## 2023-06-04 RX ORDER — AMLODIPINE BESYLATE 2.5 MG/1
2.5 TABLET ORAL DAILY
Refills: 0 | Status: DISCONTINUED | OUTPATIENT
Start: 2023-06-04 | End: 2023-06-06

## 2023-06-04 RX ADMIN — Medication 1 TABLET(S): at 17:07

## 2023-06-04 RX ADMIN — Medication 7.5 MILLIGRAM(S): at 05:09

## 2023-06-04 RX ADMIN — AMLODIPINE BESYLATE 2.5 MILLIGRAM(S): 2.5 TABLET ORAL at 13:25

## 2023-06-04 RX ADMIN — Medication 7.5 MILLIGRAM(S): at 17:07

## 2023-06-04 RX ADMIN — QUETIAPINE FUMARATE 25 MILLIGRAM(S): 200 TABLET, FILM COATED ORAL at 05:10

## 2023-06-04 RX ADMIN — FAMOTIDINE 20 MILLIGRAM(S): 10 INJECTION INTRAVENOUS at 13:24

## 2023-06-04 RX ADMIN — SERTRALINE 100 MILLIGRAM(S): 25 TABLET, FILM COATED ORAL at 13:24

## 2023-06-04 RX ADMIN — HEPARIN SODIUM 5000 UNIT(S): 5000 INJECTION INTRAVENOUS; SUBCUTANEOUS at 22:18

## 2023-06-04 RX ADMIN — HEPARIN SODIUM 5000 UNIT(S): 5000 INJECTION INTRAVENOUS; SUBCUTANEOUS at 13:24

## 2023-06-04 RX ADMIN — ATORVASTATIN CALCIUM 10 MILLIGRAM(S): 80 TABLET, FILM COATED ORAL at 22:18

## 2023-06-04 RX ADMIN — CEFTRIAXONE 100 MILLIGRAM(S): 500 INJECTION, POWDER, FOR SOLUTION INTRAMUSCULAR; INTRAVENOUS at 17:14

## 2023-06-04 RX ADMIN — QUETIAPINE FUMARATE 25 MILLIGRAM(S): 200 TABLET, FILM COATED ORAL at 17:07

## 2023-06-04 RX ADMIN — HEPARIN SODIUM 5000 UNIT(S): 5000 INJECTION INTRAVENOUS; SUBCUTANEOUS at 05:09

## 2023-06-04 RX ADMIN — Medication 1 TABLET(S): at 09:25

## 2023-06-04 NOTE — PROGRESS NOTE ADULT - ASSESSMENT
88 yo woman with history of advanced dementia, Hyperlipidemia, Hypertension, Renal stone s/p stent, and NPH sent from [facility] because of dec PO intake and lethargy. Pt c/o dizziness. Seen in ED 1 week prior for UTI. S/p 2.2 L NS in the ED    # MILLICENT on CKD st. III likely from poor PO intake  # Dizziness  # mild Leukocytosis-resolved  # Hx of recurrent UTIs  #UTI  -Cont  IV CTX, UA positive, follow up urine cx  - dec PO intake recently  - BC neg  - Cr worse today. will restart slow IVF  - trend BMP  - f/u orthostatic VS  - pt is on cranberry, probiotics and D Mannose supplements as OP      #hyperlipidemia  - Continue home atorvastatin 10 mg qhs    # Dementia w/ mood disturbance.  # anxiety  - cont home quetiapine 25 mg BID, buspirone 7.5 mg BID and sertraline 100 mg qd    # Hx of HTN  - previously on anti HTN meds, last noted to be amlodipine 5 mg and losartan 25 mg, no longer on her list. Continue holding losartan in setting of MILLICENT. Will restart Amlodipine low dose. monitor for dizziness.   -Monitor bp to see if need to restart bp meds    GI Prophylaxis with famotidine 20 mg qhs  DVT Prophylaxis : Continue heparin for now. Pt is on eliquis on home med list, but unclear why. Previous conversation with daughter who says she does not know about a hx of a fib or clots.  Left VM for daughter Cris   Code: DNR/DNI confirmed, MOLST in chart.

## 2023-06-04 NOTE — DIETITIAN INITIAL EVALUATION ADULT - PERTINENT MEDS FT
MEDICATIONS  (STANDING):  amLODIPine   Tablet 2.5 milliGRAM(s) Oral daily  atorvastatin 10 milliGRAM(s) Oral at bedtime  busPIRone 7.5 milliGRAM(s) Oral two times a day  cefTRIAXone   IVPB 1000 milliGRAM(s) IV Intermittent every 24 hours  cefTRIAXone   IVPB      famotidine    Tablet 20 milliGRAM(s) Oral daily  heparin   Injectable 5000 Unit(s) SubCutaneous every 8 hours  lactobacillus acidophilus 1 Tablet(s) Oral two times a day with meals  QUEtiapine 25 milliGRAM(s) Oral two times a day  sertraline 100 milliGRAM(s) Oral daily  sodium chloride 0.9%. 1000 milliLiter(s) (75 mL/Hr) IV Continuous <Continuous>    MEDICATIONS  (PRN):  acetaminophen     Tablet .. 650 milliGRAM(s) Oral every 6 hours PRN Temp greater or equal to 38C (100.4F), Mild Pain (1 - 3)  aluminum hydroxide/magnesium hydroxide/simethicone Suspension 30 milliLiter(s) Oral every 4 hours PRN Dyspepsia  melatonin 3 milliGRAM(s) Oral at bedtime PRN Insomnia

## 2023-06-04 NOTE — DIETITIAN INITIAL EVALUATION ADULT - OTHER INFO
86 yo woman with history of advanced dementia, Hyperlipidemia, Hypertension, Renal stone s/p stent, and NPH sent from [facility] because of dec PO intake and lethargy. Pt c/o dizziness. Seen in ED 1 week prior for UTI. S/p 2.2 L NS in the ED.    Pt tolerating DASH/TLC diet, eating 50-75% of meals per nursing flow sheets. Pt reports she is eating better than she was PTA, noted with suboptimal PO intake PTA. Pt reports UBW is somewhere around 100 lbs; unknown if this is accurate. Current weight is 135 lbs. Skin noted with blanchable redness Per nursing flowsheets. Consider trial of Ensure Plus High Protein (provides 350 kcal, 20 g protein/serving) to meet estimated energy and protein needs. No known food allergies per pt. Denies nausea, vomiting, diarrhea, constipation.

## 2023-06-04 NOTE — PROGRESS NOTE ADULT - SUBJECTIVE AND OBJECTIVE BOX
Patient is a 87y old  Female who presents with a chief complaint of dizziness and weakness.      Patient seen and examined at bedside. Pt states they feel well, denies overnight events or current complaints including chest pain, shortness of breath, dizziness, nausea, vomiting, diarrhea, fever or chills.      ALLERGIES:  No Known Allergies    MEDICATIONS  (STANDING):  atorvastatin 10 milliGRAM(s) Oral at bedtime  busPIRone 7.5 milliGRAM(s) Oral two times a day  cefTRIAXone   IVPB      cefTRIAXone   IVPB 1000 milliGRAM(s) IV Intermittent every 24 hours  famotidine    Tablet 20 milliGRAM(s) Oral daily  heparin   Injectable 5000 Unit(s) SubCutaneous every 8 hours  lactobacillus acidophilus 1 Tablet(s) Oral two times a day with meals  QUEtiapine 25 milliGRAM(s) Oral two times a day  sertraline 100 milliGRAM(s) Oral daily    MEDICATIONS  (PRN):  acetaminophen     Tablet .. 650 milliGRAM(s) Oral every 6 hours PRN Temp greater or equal to 38C (100.4F), Mild Pain (1 - 3)  aluminum hydroxide/magnesium hydroxide/simethicone Suspension 30 milliLiter(s) Oral every 4 hours PRN Dyspepsia  melatonin 3 milliGRAM(s) Oral at bedtime PRN Insomnia    Vital Signs Last 24 Hrs  T(F): 98.3 (2023 05:14), Max: 98.3 (2023 05:14)  HR: 78 (2023 05:14) (69 - 78)  BP: 158/77 (2023 05:14) (155/68 - 158/77)  RR: 16 (2023 05:14) (16 - 17)  SpO2: 96% (2023 05:14) (95% - 97%)  I&O's Summary    2023 07:01  -  2023 07:00  --------------------------------------------------------  IN: 480 mL / OUT: 0 mL / NET: 480 mL      PHYSICAL EXAM:  General: NAD, A/O x 1  ENT: MMM, no oral thrush   Neck: Supple, No JVD  Lungs: Clear to auscultation bilaterally, non labored breathing  Cardio: RRR, S1/S2, No murmurs  Abdomen: Soft, Nontender, Nondistended; Bowel sounds present  Extremities: No calf tenderness, No pitting edema    LABS:                        11.4   6.82  )-----------( 305      ( 2023 05:00 )             36.9     06-04    140  |  109  |  23  ----------------------------<  99  4.5   |  23  |  1.60    Ca    9.0      2023 05:00    TPro  7.2  /  Alb  3.2  /  TBili  0.6  /  DBili  x   /  AST  46  /  ALT  38  /  AlkPhos  104  06-02          CARDIAC MARKERS ( 2023 15:10 )  x     / 14.1 ng/L / x     / x     / x                            Urinalysis Basic - ( 2023 16:55 )    Color: Yellow / Appearance: Clear / S.009 / pH: x  Gluc: x / Ketone: Negative mg/dL  / Bili: Negative / Urobili: 0.2 mg/dL   Blood: x / Protein: Negative mg/dL / Nitrite: Positive   Leuk Esterase: Trace / RBC: 1 /HPF / WBC 1 /HPF   Sq Epi: x / Non Sq Epi: x / Bacteria: Too Numerous to count /HPF        Culture - Blood (collected 2023 20:17)  Source: .Blood Blood-Peripheral  Preliminary Report (2023 23:02):    No growth to date.    Culture - Blood (collected 2023 20:12)  Source: .Blood Blood-Peripheral  Preliminary Report (2023 23:02):    No growth to date.          RADIOLOGY & ADDITIONAL TESTS:  < from: Xray Chest 1 View AP/PA (23 @ 15:22) >    IMPRESSION: Increasing linear retrocardiac atelectasis.    --- End of Report ---            JAVIER IZQUIERDO MD; Attending Radiologist  This document has been electronically signed. 2023  3:25PM    < end of copied text >      Care Discussed with Consultants/Other Providers:

## 2023-06-04 NOTE — DIETITIAN INITIAL EVALUATION ADULT - PERTINENT LABORATORY DATA
06-04    140  |  109<H>  |  23  ----------------------------<  99  4.5   |  23  |  1.60<H>    Ca    9.0      04 Jun 2023 05:00    TPro  7.2  /  Alb  3.2<L>  /  TBili  0.6  /  DBili  x   /  AST  46<H>  /  ALT  38  /  AlkPhos  104  06-02

## 2023-06-04 NOTE — DIETITIAN INITIAL EVALUATION ADULT - ORAL INTAKE PTA/DIET HISTORY
Pt with hx of advanced dementia. Pt was able to answer questions but question reliability. Pt reports she normally has a good appetite was not eating well PTA. Pt unable to recall specifics of diet PTA.

## 2023-06-05 PROCEDURE — 99232 SBSQ HOSP IP/OBS MODERATE 35: CPT

## 2023-06-05 RX ADMIN — CEFTRIAXONE 100 MILLIGRAM(S): 500 INJECTION, POWDER, FOR SOLUTION INTRAMUSCULAR; INTRAVENOUS at 17:50

## 2023-06-05 RX ADMIN — Medication 1 TABLET(S): at 17:44

## 2023-06-05 RX ADMIN — HEPARIN SODIUM 5000 UNIT(S): 5000 INJECTION INTRAVENOUS; SUBCUTANEOUS at 06:23

## 2023-06-05 RX ADMIN — HEPARIN SODIUM 5000 UNIT(S): 5000 INJECTION INTRAVENOUS; SUBCUTANEOUS at 21:54

## 2023-06-05 RX ADMIN — Medication 1 TABLET(S): at 08:10

## 2023-06-05 RX ADMIN — AMLODIPINE BESYLATE 2.5 MILLIGRAM(S): 2.5 TABLET ORAL at 06:28

## 2023-06-05 RX ADMIN — FAMOTIDINE 20 MILLIGRAM(S): 10 INJECTION INTRAVENOUS at 12:58

## 2023-06-05 RX ADMIN — Medication 7.5 MILLIGRAM(S): at 06:23

## 2023-06-05 RX ADMIN — ATORVASTATIN CALCIUM 10 MILLIGRAM(S): 80 TABLET, FILM COATED ORAL at 21:54

## 2023-06-05 RX ADMIN — HEPARIN SODIUM 5000 UNIT(S): 5000 INJECTION INTRAVENOUS; SUBCUTANEOUS at 17:44

## 2023-06-05 RX ADMIN — SERTRALINE 100 MILLIGRAM(S): 25 TABLET, FILM COATED ORAL at 12:58

## 2023-06-05 RX ADMIN — QUETIAPINE FUMARATE 25 MILLIGRAM(S): 200 TABLET, FILM COATED ORAL at 06:23

## 2023-06-05 RX ADMIN — Medication 7.5 MILLIGRAM(S): at 17:45

## 2023-06-05 RX ADMIN — QUETIAPINE FUMARATE 25 MILLIGRAM(S): 200 TABLET, FILM COATED ORAL at 17:45

## 2023-06-05 NOTE — PROGRESS NOTE ADULT - ASSESSMENT
88 yo woman with history of advanced dementia, Hyperlipidemia, Hypertension, Renal stone s/p stent, and NPH sent from [facility] because of dec PO intake and lethargy. Pt c/o dizziness. Seen in ED 1 week prior for UTI. S/p 2.2 L NS in the ED    Recurrent UTI  Urine culture grew E Coli, follow sensitivities  Continue rocephin day 3    MILLICENT on CKD st III  likely pre renal due to dehydration  continue gentle IVF, avoid nephrotoxins  monitor renal indices    Advanced Dementia with mood disturbances  Anxiety  chronic condition  supportive care  continue home seroquel, buspar and zoloft    HLD  chronic condition  continue home atorvastatin 10 qhs    HTN   previously on anti HTN meds, last noted to be amlodipine 5 mg and losartan 25 mg, no longer on her list. Continue holding losartan in setting of MILLICENT. Will restart Amlodipine low dose. monitor for dizziness.   -Monitor bp to see if need to restart bp meds    GI Prophylaxis with famotidine 20 mg qhs  DVT Prophylaxis : Continue heparin for now. Pt is on eliquis on home med list, but unclear why. Previous conversation with daughter who says she does not know about a hx of a fib or clots.  Code: DNR/DNI confirmed, EIM in chart.    called and left a  for daughter donny martinez [phone]

## 2023-06-06 ENCOUNTER — TRANSCRIPTION ENCOUNTER (OUTPATIENT)
Age: 88
End: 2023-06-06

## 2023-06-06 VITALS
HEART RATE: 79 BPM | OXYGEN SATURATION: 96 % | DIASTOLIC BLOOD PRESSURE: 82 MMHG | SYSTOLIC BLOOD PRESSURE: 138 MMHG | RESPIRATION RATE: 18 BRPM | TEMPERATURE: 99 F

## 2023-06-06 LAB
ANION GAP SERPL CALC-SCNC: 8 MMOL/L — SIGNIFICANT CHANGE UP (ref 5–17)
BUN SERPL-MCNC: 22 MG/DL — SIGNIFICANT CHANGE UP (ref 7–23)
CALCIUM SERPL-MCNC: 9.4 MG/DL — SIGNIFICANT CHANGE UP (ref 8.4–10.5)
CHLORIDE SERPL-SCNC: 108 MMOL/L — SIGNIFICANT CHANGE UP (ref 96–108)
CO2 SERPL-SCNC: 24 MMOL/L — SIGNIFICANT CHANGE UP (ref 22–31)
CREAT SERPL-MCNC: 1 MG/DL — SIGNIFICANT CHANGE UP (ref 0.5–1.3)
EGFR: 55 ML/MIN/1.73M2 — LOW
GLUCOSE SERPL-MCNC: 93 MG/DL — SIGNIFICANT CHANGE UP (ref 70–99)
HCT VFR BLD CALC: 38 % — SIGNIFICANT CHANGE UP (ref 34.5–45)
HGB BLD-MCNC: 11.9 G/DL — SIGNIFICANT CHANGE UP (ref 11.5–15.5)
MCHC RBC-ENTMCNC: 27.5 PG — SIGNIFICANT CHANGE UP (ref 27–34)
MCHC RBC-ENTMCNC: 31.3 GM/DL — LOW (ref 32–36)
MCV RBC AUTO: 87.8 FL — SIGNIFICANT CHANGE UP (ref 80–100)
NRBC # BLD: 0 /100 WBCS — SIGNIFICANT CHANGE UP (ref 0–0)
PLATELET # BLD AUTO: 322 K/UL — SIGNIFICANT CHANGE UP (ref 150–400)
POTASSIUM SERPL-MCNC: 3.9 MMOL/L — SIGNIFICANT CHANGE UP (ref 3.5–5.3)
POTASSIUM SERPL-SCNC: 3.9 MMOL/L — SIGNIFICANT CHANGE UP (ref 3.5–5.3)
RBC # BLD: 4.33 M/UL — SIGNIFICANT CHANGE UP (ref 3.8–5.2)
RBC # FLD: 14.1 % — SIGNIFICANT CHANGE UP (ref 10.3–14.5)
SODIUM SERPL-SCNC: 140 MMOL/L — SIGNIFICANT CHANGE UP (ref 135–145)
WBC # BLD: 7.95 K/UL — SIGNIFICANT CHANGE UP (ref 3.8–10.5)
WBC # FLD AUTO: 7.95 K/UL — SIGNIFICANT CHANGE UP (ref 3.8–10.5)

## 2023-06-06 PROCEDURE — 0225U NFCT DS DNA&RNA 21 SARSCOV2: CPT

## 2023-06-06 PROCEDURE — 85025 COMPLETE CBC W/AUTO DIFF WBC: CPT

## 2023-06-06 PROCEDURE — 97162 PT EVAL MOD COMPLEX 30 MIN: CPT

## 2023-06-06 PROCEDURE — 96360 HYDRATION IV INFUSION INIT: CPT

## 2023-06-06 PROCEDURE — 97116 GAIT TRAINING THERAPY: CPT

## 2023-06-06 PROCEDURE — 87186 SC STD MICRODIL/AGAR DIL: CPT

## 2023-06-06 PROCEDURE — 87040 BLOOD CULTURE FOR BACTERIA: CPT

## 2023-06-06 PROCEDURE — 83880 ASSAY OF NATRIURETIC PEPTIDE: CPT

## 2023-06-06 PROCEDURE — 93005 ELECTROCARDIOGRAM TRACING: CPT

## 2023-06-06 PROCEDURE — 36415 COLL VENOUS BLD VENIPUNCTURE: CPT

## 2023-06-06 PROCEDURE — 80053 COMPREHEN METABOLIC PANEL: CPT

## 2023-06-06 PROCEDURE — 99285 EMERGENCY DEPT VISIT HI MDM: CPT

## 2023-06-06 PROCEDURE — 85027 COMPLETE CBC AUTOMATED: CPT

## 2023-06-06 PROCEDURE — 84145 PROCALCITONIN (PCT): CPT

## 2023-06-06 PROCEDURE — 80048 BASIC METABOLIC PNL TOTAL CA: CPT

## 2023-06-06 PROCEDURE — 81001 URINALYSIS AUTO W/SCOPE: CPT

## 2023-06-06 PROCEDURE — 87086 URINE CULTURE/COLONY COUNT: CPT

## 2023-06-06 PROCEDURE — 84484 ASSAY OF TROPONIN QUANT: CPT

## 2023-06-06 PROCEDURE — 71045 X-RAY EXAM CHEST 1 VIEW: CPT

## 2023-06-06 PROCEDURE — 99239 HOSP IP/OBS DSCHRG MGMT >30: CPT

## 2023-06-06 RX ORDER — AMLODIPINE BESYLATE 2.5 MG/1
1 TABLET ORAL
Qty: 30 | Refills: 0
Start: 2023-06-06 | End: 2023-07-05

## 2023-06-06 RX ADMIN — QUETIAPINE FUMARATE 25 MILLIGRAM(S): 200 TABLET, FILM COATED ORAL at 05:56

## 2023-06-06 RX ADMIN — SERTRALINE 100 MILLIGRAM(S): 25 TABLET, FILM COATED ORAL at 12:07

## 2023-06-06 RX ADMIN — HEPARIN SODIUM 5000 UNIT(S): 5000 INJECTION INTRAVENOUS; SUBCUTANEOUS at 05:56

## 2023-06-06 RX ADMIN — Medication 7.5 MILLIGRAM(S): at 05:56

## 2023-06-06 RX ADMIN — AMLODIPINE BESYLATE 2.5 MILLIGRAM(S): 2.5 TABLET ORAL at 05:56

## 2023-06-06 RX ADMIN — FAMOTIDINE 20 MILLIGRAM(S): 10 INJECTION INTRAVENOUS at 12:07

## 2023-06-06 RX ADMIN — Medication 1 TABLET(S): at 07:48

## 2023-06-06 RX ADMIN — HEPARIN SODIUM 5000 UNIT(S): 5000 INJECTION INTRAVENOUS; SUBCUTANEOUS at 13:15

## 2023-06-06 NOTE — DISCHARGE NOTE PROVIDER - CARE PROVIDER_API CALL
Cesar Barclay.  Internal Medicine  [address] [id number]  Phone: [phone]  Fax: [phone]  Follow Up Time:

## 2023-06-06 NOTE — DISCHARGE NOTE PROVIDER - HOSPITAL COURSE
Hospital Course  88 yo F w/ advanced dementia, HLD HTN, renal stone s/p stent, and NPH comes from [facility] because she was "not eating and very tired." The patient is unable to give a history due to dementia. I called daughter for collateral info. Daughter says the pt c/o dizziness and "looked pale" to staff members before being sent to hospital. Pt tells me also she was dizzy this AM.    Of note, the patient was in the ED on 5/25/2023 with a UTI and given bactrim. UCx contaminated,  Daughter says mom gets frequent UTIs and thinks that the patient was not compliant with the abx.    In the ED, provider noted she looked markedly dehydrated.    ED course:  patient given 2200cc IVF.    Patient admitted to [facility] on 6/2/23 with decreased oral intake, lethargy, found with recurrent UTI also Pre Renal MILLICENT.  Managed with course of IV rocephin, urine culture grew E Coli, sensitivities reviewed.  Kidney function improved with IVF.  Physical therapy evaluated the patient, recommended return to [facility] with physical therapy.  Medically cleared.     Source of Infection:  Antibiotic / Last Day:    Palliative Care / Advanced Care Planning  Code Status: Full Code  Patient/Family agreeable to Hospice/Palliative (Y/N)?  Summary of Goals of Care Conversation:    Discharging Provider:  Steve Wilson NP  Contact Info: Cell [phone] - Please call with any questions or concerns.    Outpatient Provider: Dr. Barclay - aware           Hospital Course  88 yo F w/ advanced dementia, HLD HTN, renal stone s/p stent, and NPH comes from [facility] because she was "not eating and very tired." The patient is unable to give a history due to dementia. I called daughter for collateral info. Daughter says the pt c/o dizziness and "looked pale" to staff members before being sent to hospital. Pt tells me also she was dizzy this AM.    Of note, the patient was in the ED on 5/25/2023 with a UTI and given bactrim. UCx contaminated,  Daughter says mom gets frequent UTIs and thinks that the patient was not compliant with the abx.    In the ED, provider noted she looked markedly dehydrated.    ED course:  patient given 2200cc IVF.    Patient admitted to [facility] on 6/2/23 with decreased oral intake, lethargy, found with recurrent UTI also Pre Renal MILLICENT.  Managed with course of IV rocephin, urine culture grew E Coli, sensitivities reviewed.  Kidney function improved with IVF.  Physical therapy evaluated the patient, recommended return to [facility] with physical therapy.  Medically cleared.     Source of Infection:  Antibiotic / Last Day: completed rocephin    Palliative Care / Advanced Care Planning  Code Status: Full Code  Patient/Family agreeable to Hospice/Palliative (Y/N)?  Summary of Goals of Care Conversation:    Discharging Provider:  Steve Wilson NP  Contact Info: Cell [phone] - Please call with any questions or concerns.    Outpatient Provider: Dr. Barclay - aware           Hospital Course  86 yo F w/ advanced dementia, HLD HTN, renal stone s/p stent, and NPH comes from [facility] because she was "not eating and very tired." The patient is unable to give a history due to dementia. I called daughter for collateral info. Daughter says the pt c/o dizziness and "looked pale" to staff members before being sent to hospital. Pt tells me also she was dizzy this AM.    Of note, the patient was in the ED on 5/25/2023 with a UTI and given bactrim. UCx contaminated,  Daughter says mom gets frequent UTIs and thinks that the patient was not compliant with the abx.    In the ED, provider noted she looked markedly dehydrated.    ED course:  patient given 2200cc IVF.    Patient admitted to [facility] on 6/2/23 with decreased oral intake, lethargy, found with recurrent UTI also Pre Renal MILLICENT.  Managed with course of IV rocephin, urine culture grew E Coli, sensitivities reviewed.  Kidney function improved with IVF.  Physical therapy evaluated the patient, recommended return to [facility] with physical therapy.  Medically cleared.     Source of Infection:  Antibiotic / Last Day: completed rocephin    Palliative Care / Advanced Care Planning  Code Status: Full Code    Discharging Provider:  Steve Wilson NP  Contact Info: Cell [phone] - Please call with any questions or concerns.    Outpatient Provider: Dr. Barclay - aware    Time Spent: 45 minutes

## 2023-06-06 NOTE — DISCHARGE NOTE NURSING/CASE MANAGEMENT/SOCIAL WORK - NSDCPEFALRISK_GEN_ALL_CORE
For information on Fall & Injury Prevention, visit: [email or web address] OR  [email or web address] OR  https://www.cdc.gov/steadi/patient.html

## 2023-06-06 NOTE — PROGRESS NOTE ADULT - NS ATTEND AMEND GEN_ALL_CORE FT
86 y/o F with advanced dementia, HLD, HTN, Renal stone s/p stent, and NPH sent from [facility], recent UTI admitted for MILLICENT, dehydration, possible recurrent UTI. follow urine cx, cont cftx . blood cx NGTD. BMP in AM. dispo planning
86 y/o F with advanced dementia, HLD, HTN, Renal stone s/p stent, and NPH sent from [facility], recent UTI, admitted for ecoli UTI, completed cftx. blood cx NGTD. dispo planning.
88 y/o F with advanced dementia, HLD, HTN, Renal stone s/p stent, and NPH sent from [facility], recent UTI admitted for MILLICENT, dehydration, possible recurrent UTI. follow urine cx, cont cftx day 2/3. blood cx NGTD. MILLICENT noted - cont gentle IVF, follow bmp in am, encourage PO hydration. agree with norvasc 2.5mg qd.
88 y/o F with advanced dementia, HLD, HTN, Renal stone s/p stent, and NPH sent from [facility], recent UTI admitted for MILLICENT, dehydration, possible recurrent UTI. follow urine cx, cont cftx, IVF

## 2023-06-06 NOTE — DISCHARGE NOTE NURSING/CASE MANAGEMENT/SOCIAL WORK - PATIENT PORTAL LINK FT
You can access the FollowMyHealth Patient Portal offered by [facility] by registering at the following website: [email or web address]. By joining [facility]’s FollowMyHealth portal, you will also be able to view your health information using other applications (apps) compatible with our system.

## 2023-06-06 NOTE — PROGRESS NOTE ADULT - SUBJECTIVE AND OBJECTIVE BOX
Patient is a 87y old  Female who presents with a chief complaint of dizziness and weakness.. (05 Jun 2023 08:50)    Patient seen and examined at bedside.  no acute overnight events    ALLERGIES:  No Known Allergies        Vital Signs Last 24 Hrs  T(F): 98.3 (06 Jun 2023 05:47), Max: 98.3 (06 Jun 2023 05:47)  HR: 71 (06 Jun 2023 05:47) (71 - 82)  BP: 154/71 (06 Jun 2023 05:47) (127/61 - 154/71)  RR: 16 (06 Jun 2023 05:47) (16 - 17)  SpO2: 93% (06 Jun 2023 05:47) (93% - 95%)  I&O's Summary    05 Jun 2023 07:01  -  06 Jun 2023 07:00  --------------------------------------------------------  IN: 860 mL / OUT: 0 mL / NET: 860 mL      MEDICATIONS:  acetaminophen     Tablet .. 650 milliGRAM(s) Oral every 6 hours PRN  aluminum hydroxide/magnesium hydroxide/simethicone Suspension 30 milliLiter(s) Oral every 4 hours PRN  amLODIPine   Tablet 2.5 milliGRAM(s) Oral daily  atorvastatin 10 milliGRAM(s) Oral at bedtime  busPIRone 7.5 milliGRAM(s) Oral two times a day  famotidine    Tablet 20 milliGRAM(s) Oral daily  heparin   Injectable 5000 Unit(s) SubCutaneous every 8 hours  lactobacillus acidophilus 1 Tablet(s) Oral two times a day with meals  melatonin 3 milliGRAM(s) Oral at bedtime PRN  QUEtiapine 25 milliGRAM(s) Oral two times a day  sertraline 100 milliGRAM(s) Oral daily  sodium chloride 0.9%. 1000 milliLiter(s) IV Continuous <Continuous>      PHYSICAL EXAM:  General: NAD, A/O x 1  ENT: MMM, no oral thrush  Neck: Supple, No JVD  Lungs: Clear to auscultation bilaterally, non labored, good air entry  Cardio: RRR, S1/S2, No murmurs  Abdomen: Soft, Nontender, Nondistended; Bowel sounds present  Extremities: No cyanosis, No edema    LABS:                        11.9   7.95  )-----------( 322      ( 06 Jun 2023 06:39 )             38.0     06-06    140  |  108  |  22  ----------------------------<  93  3.9   |  24  |  1.00    Ca    9.4      06 Jun 2023 06:39                                      Culture - Blood (collected 02 Jun 2023 20:17)  Source: .Blood Blood-Peripheral  Preliminary Report (03 Jun 2023 23:02):    No growth to date.    Culture - Blood (collected 02 Jun 2023 20:12)  Source: .Blood Blood-Peripheral  Preliminary Report (03 Jun 2023 23:02):    No growth to date.    Culture - Urine (collected 02 Jun 2023 16:55)  Source: Catheterized Catheterized  Final Report (05 Jun 2023 19:39):    >100,000 CFU/ml Escherichia coli  Organism: Escherichia coli (05 Jun 2023 19:39)  Organism: Escherichia coli (05 Jun 2023 19:39)      Method Type: RAKESH      -  Amikacin: S <=16      -  Amoxicillin/Clavulanic Acid: S <=8/4 Consider reserving for cystitis when ampicillin/sulbactam is resistant      -  Ampicillin: R >16 These ampicillin results predict results for amoxicillin      -  Ampicillin/Sulbactam: R >16/8 Enterobacter, Klebsiella aerogenes, Citrobacter, and Serratia may develop resistance during prolonged therapy (3-4 days)      -  Aztreonam: S <=4      -  Cefazolin: S <=2 For uncomplicated UTI with K. pneumoniae, E. coli, or P. mirablis: RAKESH <=16 is sensitive and RAKESH >=32 is resistant. This also predicts results for oral agents cefaclor, cefdinir, cefpodoxime, cefprozil, cefuroxime axetil, cephalexin and locarbef for uncomplicated UTI. Note that some isolates may be susceptible to these agents while testing resistant to cefazolin.      -  Cefepime: S <=2      -  Cefoxitin: S <=8      -  Ceftriaxone: S <=1 Enterobacter, Klebsiella aerogenes, Citrobacter, and Serratia may develop resistance during prolonged therapy      -  Cefuroxime: S <=4      -  Ciprofloxacin: S <=0.25      -  Ertapenem: S <=0.5      -  Gentamicin: S <=2      -  Imipenem: S <=1      -  Levofloxacin: S <=0.5      -  Meropenem: S <=1      -  Nitrofurantoin: S <=32 Should not be used to treat pyelonephritis      -  Piperacillin/Tazobactam: S <=8      -  Tobramycin: S <=2      -  Trimethoprim/Sulfamethoxazole: R >2/38          RADIOLOGY & ADDITIONAL TESTS:    Care Discussed with Consultants/Other Providers:    stable

## 2023-06-06 NOTE — DISCHARGE NOTE PROVIDER - NSDCMRMEDTOKEN_GEN_ALL_CORE_FT
Acidophilus with Pectin oral capsule: 1 cap(s) orally 2 times a day  atorvastatin 10 mg oral tablet: 1 tab(s) orally once a day (at bedtime)  busPIRone 7.5 mg oral tablet: 1 tab(s) orally 2 times a day  famotidine 20 mg oral tablet: 1 tab(s) orally once a day (at bedtime)   melatonin 3 mg oral tablet: 1 tab(s) orally once a day (at bedtime), As needed, Insomnia  Norvasc 2.5 mg oral tablet: 1 tab(s) orally once a day  QUEtiapine 25 mg oral tablet: 1 tab(s) orally 2 times a day  sertraline 100 mg oral tablet: 1 tab(s) orally once a day

## 2023-06-06 NOTE — DISCHARGE NOTE PROVIDER - NSDCCPCAREPLAN_GEN_ALL_CORE_FT
PRINCIPAL DISCHARGE DIAGNOSIS  Diagnosis: Acute UTI  Assessment and Plan of Treatment: you completed three days of IV antibiotics      SECONDARY DISCHARGE DIAGNOSES  Diagnosis: Acute renal insufficiency  Assessment and Plan of Treatment: your kidney function improved with IVF, please remember to try and stay hydrated

## 2023-06-06 NOTE — PROGRESS NOTE ADULT - ASSESSMENT
86 yo woman with history of advanced dementia, Hyperlipidemia, Hypertension, Renal stone s/p stent, and NPH sent from [facility] because of dec PO intake and lethargy. Pt c/o dizziness. Seen in ED 1 week prior for UTI. S/p 2.2 L NS in the ED    Recurrent UTI  completed rocephin  urine culture grew E Coli, sensitivities reviewed  PT recommends home with PT    MILLICENT on CKD st III  likely pre renal due to dehydration  back to baseline after IVF  monitor renal indices    Advanced Dementia with mood disturbances  Anxiety  chronic condition  supportive care  continue home seroquel, buspar and zoloft    HLD  chronic condition  continue home atorvastatin 10 qhs    HTN   previously on anti HTN meds, last noted to be amlodipine 5 mg and losartan 25 mg, no longer on her list. Continue holding losartan in setting of MILLICENT. Will restart Amlodipine low dose. monitor for dizziness.   -Monitor bp to see if need to restart bp meds    GI Prophylaxis with famotidine 20 mg qhs  DVT Prophylaxis : Continue heparin for now. Pt is on eliquis on home med list, but unclear why. Previous conversation with daughter who says she does not know about a hx of a fib or clots.  Code: DNR/DNI confirmed, EMI in chart.    called and left a  for daughter donny martinez [phone]

## 2023-07-16 ENCOUNTER — INPATIENT (INPATIENT)
Facility: HOSPITAL | Age: 88
LOS: 4 days | Discharge: HOME | DRG: 689 | End: 2023-07-21
Attending: STUDENT IN AN ORGANIZED HEALTH CARE EDUCATION/TRAINING PROGRAM | Admitting: STUDENT IN AN ORGANIZED HEALTH CARE EDUCATION/TRAINING PROGRAM
Payer: MEDICARE

## 2023-07-16 VITALS
SYSTOLIC BLOOD PRESSURE: 131 MMHG | TEMPERATURE: 99 F | HEART RATE: 77 BPM | DIASTOLIC BLOOD PRESSURE: 56 MMHG | OXYGEN SATURATION: 96 % | HEIGHT: 60 IN | WEIGHT: 100.09 LBS | RESPIRATION RATE: 18 BRPM

## 2023-07-16 DIAGNOSIS — Z90.710 ACQUIRED ABSENCE OF BOTH CERVIX AND UTERUS: Chronic | ICD-10-CM

## 2023-07-16 DIAGNOSIS — Z96.0 PRESENCE OF UROGENITAL IMPLANTS: Chronic | ICD-10-CM

## 2023-07-16 DIAGNOSIS — N39.0 URINARY TRACT INFECTION, SITE NOT SPECIFIED: ICD-10-CM

## 2023-07-16 LAB
ALBUMIN SERPL ELPH-MCNC: 3.3 G/DL — SIGNIFICANT CHANGE UP (ref 3.3–5)
ALP SERPL-CCNC: 119 U/L — SIGNIFICANT CHANGE UP (ref 40–120)
ALT FLD-CCNC: 17 U/L — SIGNIFICANT CHANGE UP (ref 10–45)
ANION GAP SERPL CALC-SCNC: 9 MMOL/L — SIGNIFICANT CHANGE UP (ref 5–17)
APPEARANCE UR: ABNORMAL
AST SERPL-CCNC: 27 U/L — SIGNIFICANT CHANGE UP (ref 10–40)
BACTERIA # UR AUTO: ABNORMAL /HPF
BASOPHILS # BLD AUTO: 0.04 K/UL — SIGNIFICANT CHANGE UP (ref 0–0.2)
BASOPHILS NFR BLD AUTO: 0.4 % — SIGNIFICANT CHANGE UP (ref 0–2)
BILIRUB SERPL-MCNC: 0.6 MG/DL — SIGNIFICANT CHANGE UP (ref 0.2–1.2)
BILIRUB UR-MCNC: NEGATIVE — SIGNIFICANT CHANGE UP
BUN SERPL-MCNC: 25 MG/DL — HIGH (ref 7–23)
CALCIUM SERPL-MCNC: 9.9 MG/DL — SIGNIFICANT CHANGE UP (ref 8.4–10.5)
CHLORIDE SERPL-SCNC: 107 MMOL/L — SIGNIFICANT CHANGE UP (ref 96–108)
CO2 SERPL-SCNC: 26 MMOL/L — SIGNIFICANT CHANGE UP (ref 22–31)
COLOR SPEC: YELLOW — SIGNIFICANT CHANGE UP
CREAT SERPL-MCNC: 1.27 MG/DL — SIGNIFICANT CHANGE UP (ref 0.5–1.3)
DIFF PNL FLD: NEGATIVE — SIGNIFICANT CHANGE UP
EGFR: 41 ML/MIN/1.73M2 — LOW
EOSINOPHIL # BLD AUTO: 0.16 K/UL — SIGNIFICANT CHANGE UP (ref 0–0.5)
EOSINOPHIL NFR BLD AUTO: 1.4 % — SIGNIFICANT CHANGE UP (ref 0–6)
EPI CELLS # UR: 1 — SIGNIFICANT CHANGE UP
GLUCOSE SERPL-MCNC: 97 MG/DL — SIGNIFICANT CHANGE UP (ref 70–99)
GLUCOSE UR QL: NEGATIVE MG/DL — SIGNIFICANT CHANGE UP
HCT VFR BLD CALC: 39.4 % — SIGNIFICANT CHANGE UP (ref 34.5–45)
HGB BLD-MCNC: 12.2 G/DL — SIGNIFICANT CHANGE UP (ref 11.5–15.5)
IMM GRANULOCYTES NFR BLD AUTO: 0.5 % — SIGNIFICANT CHANGE UP (ref 0–0.9)
KETONES UR-MCNC: NEGATIVE MG/DL — SIGNIFICANT CHANGE UP
LEUKOCYTE ESTERASE UR-ACNC: ABNORMAL
LYMPHOCYTES # BLD AUTO: 1.41 K/UL — SIGNIFICANT CHANGE UP (ref 1–3.3)
LYMPHOCYTES # BLD AUTO: 12.7 % — LOW (ref 13–44)
MCHC RBC-ENTMCNC: 26.9 PG — LOW (ref 27–34)
MCHC RBC-ENTMCNC: 31 GM/DL — LOW (ref 32–36)
MCV RBC AUTO: 86.8 FL — SIGNIFICANT CHANGE UP (ref 80–100)
MONOCYTES # BLD AUTO: 0.95 K/UL — HIGH (ref 0–0.9)
MONOCYTES NFR BLD AUTO: 8.6 % — SIGNIFICANT CHANGE UP (ref 2–14)
NEUTROPHILS # BLD AUTO: 8.45 K/UL — HIGH (ref 1.8–7.4)
NEUTROPHILS NFR BLD AUTO: 76.4 % — SIGNIFICANT CHANGE UP (ref 43–77)
NITRITE UR-MCNC: POSITIVE
NRBC # BLD: 0 /100 WBCS — SIGNIFICANT CHANGE UP (ref 0–0)
PH UR: 7 — SIGNIFICANT CHANGE UP (ref 5–8)
PLATELET # BLD AUTO: 320 K/UL — SIGNIFICANT CHANGE UP (ref 150–400)
POTASSIUM SERPL-MCNC: 4.3 MMOL/L — SIGNIFICANT CHANGE UP (ref 3.5–5.3)
POTASSIUM SERPL-SCNC: 4.3 MMOL/L — SIGNIFICANT CHANGE UP (ref 3.5–5.3)
PROT SERPL-MCNC: 7.7 G/DL — SIGNIFICANT CHANGE UP (ref 6–8.3)
PROT UR-MCNC: NEGATIVE MG/DL — SIGNIFICANT CHANGE UP
RBC # BLD: 4.54 M/UL — SIGNIFICANT CHANGE UP (ref 3.8–5.2)
RBC # FLD: 14.6 % — HIGH (ref 10.3–14.5)
RBC CASTS # UR COMP ASSIST: 0 /HPF — SIGNIFICANT CHANGE UP (ref 0–4)
SODIUM SERPL-SCNC: 142 MMOL/L — SIGNIFICANT CHANGE UP (ref 135–145)
SP GR SPEC: 1.01 — SIGNIFICANT CHANGE UP (ref 1–1.03)
UROBILINOGEN FLD QL: 0.2 MG/DL — SIGNIFICANT CHANGE UP (ref 0.2–1)
WBC # BLD: 11.06 K/UL — HIGH (ref 3.8–10.5)
WBC # FLD AUTO: 11.06 K/UL — HIGH (ref 3.8–10.5)
WBC UR QL: 3 /HPF — SIGNIFICANT CHANGE UP (ref 0–5)

## 2023-07-16 PROCEDURE — 99223 1ST HOSP IP/OBS HIGH 75: CPT

## 2023-07-16 PROCEDURE — 70450 CT HEAD/BRAIN W/O DYE: CPT | Mod: 26,MA

## 2023-07-16 PROCEDURE — 71045 X-RAY EXAM CHEST 1 VIEW: CPT | Mod: 26

## 2023-07-16 PROCEDURE — 99285 EMERGENCY DEPT VISIT HI MDM: CPT

## 2023-07-16 PROCEDURE — 93010 ELECTROCARDIOGRAM REPORT: CPT

## 2023-07-16 RX ORDER — ATORVASTATIN CALCIUM 80 MG/1
10 TABLET, FILM COATED ORAL AT BEDTIME
Refills: 0 | Status: DISCONTINUED | OUTPATIENT
Start: 2023-07-16 | End: 2023-07-21

## 2023-07-16 RX ORDER — LANOLIN ALCOHOL/MO/W.PET/CERES
3 CREAM (GRAM) TOPICAL AT BEDTIME
Refills: 0 | Status: DISCONTINUED | OUTPATIENT
Start: 2023-07-16 | End: 2023-07-21

## 2023-07-16 RX ORDER — LANOLIN ALCOHOL/MO/W.PET/CERES
3 CREAM (GRAM) TOPICAL AT BEDTIME
Refills: 0 | Status: DISCONTINUED | OUTPATIENT
Start: 2023-07-16 | End: 2023-07-16

## 2023-07-16 RX ORDER — CEFTRIAXONE 500 MG/1
1000 INJECTION, POWDER, FOR SOLUTION INTRAMUSCULAR; INTRAVENOUS ONCE
Refills: 0 | Status: COMPLETED | OUTPATIENT
Start: 2023-07-16 | End: 2023-07-16

## 2023-07-16 RX ORDER — AMLODIPINE BESYLATE 2.5 MG/1
2.5 TABLET ORAL DAILY
Refills: 0 | Status: DISCONTINUED | OUTPATIENT
Start: 2023-07-16 | End: 2023-07-19

## 2023-07-16 RX ORDER — LACTOBACILLUS ACIDOPHILUS 100MM CELL
1 CAPSULE ORAL EVERY 12 HOURS
Refills: 0 | Status: DISCONTINUED | OUTPATIENT
Start: 2023-07-16 | End: 2023-07-21

## 2023-07-16 RX ORDER — FAMOTIDINE 10 MG/ML
20 INJECTION INTRAVENOUS DAILY
Refills: 0 | Status: DISCONTINUED | OUTPATIENT
Start: 2023-07-16 | End: 2023-07-21

## 2023-07-16 RX ORDER — SERTRALINE 25 MG/1
100 TABLET, FILM COATED ORAL DAILY
Refills: 0 | Status: DISCONTINUED | OUTPATIENT
Start: 2023-07-16 | End: 2023-07-21

## 2023-07-16 RX ORDER — ONDANSETRON 8 MG/1
4 TABLET, FILM COATED ORAL EVERY 8 HOURS
Refills: 0 | Status: DISCONTINUED | OUTPATIENT
Start: 2023-07-16 | End: 2023-07-21

## 2023-07-16 RX ORDER — CEFTRIAXONE 500 MG/1
1000 INJECTION, POWDER, FOR SOLUTION INTRAMUSCULAR; INTRAVENOUS EVERY 24 HOURS
Refills: 0 | Status: COMPLETED | OUTPATIENT
Start: 2023-07-17 | End: 2023-07-19

## 2023-07-16 RX ORDER — OLANZAPINE 15 MG/1
2.5 TABLET, FILM COATED ORAL EVERY 12 HOURS
Refills: 0 | Status: DISCONTINUED | OUTPATIENT
Start: 2023-07-16 | End: 2023-07-21

## 2023-07-16 RX ORDER — ACETAMINOPHEN 500 MG
650 TABLET ORAL EVERY 6 HOURS
Refills: 0 | Status: DISCONTINUED | OUTPATIENT
Start: 2023-07-16 | End: 2023-07-17

## 2023-07-16 RX ORDER — ENOXAPARIN SODIUM 100 MG/ML
30 INJECTION SUBCUTANEOUS EVERY 24 HOURS
Refills: 0 | Status: DISCONTINUED | OUTPATIENT
Start: 2023-07-16 | End: 2023-07-17

## 2023-07-16 RX ORDER — QUETIAPINE FUMARATE 200 MG/1
25 TABLET, FILM COATED ORAL
Refills: 0 | Status: DISCONTINUED | OUTPATIENT
Start: 2023-07-16 | End: 2023-07-21

## 2023-07-16 RX ORDER — SODIUM CHLORIDE 9 MG/ML
1000 INJECTION INTRAMUSCULAR; INTRAVENOUS; SUBCUTANEOUS ONCE
Refills: 0 | Status: COMPLETED | OUTPATIENT
Start: 2023-07-16 | End: 2023-07-16

## 2023-07-16 RX ADMIN — SODIUM CHLORIDE 1000 MILLILITER(S): 9 INJECTION INTRAMUSCULAR; INTRAVENOUS; SUBCUTANEOUS at 14:15

## 2023-07-16 RX ADMIN — ATORVASTATIN CALCIUM 10 MILLIGRAM(S): 80 TABLET, FILM COATED ORAL at 22:41

## 2023-07-16 RX ADMIN — SODIUM CHLORIDE 1000 MILLILITER(S): 9 INJECTION INTRAMUSCULAR; INTRAVENOUS; SUBCUTANEOUS at 13:10

## 2023-07-16 RX ADMIN — QUETIAPINE FUMARATE 25 MILLIGRAM(S): 200 TABLET, FILM COATED ORAL at 19:33

## 2023-07-16 RX ADMIN — Medication 1 TABLET(S): at 19:33

## 2023-07-16 RX ADMIN — CEFTRIAXONE 100 MILLIGRAM(S): 500 INJECTION, POWDER, FOR SOLUTION INTRAMUSCULAR; INTRAVENOUS at 16:37

## 2023-07-16 RX ADMIN — Medication 7.5 MILLIGRAM(S): at 19:33

## 2023-07-16 RX ADMIN — CEFTRIAXONE 1000 MILLIGRAM(S): 500 INJECTION, POWDER, FOR SOLUTION INTRAMUSCULAR; INTRAVENOUS at 17:10

## 2023-07-16 NOTE — ED ADULT TRIAGE NOTE - CHIEF COMPLAINT QUOTE
Pt BIBA from the [facility] with unwitnessed fall. Denies any pain or discomfort. Denies blood thinners.

## 2023-07-16 NOTE — ED PROVIDER NOTE - PHYSICAL EXAMINATION
General:     NAD, well-nourished, well-appearing  Head:     NC/No visible signs of head injury, EOMI, oral mucosa dry  Neck:     trachea midline No midline C-spine tenderness  Lungs:     CTA b/l, no w/r/r  CVS:     S1S2, RRR, no m/g/r  Abd:     +BS, s/nt/nd, no organomegaly  Ext:    2+ radial and pedal pulses, no c/c/e  Neuro: AAOx1 Motor sensation grossly intact

## 2023-07-16 NOTE — H&P ADULT - ASSESSMENT
86 yo F w/ advanced dementia, HLD HTN, renal stone s/p stent, UTI's and NPH comes from [facility] for change in mental status, agitated, erratic behavior and fall. Hx of recent UTI and admission in June 2023 which grew Ecoli. Admitted for metabolic encephalopathy secondary to UTI.      #Metabolic encephalopathy  #Acute UTI  # Hx of recurrent UTIs  #Fall  -Admit to medicine   -CT head negative   -FU urine culture. Previous culture grew Ecoli, sensitive to Ceftriaxone. Will continue Ceftriaxone.   - pt is on cranberry, probiotics and D Mannose supplements as OP  -Zyprexa PRN agitation   -Urology consult   -Fall precautions       #hyperlipidemia  - Continue home atorvastatin 10 mg qhs    # Dementia w/ mood disturbance.  # anxiety  - cont home quetiapine 25 mg BID, buspirone 7.5 mg BID and sertraline 100 mg qd  -Zyprexa prn agitation/aggression    # Hx of HTN  - low dose Norvasc     GI Prophylaxis with famotidine 20 mg qhs  DVT Prophylaxis : lovenox renal dose   Updated daughter and Cris WALLACE   Code: DNR/DNI confirmed, MOLST in chart.

## 2023-07-16 NOTE — ED PROVIDER NOTE - CLINICAL SUMMARY MEDICAL DECISION MAKING FREE TEXT BOX
87-year-old female brought in by EMS from [facility] chief complaint change in mental status agitated and erratic behavior and a history of chronic UTI the EMS also reported the patient had fallen down and head injury they brought the patient here with a c-collar in place History of dyslipidemia hypertension dementia hydrocephalus taking kidney stones GI bleed  Plan CT of the brain basic labs including UA urine culture.  Reevaluate

## 2023-07-16 NOTE — ED PROVIDER NOTE - CARE PLAN
Goal:	ams , fall head injury   1 Principal Discharge DX:	Acute UTI (urinary tract infection)  Goal:	ams , fall head injury  Secondary Diagnosis:	Recurrent falls  Secondary Diagnosis:	Closed head injury  Secondary Diagnosis:	Alzheimer's dementia with behavioral disturbance

## 2023-07-16 NOTE — ED PROVIDER NOTE - OBJECTIVE STATEMENT
87-year-old female brought in by EMS from [facility] chief complaint change in mental status agitated and erratic behavior and a history of chronic UTI the EMS also reported the patient had fallen down and head injury they brought the patient here with a c-collar in place History of dyslipidemia hypertension dementia hydrocephalus taking kidney stones GI bleed

## 2023-07-16 NOTE — H&P ADULT - HISTORY OF PRESENT ILLNESS
86 yo F w/ advanced dementia, HLD HTN, renal stone s/p stent, UTI's and NPH comes from [facility] for change in mental status, agitated, erratic behavior and fall. Hx of recent UTI and admission in June 2023 which grew Ecoli.  History of dyslipidemia hypertension dementiaThe patient is unable to give a history due to dementia.      86 yo F w/ advanced dementia, HLD HTN, renal stone s/p stent, UTI's and NPH comes from [facility] for change in mental status, agitated, erratic behavior and fall. Hx of recent UTI and admission in June 2023 which grew Ecoli.   Admitted for metabolic encephalopathy secondary to UTI. The patient is unable to give a history due to dementia.

## 2023-07-16 NOTE — ED ADULT NURSE NOTE - NSFALLHARMRISKINTERV_ED_ALL_ED
Assistance OOB with selected safe patient handling equipment if applicable/Assistance with ambulation/Communicate risk of Fall with Harm to all staff, patient, and family/Monitor gait and stability/Monitor for mental status changes and reorient to person, place, and time, as needed/Move patient closer to nursing station/within visual sight of ED staff/Provide visual cue: red socks, yellow wristband, yellow gown, etc/Reinforce activity limits and safety measures with patient and family/Toileting schedule using arm’s reach rule for commode and bathroom/Use of alarms - bed, stretcher, chair and/or video monitoring/Bed in lowest position, wheels locked, appropriate side rails in place/Call bell, personal items and telephone in reach/Instruct patient to call for assistance before getting out of bed/chair/stretcher/Non-slip footwear applied when patient is off stretcher/[place] to call system/Physically safe environment - no spills, clutter or unnecessary equipment/Purposeful Proactive Rounding/Room/bathroom lighting operational, light cord in reach

## 2023-07-16 NOTE — H&P ADULT - NSHPPHYSICALEXAM_GEN_ALL_CORE
Vital Signs Last 24 Hrs  T(F): 98.6 (16 Jul 2023 11:12), Max: 98.6 (16 Jul 2023 11:12)  HR: 77 (16 Jul 2023 11:12) (77 - 77)  BP: 131/56 (16 Jul 2023 11:12) (131/56 - 131/56)  RR: 18 (16 Jul 2023 11:12) (18 - 18)  SpO2: 96% (16 Jul 2023 11:12) (96% - 96%)    PHYSICAL EXAM:  GENERAL: NAD, elderly, confused, well-developed  HEAD:  Atraumatic, Normocephalic  EYES: EOMI, conjunctiva and sclera clear  ENMT: Moist mucous membranes, Good dentition, no thrush  NECK: Supple, No JVD  CHEST/LUNG: Clear to auscultation bilaterally, good air entry, non-labored breathing  HEART: RRR; S1/S2, No murmur  ABDOMEN: Soft, Nontender, Nondistended; Bowel sounds present  VASCULAR: Normal pulses, Normal capillary refill  EXTREMITIES: No calf tenderness, No cyanosis, No edema  LYMPH: Normal; No lymphadenopathy noted  SKIN: Warm, Intact  PSYCH: Normal mood, Normal affect  NERVOUS SYSTEM:  AOx0, poor concentration; No focal deficits

## 2023-07-16 NOTE — ED ADULT NURSE NOTE - CHIEF COMPLAINT QUOTE
Pt BIBA from the [facility] with unwitnessed fall. Denies any pain or discomfort. Denies blood thinners.

## 2023-07-16 NOTE — H&P ADULT - NS ATTEND AMEND GEN_ALL_CORE FT
86 yo F with advanced dementia, Hyperlipidemia, Hypertension, renal stone s/p stent who presents from [facility] with AMS    EKG (personally reviewed by me): Sinus rhythm at 75bpm    Acute metabolic encephalopathy 2/2 UTI  Ceftriaxone for UTI  Monitor mental status     DNR/DNI. MOLST in chart

## 2023-07-16 NOTE — H&P ADULT - BILLING PROVIDER USE ONLY
U/s order was sent yesterday, can we please call and see if they received it and if it was done?   stated Attending or DELFINA Only

## 2023-07-16 NOTE — H&P ADULT - NSHPLABSRESULTS_GEN_ALL_CORE
.                            12.2   11.06 )-----------( 320      ( 2023 13:19 )             39.4       07-16    142  |  107  |  25  ----------------------------<  97  4.3   |  26  |  1.27    Ca    9.9      2023 13:19    TPro  7.7  /  Alb  3.3  /  TBili  0.6  /  DBili  x   /  AST  27  /  ALT  17  /  AlkPhos  119  07-                    Urinalysis Basic - ( 2023 16:02 )    Color: Yellow / Appearance: Cloudy / S.015 / pH: x  Gluc: x / Ketone: Negative mg/dL  / Bili: Negative / Urobili: 0.2 mg/dL   Blood: x / Protein: Negative mg/dL / Nitrite: Positive   Leuk Esterase: Trace / RBC: 0 /HPF / WBC 3 /HPF   Sq Epi: x / Non Sq Epi: x / Bacteria: Many /HPF              < from: Xray Chest 1 View- PORTABLE-Urgent (23 @ 12:36) >    IMPRESSION: No acute finding at this time.    --- End of Report ---            JAVIER IZQUIERDO MD; Attending Radiologist  This document has been electronically signed. 2023 12:37PM    < end of copied text >    < from: CT Head No Cont (23 @ 12:05) >      IMPRESSION:    No significant interval change.    No hemorrhage, mass effect or calvarial fracture.    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    --- End of Report ---            LISA LEE MD; Attending Radiologist  This document has been electronically signed. 2023 12:10PM    EKG: ST elevations V1, V2,, depression V6 unchanged from prior EKG . Sinus rhythm, vent rate 75 (personal review)

## 2023-07-17 LAB
ANION GAP SERPL CALC-SCNC: 10 MMOL/L — SIGNIFICANT CHANGE UP (ref 5–17)
BUN SERPL-MCNC: 20 MG/DL — SIGNIFICANT CHANGE UP (ref 7–23)
CALCIUM SERPL-MCNC: 8.8 MG/DL — SIGNIFICANT CHANGE UP (ref 8.4–10.5)
CHLORIDE SERPL-SCNC: 108 MMOL/L — SIGNIFICANT CHANGE UP (ref 96–108)
CO2 SERPL-SCNC: 24 MMOL/L — SIGNIFICANT CHANGE UP (ref 22–31)
CREAT SERPL-MCNC: 0.89 MG/DL — SIGNIFICANT CHANGE UP (ref 0.5–1.3)
EGFR: 63 ML/MIN/1.73M2 — SIGNIFICANT CHANGE UP
GLUCOSE SERPL-MCNC: 92 MG/DL — SIGNIFICANT CHANGE UP (ref 70–99)
HCT VFR BLD CALC: 40.9 % — SIGNIFICANT CHANGE UP (ref 34.5–45)
HGB BLD-MCNC: 12.4 G/DL — SIGNIFICANT CHANGE UP (ref 11.5–15.5)
MCHC RBC-ENTMCNC: 26.5 PG — LOW (ref 27–34)
MCHC RBC-ENTMCNC: 30.3 GM/DL — LOW (ref 32–36)
MCV RBC AUTO: 87.4 FL — SIGNIFICANT CHANGE UP (ref 80–100)
NRBC # BLD: 0 /100 WBCS — SIGNIFICANT CHANGE UP (ref 0–0)
PLATELET # BLD AUTO: 282 K/UL — SIGNIFICANT CHANGE UP (ref 150–400)
POTASSIUM SERPL-MCNC: 3.8 MMOL/L — SIGNIFICANT CHANGE UP (ref 3.5–5.3)
POTASSIUM SERPL-SCNC: 3.8 MMOL/L — SIGNIFICANT CHANGE UP (ref 3.5–5.3)
RBC # BLD: 4.68 M/UL — SIGNIFICANT CHANGE UP (ref 3.8–5.2)
RBC # FLD: 14.6 % — HIGH (ref 10.3–14.5)
SODIUM SERPL-SCNC: 142 MMOL/L — SIGNIFICANT CHANGE UP (ref 135–145)
WBC # BLD: 8.16 K/UL — SIGNIFICANT CHANGE UP (ref 3.8–10.5)
WBC # FLD AUTO: 8.16 K/UL — SIGNIFICANT CHANGE UP (ref 3.8–10.5)

## 2023-07-17 PROCEDURE — 99233 SBSQ HOSP IP/OBS HIGH 50: CPT

## 2023-07-17 PROCEDURE — 99222 1ST HOSP IP/OBS MODERATE 55: CPT

## 2023-07-17 RX ORDER — ACETAMINOPHEN 500 MG
650 TABLET ORAL EVERY 6 HOURS
Refills: 0 | Status: DISCONTINUED | OUTPATIENT
Start: 2023-07-17 | End: 2023-07-21

## 2023-07-17 RX ORDER — ENOXAPARIN SODIUM 100 MG/ML
40 INJECTION SUBCUTANEOUS EVERY 24 HOURS
Refills: 0 | Status: DISCONTINUED | OUTPATIENT
Start: 2023-07-18 | End: 2023-07-21

## 2023-07-17 RX ADMIN — FAMOTIDINE 20 MILLIGRAM(S): 10 INJECTION INTRAVENOUS at 12:16

## 2023-07-17 RX ADMIN — QUETIAPINE FUMARATE 25 MILLIGRAM(S): 200 TABLET, FILM COATED ORAL at 17:33

## 2023-07-17 RX ADMIN — QUETIAPINE FUMARATE 25 MILLIGRAM(S): 200 TABLET, FILM COATED ORAL at 06:30

## 2023-07-17 RX ADMIN — Medication 1 TABLET(S): at 17:33

## 2023-07-17 RX ADMIN — AMLODIPINE BESYLATE 2.5 MILLIGRAM(S): 2.5 TABLET ORAL at 06:30

## 2023-07-17 RX ADMIN — Medication 7.5 MILLIGRAM(S): at 17:33

## 2023-07-17 RX ADMIN — SERTRALINE 100 MILLIGRAM(S): 25 TABLET, FILM COATED ORAL at 12:16

## 2023-07-17 RX ADMIN — ENOXAPARIN SODIUM 30 MILLIGRAM(S): 100 INJECTION SUBCUTANEOUS at 12:16

## 2023-07-17 RX ADMIN — ATORVASTATIN CALCIUM 10 MILLIGRAM(S): 80 TABLET, FILM COATED ORAL at 21:04

## 2023-07-17 RX ADMIN — CEFTRIAXONE 100 MILLIGRAM(S): 500 INJECTION, POWDER, FOR SOLUTION INTRAMUSCULAR; INTRAVENOUS at 16:05

## 2023-07-17 RX ADMIN — Medication 7.5 MILLIGRAM(S): at 06:30

## 2023-07-17 RX ADMIN — Medication 1 TABLET(S): at 06:30

## 2023-07-17 NOTE — PATIENT PROFILE ADULT - FALL HARM RISK - HARM RISK INTERVENTIONS

## 2023-07-17 NOTE — CONSULT NOTE ADULT - SUBJECTIVE AND OBJECTIVE BOX
88 yo F w/ advanced dementia, HLD HTN, renal stone s/p stent, UTI's and NPH comes from [facility] for change in mental status, agitated, erratic behavior and fall. Hx of recent UTI and admission in 2023 which grew Ecoli.   Admitted for metabolic encephalopathy secondary to UTI. The patient is unable to give a history due to dementia.           Review of Systems:  Other Review of Systems: All other review of systems negative, except as noted in HPI    Goals of Care:   Conversation Discussion:  · Conversation	MOLST Discussed  · Conversation Details	Discussed GOC with MADISON Ram. Confirmed DNR/DNI. Previous MOLST in chart    Location of Discussion:  · Location of discussion	Telephone    Time Spent on Advance Care Planning:  Attending or DELFINA Only.     I personally spent 7 minutes on advance care planning services with the patient. This time is separate and distinct from any other care management services provided on this date.      Allergies and Intolerances:        Allergies:  	No Known Allergies:     Home Medications:   * Patient Currently Takes Medications as of 2023 17:37 documented in Structured Notes  · 	Norvasc 2.5 mg oral tablet: Last Dose Taken:  , 1 tab(s) orally once a day  · 	melatonin 3 mg oral tablet: Last Dose Taken:  , 1 tab(s) orally once a day (at bedtime), As needed, Insomnia  · 	atorvastatin 10 mg oral tablet: Last Dose Taken:  , 1 tab(s) orally once a day (at bedtime)  · 	famotidine 20 mg oral tablet: Last Dose Taken:  , 1 tab(s) orally once a day (at bedtime)   · 	busPIRone 7.5 mg oral tablet: Last Dose Taken:  , 1 tab(s) orally 2 times a day  · 	sertraline 100 mg oral tablet: Last Dose Taken:  , 1 tab(s) orally once a day  · 	Acidophilus with Pectin oral capsule: Last Dose Taken:  , 1 cap(s) orally 2 times a day  · 	QUEtiapine 25 mg oral tablet: Last Dose Taken:  , 1 tab(s) orally 2 times a day  · 	cranberry oral tablet: Last Dose Taken:  , 450 orally once a day    .    Patient History:   Past Medical, Past Surgical, and Family History:  PAST MEDICAL HISTORY:  Dementia     HLD (hyperlipidemia)     HTN (hypertension).     PAST SURGICAL HISTORY:  H/O: hysterectomy     S/P cystoscopy with ureteral stent placement.     FAMILY HISTORY:  Mother  Still living? Unknown  FH: HTN (hypertension), Age at diagnosis: Age Unknown.    Social History:  · Substance use	No  · Social History (marital status, living situation, occupation, and sexual history)	Lives at [facility]  Unable to obtain further info    Tobacco Screening:  · Core Measure Site	Yes  · Has the patient used tobacco in the past 30 days?	No    Risk Assessment:   Present on Admission:  Deep Venous Thrombosis	no  Pulmonary Embolus	no    HIV Screening:  · In accordance with [place] State law, we offer every patient who comes to our ED an HIV test. Would you like to be tested today?	Unable to answer due to medical condition/unresponsive/etc...    Physical Exam:   Physical Exam: Vital Signs Last 24 Hrs  T(F): 98.6 (2023 11:12), Max: 98.6 (2023 11:12)  HR: 77 (2023 11:12) (77 - 77)  BP: 131/56 (2023 11:12) (131/56 - 131/56)  RR: 18 (2023 11:12) (18 - 18)  SpO2: 96% (2023 11:12) (96% - 96%)    PHYSICAL EXAM:  GENERAL: NAD, elderly, confused, well-developed  HEAD:  Atraumatic, Normocephalic  EYES: EOMI, conjunctiva and sclera clear  ENMT: Moist mucous membranes, Good dentition, no thrush  NECK: Supple, No JVD  CHEST/LUNG: Clear to auscultation bilaterally, good air entry, non-labored breathing  HEART: RRR; S1/S2, No murmur  ABDOMEN: Soft, Nontender, Nondistended; Bowel sounds present  VASCULAR: Normal pulses, Normal capillary refill  EXTREMITIES: No calf tenderness, No cyanosis, No edema  LYMPH: Normal; No lymphadenopathy noted  SKIN: Warm, Intact  PSYCH: Normal mood, Normal affect  NERVOUS SYSTEM:  AOx3, poor concentration; No focal deficits      Labs and Results:  Labs, Radiology, Cardiology, and Other Results: .                            12.2   11.06 )-----------( 320      ( 2023 13:19 )             39.4       07-16    142  |  107  |  25  ----------------------------<  97  4.3   |  26  |  1.27    Ca    9.9      2023 13:19    TPro  7.7  /  Alb  3.3  /  TBili  0.6  /  DBili  x   /  AST  27  /  ALT  17  /  AlkPhos  119  07-16        Urinalysis Basic - ( 2023 16:02 )    Color: Yellow / Appearance: Cloudy / S.015 / pH: x  Gluc: x / Ketone: Negative mg/dL  / Bili: Negative / Urobili: 0.2 mg/dL   Blood: x / Protein: Negative mg/dL / Nitrite: Positive   Leuk Esterase: Trace / RBC: 0 /HPF / WBC 3 /HPF   Sq Epi: x / Non Sq Epi: x / Bacteria: Many /HPF      < from: Xray Chest 1 View- PORTABLE-Urgent (23 @ 12:36) >    IMPRESSION: No acute finding at this time.    --- End of Report ---            JAVIER IZQUIERDO MD; Attending Radiologist  This document has been electronically signed. 2023 12:37PM    < end of copied text >    < from: CT Head No Cont (23 @ 12:05) >      IMPRESSION:    No significant interval change.    No hemorrhage, mass effect or calvarial fracture.    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    --- End of Report ---

## 2023-07-17 NOTE — PHYSICAL THERAPY INITIAL EVALUATION ADULT - ADDITIONAL COMMENTS
pt is a questionable informant, info per previous chart: pt lives in [facility] assisted living uses rw or rollator for amb. does not require assist for ADL's

## 2023-07-17 NOTE — CONSULT NOTE ADULT - ASSESSMENT
86 yo F w/ advanced dementia, HLD HTN, renal stone s/p stent, UTI's and NPH comes from [facility] for change in mental status, agitated, erratic behavior and fall. Hx of recent UTI and admission in June 2023 which grew Ecoli. Admitted for metabolic encephalopathy secondary to UTI.    --Please obtain Non Contrast CT scan to assess stone/stent status  --IV ATBs  --Follow Cultures

## 2023-07-17 NOTE — PROGRESS NOTE ADULT - SUBJECTIVE AND OBJECTIVE BOX
Patient is a 87y old  Female who presents with a chief complaint of AMS (16 Jul 2023 17:08)      Patient seen and examined at bedside. No events overnight. Patient denies any acute complaints at this time, feels well. Calm when speaking. Denies chest pain, sob, abd pain, headache, fever or chills    ALLERGIES:  No Known Allergies    MEDICATIONS  (STANDING):  amLODIPine   Tablet 2.5 milliGRAM(s) Oral daily  atorvastatin 10 milliGRAM(s) Oral at bedtime  busPIRone 7.5 milliGRAM(s) Oral two times a day  cefTRIAXone   IVPB 1000 milliGRAM(s) IV Intermittent every 24 hours  enoxaparin Injectable 30 milliGRAM(s) SubCutaneous every 24 hours  famotidine    Tablet 20 milliGRAM(s) Oral daily  lactobacillus acidophilus 1 Tablet(s) Oral every 12 hours  QUEtiapine 25 milliGRAM(s) Oral two times a day  sertraline 100 milliGRAM(s) Oral daily    MEDICATIONS  (PRN):  acetaminophen     Tablet .. 650 milliGRAM(s) Oral every 6 hours PRN Mild Pain (1 - 3)  acetaminophen     Tablet .. 650 milliGRAM(s) Oral every 6 hours PRN Temp greater or equal to 38C (100.4F)  aluminum hydroxide/magnesium hydroxide/simethicone Suspension 30 milliLiter(s) Oral every 4 hours PRN Dyspepsia  melatonin. 3 milliGRAM(s) Oral at bedtime PRN Insomnia  OLANZapine Injectable 2.5 milliGRAM(s) IntraMuscular every 12 hours PRN agitation, agression  ondansetron Injectable 4 milliGRAM(s) IV Push every 8 hours PRN Nausea and/or Vomiting    Vital Signs Last 24 Hrs  T(F): 97.5 (17 Jul 2023 10:47), Max: 98 (16 Jul 2023 15:20)  HR: 88 (17 Jul 2023 11:32) (65 - 88)  BP: 189/91 (17 Jul 2023 11:32) (111/81 - 189/91)  RR: 16 (17 Jul 2023 10:47) (16 - 19)  SpO2: 96% (17 Jul 2023 11:32) (95% - 100%)  I&O's Summary      PHYSICAL EXAM:  GENERAL: NAD, laying in bed  HEAD:  Atraumatic, Normocephalic  EYES: PEERL, conjunctiva and sclera clear  ENMT: Moist mucous membranes,  Supple, No JVD  CHEST/LUNG: Clear to auscultation bilaterally, good air entry, non-labored breathing  HEART: RRR; S1/S2, No murmur  ABDOMEN: Soft, Nontender, Nondistended; Bowel sounds present  EXTREMITIES: No calf tenderness, No cyanosis, No edema  SKIN: Warm, perfused  PSYCH: Normal mood, Normal affect  NERVOUS SYSTEM:  awake and alert, follows commands    LABS:                        12.4   8.16  )-----------( 282      ( 17 Jul 2023 05:21 )             40.9     07-17    142  |  108  |  20  ----------------------------<  92  3.8   |  24  |  0.89    Ca    8.8      17 Jul 2023 05:21    TPro  7.7  /  Alb  3.3  /  TBili  0.6  /  DBili  x   /  AST  27  /  ALT  17  /  AlkPhos  119  07-16                                Urinalysis Basic - ( 17 Jul 2023 05:21 )    Color: x / Appearance: x / SG: x / pH: x  Gluc: 92 mg/dL / Ketone: x  / Bili: x / Urobili: x   Blood: x / Protein: x / Nitrite: x   Leuk Esterase: x / RBC: x / WBC x   Sq Epi: x / Non Sq Epi: x / Bacteria: x            RADIOLOGY & ADDITIONAL TESTS:    Care Discussed with Consultants/Other Providers:

## 2023-07-17 NOTE — PATIENT PROFILE ADULT - FUNCTIONAL ASSESSMENT - DAILY ACTIVITY 4.
His echo demonstrated NL LV systolic function & no major heart valve abnormalities, so the murmur I heard on exam is likely flow-related, not pathologic, as I had suspected.     3 = A little assistance

## 2023-07-17 NOTE — PHYSICAL THERAPY INITIAL EVALUATION ADULT - NSACTIVITYREC_GEN_A_PT
Body Location Override (Optional - Billing Will Still Be Based On Selected Body Map Location If Applicable): epigastric skin oob to chair w/rw and assist x1

## 2023-07-17 NOTE — PHYSICAL THERAPY INITIAL EVALUATION ADULT - STANDING BALANCE: DYNAMIC, REHAB EVAL
fair balance
8 cm/central line located in the/no pneumothorax/depth of insertion/central line located in the superior vena cava/post-procedure radiography performed

## 2023-07-17 NOTE — PHYSICAL THERAPY INITIAL EVALUATION ADULT - PERTINENT HX OF CURRENT PROBLEM, REHAB EVAL
86 yo F w/ advanced dementia, HLD HTN, renal stone s/p stent, UTI's and NPH comes from [facility] for change in mental status, agitated, erratic behavior and fall. Hx of recent UTI and admission in June 2023 which grew Ecoli.   Admitted for metabolic encephalopathy secondary to UTI. The patient is unable to give a history due to dementia.

## 2023-07-18 LAB
ANION GAP SERPL CALC-SCNC: 8 MMOL/L — SIGNIFICANT CHANGE UP (ref 5–17)
BASOPHILS # BLD AUTO: 0.03 K/UL — SIGNIFICANT CHANGE UP (ref 0–0.2)
BASOPHILS NFR BLD AUTO: 0.3 % — SIGNIFICANT CHANGE UP (ref 0–2)
BUN SERPL-MCNC: 17 MG/DL — SIGNIFICANT CHANGE UP (ref 7–23)
CALCIUM SERPL-MCNC: 9.2 MG/DL — SIGNIFICANT CHANGE UP (ref 8.4–10.5)
CHLORIDE SERPL-SCNC: 107 MMOL/L — SIGNIFICANT CHANGE UP (ref 96–108)
CO2 SERPL-SCNC: 26 MMOL/L — SIGNIFICANT CHANGE UP (ref 22–31)
CREAT SERPL-MCNC: 0.94 MG/DL — SIGNIFICANT CHANGE UP (ref 0.5–1.3)
EGFR: 59 ML/MIN/1.73M2 — LOW
EOSINOPHIL # BLD AUTO: 0.3 K/UL — SIGNIFICANT CHANGE UP (ref 0–0.5)
EOSINOPHIL NFR BLD AUTO: 3 % — SIGNIFICANT CHANGE UP (ref 0–6)
GLUCOSE SERPL-MCNC: 112 MG/DL — HIGH (ref 70–99)
HCT VFR BLD CALC: 39.2 % — SIGNIFICANT CHANGE UP (ref 34.5–45)
HGB BLD-MCNC: 12 G/DL — SIGNIFICANT CHANGE UP (ref 11.5–15.5)
IMM GRANULOCYTES NFR BLD AUTO: 0.4 % — SIGNIFICANT CHANGE UP (ref 0–0.9)
LYMPHOCYTES # BLD AUTO: 1.19 K/UL — SIGNIFICANT CHANGE UP (ref 1–3.3)
LYMPHOCYTES # BLD AUTO: 11.8 % — LOW (ref 13–44)
MCHC RBC-ENTMCNC: 26.3 PG — LOW (ref 27–34)
MCHC RBC-ENTMCNC: 30.6 GM/DL — LOW (ref 32–36)
MCV RBC AUTO: 85.8 FL — SIGNIFICANT CHANGE UP (ref 80–100)
MONOCYTES # BLD AUTO: 0.77 K/UL — SIGNIFICANT CHANGE UP (ref 0–0.9)
MONOCYTES NFR BLD AUTO: 7.6 % — SIGNIFICANT CHANGE UP (ref 2–14)
NEUTROPHILS # BLD AUTO: 7.75 K/UL — HIGH (ref 1.8–7.4)
NEUTROPHILS NFR BLD AUTO: 76.9 % — SIGNIFICANT CHANGE UP (ref 43–77)
NRBC # BLD: 0 /100 WBCS — SIGNIFICANT CHANGE UP (ref 0–0)
PLATELET # BLD AUTO: 310 K/UL — SIGNIFICANT CHANGE UP (ref 150–400)
POTASSIUM SERPL-MCNC: 3.9 MMOL/L — SIGNIFICANT CHANGE UP (ref 3.5–5.3)
POTASSIUM SERPL-SCNC: 3.9 MMOL/L — SIGNIFICANT CHANGE UP (ref 3.5–5.3)
RBC # BLD: 4.57 M/UL — SIGNIFICANT CHANGE UP (ref 3.8–5.2)
RBC # FLD: 14.4 % — SIGNIFICANT CHANGE UP (ref 10.3–14.5)
SODIUM SERPL-SCNC: 141 MMOL/L — SIGNIFICANT CHANGE UP (ref 135–145)
WBC # BLD: 10.08 K/UL — SIGNIFICANT CHANGE UP (ref 3.8–10.5)
WBC # FLD AUTO: 10.08 K/UL — SIGNIFICANT CHANGE UP (ref 3.8–10.5)

## 2023-07-18 PROCEDURE — 74176 CT ABD & PELVIS W/O CONTRAST: CPT | Mod: 26

## 2023-07-18 PROCEDURE — 99233 SBSQ HOSP IP/OBS HIGH 50: CPT

## 2023-07-18 RX ADMIN — QUETIAPINE FUMARATE 25 MILLIGRAM(S): 200 TABLET, FILM COATED ORAL at 05:21

## 2023-07-18 RX ADMIN — CEFTRIAXONE 100 MILLIGRAM(S): 500 INJECTION, POWDER, FOR SOLUTION INTRAMUSCULAR; INTRAVENOUS at 16:34

## 2023-07-18 RX ADMIN — Medication 7.5 MILLIGRAM(S): at 05:22

## 2023-07-18 RX ADMIN — FAMOTIDINE 20 MILLIGRAM(S): 10 INJECTION INTRAVENOUS at 11:04

## 2023-07-18 RX ADMIN — SERTRALINE 100 MILLIGRAM(S): 25 TABLET, FILM COATED ORAL at 11:04

## 2023-07-18 RX ADMIN — Medication 1 TABLET(S): at 05:22

## 2023-07-18 RX ADMIN — AMLODIPINE BESYLATE 2.5 MILLIGRAM(S): 2.5 TABLET ORAL at 05:23

## 2023-07-18 RX ADMIN — Medication 7.5 MILLIGRAM(S): at 18:13

## 2023-07-18 RX ADMIN — QUETIAPINE FUMARATE 25 MILLIGRAM(S): 200 TABLET, FILM COATED ORAL at 18:13

## 2023-07-18 RX ADMIN — ENOXAPARIN SODIUM 40 MILLIGRAM(S): 100 INJECTION SUBCUTANEOUS at 11:04

## 2023-07-18 RX ADMIN — Medication 1 TABLET(S): at 18:13

## 2023-07-19 ENCOUNTER — TRANSCRIPTION ENCOUNTER (OUTPATIENT)
Age: 88
End: 2023-07-19

## 2023-07-19 LAB
-  AMIKACIN: SIGNIFICANT CHANGE UP
-  AMOXICILLIN/CLAVULANIC ACID: SIGNIFICANT CHANGE UP
-  AMPICILLIN/SULBACTAM: SIGNIFICANT CHANGE UP
-  AMPICILLIN: SIGNIFICANT CHANGE UP
-  AZTREONAM: SIGNIFICANT CHANGE UP
-  CEFAZOLIN: SIGNIFICANT CHANGE UP
-  CEFEPIME: SIGNIFICANT CHANGE UP
-  CEFOXITIN: SIGNIFICANT CHANGE UP
-  CEFTRIAXONE: SIGNIFICANT CHANGE UP
-  CEFUROXIME: SIGNIFICANT CHANGE UP
-  CIPROFLOXACIN: SIGNIFICANT CHANGE UP
-  ERTAPENEM: SIGNIFICANT CHANGE UP
-  GENTAMICIN: SIGNIFICANT CHANGE UP
-  IMIPENEM: SIGNIFICANT CHANGE UP
-  LEVOFLOXACIN: SIGNIFICANT CHANGE UP
-  MEROPENEM: SIGNIFICANT CHANGE UP
-  NITROFURANTOIN: SIGNIFICANT CHANGE UP
-  PIPERACILLIN/TAZOBACTAM: SIGNIFICANT CHANGE UP
-  TOBRAMYCIN: SIGNIFICANT CHANGE UP
-  TRIMETHOPRIM/SULFAMETHOXAZOLE: SIGNIFICANT CHANGE UP
ANION GAP SERPL CALC-SCNC: 9 MMOL/L — SIGNIFICANT CHANGE UP (ref 5–17)
BASOPHILS # BLD AUTO: 0.05 K/UL — SIGNIFICANT CHANGE UP (ref 0–0.2)
BASOPHILS NFR BLD AUTO: 0.5 % — SIGNIFICANT CHANGE UP (ref 0–2)
BUN SERPL-MCNC: 15 MG/DL — SIGNIFICANT CHANGE UP (ref 7–23)
CALCIUM SERPL-MCNC: 9.5 MG/DL — SIGNIFICANT CHANGE UP (ref 8.4–10.5)
CHLORIDE SERPL-SCNC: 108 MMOL/L — SIGNIFICANT CHANGE UP (ref 96–108)
CO2 SERPL-SCNC: 25 MMOL/L — SIGNIFICANT CHANGE UP (ref 22–31)
CREAT SERPL-MCNC: 0.93 MG/DL — SIGNIFICANT CHANGE UP (ref 0.5–1.3)
EGFR: 60 ML/MIN/1.73M2 — SIGNIFICANT CHANGE UP
EOSINOPHIL # BLD AUTO: 0.29 K/UL — SIGNIFICANT CHANGE UP (ref 0–0.5)
EOSINOPHIL NFR BLD AUTO: 3.1 % — SIGNIFICANT CHANGE UP (ref 0–6)
GLUCOSE SERPL-MCNC: 102 MG/DL — HIGH (ref 70–99)
HCT VFR BLD CALC: 38.1 % — SIGNIFICANT CHANGE UP (ref 34.5–45)
HGB BLD-MCNC: 11.6 G/DL — SIGNIFICANT CHANGE UP (ref 11.5–15.5)
IMM GRANULOCYTES NFR BLD AUTO: 0.3 % — SIGNIFICANT CHANGE UP (ref 0–0.9)
LYMPHOCYTES # BLD AUTO: 1.28 K/UL — SIGNIFICANT CHANGE UP (ref 1–3.3)
LYMPHOCYTES # BLD AUTO: 13.6 % — SIGNIFICANT CHANGE UP (ref 13–44)
MCHC RBC-ENTMCNC: 26.4 PG — LOW (ref 27–34)
MCHC RBC-ENTMCNC: 30.4 GM/DL — LOW (ref 32–36)
MCV RBC AUTO: 86.6 FL — SIGNIFICANT CHANGE UP (ref 80–100)
METHOD TYPE: SIGNIFICANT CHANGE UP
MONOCYTES # BLD AUTO: 1.03 K/UL — HIGH (ref 0–0.9)
MONOCYTES NFR BLD AUTO: 11 % — SIGNIFICANT CHANGE UP (ref 2–14)
NEUTROPHILS # BLD AUTO: 6.71 K/UL — SIGNIFICANT CHANGE UP (ref 1.8–7.4)
NEUTROPHILS NFR BLD AUTO: 71.5 % — SIGNIFICANT CHANGE UP (ref 43–77)
NRBC # BLD: 0 /100 WBCS — SIGNIFICANT CHANGE UP (ref 0–0)
PLATELET # BLD AUTO: 314 K/UL — SIGNIFICANT CHANGE UP (ref 150–400)
POTASSIUM SERPL-MCNC: 3.7 MMOL/L — SIGNIFICANT CHANGE UP (ref 3.5–5.3)
POTASSIUM SERPL-SCNC: 3.7 MMOL/L — SIGNIFICANT CHANGE UP (ref 3.5–5.3)
RBC # BLD: 4.4 M/UL — SIGNIFICANT CHANGE UP (ref 3.8–5.2)
RBC # FLD: 14.5 % — SIGNIFICANT CHANGE UP (ref 10.3–14.5)
SODIUM SERPL-SCNC: 142 MMOL/L — SIGNIFICANT CHANGE UP (ref 135–145)
WBC # BLD: 9.39 K/UL — SIGNIFICANT CHANGE UP (ref 3.8–10.5)
WBC # FLD AUTO: 9.39 K/UL — SIGNIFICANT CHANGE UP (ref 3.8–10.5)

## 2023-07-19 PROCEDURE — 99233 SBSQ HOSP IP/OBS HIGH 50: CPT

## 2023-07-19 RX ORDER — AMLODIPINE BESYLATE 2.5 MG/1
1 TABLET ORAL
Qty: 0 | Refills: 0 | DISCHARGE
Start: 2023-07-19

## 2023-07-19 RX ORDER — AMLODIPINE BESYLATE 2.5 MG/1
5 TABLET ORAL DAILY
Refills: 0 | Status: DISCONTINUED | OUTPATIENT
Start: 2023-07-19 | End: 2023-07-21

## 2023-07-19 RX ORDER — METOPROLOL TARTRATE 50 MG
2.5 TABLET ORAL ONCE
Refills: 0 | Status: DISCONTINUED | OUTPATIENT
Start: 2023-07-19 | End: 2023-07-19

## 2023-07-19 RX ADMIN — SERTRALINE 100 MILLIGRAM(S): 25 TABLET, FILM COATED ORAL at 11:23

## 2023-07-19 RX ADMIN — CEFTRIAXONE 100 MILLIGRAM(S): 500 INJECTION, POWDER, FOR SOLUTION INTRAMUSCULAR; INTRAVENOUS at 17:21

## 2023-07-19 RX ADMIN — QUETIAPINE FUMARATE 25 MILLIGRAM(S): 200 TABLET, FILM COATED ORAL at 17:22

## 2023-07-19 RX ADMIN — ATORVASTATIN CALCIUM 10 MILLIGRAM(S): 80 TABLET, FILM COATED ORAL at 22:10

## 2023-07-19 RX ADMIN — AMLODIPINE BESYLATE 2.5 MILLIGRAM(S): 2.5 TABLET ORAL at 11:23

## 2023-07-19 RX ADMIN — Medication 7.5 MILLIGRAM(S): at 17:22

## 2023-07-19 RX ADMIN — ENOXAPARIN SODIUM 40 MILLIGRAM(S): 100 INJECTION SUBCUTANEOUS at 11:23

## 2023-07-19 RX ADMIN — FAMOTIDINE 20 MILLIGRAM(S): 10 INJECTION INTRAVENOUS at 11:23

## 2023-07-19 NOTE — DISCHARGE NOTE PROVIDER - NSDCMRMEDTOKEN_GEN_ALL_CORE_FT
Acidophilus with Pectin oral capsule: 1 cap(s) orally 2 times a day  amLODIPine 5 mg oral tablet: 1 tab(s) orally once a day  atorvastatin 10 mg oral tablet: 1 tab(s) orally once a day (at bedtime)  busPIRone 7.5 mg oral tablet: 1 tab(s) orally 2 times a day  cranberry oral tablet: 450 orally once a day  famotidine 20 mg oral tablet: 1 tab(s) orally once a day (at bedtime)   melatonin 3 mg oral tablet: 1 tab(s) orally once a day (at bedtime), As needed, Insomnia  QUEtiapine 25 mg oral tablet: 1 tab(s) orally 2 times a day  sertraline 100 mg oral tablet: 1 tab(s) orally once a day

## 2023-07-19 NOTE — DISCHARGE NOTE PROVIDER - NSDCCPCAREPLAN_GEN_ALL_CORE_FT
PRINCIPAL DISCHARGE DIAGNOSIS  Diagnosis: Acute UTI (urinary tract infection)  Assessment and Plan of Treatment: You were admitted for altered mental status  You were diagnosed with UTI  You were treated with IV antibiotics  You were prescribed the following new medications: amlodipine 5mg daily for high blood pressure  You will need to follow up with your primary care physician.  Discharging Provider:  Milan Etienne D.O.  Contact Info: Cell [phone] - Please call with any questions or concerns.     PRINCIPAL DISCHARGE DIAGNOSIS  Diagnosis: Acute UTI (urinary tract infection)  Assessment and Plan of Treatment: You were admitted for altered mental status  You were diagnosed with UTI  You were treated with IV antibiotics  You were prescribed the following new medications: amlodipine 5mg daily for high blood pressure  You will need to follow up with your primary care physician.  Discharging Provider:  Milan Etienne D.O.  Contact Info: Cell [phone] - Please call with any questions or concerns.       PRINCIPAL DISCHARGE DIAGNOSIS  Diagnosis: Acute UTI (urinary tract infection)  Assessment and Plan of Treatment: You were admitted for altered mental status  You were diagnosed with UTI  You were treated with IV antibiotics  You were prescribed the following new medications: amlodipine 5mg daily for high blood pressure  You will need to follow up with your primary care physician.  Discharging Provider:  Priscilla Nichols DO  Contact Info: Cell [phone] - Please call with any questions or concerns.

## 2023-07-19 NOTE — DISCHARGE NOTE PROVIDER - CARE PROVIDER_API CALL
Cesar Barclay.  Internal Medicine  [address] [id number]  Phone: [phone]  Fax: [phone]  Follow Up Time:

## 2023-07-19 NOTE — CHART NOTE - NSCHARTNOTEFT_GEN_A_CORE
F/U Note:    87y Female admitted with    Interval Hx;    Patient confused, telling RN to get away from her and refusing to take her amlodipine.     Vital Signs Last 24 Hrs  T(C): 36.4 (19 Jul 2023 05:27), Max: 36.9 (18 Jul 2023 21:03)  T(F): 97.6 (19 Jul 2023 05:27), Max: 98.4 (18 Jul 2023 21:03)  HR: 72 (19 Jul 2023 05:27) (70 - 85)  BP: 194/81 (19 Jul 2023 05:27) (121/78 - 194/81)  BP(mean): 113 (19 Jul 2023 05:27) (113 - 113)  RR: 16 (19 Jul 2023 05:27) (16 - 18)  SpO2: 95% (19 Jul 2023 05:27) (95% - 96%)    Parameters below as of 19 Jul 2023 05:27  Patient On (Oxygen Delivery Method): room air                                12.0   10.08 )-----------( 310      ( 18 Jul 2023 07:13 )             39.2         07-18    141  |  107  |  17  ----------------------------<  112<H>  3.9   |  26  |  0.94    Ca    9.2      18 Jul 2023 07:13          .rosnrml         .normalexam F/U Note:    88 yo F w/ advanced dementia, HLD HTN, renal stone s/p stent, UTI's and NPH comes from [facility] for change in mental status, agitated, erratic behavior and fall. Hx of recent UTI and admission in June 2023 which grew Ecoli. Admitted for metabolic encephalopathy secondary to UTI.    Interval Hx;    Patient confused, telling RN to get away from her and refusing to take her amlodipine. Patient's BP with systolics in the 190s. Waited 30 mins for the patient to calm down and systolics down to the 120s. Told RN to reschedule her amlodipine.     Vital Signs Last 24 Hrs  T(C): 36.4 (19 Jul 2023 05:27), Max: 36.9 (18 Jul 2023 21:03)  T(F): 97.6 (19 Jul 2023 05:27), Max: 98.4 (18 Jul 2023 21:03)  HR: 72 (19 Jul 2023 05:27) (70 - 85)  BP: 194/81 (19 Jul 2023 05:27) (121/78 - 194/81)  BP(mean): 113 (19 Jul 2023 05:27) (113 - 113)  RR: 16 (19 Jul 2023 05:27) (16 - 18)  SpO2: 95% (19 Jul 2023 05:27) (95% - 96%)    Parameters below as of 19 Jul 2023 05:27  Patient On (Oxygen Delivery Method): room air                                12.0   10.08 )-----------( 310      ( 18 Jul 2023 07:13 )             39.2         07-18    141  |  107  |  17  ----------------------------<  112<H>  3.9   |  26  |  0.94    Ca    9.2      18 Jul 2023 07:13

## 2023-07-19 NOTE — DISCHARGE NOTE PROVIDER - NSDCCAREPROVSEEN_GEN_ALL_CORE_FT
Marquise, Jamshid Nichols, Priscilla Hsu, Carol Jacome, Nessa Moss, Hiren Sawyer, Nichol Hernandez, Pankaj Dent, Sai Priscilla Nichols Myrta

## 2023-07-19 NOTE — DISCHARGE NOTE PROVIDER - HOSPITAL COURSE
Hospital Course  HPI:  86 yo F w/ advanced dementia, HLD HTN, renal stone s/p stent, UTI's and NPH comes from [facility] for change in mental status, agitated, erratic behavior and fall. Hx of recent UTI and admission in June 2023 which grew Ecoli.   Admitted for metabolic encephalopathy secondary to UTI. The patient is unable to give a history due to dementia.      (16 Jul 2023 17:08)    Patient admitted for agitation and erratic behavior, AMS noted to have metabolic encephalopathy due to UTI. She was started on rocephin and UCx grew E coli _______. Patient's encephalopathy resolved. Seen by urology for frequent UTIs, CT renal stone hunt done which was negative. Also noted to have elevated BP and started on norvasc 5mg daily. Seen by PT and recommend ONEAL, stable for discharge    You were admitted for altered mental status  You were diagnosed with UTI  You were treated with IV antibiotics  You were prescribed the following new medications: amlodipine 5mg daily for high blood pressure    You will need to follow up with your primary care physician.    Discharging Provider:  Milan Etienne D.O.  Contact Info: Cell [phone] - Please call with any questions or concerns.    Outpatient Provider: Dr. Barclay    Signout given to  SNF Provider:   Hospital Course  HPI:  88 yo F w/ advanced dementia, HLD HTN, renal stone s/p stent, UTI's and NPH comes from [facility] for change in mental status, agitated, erratic behavior and fall. Hx of recent UTI and admission in June 2023 which grew Ecoli.   Admitted for metabolic encephalopathy secondary to UTI. The patient is unable to give a history due to dementia.    (16 Jul 2023 17:08)    Patient admitted for agitation and erratic behavior, AMS noted to have metabolic encephalopathy due to UTI. She was started on rocephin and UCx grew E coli. Patient's encephalopathy resolved. Seen by urology for frequent UTIs, CT renal stone hunt done which was negative. Also noted to have elevated BP and started on norvasc 5mg daily. Seen by PT and recommend ONEAL, stable for discharge. Pt was given 1 dose of potassium chloride powder 40mE given her potassium today 7/20 was 3.4.    You were admitted for altered mental status  You were diagnosed with UTI  You were treated with IV antibiotics  You were prescribed the following new medications: amlodipine 5mg daily for high blood pressure    You will need to follow up with your primary care physician.    Discharging Provider:  Dr. Priscilla Nichols  Contact Info: Cell [phone] - Please call with any questions or concerns.    Outpatient Provider: Dr. Barclay    Signout given to  SNF Provider:

## 2023-07-19 NOTE — DISCHARGE NOTE PROVIDER - ATTENDING DISCHARGE PHYSICAL EXAMINATION:
Gen: NAD  Neuro: Alert, follows simple commands grossly moves all extremities  Lungs: CTA b/l no wheezing

## 2023-07-19 NOTE — PROGRESS NOTE ADULT - SUBJECTIVE AND OBJECTIVE BOX
Patient is a 87y old  Female who presents with a chief complaint of AMS (18 Jul 2023 13:12)      Patient seen and examined at bedside. Overnight events noted. Patient denies any acute complaints at this time, feels well. Calm when speaking. Denies chest pain, sob, abd pain, headache, fever or chills    ALLERGIES:  No Known Allergies    MEDICATIONS  (STANDING):  amLODIPine   Tablet 2.5 milliGRAM(s) Oral daily  atorvastatin 10 milliGRAM(s) Oral at bedtime  busPIRone 7.5 milliGRAM(s) Oral two times a day  cefTRIAXone   IVPB 1000 milliGRAM(s) IV Intermittent every 24 hours  enoxaparin Injectable 40 milliGRAM(s) SubCutaneous every 24 hours  famotidine    Tablet 20 milliGRAM(s) Oral daily  lactobacillus acidophilus 1 Tablet(s) Oral every 12 hours  QUEtiapine 25 milliGRAM(s) Oral two times a day  sertraline 100 milliGRAM(s) Oral daily    MEDICATIONS  (PRN):  acetaminophen     Tablet .. 650 milliGRAM(s) Oral every 6 hours PRN Mild Pain (1 - 3)  acetaminophen     Tablet .. 650 milliGRAM(s) Oral every 6 hours PRN Temp greater or equal to 38C (100.4F)  aluminum hydroxide/magnesium hydroxide/simethicone Suspension 30 milliLiter(s) Oral every 4 hours PRN Dyspepsia  melatonin. 3 milliGRAM(s) Oral at bedtime PRN Insomnia  OLANZapine Injectable 2.5 milliGRAM(s) IntraMuscular every 12 hours PRN agitation, agression  ondansetron Injectable 4 milliGRAM(s) IV Push every 8 hours PRN Nausea and/or Vomiting    Vital Signs Last 24 Hrs  T(F): 97.5 (19 Jul 2023 10:22), Max: 98.4 (18 Jul 2023 21:03)  HR: 98 (19 Jul 2023 10:22) (72 - 98)  BP: 163/70 (19 Jul 2023 10:22) (121/78 - 194/81)  RR: 16 (19 Jul 2023 10:22) (16 - 18)  SpO2: 95% (19 Jul 2023 10:22) (95% - 95%)  I&O's Summary    18 Jul 2023 07:01  -  19 Jul 2023 07:00  --------------------------------------------------------  IN: 0 mL / OUT: 300 mL / NET: -300 mL      PHYSICAL EXAM:  GENERAL: NAD, laying in bed  HEAD:  Atraumatic, Normocephalic  EYES: PEERL, conjunctiva and sclera clear  ENMT: Moist mucous membranes,  Supple, No JVD  CHEST/LUNG: Clear to auscultation bilaterally, good air entry, non-labored breathing  HEART: RRR; S1/S2, No murmur  ABDOMEN: Soft, Nontender, Nondistended; Bowel sounds present  EXTREMITIES: No calf tenderness, No cyanosis, No edema  SKIN: Warm, perfused  PSYCH: Normal mood, Normal affect  NERVOUS SYSTEM:  awake and alert, follows commands    LABS:                        11.6   9.39  )-----------( 314      ( 19 Jul 2023 07:26 )             38.1     07-19    142  |  108  |  15  ----------------------------<  102  3.7   |  25  |  0.93    Ca    9.5      19 Jul 2023 07:26    TPro  7.7  /  Alb  3.3  /  TBili  0.6  /  DBili  x   /  AST  27  /  ALT  17  /  AlkPhos  119  07-16                                Urinalysis Basic - ( 19 Jul 2023 07:26 )    Color: x / Appearance: x / SG: x / pH: x  Gluc: 102 mg/dL / Ketone: x  / Bili: x / Urobili: x   Blood: x / Protein: x / Nitrite: x   Leuk Esterase: x / RBC: x / WBC x   Sq Epi: x / Non Sq Epi: x / Bacteria: x        Culture - Urine (collected 16 Jul 2023 16:02)  Source: Clean Catch Clean Catch (Midstream)  Preliminary Report (18 Jul 2023 13:32):    >100,000 CFU/ml Escherichia coli          RADIOLOGY & ADDITIONAL TESTS:    Care Discussed with Consultants/Other Providers:

## 2023-07-20 DIAGNOSIS — G93.41 METABOLIC ENCEPHALOPATHY: ICD-10-CM

## 2023-07-20 DIAGNOSIS — F03.90 UNSPECIFIED DEMENTIA WITHOUT BEHAVIORAL DISTURBANCE: ICD-10-CM

## 2023-07-20 LAB
-  AMPICILLIN: SIGNIFICANT CHANGE UP
-  CIPROFLOXACIN: SIGNIFICANT CHANGE UP
-  LEVOFLOXACIN: SIGNIFICANT CHANGE UP
-  NITROFURANTOIN: SIGNIFICANT CHANGE UP
-  TETRACYCLINE: SIGNIFICANT CHANGE UP
-  VANCOMYCIN: SIGNIFICANT CHANGE UP
ANION GAP SERPL CALC-SCNC: 13 MMOL/L — SIGNIFICANT CHANGE UP (ref 5–17)
BUN SERPL-MCNC: 19 MG/DL — SIGNIFICANT CHANGE UP (ref 7–23)
CALCIUM SERPL-MCNC: 9.8 MG/DL — SIGNIFICANT CHANGE UP (ref 8.4–10.5)
CHLORIDE SERPL-SCNC: 106 MMOL/L — SIGNIFICANT CHANGE UP (ref 96–108)
CO2 SERPL-SCNC: 24 MMOL/L — SIGNIFICANT CHANGE UP (ref 22–31)
CREAT SERPL-MCNC: 1.04 MG/DL — SIGNIFICANT CHANGE UP (ref 0.5–1.3)
CULTURE RESULTS: SIGNIFICANT CHANGE UP
EGFR: 53 ML/MIN/1.73M2 — LOW
GLUCOSE SERPL-MCNC: 102 MG/DL — HIGH (ref 70–99)
HCT VFR BLD CALC: 38.9 % — SIGNIFICANT CHANGE UP (ref 34.5–45)
HGB BLD-MCNC: 12 G/DL — SIGNIFICANT CHANGE UP (ref 11.5–15.5)
MCHC RBC-ENTMCNC: 26.3 PG — LOW (ref 27–34)
MCHC RBC-ENTMCNC: 30.8 GM/DL — LOW (ref 32–36)
MCV RBC AUTO: 85.3 FL — SIGNIFICANT CHANGE UP (ref 80–100)
METHOD TYPE: SIGNIFICANT CHANGE UP
NRBC # BLD: 0 /100 WBCS — SIGNIFICANT CHANGE UP (ref 0–0)
ORGANISM # SPEC MICROSCOPIC CNT: SIGNIFICANT CHANGE UP
PLATELET # BLD AUTO: 341 K/UL — SIGNIFICANT CHANGE UP (ref 150–400)
POTASSIUM SERPL-MCNC: 3.4 MMOL/L — LOW (ref 3.5–5.3)
POTASSIUM SERPL-SCNC: 3.4 MMOL/L — LOW (ref 3.5–5.3)
RBC # BLD: 4.56 M/UL — SIGNIFICANT CHANGE UP (ref 3.8–5.2)
RBC # FLD: 14.6 % — HIGH (ref 10.3–14.5)
SODIUM SERPL-SCNC: 143 MMOL/L — SIGNIFICANT CHANGE UP (ref 135–145)
SPECIMEN SOURCE: SIGNIFICANT CHANGE UP
WBC # BLD: 11.46 K/UL — HIGH (ref 3.8–10.5)
WBC # FLD AUTO: 11.46 K/UL — HIGH (ref 3.8–10.5)

## 2023-07-20 PROCEDURE — 99232 SBSQ HOSP IP/OBS MODERATE 35: CPT | Mod: GC

## 2023-07-20 RX ORDER — POTASSIUM CHLORIDE 20 MEQ
40 PACKET (EA) ORAL ONCE
Refills: 0 | Status: DISCONTINUED | OUTPATIENT
Start: 2023-07-20 | End: 2023-07-20

## 2023-07-20 RX ORDER — POTASSIUM CHLORIDE 20 MEQ
40 PACKET (EA) ORAL ONCE
Refills: 0 | Status: COMPLETED | OUTPATIENT
Start: 2023-07-20 | End: 2023-07-20

## 2023-07-20 RX ADMIN — FAMOTIDINE 20 MILLIGRAM(S): 10 INJECTION INTRAVENOUS at 11:18

## 2023-07-20 RX ADMIN — Medication 1 TABLET(S): at 06:04

## 2023-07-20 RX ADMIN — AMLODIPINE BESYLATE 5 MILLIGRAM(S): 2.5 TABLET ORAL at 06:04

## 2023-07-20 RX ADMIN — Medication 7.5 MILLIGRAM(S): at 06:04

## 2023-07-20 RX ADMIN — ENOXAPARIN SODIUM 40 MILLIGRAM(S): 100 INJECTION SUBCUTANEOUS at 11:19

## 2023-07-20 RX ADMIN — Medication 7.5 MILLIGRAM(S): at 17:27

## 2023-07-20 RX ADMIN — Medication 1 TABLET(S): at 17:27

## 2023-07-20 RX ADMIN — SERTRALINE 100 MILLIGRAM(S): 25 TABLET, FILM COATED ORAL at 11:19

## 2023-07-20 RX ADMIN — Medication 40 MILLIEQUIVALENT(S): at 11:18

## 2023-07-20 RX ADMIN — QUETIAPINE FUMARATE 25 MILLIGRAM(S): 200 TABLET, FILM COATED ORAL at 06:04

## 2023-07-20 RX ADMIN — QUETIAPINE FUMARATE 25 MILLIGRAM(S): 200 TABLET, FILM COATED ORAL at 17:27

## 2023-07-20 NOTE — PROGRESS NOTE ADULT - PROBLEM SELECTOR PLAN 1
-Secondary to UTI, now s/p treatment with Ceftriaxone  - CT head negative   - leukocytosis resolved  - CT renal stone hunt negative  - uro recs appreciated  - PT consult- ONEAL going today -Secondary to UTI, now s/p treatment with Ceftriaxone  - CT head negative   - CT renal stone hunt negative  - uro recs appreciated  - PT consult- ONEAL going today

## 2023-07-20 NOTE — PROGRESS NOTE ADULT - SUBJECTIVE AND OBJECTIVE BOX
Patient is a 87y old  Female who presents with a chief complaint of AMS (18 Jul 2023 13:12)    Patient seen and examined at bedside. Overnight events noted. Patient denies any acute complaints at this time, feels well.    ALLERGIES:  No Known Allergies    MEDICATIONS  (STANDING):  amLODIPine   Tablet 2.5 milliGRAM(s) Oral daily  atorvastatin 10 milliGRAM(s) Oral at bedtime  busPIRone 7.5 milliGRAM(s) Oral two times a day  cefTRIAXone   IVPB 1000 milliGRAM(s) IV Intermittent every 24 hours  enoxaparin Injectable 40 milliGRAM(s) SubCutaneous every 24 hours  famotidine    Tablet 20 milliGRAM(s) Oral daily  lactobacillus acidophilus 1 Tablet(s) Oral every 12 hours  QUEtiapine 25 milliGRAM(s) Oral two times a day  sertraline 100 milliGRAM(s) Oral daily    MEDICATIONS  (PRN):  acetaminophen     Tablet .. 650 milliGRAM(s) Oral every 6 hours PRN Mild Pain (1 - 3)  acetaminophen     Tablet .. 650 milliGRAM(s) Oral every 6 hours PRN Temp greater or equal to 38C (100.4F)  aluminum hydroxide/magnesium hydroxide/simethicone Suspension 30 milliLiter(s) Oral every 4 hours PRN Dyspepsia  melatonin. 3 milliGRAM(s) Oral at bedtime PRN Insomnia  OLANZapine Injectable 2.5 milliGRAM(s) IntraMuscular every 12 hours PRN agitation, agression  ondansetron Injectable 4 milliGRAM(s) IV Push every 8 hours PRN Nausea and/or Vomiting    Vital Signs Last 24 Hrs  T(C): 36.7 (20 Jul 2023 13:28), Max: 36.9 (19 Jul 2023 20:14)  T(F): 98.1 (20 Jul 2023 13:28), Max: 98.4 (19 Jul 2023 20:14)  HR: 67 (20 Jul 2023 13:28) (67 - 85)  BP: 136/93 (20 Jul 2023 13:28) (117/44 - 150/74)  BP(mean): 100 (20 Jul 2023 04:44) (100 - 100)  RR: 18 (20 Jul 2023 13:28) (17 - 18)  SpO2: 98% (20 Jul 2023 13:28) (93% - 98%)    Parameters below as of 20 Jul 2023 13:28  Patient On (Oxygen Delivery Method): room air          PHYSICAL EXAM:  GENERAL: NAD, laying in bed  HEAD:  Atraumatic, Normocephalic  EYES: PEERL, conjunctiva and sclera clear  ENMT: Moist mucous membranes,  Supple, No JVD  CHEST/LUNG: Clear to auscultation bilaterally, good air entry, non-labored breathing  HEART: RRR; S1/S2, No murmur  ABDOMEN: Soft, Nontender, Nondistended; Bowel sounds present  EXTREMITIES: No calf tenderness, No cyanosis, No edema  SKIN: Warm, perfused  PSYCH: Normal mood, Normal affect  NERVOUS SYSTEM:  awake and alert, follows commands    LABS:                                   12.0   11.46 )-----------( 341      ( 20 Jul 2023 07:30 )             38.9                                 Urinalysis Basic - ( 19 Jul 2023 07:26 )    Color: x / Appearance: x / SG: x / pH: x  Gluc: 102 mg/dL / Ketone: x  / Bili: x / Urobili: x   Blood: x / Protein: x / Nitrite: x   Leuk Esterase: x / RBC: x / WBC x   Sq Epi: x / Non Sq Epi: x / Bacteria: x        Culture - Urine (collected 16 Jul 2023 16:02)  Source: Clean Catch Clean Catch (Midstream)  Preliminary Report (18 Jul 2023 13:32):    >100,000 CFU/ml Escherichia coli          RADIOLOGY & ADDITIONAL TESTS:    Care Discussed with Consultants/Other Providers:    Patient is a 87y old  Female who presents with a chief complaint of AMS (18 Jul 2023 13:12)    Patient seen and examined at bedside. Overnight events noted. Patient denies any acute complaints at this time, feels well.  Ros limited due to mental status     ALLERGIES:  No Known Allergies    MEDICATIONS  (STANDING):  amLODIPine   Tablet 2.5 milliGRAM(s) Oral daily  atorvastatin 10 milliGRAM(s) Oral at bedtime  busPIRone 7.5 milliGRAM(s) Oral two times a day  cefTRIAXone   IVPB 1000 milliGRAM(s) IV Intermittent every 24 hours  enoxaparin Injectable 40 milliGRAM(s) SubCutaneous every 24 hours  famotidine    Tablet 20 milliGRAM(s) Oral daily  lactobacillus acidophilus 1 Tablet(s) Oral every 12 hours  QUEtiapine 25 milliGRAM(s) Oral two times a day  sertraline 100 milliGRAM(s) Oral daily    MEDICATIONS  (PRN):  acetaminophen     Tablet .. 650 milliGRAM(s) Oral every 6 hours PRN Mild Pain (1 - 3)  acetaminophen     Tablet .. 650 milliGRAM(s) Oral every 6 hours PRN Temp greater or equal to 38C (100.4F)  aluminum hydroxide/magnesium hydroxide/simethicone Suspension 30 milliLiter(s) Oral every 4 hours PRN Dyspepsia  melatonin. 3 milliGRAM(s) Oral at bedtime PRN Insomnia  OLANZapine Injectable 2.5 milliGRAM(s) IntraMuscular every 12 hours PRN agitation, agression  ondansetron Injectable 4 milliGRAM(s) IV Push every 8 hours PRN Nausea and/or Vomiting    Vital Signs Last 24 Hrs  T(C): 36.7 (20 Jul 2023 13:28), Max: 36.9 (19 Jul 2023 20:14)  T(F): 98.1 (20 Jul 2023 13:28), Max: 98.4 (19 Jul 2023 20:14)  HR: 67 (20 Jul 2023 13:28) (67 - 85)  BP: 136/93 (20 Jul 2023 13:28) (117/44 - 150/74)  BP(mean): 100 (20 Jul 2023 04:44) (100 - 100)  RR: 18 (20 Jul 2023 13:28) (17 - 18)  SpO2: 98% (20 Jul 2023 13:28) (93% - 98%)    Parameters below as of 20 Jul 2023 13:28  Patient On (Oxygen Delivery Method): room air          PHYSICAL EXAM:  GENERAL: NAD, laying in bed  HEAD:  Atraumatic, Normocephalic  EYES: PEERL, conjunctiva and sclera clear  ENMT: Moist mucous membranes,  Supple, No JVD  CHEST/LUNG: Clear to auscultation bilaterally, good air entry, non-labored breathing  HEART: RRR; S1/S2, No murmur  ABDOMEN: Soft, Nontender, Nondistended; Bowel sounds present  EXTREMITIES: No calf tenderness, No cyanosis, No edema  SKIN: Warm, perfused  PSYCH: Normal mood, Normal affect  NERVOUS SYSTEM:  awake and alert, follows commands grossly moves all extremities     LABS:                                   12.0   11.46 )-----------( 341      ( 20 Jul 2023 07:30 )             38.9               Urinalysis Basic - ( 19 Jul 2023 07:26 )    Color: x / Appearance: x / SG: x / pH: x  Gluc: 102 mg/dL / Ketone: x  / Bili: x / Urobili: x   Blood: x / Protein: x / Nitrite: x   Leuk Esterase: x / RBC: x / WBC x   Sq Epi: x / Non Sq Epi: x / Bacteria: x        Culture - Urine (collected 16 Jul 2023 16:02)  Source: Clean Catch Clean Catch (Midstream)  Preliminary Report (18 Jul 2023 13:32):    >100,000 CFU/ml Escherichia coli          RADIOLOGY & ADDITIONAL TESTS:    Care Discussed with Consultants/Other Providers:

## 2023-07-21 ENCOUNTER — TRANSCRIPTION ENCOUNTER (OUTPATIENT)
Age: 88
End: 2023-07-21

## 2023-07-21 VITALS
RESPIRATION RATE: 16 BRPM | TEMPERATURE: 98 F | HEART RATE: 74 BPM | OXYGEN SATURATION: 95 % | SYSTOLIC BLOOD PRESSURE: 108 MMHG | DIASTOLIC BLOOD PRESSURE: 75 MMHG

## 2023-07-21 LAB
ANION GAP SERPL CALC-SCNC: 7 MMOL/L — SIGNIFICANT CHANGE UP (ref 5–17)
BUN SERPL-MCNC: 22 MG/DL — SIGNIFICANT CHANGE UP (ref 7–23)
CALCIUM SERPL-MCNC: 9.9 MG/DL — SIGNIFICANT CHANGE UP (ref 8.4–10.5)
CHLORIDE SERPL-SCNC: 106 MMOL/L — SIGNIFICANT CHANGE UP (ref 96–108)
CO2 SERPL-SCNC: 26 MMOL/L — SIGNIFICANT CHANGE UP (ref 22–31)
CREAT SERPL-MCNC: 0.98 MG/DL — SIGNIFICANT CHANGE UP (ref 0.5–1.3)
EGFR: 56 ML/MIN/1.73M2 — LOW
GLUCOSE SERPL-MCNC: 102 MG/DL — HIGH (ref 70–99)
HCT VFR BLD CALC: 38 % — SIGNIFICANT CHANGE UP (ref 34.5–45)
HGB BLD-MCNC: 11.7 G/DL — SIGNIFICANT CHANGE UP (ref 11.5–15.5)
MCHC RBC-ENTMCNC: 26.6 PG — LOW (ref 27–34)
MCHC RBC-ENTMCNC: 30.8 GM/DL — LOW (ref 32–36)
MCV RBC AUTO: 86.4 FL — SIGNIFICANT CHANGE UP (ref 80–100)
NRBC # BLD: 0 /100 WBCS — SIGNIFICANT CHANGE UP (ref 0–0)
PLATELET # BLD AUTO: 326 K/UL — SIGNIFICANT CHANGE UP (ref 150–400)
POTASSIUM SERPL-MCNC: 4.1 MMOL/L — SIGNIFICANT CHANGE UP (ref 3.5–5.3)
POTASSIUM SERPL-SCNC: 4.1 MMOL/L — SIGNIFICANT CHANGE UP (ref 3.5–5.3)
RBC # BLD: 4.4 M/UL — SIGNIFICANT CHANGE UP (ref 3.8–5.2)
RBC # FLD: 14.6 % — HIGH (ref 10.3–14.5)
SODIUM SERPL-SCNC: 139 MMOL/L — SIGNIFICANT CHANGE UP (ref 135–145)
WBC # BLD: 10.13 K/UL — SIGNIFICANT CHANGE UP (ref 3.8–10.5)
WBC # FLD AUTO: 10.13 K/UL — SIGNIFICANT CHANGE UP (ref 3.8–10.5)

## 2023-07-21 PROCEDURE — 93005 ELECTROCARDIOGRAM TRACING: CPT

## 2023-07-21 PROCEDURE — 96361 HYDRATE IV INFUSION ADD-ON: CPT

## 2023-07-21 PROCEDURE — 71045 X-RAY EXAM CHEST 1 VIEW: CPT

## 2023-07-21 PROCEDURE — 80048 BASIC METABOLIC PNL TOTAL CA: CPT

## 2023-07-21 PROCEDURE — 85025 COMPLETE CBC W/AUTO DIFF WBC: CPT

## 2023-07-21 PROCEDURE — 87186 SC STD MICRODIL/AGAR DIL: CPT

## 2023-07-21 PROCEDURE — 96365 THER/PROPH/DIAG IV INF INIT: CPT

## 2023-07-21 PROCEDURE — 85027 COMPLETE CBC AUTOMATED: CPT

## 2023-07-21 PROCEDURE — 99285 EMERGENCY DEPT VISIT HI MDM: CPT | Mod: 25

## 2023-07-21 PROCEDURE — 74176 CT ABD & PELVIS W/O CONTRAST: CPT

## 2023-07-21 PROCEDURE — 87086 URINE CULTURE/COLONY COUNT: CPT

## 2023-07-21 PROCEDURE — 70450 CT HEAD/BRAIN W/O DYE: CPT | Mod: MA

## 2023-07-21 PROCEDURE — 81001 URINALYSIS AUTO W/SCOPE: CPT

## 2023-07-21 PROCEDURE — 80053 COMPREHEN METABOLIC PANEL: CPT

## 2023-07-21 PROCEDURE — 97162 PT EVAL MOD COMPLEX 30 MIN: CPT

## 2023-07-21 PROCEDURE — 36415 COLL VENOUS BLD VENIPUNCTURE: CPT

## 2023-07-21 PROCEDURE — 99239 HOSP IP/OBS DSCHRG MGMT >30: CPT

## 2023-07-21 PROCEDURE — 87077 CULTURE AEROBIC IDENTIFY: CPT

## 2023-07-21 RX ADMIN — ENOXAPARIN SODIUM 40 MILLIGRAM(S): 100 INJECTION SUBCUTANEOUS at 11:41

## 2023-07-21 RX ADMIN — FAMOTIDINE 20 MILLIGRAM(S): 10 INJECTION INTRAVENOUS at 11:40

## 2023-07-21 RX ADMIN — SERTRALINE 100 MILLIGRAM(S): 25 TABLET, FILM COATED ORAL at 11:40

## 2023-07-21 NOTE — PROGRESS NOTE ADULT - SUBJECTIVE AND OBJECTIVE BOX
Patient is a 87y old  Female who presents with a chief complaint of AMS (20 Jul 2023 13:51)    INTERVAL HPI/ OVERNIGHT EVENTS: Patient seen and examined at bedside. No overnight events. She states she slept well and feels well.    MEDICATIONS  (STANDING):  amLODIPine   Tablet 5 milliGRAM(s) Oral daily  atorvastatin 10 milliGRAM(s) Oral at bedtime  busPIRone 7.5 milliGRAM(s) Oral two times a day  enoxaparin Injectable 40 milliGRAM(s) SubCutaneous every 24 hours  famotidine    Tablet 20 milliGRAM(s) Oral daily  lactobacillus acidophilus 1 Tablet(s) Oral every 12 hours  QUEtiapine 25 milliGRAM(s) Oral two times a day  sertraline 100 milliGRAM(s) Oral daily    MEDICATIONS  (PRN):  acetaminophen     Tablet .. 650 milliGRAM(s) Oral every 6 hours PRN Mild Pain (1 - 3)  acetaminophen     Tablet .. 650 milliGRAM(s) Oral every 6 hours PRN Temp greater or equal to 38C (100.4F)  aluminum hydroxide/magnesium hydroxide/simethicone Suspension 30 milliLiter(s) Oral every 4 hours PRN Dyspepsia  melatonin. 3 milliGRAM(s) Oral at bedtime PRN Insomnia  OLANZapine Injectable 2.5 milliGRAM(s) IntraMuscular every 12 hours PRN agitation, agression  ondansetron Injectable 4 milliGRAM(s) IV Push every 8 hours PRN Nausea and/or Vomiting    No Known Allergies    REVIEW OF SYSTEMS:  CONSTITUTIONAL: No fever or chills  HEENT:  No headache, no sore throat  RESPIRATORY: No cough, wheezing, or shortness of breath  CARDIOVASCULAR: No palpitations  GASTROINTESTINAL: No abd pain, nausea, vomiting, or diarrhea  GENITOURINARY: No dysuria, frequency, or hematuria  NEUROLOGICAL: no focal weakness or dizziness  MUSCULOSKELETAL: no myalgias     Vital Signs Last 24 Hrs  Vital Signs Last 24 Hrs  T(C): 36.3 (21 Jul 2023 04:49), Max: 37.1 (20 Jul 2023 21:14)  T(F): 97.3 (21 Jul 2023 04:49), Max: 98.7 (20 Jul 2023 21:14)  HR: 68 (21 Jul 2023 04:49) (67 - 75)  BP: 156/62 (21 Jul 2023 04:49) (128/68 - 156/62)  BP(mean): 94 (21 Jul 2023 04:49) (94 - 94)  RR: 17 (21 Jul 2023 04:49) (17 - 18)  SpO2: 95% (21 Jul 2023 04:49) (95% - 98%)    O2 Parameters below as of 21 Jul 2023 04:49  Patient On (Oxygen Delivery Method): room air    I&O's Summary  BMI (kg/m2): 25.1 (07-17-23 @ 00:54)    PHYSICAL EXAM:  GENERAL: NAD  HEENT:  AT/NC, anicteric, moist mucous membranes, EOMI, PERRL, no lid-lag, conjunctiva and sclera clear  CHEST/LUNG:  CTA b/l, no rales, wheezes, or rhonchi,  normal respiratory effort, no intercostal retractions  HEART:  RRR, S1, S2, no murmurs; no pitting edema  ABDOMEN:  BS+, soft, nontender, nondistended; No HSM  MSK/EXTREMITIES: 2+ peripheral pulses, no clubbing or cyanosis  NERVOUS SYSTEM: answers questions and follows commands appropriately, A&Ox3 grossly moves all extremities   PSYCH: Appropriate affect, Alert & Awake; Good judgement      LABS: Personally reviewed  CBC                        11.7   10.13 )-----------( 326      ( 21 Jul 2023 09:15 )             38.0     CMP  07-21    139  |  106  |  22  ----------------------------<  102  4.1   |  26  |  0.98    Ca    9.9      21 Jul 2023 09:15        Urinalysis Basic - ( 21 Jul 2023 09:15 )  Color: x / Appearance: x / SG: x / pH: x  Gluc: 102 mg/dL / Ketone: x  / Bili: x / Urobili: x   Blood: x / Protein: x / Nitrite: x   Leuk Esterase: x / RBC: x / WBC x   Sq Epi: x / Non Sq Epi: x / Bacteria: x      Culture - Urine (collected 16 Jul 2023 16:02)  Source: Clean Catch Clean Catch (Midstream)  Final Report (20 Jul 2023 22:23):    >100,000 CFU/ml Escherichia coli    50,000 - 99,000 CFU/mL Enterococcus faecalis  Organism: Escherichia coli  Enterococcus faecalis (20 Jul 2023 22:23)  Organism: Enterococcus faecalis (20 Jul 2023 22:23)      Method Type: RAKESH      -  Ciprofloxacin: R >2      -  Ampicillin: S <=2 Predicts results to ampicillin/sulbactam, amoxacillin-clavulanate and  piperacillin-tazobactam.      -  Levofloxacin: R >4      -  Nitrofurantoin: S <=32 Should not be used to treat pyelonephritis.      -  Tetracycline: R >8      -  Vancomycin: S 2  Organism: Escherichia coli (20 Jul 2023 22:23)      Method Type: RAKESH      -  Amikacin: S <=16      -  Amoxicillin/Clavulanic Acid: S <=8/4      -  Ampicillin: S <=8 These ampicillin results predict results for amoxicillin      -  Ampicillin/Sulbactam: S <=4/2 Enterobacter, Klebsiella aerogenes, Citrobacter, and Serratia may develop resistance during prolonged therapy (3-4 days)      -  Aztreonam: S <=4      -  Cefazolin: S <=2 For uncomplicated UTI with K. pneumoniae, E. coli, or P. mirablis: RAKESH <=16 is sensitive and RAKESH >=32 is resistant. This also predicts results for oral agents cefaclor, cefdinir, cefpodoxime, cefprozil, cefuroxime axetil, cephalexin and locarbef for uncomplicated UTI. Note that some isolates may be susceptible to these agents while testing resistant to cefazolin.      -  Cefepime: S <=2      -  Cefoxitin: S <=8      -  Ceftriaxone: S <=1 Enterobacter, Klebsiella aerogenes, Citrobacter, and Serratia may develop resistance during prolonged therapy      -  Cefuroxime: S <=4      -  Ciprofloxacin: S <=0.25      -  Ertapenem: S <=0.5      -  Gentamicin: S <=2      -  Imipenem: S <=1      -  Levofloxacin: S <=0.5      -  Meropenem: S <=1      -  Nitrofurantoin: S <=32 Should not be used to treat pyelonephritis      -  Piperacillin/Tazobactam: S <=8      -  Tobramycin: S <=2      -  Trimethoprim/Sulfamethoxazole: S <=0.5/9.5      Culture - Urine (collected 07-16-23 @ 16:02)  Source: Clean Catch Clean Catch (Midstream)  Final Report (07-20-23 @ 22:23):    >100,000 CFU/ml Escherichia coli    50,000 - 99,000 CFU/mL Enterococcus faecalis  Organism: Escherichia coli  Enterococcus faecalis (07-20-23 @ 22:23)  Organism: Enterococcus faecalis (07-20-23 @ 22:23)      Method Type: RAKESH      -  Ciprofloxacin: R >2      -  Ampicillin: S <=2 Predicts results to ampicillin/sulbactam, amoxacillin-clavulanate and  piperacillin-tazobactam.      -  Levofloxacin: R >4      -  Nitrofurantoin: S <=32 Should not be used to treat pyelonephritis.      -  Tetracycline: R >8      -  Vancomycin: S 2  Organism: Escherichia coli (07-20-23 @ 22:23)      Method Type: RAKESH      -  Amikacin: S <=16      -  Amoxicillin/Clavulanic Acid: S <=8/4      -  Ampicillin: S <=8 These ampicillin results predict results for amoxicillin      -  Ampicillin/Sulbactam: S <=4/2 Enterobacter, Klebsiella aerogenes, Citrobacter, and Serratia may develop resistance during prolonged therapy (3-4 days)      -  Aztreonam: S <=4      -  Cefazolin: S <=2 For uncomplicated UTI with K. pneumoniae, E. coli, or P. mirablis: RAKESH <=16 is sensitive and RAKESH >=32 is resistant. This also predicts results for oral agents cefaclor, cefdinir, cefpodoxime, cefprozil, cefuroxime axetil, cephalexin and locarbef for uncomplicated UTI. Note that some isolates may be susceptible to these agents while testing resistant to cefazolin.      -  Cefepime: S <=2      -  Cefoxitin: S <=8      -  Ceftriaxone: S <=1 Enterobacter, Klebsiella aerogenes, Citrobacter, and Serratia may develop resistance during prolonged therapy      -  Cefuroxime: S <=4      -  Ciprofloxacin: S <=0.25      -  Ertapenem: S <=0.5      -  Gentamicin: S <=2      -  Imipenem: S <=1      -  Levofloxacin: S <=0.5      -  Meropenem: S <=1      -  Nitrofurantoin: S <=32 Should not be used to treat pyelonephritis      -  Piperacillin/Tazobactam: S <=8      -  Tobramycin: S <=2      -  Trimethoprim/Sulfamethoxazole: S <=0.5/9.5        RADIOLOGY & ADDITIONAL TESTS: Personally reviewed.     Consultant(s) Notes Reviewed:  [x] YES  [ ] NO   Discussed with LAURA/VERÓNICA, RN     Patient is a 87y old  Female who presents with a chief complaint of AMS (20 Jul 2023 13:51)    INTERVAL HPI/ OVERNIGHT EVENTS: Patient seen and examined at bedside. No overnight events. She states she slept well and feels well.  ROS limited due to mental status    MEDICATIONS  (STANDING):  amLODIPine   Tablet 5 milliGRAM(s) Oral daily  atorvastatin 10 milliGRAM(s) Oral at bedtime  busPIRone 7.5 milliGRAM(s) Oral two times a day  enoxaparin Injectable 40 milliGRAM(s) SubCutaneous every 24 hours  famotidine    Tablet 20 milliGRAM(s) Oral daily  lactobacillus acidophilus 1 Tablet(s) Oral every 12 hours  QUEtiapine 25 milliGRAM(s) Oral two times a day  sertraline 100 milliGRAM(s) Oral daily    MEDICATIONS  (PRN):  acetaminophen     Tablet .. 650 milliGRAM(s) Oral every 6 hours PRN Mild Pain (1 - 3)  acetaminophen     Tablet .. 650 milliGRAM(s) Oral every 6 hours PRN Temp greater or equal to 38C (100.4F)  aluminum hydroxide/magnesium hydroxide/simethicone Suspension 30 milliLiter(s) Oral every 4 hours PRN Dyspepsia  melatonin. 3 milliGRAM(s) Oral at bedtime PRN Insomnia  OLANZapine Injectable 2.5 milliGRAM(s) IntraMuscular every 12 hours PRN agitation, agression  ondansetron Injectable 4 milliGRAM(s) IV Push every 8 hours PRN Nausea and/or Vomiting    No Known Allergies    Vital Signs Last 24 Hrs  Vital Signs Last 24 Hrs  T(C): 36.3 (21 Jul 2023 04:49), Max: 37.1 (20 Jul 2023 21:14)  T(F): 97.3 (21 Jul 2023 04:49), Max: 98.7 (20 Jul 2023 21:14)  HR: 68 (21 Jul 2023 04:49) (67 - 75)  BP: 156/62 (21 Jul 2023 04:49) (128/68 - 156/62)  BP(mean): 94 (21 Jul 2023 04:49) (94 - 94)  RR: 17 (21 Jul 2023 04:49) (17 - 18)  SpO2: 95% (21 Jul 2023 04:49) (95% - 98%)    O2 Parameters below as of 21 Jul 2023 04:49  Patient On (Oxygen Delivery Method): room air    I&O's Summary  BMI (kg/m2): 25.1 (07-17-23 @ 00:54)    PHYSICAL EXAM:  GENERAL: NAD  HEENT:  AT/NC, anicteric, moist mucous membranes, EOMI, PERRL, no lid-lag, conjunctiva and sclera clear  CHEST/LUNG:  CTA b/l, no rales, wheezes, or rhonchi,  normal respiratory effort, no intercostal retractions  HEART:  RRR, S1, S2, no murmurs; no pitting edema  ABDOMEN:  BS+, soft, nontender, nondistended; No HSM  MSK/EXTREMITIES: palpable peripheral pulses, no clubbing or cyanosis  NERVOUS SYSTEM: Alert, follows simple commands, grossly moves all extremities   PSYCH: Appropriate affect, Alert & Awake; poor judgement      LABS: Personally reviewed  CBC                        11.7   10.13 )-----------( 326      ( 21 Jul 2023 09:15 )             38.0     CMP  07-21    139  |  106  |  22  ----------------------------<  102  4.1   |  26  |  0.98    Ca    9.9      21 Jul 2023 09:15        Urinalysis Basic - ( 21 Jul 2023 09:15 )  Color: x / Appearance: x / SG: x / pH: x  Gluc: 102 mg/dL / Ketone: x  / Bili: x / Urobili: x   Blood: x / Protein: x / Nitrite: x   Leuk Esterase: x / RBC: x / WBC x   Sq Epi: x / Non Sq Epi: x / Bacteria: x      Culture - Urine (collected 16 Jul 2023 16:02)  Source: Clean Catch Clean Catch (Midstream)  Final Report (20 Jul 2023 22:23):    >100,000 CFU/ml Escherichia coli    50,000 - 99,000 CFU/mL Enterococcus faecalis  Organism: Escherichia coli  Enterococcus faecalis (20 Jul 2023 22:23)  Organism: Enterococcus faecalis (20 Jul 2023 22:23)      Method Type: RAKESH      -  Ciprofloxacin: R >2      -  Ampicillin: S <=2 Predicts results to ampicillin/sulbactam, amoxacillin-clavulanate and  piperacillin-tazobactam.      -  Levofloxacin: R >4      -  Nitrofurantoin: S <=32 Should not be used to treat pyelonephritis.      -  Tetracycline: R >8      -  Vancomycin: S 2  Organism: Escherichia coli (20 Jul 2023 22:23)      Method Type: RAKESH      -  Amikacin: S <=16      -  Amoxicillin/Clavulanic Acid: S <=8/4      -  Ampicillin: S <=8 These ampicillin results predict results for amoxicillin      -  Ampicillin/Sulbactam: S <=4/2 Enterobacter, Klebsiella aerogenes, Citrobacter, and Serratia may develop resistance during prolonged therapy (3-4 days)      -  Aztreonam: S <=4      -  Cefazolin: S <=2 For uncomplicated UTI with K. pneumoniae, E. coli, or P. mirablis: RAKESH <=16 is sensitive and RAKESH >=32 is resistant. This also predicts results for oral agents cefaclor, cefdinir, cefpodoxime, cefprozil, cefuroxime axetil, cephalexin and locarbef for uncomplicated UTI. Note that some isolates may be susceptible to these agents while testing resistant to cefazolin.      -  Cefepime: S <=2      -  Cefoxitin: S <=8      -  Ceftriaxone: S <=1 Enterobacter, Klebsiella aerogenes, Citrobacter, and Serratia may develop resistance during prolonged therapy      -  Cefuroxime: S <=4      -  Ciprofloxacin: S <=0.25      -  Ertapenem: S <=0.5      -  Gentamicin: S <=2      -  Imipenem: S <=1      -  Levofloxacin: S <=0.5      -  Meropenem: S <=1      -  Nitrofurantoin: S <=32 Should not be used to treat pyelonephritis      -  Piperacillin/Tazobactam: S <=8      -  Tobramycin: S <=2      -  Trimethoprim/Sulfamethoxazole: S <=0.5/9.5      Culture - Urine (collected 07-16-23 @ 16:02)  Source: Clean Catch Clean Catch (Midstream)  Final Report (07-20-23 @ 22:23):    >100,000 CFU/ml Escherichia coli    50,000 - 99,000 CFU/mL Enterococcus faecalis  Organism: Escherichia coli  Enterococcus faecalis (07-20-23 @ 22:23)  Organism: Enterococcus faecalis (07-20-23 @ 22:23)      Method Type: RAKESH      -  Ciprofloxacin: R >2      -  Ampicillin: S <=2 Predicts results to ampicillin/sulbactam, amoxacillin-clavulanate and  piperacillin-tazobactam.      -  Levofloxacin: R >4      -  Nitrofurantoin: S <=32 Should not be used to treat pyelonephritis.      -  Tetracycline: R >8      -  Vancomycin: S 2  Organism: Escherichia coli (07-20-23 @ 22:23)      Method Type: RAKESH      -  Amikacin: S <=16      -  Amoxicillin/Clavulanic Acid: S <=8/4      -  Ampicillin: S <=8 These ampicillin results predict results for amoxicillin      -  Ampicillin/Sulbactam: S <=4/2 Enterobacter, Klebsiella aerogenes, Citrobacter, and Serratia may develop resistance during prolonged therapy (3-4 days)      -  Aztreonam: S <=4      -  Cefazolin: S <=2 For uncomplicated UTI with K. pneumoniae, E. coli, or P. mirablis: RAKESH <=16 is sensitive and RAKESH >=32 is resistant. This also predicts results for oral agents cefaclor, cefdinir, cefpodoxime, cefprozil, cefuroxime axetil, cephalexin and locarbef for uncomplicated UTI. Note that some isolates may be susceptible to these agents while testing resistant to cefazolin.      -  Cefepime: S <=2      -  Cefoxitin: S <=8      -  Ceftriaxone: S <=1 Enterobacter, Klebsiella aerogenes, Citrobacter, and Serratia may develop resistance during prolonged therapy      -  Cefuroxime: S <=4      -  Ciprofloxacin: S <=0.25      -  Ertapenem: S <=0.5      -  Gentamicin: S <=2      -  Imipenem: S <=1      -  Levofloxacin: S <=0.5      -  Meropenem: S <=1      -  Nitrofurantoin: S <=32 Should not be used to treat pyelonephritis      -  Piperacillin/Tazobactam: S <=8      -  Tobramycin: S <=2      -  Trimethoprim/Sulfamethoxazole: S <=0.5/9.5        RADIOLOGY & ADDITIONAL TESTS: Personally reviewed.     Consultant(s) Notes Reviewed:  [x] YES  [ ] NO   Discussed with SW/VERÓNICA, RN

## 2023-07-21 NOTE — PROGRESS NOTE ADULT - ATTENDING COMMENTS
88 yo F w/ advanced dementia, HLD HTN, renal stone s/p stent, UTI's and NPH comes from [facility] for change in mental status, agitated, erratic behavior and fall. Hx of recent UTI and admission in June 2023 which grew Ecoli. Admitted for metabolic encephalopathy secondary to UTI. Completed course of IV abx. UCx reviewed.    Hypokalemia improved after repletion  Leukocytosis resolved.     HTN - continue amlodipine monitor vitals  HLD - continue Lipitor .   CKD Stage 3 avoid nephrotoxic medications follow up routine BMP    DC planning, awaiting bed.
86 yo F w/ advanced dementia, HLD HTN, renal stone s/p stent, UTI's and NPH comes from [facility] for change in mental status, agitated, erratic behavior and fall. Hx of recent UTI and admission in June 2023 which grew Ecoli. Admitted for metabolic encephalopathy secondary to UTI.    Hypokalemia - replete KCL 40mEq x 1 Follow up AM BMP  Leukocytosis likely reactive afebrile chronic follow up AM CBC    HTN - continue amlodipine monitor vitals  HLD - continue Lipitor

## 2023-07-21 NOTE — PROGRESS NOTE ADULT - TIME BILLING
Reviewing chart notes and data, face to face time counseling the patient, coordinating care with SW/CM at IDR[facility].   Pt seen and examined. Case discussed with resident. Agree with assessment and plan above (edited by me in detail)
Reviewing chart notes and data, face to face time counseling the patient, coordinating care with SW/CM at IDRs.   Pt seen and examined. Case discussed with resident. Agree with assessment and plan above (edited by me in detail)

## 2023-07-21 NOTE — DISCHARGE NOTE NURSING/CASE MANAGEMENT/SOCIAL WORK - PATIENT PORTAL LINK FT
You can access the FollowMyHealth Patient Portal offered by [facility] by registering at the following website: [email or web address]. By joining [employer]’s FollowMyHealth portal, you will also be able to view your health information using other applications (apps) compatible with our system.

## 2023-07-21 NOTE — PROGRESS NOTE ADULT - PROBLEM SELECTOR PLAN 2
- cont home quetiapine 25 mg BID, buspirone 7.5 mg BID and sertraline 100 mg qd  - Zyprexa prn agitation/aggression
- cont home quetiapine 25 mg BID, buspirone 7.5 mg BID and sertraline 100 mg qd  - Zyprexa prn agitation/aggression

## 2023-07-21 NOTE — PROGRESS NOTE ADULT - ASSESSMENT
86 yo F w/ advanced dementia, HLD HTN, renal stone s/p stent, UTI's and NPH comes from [facility] for change in mental status, agitated, erratic behavior and fall. Hx of recent UTI and admission in June 2023 which grew Ecoli. Admitted for metabolic encephalopathy secondary to UTI.    #Metabolic encephalopathy  #Acute UTI  #Hx of recurrent UTIs  #Fall   - CT head negative   - likely in setting of UTI, now encephalopathy appears resolved  - leukocytosis resolved  - continue rocephin for now  - follow up UCx  - Urology consult appreciated- will get CT renal stone hunt to r/o stone and stent patency  - PT consult- [facility]    #hyperlipidemia  - Continue home atorvastatin 10 mg qhs    #Dementia w/ mood disturbance.  #anxiety  - cont home quetiapine 25 mg BID, buspirone 7.5 mg BID and sertraline 100 mg qd  - Zyprexa prn agitation/aggression    #Hx of HTN  - low dose Norvasc   - noted to be elevated, continue to monitor and will increase to 5mg if persistently elevated    #DVT ppx  - lovenox    updated daughter Cris Ram over the phone and answered all questions
86 yo F w/ advanced dementia, HLD HTN, renal stone s/p stent, UTI's and NPH comes from [facility] for change in mental status, agitated, erratic behavior and fall. Hx of recent UTI and admission in June 2023 which grew Ecoli. Admitted for metabolic encephalopathy secondary to UTI.    #Metabolic encephalopathy  #Acute UTI  #Hx of recurrent UTIs  #Fall   - CT head negative   - likely in setting of UTI, now encephalopathy appears resolved  - leukocytosis resolved  - continue rocephin for now, today is last day  - UCx with E coli, follow up S&S- if pansensitive, has completed course  - CT renal stone hunt negative  - uro recs appreciated  - PT consult- [facility]    #hyperlipidemia  - Continue home atorvastatin 10 mg qhs    #Dementia w/ mood disturbance.  #anxiety  - cont home quetiapine 25 mg BID, buspirone 7.5 mg BID and sertraline 100 mg qd  - Zyprexa prn agitation/aggression    #Hx of HTN  - low dose Norvasc   - noted to be elevated, continue to monitor and will increase to 5mg if persistently elevated    #DVT ppx  - lovenox    updated daughter Cris Ram over the phone and answered all questions  dispo: pending sensitivities of urine culture to determine if correct abx- medically stable to discharge to [facility] otherwise
86 yo F w/ advanced dementia, HLD HTN, renal stone s/p stent, UTI's and NPH comes from [facility] for change in mental status, agitated, erratic behavior and fall. Hx of recent UTI and admission in June 2023 which grew Ecoli. Admitted for metabolic encephalopathy secondary to UTI.    #Metabolic encephalopathy  #Acute UTI  #Hx of recurrent UTIs  #Fall   - CT head negative   - likely in setting of UTI, now encephalopathy appears resolved  - leukocytosis resolved  - continue rocephin for now  - follow up UCx and BCx   - Urology consult pending  - PT consult- [facility]    #hyperlipidemia  - Continue home atorvastatin 10 mg qhs    #Dementia w/ mood disturbance.  #anxiety  - cont home quetiapine 25 mg BID, buspirone 7.5 mg BID and sertraline 100 mg qd  - Zyprexa prn agitation/aggression    #Hx of HTN  - low dose Norvasc   - noted to be elevated, continue to monitor and will increase to 5mg if persistently elevated    #DVT ppx  - lovenox    updated daughter Cris Ram over the phone and answered all questions
88 yo F w/ advanced dementia, HLD HTN, renal stone s/p stent, UTI's and NPH comes from [facility] for change in mental status, agitated, erratic behavior and fall. Hx of recent UTI and admission in June 2023 which grew Ecoli. Admitted for metabolic encephalopathy secondary to UTI.    
88 yo F w/ advanced dementia, HLD HTN, renal stone s/p stent, UTI's and NPH comes from [facility] for change in mental status, agitated, erratic behavior and fall. Hx of recent UTI and admission in June 2023 which grew Ecoli. Admitted for metabolic encephalopathy secondary to UTI.

## 2023-07-21 NOTE — PROGRESS NOTE ADULT - PROBLEM SELECTOR PLAN 1
- Secondary to UTI, now s/p treatment with Ceftriaxone  - CT head negative   - CT renal stone hunt negative  - uro recs appreciated  - wbc on 7/21/23 improved  - K supplemented and was corrected to 4.1   - PT consult- [facility] going today 7/21/23

## 2023-08-01 PROBLEM — Z78.9 OTHER SPECIFIED HEALTH STATUS: Chronic | Status: ACTIVE | Noted: 2022-01-18

## 2023-08-02 PROBLEM — Z00.00 ENCOUNTER FOR PREVENTIVE HEALTH EXAMINATION: Status: ACTIVE | Noted: 2023-08-02

## 2023-08-09 NOTE — DISCUSSION/SUMMARY
[de-identified] : 87-year-old woman with a left shoulder injury, approximately  -The risks and benefits of operative versus nonoperative management were discussed at length with the patient. The patient shows a good understanding of the injury and treatment options. They would like to move forward with ~  ~ .  - - - - -Follow-up in -All of the patient's questions and concerns were addressed.

## 2023-08-09 NOTE — HISTORY OF PRESENT ILLNESS
[de-identified] : Ms. GINA GOLD is a 87 year old woman presents today for initial evaluation of a left shoulder injury sustained on She was seen at where x-rays were taken.

## 2023-08-10 ENCOUNTER — APPOINTMENT (OUTPATIENT)
Dept: ORTHOPEDIC SURGERY | Facility: CLINIC | Age: 88
End: 2023-08-10

## 2023-08-21 NOTE — ED ADULT NURSE NOTE - NSIMPLEMENTINTERV_GEN_ALL_ED
ADELINA Hutson Registered Nurse  
Implemented All Universal Safety Interventions:  [place] to call system. Call bell, personal items and telephone within reach. Instruct patient to call for assistance. Room bathroom lighting operational. Non-slip footwear when patient is off stretcher. Physically safe environment: no spills, clutter or unnecessary equipment. Stretcher in lowest position, wheels locked, appropriate side rails in place.

## 2023-10-26 NOTE — PHYSICAL THERAPY INITIAL EVALUATION ADULT - PERTINENT HX OF CURRENT PROBLEM, REHAB EVAL
Caller: Aruna Hammonds    Relationship: Self    Best call back number: [phone]    What is the best time to reach you: [facility] 28HART    Who are you requesting to speak with (clinical staff, provider,  specific staff member): CLINICAL STAFF      What was the call regarding: PT REQUESTS PREP, TIME OF ARRIVAL, LOCATION, ETC BE SENT VIA [employer]/PROC IS 11.13.23    Is it okay if the provider responds through [employer]: YES         83 year old female with pmhx of HTN, mild AI, moderate TR, moderate pulm HTN, and recurrent UTIs p/w left flank pain and dysuria. Pt recently seen by  PCP and found to have a UTI, started on cipro 2 days prior to admission. Pt found to have left sided distal ureteral stone with leukocytosis to 22. Now s/p L stent placement for UTI, left distal ureteral stones.

## 2023-12-14 NOTE — ED ADULT TRIAGE NOTE - HEART RATE (BEATS/MIN)
[FreeTextEntry1] : 75-year-old male with a PMH of HLD, Facial nerve paralysis, Ischemic stroke 9 months ago. anxiety, BPH, Acoustic neuroma complain of LLQ pain x 3 days. reports he gave himself an enema OTC and had a small bowel movement. before the enema he was constipated and did have a BM for three days. denies any blood in the stool, N/VD. fevers, myalgias. Denies any previous abdominal surgery. has not had a colonoscopy for over 20 years.   1. Acute LLQ pain W/ Constipation  
76

## 2024-03-31 RX ORDER — AMLODIPINE BESYLATE 2.5 MG/1
1 TABLET ORAL
Qty: 0 | Refills: 0 | DISCHARGE

## 2024-03-31 RX ORDER — LACTOBACILLUS ACIDOPHILUS 100MM CELL
1 CAPSULE ORAL
Qty: 0 | Refills: 0 | DISCHARGE

## 2024-03-31 RX ORDER — SERTRALINE 25 MG/1
1 TABLET, FILM COATED ORAL
Qty: 0 | Refills: 0 | DISCHARGE

## 2024-03-31 RX ORDER — QUETIAPINE FUMARATE 200 MG/1
1 TABLET, FILM COATED ORAL
Qty: 0 | Refills: 0 | DISCHARGE

## 2024-03-31 RX ORDER — FAMOTIDINE 10 MG/ML
1 INJECTION INTRAVENOUS
Qty: 0 | Refills: 0 | DISCHARGE

## 2024-03-31 RX ORDER — LOSARTAN POTASSIUM 100 MG/1
1 TABLET, FILM COATED ORAL
Qty: 0 | Refills: 0 | DISCHARGE

## 2024-03-31 RX ORDER — APIXABAN 2.5 MG/1
1 TABLET, FILM COATED ORAL
Qty: 0 | Refills: 0 | DISCHARGE

## 2024-03-31 RX ORDER — ERGOCALCIFEROL 1.25 MG/1
1 CAPSULE ORAL
Qty: 0 | Refills: 0 | DISCHARGE

## 2024-04-03 NOTE — ED PROCEDURE NOTE - NS ED PROCEDURE ASSISTED BY
Mohs Case Number: [id number] Date Of Previous Biopsy (Optional): 2/20/24 Previous Accession (Optional): [id number] Biopsy Photograph Reviewed: Yes Consent Type: Consent 1 (Standard) Eye Shield Used: No Initial Size Of Lesion: 1.5 X Size Of Lesion In Cm (Optional): 1.2 Number Of Stages: 1 Primary Defect Length In Cm (Final Defect Size - Required For Flaps/Grafts): 1.8 Primary Defect Width In Cm (Final Defect Size - Required For Flaps/Grafts): 1.4 Repair Type: Referred to plastics for closure Which Instrument Did You Use For Dermabrasion?: Wire Brush Which Eyelid Repair Cpt Are You Using?: 44405 Oculoplastic Surgeon Procedure Text (A): After obtaining clear surgical margins the patient was sent to oculoplastics for surgical repair.  The patient understands they will receive post-surgical care and follow-up from the referring physician's office. Otolaryngologist Procedure Text (A): After obtaining clear surgical margins the patient was sent to otolaryngology for surgical repair.  The patient understands they will receive post-surgical care and follow-up from the referring physician's office. Supervision was available Plastic Surgeon Procedure Text (A): After obtaining clear surgical margins the patient was sent to plastics for surgical repair.  The patient understands they will receive post-surgical care and follow-up from the referring physician's office. Mid-Level Procedure Text (A): After obtaining clear surgical margins the patient was sent to a mid-level provider for surgical repair.  The patient understands they will receive post-surgical care and follow-up from the mid-level provider. Provider Procedure Text (A): After obtaining clear surgical margins the defect was repaired by another provider. Asc Procedure Text (A): After obtaining clear surgical margins the patient was sent to an ASC for surgical repair.  The patient understands they will receive post-surgical care and follow-up from the ASC physician. Simple / Intermediate / Complex Repair - Final Wound Length In Cm: 0 Suturegard Retention Suture: 2-0 Nylon Retention Suture Bite Size: 3 mm Length To Time In Minutes Device Was In Place: 10 Undermining Type: Entire Wound Debridement Text: The wound edges were debrided prior to proceeding with the closure to facilitate wound healing. Helical Rim Text: The closure involved the helical rim. Vermilion Border Text: The closure involved the vermilion border. Nostril Rim Text: The closure involved the nostril rim. Retention Suture Text: Retention sutures were placed to support the closure and prevent dehiscence. Location Indication Override (Is Already Calculated Based On Selected Body Location): Area M Area H Indication Text: Tumors in this location are included in Area H (eyelids, eyebrows, nose, lips, chin, ear, pre-auricular, post-auricular, temple, genitalia, hands, feet, ankles and areola).  Tissue conservation is critical in these anatomic locations. Area M Indication Text: Tumors in this location are included in Area M (cheek, forehead, scalp, neck, jawline and pretibial skin).  Mohs surgery is indicated for tumors in these anatomic locations. Area L Indication Text: Tumors in this location are included in Area L (trunk and extremities).  Mohs surgery is indicated for larger tumors, or tumors with aggressive histologic features, in these anatomic locations. Depth Of Tumor Invasion (For Histology): tumor not visualized (deep and peripheral margins are clear of tumor) Perineural Invasion (For Histology - Be Specific If Possible): absent Special Stains Stage 1 - Results: Base On Clearance Noted Above Stage 2: Additional Anesthesia Type: 1% lidocaine with epinephrine Staging Info: By selecting yes to the question above you will include information on AJCC 8 tumor staging in your Mohs note. Information on tumor staging will be automatically added for SCCs on the head and neck. AJCC 8 includes tumor size, tumor depth, perineural involvement and bone invasion. Tumor Depth: Less than 6mm from granular layer and no invasion beyond the subcutaneous fat Was The Patient On Physician Recommended Anticoagulation Therapy?: Please Select the Appropriate Response Medical Necessity Statement: Based on the clinical exam, the pathology, the patient's preference and my medical judgement, Mohs surgery is the most appropriate treatment for this cancer compared to other treatments. Alternatives Discussed Intro (Do Not Add Period): I discussed alternative treatments to Mohs surgery and specifically discussed the risks and benefits of Consent 1/Introductory Paragraph: The rationale for Mohs was explained to the patient and consent was obtained. The risks, benefits and alternatives to therapy were discussed in detail. Specifically, the risks of infection, scarring, pain, bleeding, prolonged wound healing, incomplete removal, allergy to anesthesia, nerve injury, recurrence and the possible need of delayed or additional reconstruction were addressed. Prior to the procedure, the treatment site was clearly identified and confirmed by the patient. All components of Universal Protocol/PAUSE Rule completed. Consent 2/Introductory Paragraph: Mohs surgery was explained to the patient and consent was obtained. The risks, benefits and alternatives to therapy were discussed in detail. Specifically, the risks of infection, scarring, bleeding, prolonged wound healing, incomplete removal, allergy to anesthesia, nerve injury and recurrence were addressed. Prior to the procedure, the treatment site was clearly identified and confirmed by the patient. All components of Universal Protocol/PAUSE Rule completed. Consent 3/Introductory Paragraph: I gave the patient a chance to ask questions they had about the procedure.  Following this I explained the Mohs procedure and consent was obtained. The risks, benefits and alternatives to therapy were discussed in detail. Specifically, the risks of infection, scarring, bleeding, prolonged wound healing, incomplete removal, allergy to anesthesia, nerve injury and recurrence were addressed. Prior to the procedure, the treatment site was clearly identified and confirmed by the patient. All components of Universal Protocol/PAUSE Rule completed. Consent (Temporal Branch)/Introductory Paragraph: The rationale for Mohs was explained to the patient and consent was obtained. The risks, benefits and alternatives to therapy were discussed in detail. Specifically, the risks of damage to the temporal branch of the facial nerve, infection, scarring, pain, bleeding, prolonged wound healing, incomplete removal, allergy to anesthesia, nerve injury, recurrence and the possible need of delayed or additional reconstruction were addressed.  Prior to the procedure, the treatment site was clearly identified and confirmed by the patient. All components of Universal Protocol/PAUSE Rule completed. Consent (Marginal Mandibular)/Introductory Paragraph: The rationale for Mohs was explained to the patient and consent was obtained. The risks, benefits and alternatives to therapy were discussed in detail. Specifically, the risks of damage to the marginal mandibular branch of the facial nerve, infection, scarring, bleeding, prolonged wound healing, incomplete removal, allergy to anesthesia, and recurrence were addressed. Prior to the procedure, the treatment site was clearly identified and confirmed by the patient. All components of Universal Protocol/PAUSE Rule completed. Consent (Spinal Accessory)/Introductory Paragraph: The rationale for Mohs was explained to the patient and consent was obtained. The risks, benefits and alternatives to therapy were discussed in detail. Specifically, the risks of damage to the spinal accessory nerve, infection, scarring, bleeding, prolonged wound healing, incomplete removal, allergy to anesthesia, and recurrence were addressed. Prior to the procedure, the treatment site was clearly identified and confirmed by the patient. All components of Universal Protocol/PAUSE Rule completed. Consent (Near Eyelid Margin)/Introductory Paragraph: The rationale for Mohs was explained to the patient and consent was obtained. The risks, benefits and alternatives to therapy were discussed in detail. Specifically, the risks of ectropion or eyelid deformity, infection, scarring, bleeding, prolonged wound healing, incomplete removal, allergy to anesthesia, nerve injury and recurrence were addressed. Prior to the procedure, the treatment site was clearly identified and confirmed by the patient. All components of Universal Protocol/PAUSE Rule completed. Consent (Ear)/Introductory Paragraph: The rationale for Mohs was explained to the patient and consent was obtained. The risks, benefits and alternatives to therapy were discussed in detail. Specifically, the risks of ear deformity, infection, scarring, bleeding, prolonged wound healing, incomplete removal, allergy to anesthesia, nerve injury and recurrence were addressed. Prior to the procedure, the treatment site was clearly identified and confirmed by the patient. All components of Universal Protocol/PAUSE Rule completed. Consent (Nose)/Introductory Paragraph: The rationale for Mohs was explained to the patient and consent was obtained. The risks, benefits and alternatives to therapy were discussed in detail. Specifically, the risks of nasal deformity, changes in the flow of air through the nose, infection, scarring, bleeding, prolonged wound healing, incomplete removal, allergy to anesthesia, nerve injury and recurrence were addressed. Prior to the procedure, the treatment site was clearly identified and confirmed by the patient. All components of Universal Protocol/PAUSE Rule completed. Consent (Lip)/Introductory Paragraph: The rationale for Mohs was explained to the patient and consent was obtained. The risks, benefits and alternatives to therapy were discussed in detail. Specifically, the risks of lip deformity, changes in the oral aperture, infection, scarring, bleeding, prolonged wound healing, incomplete removal, allergy to anesthesia, nerve injury and recurrence were addressed. Prior to the procedure, the treatment site was clearly identified and confirmed by the patient. All components of Universal Protocol/PAUSE Rule completed. Consent (Scalp)/Introductory Paragraph: The rationale for Mohs was explained to the patient and consent was obtained. The risks, benefits and alternatives to therapy were discussed in detail. Specifically, the risks of changes in hair growth pattern secondary to repair, infection, scarring, bleeding, prolonged wound healing, incomplete removal, allergy to anesthesia, nerve injury and recurrence were addressed. Prior to the procedure, the treatment site was clearly identified and confirmed by the patient. All components of Universal Protocol/PAUSE Rule completed. Detail Level: Detailed Postop Diagnosis: same Surgeon: Joseline Burden MD Anesthesia Type: 1% lidocaine with 1:100,000 epinephrine and a 1:10 solution of 8.4% sodium bicarbonate Anesthesia Volume In Cc: 3 Hemostasis: Electrocautery Estimated Blood Loss (Cc): minimal Brow Lift Text: A midfrontal incision was made medially to the defect to allow access to the tissues just superior to the left eyebrow. Following careful dissection inferiorly in a supraperiosteal plane to the level of the left eyebrow, several 3-0 monocryl sutures were used to resuspend the eyebrow orbicularis oculi muscular unit to the superior frontal bone periosteum. This resulted in an appropriate reapproximation of static eyebrow symmetry and correction of the left brow ptosis. Deep Sutures: 5-0 Monocryl Epidermal Sutures: 5-0 Fast Absorbing Gut Epidermal Closure: running Suturegard Intro: Intraoperative tissue expansion was performed, utilizing the SUTUREGARD device, in order to reduce wound tension. Suturegard Body: The suture ends were repeatedly re-tightened and re-clamped to achieve the desired tissue expansion. Hemigard Intro: Due to skin fragility and wound tension, it was decided to use HEMIGARD adhesive retention suture devices to permit a linear closure. The skin was cleaned and dried for a 6cm distance away from the wound. Excessive hair, if present, was removed to allow for adhesion. Hemigard Postcare Instructions: The HEMIGARD strips are to remain completely dry for at least 5-7 days. Donor Site Anesthesia Type: same as repair anesthesia Epidermal Closure Graft Donor Site (Optional): simple interrupted Graft Donor Site Bandage (Optional-Leave Blank If You Don't Want In Note): Steri-strips and a pressure bandage were applied to the donor site. Closure 2 Information: This tab is for additional flaps and grafts, including complex repair and grafts and complex repair and flaps. You can also specify a different location for the additional defect, if the location is the same you do not need to select a new one. We will insert the automated text for the repair you select below just as we do for solitary flaps and grafts. Please note that at this time if you select a location with a different insurance zone you will need to override the ICD10 and CPT if appropriate. Closure 3 Information: This tab is for additional flaps and grafts above and beyond our usual structured repairs.  Please note if you enter information here it will not currently bill and you will need to add the billing information manually. Dressing: pressure dressing Wound Care (No Sutures): Petrolatum Dressing (No Sutures): dry sterile dressing Unna Boot Text: An Unna boot was placed to help immobilize the limb and facilitate more rapid healing. Home Suture Removal Text: Patient was provided instructions on removing sutures and will remove their sutures at home.  If they have any questions or difficulties they will call the office. Post-Care Instructions: Should the patient develop any fevers, chills, bleeding, severe pain patient will contact the office immediately. Pain Refusal Text: I offered to prescribe pain medication but the patient refused to take this medication. Mauc Instructions: By selecting yes to the question below the MAUC number will be added into the note.  This will be calculated automatically based on the diagnosis chosen, the size entered, the body zone selected (H,M,L) and the specific indications you chose. You will also have the option to override the Mohs AUC if you disagree with the automatically calculated number and this option is found in the Case Summary tab. Where Do You Want The Question To Include Opioid Counseling Located?: Case Summary Tab Eye Protection Verbiage: Before proceeding with the stage, a plastic scleral shield was inserted. The globe was anesthetized with a few drops of 1% lidocaine with 1:100,000 epinephrine. Then, an appropriate sized scleral shield was chosen and coated with lacrilube ointment. The shield was gently inserted and left in place for the duration of each stage. After the stage was completed, the shield was gently removed. Mohs Method Verbiage: The operative site was curetted for margins and a beveled incision following the standard Mohs approach was done.  The specimen was harvested as a microscopic controlled layer and a map of the extirpated tissue was made for orientation. Surgeon/Pathologist Verbiage (Will Incorporate Name Of Surgeon From Intro If Not Blank): operated in two distinct and integrated capacities as the surgeon and pathologist. Mohs Histo Method Verbiage: Each section was then chromacoded and processed in the Mohs lab using the Mohs protocol and submitted for horizontal frozen section. Subsequent Stages Histo Method Verbiage: Using a similar technique to that described above, a thin layer of tissue was removed from all areas where tumor was visible on the previous stage.  The tissue was again oriented, mapped, dyed, and processed as above. Mohs Rapid Report Verbiage: The area of clinically evident tumor was marked with skin marking ink and appropriately hatched.  The initial incision was made following the Mohs approach through the skin.  The specimen was taken to the lab, divided into the necessary number of pieces, chromacoded and processed according to the Mohs protocol.  This was repeated in successive stages until a tumor free defect was achieved. Complex Repair Preamble Text (Leave Blank If You Do Not Want): Extensive wide undermining was performed. Intermediate Repair Preamble Text (Leave Blank If You Do Not Want): Wide undermining was performed with blunt dissection. Crescentic Intermediate Repair Preamble Text (Leave Blank If You Do Not Want): Undermining was performed with blunt dissection. Non-Graft Cartilage Fenestration Text: The cartilage was fenestrated with a 2mm punch biopsy to help facilitate healing. Graft Cartilage Fenestration Text: The cartilage was fenestrated with a 2mm punch biopsy to help facilitate graft survival and healing. Secondary Intention Text (Leave Blank If You Do Not Want): The defect will heal with secondary intention. No Repair - Repaired With Adjacent Surgical Defect Text (Leave Blank If You Do Not Want): After obtaining clear surgical margins the defect was repaired concurrently with another surgical defect which was in close approximation. Referred To Plastics For Closure Text (Leave Blank If You Do Not Want): After obtaining clear surgical margins the patient was sent to plastics for surgical repair.  The patient understands they will receive post-surgical care and follow-up from Dr. Snow. Referred To Mid-Level For Closure Text (Leave Blank If You Do Not Want): After obtaining clear surgical margins the patient was sent to Edy Panchal PA-C for surgical repair.  The patient understands they will receive post-surgical care and follow-up from the mid-level provider. Adjacent Tissue Transfer Text: The defect edges were debeveled with a #15 scalpel blade.  Given the location of the defect and the proximity to free margins an adjacent tissue transfer was deemed most appropriate.  Using a sterile surgical marker, an appropriate flap was drawn incorporating the defect and placing the expected incisions within the relaxed skin tension lines where possible.    The area thus outlined was incised deep to adipose tissue with a #15 scalpel blade.  The skin margins were undermined to an appropriate distance in all directions utilizing iris scissors. Advancement Flap (Single) Text: The defect edges were debeveled with a #15 scalpel blade.  Given the location of the defect and the proximity to free margins a single advancement flap was deemed most appropriate.  Using a sterile surgical marker, an appropriate advancement flap was drawn incorporating the defect and placing the expected incisions within the relaxed skin tension lines where possible.    The area thus outlined was incised deep to adipose tissue with a #15 scalpel blade.  The skin margins were undermined to an appropriate distance in all directions utilizing iris scissors. Advancement Flap (Double) Text: The defect edges were debeveled with a #15 scalpel blade.  Given the location of the defect and the proximity to free margins a double advancement flap was deemed most appropriate.  Using a sterile surgical marker, the appropriate advancement flaps were drawn incorporating the defect and placing the expected incisions within the relaxed skin tension lines where possible.    The area thus outlined was incised deep to adipose tissue with a #15 scalpel blade.  The skin margins were undermined to an appropriate distance in all directions utilizing iris scissors. Burow's Advancement Flap Text: The defect edges were debeveled with a #15 scalpel blade.  Given the location of the defect and the proximity to free margins a Burow's advancement flap was deemed most appropriate.  Using a sterile surgical marker, the appropriate advancement flap was drawn incorporating the defect and placing the expected incisions within the relaxed skin tension lines where possible.    The area thus outlined was incised deep to adipose tissue with a #15 scalpel blade.  The skin margins were undermined to an appropriate distance in all directions utilizing iris scissors. Chonodrocutaneous Helical Advancement Flap Text: The defect edges were debeveled with a #15 scalpel blade.  Given the location of the defect and the proximity to free margins a chondrocutaneous helical advancement flap was deemed most appropriate.  Using a sterile surgical marker, the appropriate advancement flap was drawn incorporating the defect and placing the expected incisions within the relaxed skin tension lines where possible.    The area thus outlined was incised deep to adipose tissue with a #15 scalpel blade.  The skin margins were undermined to an appropriate distance in all directions utilizing iris scissors. Crescentic Advancement Flap Text: The defect edges were debeveled with a #15 scalpel blade.  Given the location of the defect and the proximity to free margins a crescentic advancement flap was deemed most appropriate.  Using a sterile surgical marker, the appropriate advancement flap was drawn incorporating the defect and placing the expected incisions within the relaxed skin tension lines where possible.    The area thus outlined was incised deep to adipose tissue with a #15 scalpel blade.  The skin margins were undermined to an appropriate distance in all directions utilizing iris scissors. A-T Advancement Flap Text: The defect edges were debeveled with a #15 scalpel blade.  Given the location of the defect, shape of the defect and the proximity to free margins an A-T advancement flap was deemed most appropriate.  Using a sterile surgical marker, an appropriate advancement flap was drawn incorporating the defect and placing the expected incisions within the relaxed skin tension lines where possible.    The area thus outlined was incised deep to adipose tissue with a #15 scalpel blade.  The skin margins were undermined to an appropriate distance in all directions utilizing iris scissors. O-T Advancement Flap Text: The defect edges were debeveled with a #15 scalpel blade.  Given the location of the defect, shape of the defect and the proximity to free margins an O-T advancement flap was deemed most appropriate.  Using a sterile surgical marker, an appropriate advancement flap was drawn incorporating the defect and placing the expected incisions within the relaxed skin tension lines where possible.    The area thus outlined was incised deep to adipose tissue with a #15 scalpel blade.  The skin margins were undermined to an appropriate distance in all directions utilizing iris scissors. O-L Flap Text: The defect edges were debeveled with a #15 scalpel blade.  Given the location of the defect, shape of the defect and the proximity to free margins an O-L flap was deemed most appropriate.  Using a sterile surgical marker, an appropriate advancement flap was drawn incorporating the defect and placing the expected incisions within the relaxed skin tension lines where possible.    The area thus outlined was incised deep to adipose tissue with a #15 scalpel blade.  The skin margins were undermined to an appropriate distance in all directions utilizing iris scissors. O-Z Flap Text: The defect edges were debeveled with a #15 scalpel blade.  Given the location of the defect, shape of the defect and the proximity to free margins an O-Z flap was deemed most appropriate.  Using a sterile surgical marker, an appropriate transposition flap was drawn incorporating the defect and placing the expected incisions within the relaxed skin tension lines where possible. The area thus outlined was incised deep to adipose tissue with a #15 scalpel blade.  The skin margins were undermined to an appropriate distance in all directions utilizing iris scissors. Double O-Z Flap Text: The defect edges were debeveled with a #15 scalpel blade.  Given the location of the defect, shape of the defect and the proximity to free margins a Double O-Z flap was deemed most appropriate.  Using a sterile surgical marker, an appropriate transposition flap was drawn incorporating the defect and placing the expected incisions within the relaxed skin tension lines where possible. The area thus outlined was incised deep to adipose tissue with a #15 scalpel blade.  The skin margins were undermined to an appropriate distance in all directions utilizing iris scissors. V-Y Flap Text: The defect edges were debeveled with a #15 scalpel blade.  Given the location of the defect, shape of the defect and the proximity to free margins a V-Y flap was deemed most appropriate.  Using a sterile surgical marker, an appropriate advancement flap was drawn incorporating the defect and placing the expected incisions within the relaxed skin tension lines where possible.    The area thus outlined was incised deep to adipose tissue with a #15 scalpel blade.  The skin margins were undermined to an appropriate distance in all directions utilizing iris scissors. Advancement-Rotation Flap Text: The defect edges were debeveled with a #15 scalpel blade.  Given the location of the defect, shape of the defect and the proximity to free margins an advancement-rotation flap was deemed most appropriate.  Using a sterile surgical marker, an appropriate flap was drawn incorporating the defect and placing the expected incisions within the relaxed skin tension lines where possible. The area thus outlined was incised deep to adipose tissue with a #15 scalpel blade.  The skin margins were undermined to an appropriate distance in all directions utilizing iris scissors. Mercedes Flap Text: The defect edges were debeveled with a #15 scalpel blade.  Given the location of the defect, shape of the defect and the proximity to free margins a Mercedes flap was deemed most appropriate.  Using a sterile surgical marker, an appropriate advancement flap was drawn incorporating the defect and placing the expected incisions within the relaxed skin tension lines where possible. The area thus outlined was incised deep to adipose tissue with a #15 scalpel blade.  The skin margins were undermined to an appropriate distance in all directions utilizing iris scissors. Modified Advancement Flap Text: The defect edges were debeveled with a #15 scalpel blade.  Given the location of the defect, shape of the defect and the proximity to free margins a modified advancement flap was deemed most appropriate.  Using a sterile surgical marker, an appropriate advancement flap was drawn incorporating the defect and placing the expected incisions within the relaxed skin tension lines where possible.    The area thus outlined was incised deep to adipose tissue with a #15 scalpel blade.  The skin margins were undermined to an appropriate distance in all directions utilizing iris scissors. Mucosal Advancement Flap Text: Given the location of the defect, shape of the defect and the proximity to free margins a mucosal advancement flap was deemed most appropriate. Incisions were made with a 15 blade scalpel in the appropriate fashion along the cutaneous vermilion border and the mucosal lip. The remaining actinically damaged mucosal tissue was excised.  The mucosal advancement flap was then elevated to the gingival sulcus with care taken to preserve the neurovascular structures and advanced into the primary defect. Care was taken to ensure that precise realignment of the vermilion border was achieved. Peng Advancement Flap Text: The defect edges were debeveled with a #15 scalpel blade.  Given the location of the defect, shape of the defect and the proximity to free margins a Peng advancement flap was deemed most appropriate.  Using a sterile surgical marker, an appropriate advancement flap was drawn incorporating the defect and placing the expected incisions within the relaxed skin tension lines where possible. The area thus outlined was incised deep to adipose tissue with a #15 scalpel blade.  The skin margins were undermined to an appropriate distance in all directions utilizing iris scissors. Hatchet Flap Text: The defect edges were debeveled with a #15 scalpel blade.  Given the location of the defect, shape of the defect and the proximity to free margins a hatchet flap was deemed most appropriate.  Using a sterile surgical marker, an appropriate hatchet flap was drawn incorporating the defect and placing the expected incisions within the relaxed skin tension lines where possible.    The area thus outlined was incised deep to adipose tissue with a #15 scalpel blade.  The skin margins were undermined to an appropriate distance in all directions utilizing iris scissors. Rotation Flap Text: The defect edges were debeveled with a #15 scalpel blade.  Given the location of the defect, shape of the defect and the proximity to free margins a rotation flap was deemed most appropriate.  Using a sterile surgical marker, an appropriate rotation flap was drawn incorporating the defect and placing the expected incisions within the relaxed skin tension lines where possible.    The area thus outlined was incised deep to adipose tissue with a #15 scalpel blade.  The skin margins were undermined to an appropriate distance in all directions utilizing iris scissors. Bilateral Rotation Flap Text: The defect edges were debeveled with a #15 scalpel blade. Given the location of the defect, shape of the defect and the proximity to free margins a bilateral rotation flap was deemed most appropriate. Using a sterile surgical marker, an appropriate rotation flap was drawn incorporating the defect and placing the expected incisions within the relaxed skin tension lines where possible. The area thus outlined was incised deep to adipose tissue with a #15 scalpel blade. The skin margins were undermined to an appropriate distance in all directions utilizing iris scissors. Following this, the designed flap was carried over into the primary defect and sutured into place. Spiral Flap Text: The defect edges were debeveled with a #15 scalpel blade.  Given the location of the defect, shape of the defect and the proximity to free margins a spiral flap was deemed most appropriate.  Using a sterile surgical marker, an appropriate rotation flap was drawn incorporating the defect and placing the expected incisions within the relaxed skin tension lines where possible. The area thus outlined was incised deep to adipose tissue with a #15 scalpel blade.  The skin margins were undermined to an appropriate distance in all directions utilizing iris scissors. Staged Advancement Flap Text: The defect edges were debeveled with a #15 scalpel blade.  Given the location of the defect, shape of the defect and the proximity to free margins a staged advancement flap was deemed most appropriate.  Using a sterile surgical marker, an appropriate advancement flap was drawn incorporating the defect and placing the expected incisions within the relaxed skin tension lines where possible. The area thus outlined was incised deep to adipose tissue with a #15 scalpel blade.  The skin margins were undermined to an appropriate distance in all directions utilizing iris scissors. Star Wedge Flap Text: The defect edges were debeveled with a #15 scalpel blade.  Given the location of the defect, shape of the defect and the proximity to free margins a star wedge flap was deemed most appropriate.  Using a sterile surgical marker, an appropriate rotation flap was drawn incorporating the defect and placing the expected incisions within the relaxed skin tension lines where possible. The area thus outlined was incised deep to adipose tissue with a #15 scalpel blade.  The skin margins were undermined to an appropriate distance in all directions utilizing iris scissors. Transposition Flap Text: The defect edges were debeveled with a #15 scalpel blade.  Given the location of the defect and the proximity to free margins a transposition flap was deemed most appropriate.  Using a sterile surgical marker, an appropriate transposition flap was drawn incorporating the defect.    The area thus outlined was incised deep to adipose tissue with a #15 scalpel blade.  The skin margins were undermined to an appropriate distance in all directions utilizing iris scissors. Muscle Hinge Flap Text: The defect edges were debeveled with a #15 scalpel blade.  Given the size, depth and location of the defect and the proximity to free margins a muscle hinge flap was deemed most appropriate.  Using a sterile surgical marker, an appropriate hinge flap was drawn incorporating the defect. The area thus outlined was incised with a #15 scalpel blade.  The skin margins were undermined to an appropriate distance in all directions utilizing iris scissors. Mustarde Flap Text: The defect edges were debeveled with a #15 scalpel blade.  Given the size, depth and location of the defect and the proximity to free margins a Mustarde flap was deemed most appropriate.  Using a sterile surgical marker, an appropriate flap was drawn incorporating the defect. The area thus outlined was incised with a #15 scalpel blade.  The skin margins were undermined to an appropriate distance in all directions utilizing iris scissors. Nasal Turnover Hinge Flap Text: The defect edges were debeveled with a #15 scalpel blade.  Given the size, depth, location of the defect and the defect being full thickness a nasal turnover hinge flap was deemed most appropriate.  Using a sterile surgical marker, an appropriate hinge flap was drawn incorporating the defect. The area thus outlined was incised with a #15 scalpel blade. The flap was designed to recreate the nasal mucosal lining and the alar rim. The skin margins were undermined to an appropriate distance in all directions utilizing iris scissors. Nasalis-Muscle-Based Myocutaneous Island Pedicle Flap Text: Using a #15 blade, an incision was made around the donor flap to the level of the nasalis muscle. Wide lateral undermining was then performed in both the subcutaneous plane above the nasalis muscle, and in a submuscular plane just above periosteum. This allowed the formation of a free nasalis muscle axial pedicle (based on the angular artery) which was still attached to the actual cutaneous flap, increasing its mobility and vascular viability. Hemostasis was obtained with pinpoint electrocoagulation. The flap was mobilized into position and the pivotal anchor points positioned and stabilized with buried interrupted sutures. Subcutaneous and dermal tissues were closed in a multilayered fashion with sutures. Tissue redundancies were excised, and the epidermal edges were apposed without significant tension and sutured with sutures. Nasalis Myocutaneous Flap Text: Using a #15 blade, an incision was made around the donor flap to the level of the nasalis muscle. Wide lateral undermining was then performed in both the subcutaneous plane above the nasalis muscle, and in a submuscular plane just above periosteum. This allowed the formation of a free nasalis muscle axial pedicle which was still attached to the actual cutaneous flap, increasing its mobility and vascular viability. Hemostasis was obtained with pinpoint electrocoagulation. The flap was mobilized into position and the pivotal anchor points positioned and stabilized with buried interrupted sutures. Subcutaneous and dermal tissues were closed in a multilayered fashion with sutures. Tissue redundancies were excised, and the epidermal edges were apposed without significant tension and sutured with sutures. Orbicularis Oris Muscle Flap Text: The defect edges were debeveled with a #15 scalpel blade.  Given that the defect affected the competency of the oral sphincter an obicularis oris muscle flap was deemed most appropriate to restore this competency and normal muscle function.  Using a sterile surgical marker, an appropriate flap was drawn incorporating the defect. The area thus outlined was incised with a #15 scalpel blade. Melolabial Transposition Flap Text: The defect edges were debeveled with a #15 scalpel blade.  Given the location of the defect and the proximity to free margins a melolabial flap was deemed most appropriate.  Using a sterile surgical marker, an appropriate melolabial transposition flap was drawn incorporating the defect.    The area thus outlined was incised deep to adipose tissue with a #15 scalpel blade.  The skin margins were undermined to an appropriate distance in all directions utilizing iris scissors. Rectangular Flap Text: The defect edges were debeveled with a #15 scalpel blade. Given the location of the defect and the proximity to free margins a rectangular flap was deemed most appropriate. Using a sterile surgical marker, an appropriate rectangular flap was drawn incorporating the defect. The area thus outlined was incised deep to adipose tissue with a #15 scalpel blade. The skin margins were undermined to an appropriate distance in all directions utilizing iris scissors. Following this, the designed flap was carried over into the primary defect and sutured into place. Rhombic Flap Text: The defect edges were debeveled with a #15 scalpel blade.  Given the location of the defect and the proximity to free margins a rhombic flap was deemed most appropriate.  Using a sterile surgical marker, an appropriate rhombic flap was drawn incorporating the defect.    The area thus outlined was incised deep to adipose tissue with a #15 scalpel blade.  The skin margins were undermined to an appropriate distance in all directions utilizing iris scissors. Rhomboid Transposition Flap Text: The defect edges were debeveled with a #15 scalpel blade.  Given the location of the defect and the proximity to free margins a rhomboid transposition flap was deemed most appropriate.  Using a sterile surgical marker, an appropriate rhomboid flap was drawn incorporating the defect.    The area thus outlined was incised deep to adipose tissue with a #15 scalpel blade.  The skin margins were undermined to an appropriate distance in all directions utilizing iris scissors. Bi-Rhombic Flap Text: The defect edges were debeveled with a #15 scalpel blade.  Given the location of the defect and the proximity to free margins a bi-rhombic flap was deemed most appropriate.  Using a sterile surgical marker, an appropriate rhombic flap was drawn incorporating the defect. The area thus outlined was incised deep to adipose tissue with a #15 scalpel blade.  The skin margins were undermined to an appropriate distance in all directions utilizing iris scissors. Helical Rim Advancement Flap Text: The defect edges were debeveled with a #15 blade scalpel.  Given the location of the defect and the proximity to free margins (helical rim) a double helical rim advancement flap was deemed most appropriate.  Using a sterile surgical marker, the appropriate advancement flaps were drawn incorporating the defect and placing the expected incisions between the helical rim and antihelix where possible.  The area thus outlined was incised through and through with a #15 scalpel blade.  With a skin hook and iris scissors, the flaps were gently and sharply undermined and freed up. Bilateral Helical Rim Advancement Flap Text: The defect edges were debeveled with a #15 blade scalpel.  Given the location of the defect and the proximity to free margins (helical rim) a bilateral helical rim advancement flap was deemed most appropriate.  Using a sterile surgical marker, the appropriate advancement flaps were drawn incorporating the defect and placing the expected incisions between the helical rim and antihelix where possible.  The area thus outlined was incised through and through with a #15 scalpel blade.  With a skin hook and iris scissors, the flaps were gently and sharply undermined and freed up. Ear Star Wedge Flap Text: The defect edges were debeveled with a #15 blade scalpel.  Given the location of the defect and the proximity to free margins (helical rim) an ear star wedge flap was deemed most appropriate.  Using a sterile surgical marker, the appropriate flap was drawn incorporating the defect and placing the expected incisions between the helical rim and antihelix where possible.  The area thus outlined was incised through and through with a #15 scalpel blade. Banner Transposition Flap Text: The defect edges were debeveled with a #15 scalpel blade.  Given the location of the defect and the proximity to free margins a Banner transposition flap was deemed most appropriate.  Using a sterile surgical marker, an appropriate flap drawn around the defect. The area thus outlined was incised deep to adipose tissue with a #15 scalpel blade.  The skin margins were undermined to an appropriate distance in all directions utilizing iris scissors. Bilobed Flap Text: The defect edges were debeveled with a #15 scalpel blade.  Given the location of the defect and the proximity to free margins a bilobe flap was deemed most appropriate.  Using a sterile surgical marker, an appropriate bilobe flap drawn around the defect.    The area thus outlined was incised deep to adipose tissue with a #15 scalpel blade.  The skin margins were undermined to an appropriate distance in all directions utilizing iris scissors. Bilobed Transposition Flap Text: The defect edges were debeveled with a #15 scalpel blade.  Given the location of the defect and the proximity to free margins a bilobed transposition flap was deemed most appropriate.  Using a sterile surgical marker, an appropriate bilobe flap drawn around the defect.    The area thus outlined was incised deep to adipose tissue with a #15 scalpel blade.  The skin margins were undermined to an appropriate distance in all directions utilizing iris scissors. Trilobed Flap Text: The defect edges were debeveled with a #15 scalpel blade.  Given the location of the defect and the proximity to free margins a trilobed flap was deemed most appropriate.  Using a sterile surgical marker, an appropriate trilobed flap drawn around the defect.    The area thus outlined was incised deep to adipose tissue with a #15 scalpel blade.  The skin margins were undermined to an appropriate distance in all directions utilizing iris scissors. Dorsal Nasal Flap Text: The defect edges were debeveled with a #15 scalpel blade.  Given the location of the defect and the proximity to free margins a dorsal nasal flap was deemed most appropriate.  Using a sterile surgical marker, an appropriate dorsal nasal flap was drawn around the defect.    The area thus outlined was incised deep to adipose tissue with a #15 scalpel blade.  The skin margins were undermined to an appropriate distance in all directions utilizing iris scissors. Island Pedicle Flap Text: The defect edges were debeveled with a #15 scalpel blade.  Given the location of the defect, shape of the defect and the proximity to free margins an island pedicle advancement flap was deemed most appropriate.  Using a sterile surgical marker, an appropriate advancement flap was drawn incorporating the defect, outlining the appropriate donor tissue and placing the expected incisions within the relaxed skin tension lines where possible.    The area thus outlined was incised deep to adipose tissue with a #15 scalpel blade.  The skin margins were undermined to an appropriate distance in all directions around the primary defect and laterally outward around the island pedicle utilizing iris scissors.  There was minimal undermining beneath the pedicle flap. Island Pedicle Flap With Canthal Suspension Text: The defect edges were debeveled with a #15 scalpel blade.  Given the location of the defect, shape of the defect and the proximity to free margins an island pedicle advancement flap was deemed most appropriate.  Using a sterile surgical marker, an appropriate advancement flap was drawn incorporating the defect, outlining the appropriate donor tissue and placing the expected incisions within the relaxed skin tension lines where possible. The area thus outlined was incised deep to adipose tissue with a #15 scalpel blade.  The skin margins were undermined to an appropriate distance in all directions around the primary defect and laterally outward around the island pedicle utilizing iris scissors.  There was minimal undermining beneath the pedicle flap. A suspension suture was placed in the canthal tendon to prevent tension and prevent ectropion. Alar Island Pedicle Flap Text: The defect edges were debeveled with a #15 scalpel blade.  Given the location of the defect, shape of the defect and the proximity to the alar rim an island pedicle advancement flap was deemed most appropriate.  Using a sterile surgical marker, an appropriate advancement flap was drawn incorporating the defect, outlining the appropriate donor tissue and placing the expected incisions within the nasal ala running parallel to the alar rim. The area thus outlined was incised with a #15 scalpel blade.  The skin margins were undermined minimally to an appropriate distance in all directions around the primary defect and laterally outward around the island pedicle utilizing iris scissors.  There was minimal undermining beneath the pedicle flap. Double Island Pedicle Flap Text: The defect edges were debeveled with a #15 scalpel blade.  Given the location of the defect, shape of the defect and the proximity to free margins a double island pedicle advancement flap was deemed most appropriate.  Using a sterile surgical marker, an appropriate advancement flap was drawn incorporating the defect, outlining the appropriate donor tissue and placing the expected incisions within the relaxed skin tension lines where possible.    The area thus outlined was incised deep to adipose tissue with a #15 scalpel blade.  The skin margins were undermined to an appropriate distance in all directions around the primary defect and laterally outward around the island pedicle utilizing iris scissors.  There was minimal undermining beneath the pedicle flap. Island Pedicle Flap-Requiring Vessel Identification Text: The defect edges were debeveled with a #15 scalpel blade.  Given the location of the defect, shape of the defect and the proximity to free margins an island pedicle advancement flap was deemed most appropriate.  Using a sterile surgical marker, an appropriate advancement flap was drawn, based on the axial vessel mentioned above, incorporating the defect, outlining the appropriate donor tissue and placing the expected incisions within the relaxed skin tension lines where possible.    The area thus outlined was incised deep to adipose tissue with a #15 scalpel blade.  The skin margins were undermined to an appropriate distance in all directions around the primary defect and laterally outward around the island pedicle utilizing iris scissors.  There was minimal undermining beneath the pedicle flap. Keystone Flap Text: The defect edges were debeveled with a #15 scalpel blade.  Given the location of the defect, shape of the defect a keystone flap was deemed most appropriate.  Using a sterile surgical marker, an appropriate keystone flap was drawn incorporating the defect, outlining the appropriate donor tissue and placing the expected incisions within the relaxed skin tension lines where possible. The area thus outlined was incised deep to adipose tissue with a #15 scalpel blade.  The skin margins were undermined to an appropriate distance in all directions around the primary defect and laterally outward around the flap utilizing iris scissors. O-T Plasty Text: The defect edges were debeveled with a #15 scalpel blade.  Given the location of the defect, shape of the defect and the proximity to free margins an O-T plasty was deemed most appropriate.  Using a sterile surgical marker, an appropriate O-T plasty was drawn incorporating the defect and placing the expected incisions within the relaxed skin tension lines where possible.    The area thus outlined was incised deep to adipose tissue with a #15 scalpel blade.  The skin margins were undermined to an appropriate distance in all directions utilizing iris scissors. O-Z Plasty Text: The defect edges were debeveled with a #15 scalpel blade.  Given the location of the defect, shape of the defect and the proximity to free margins an O-Z plasty (double transposition flap) was deemed most appropriate.  Using a sterile surgical marker, the appropriate transposition flaps were drawn incorporating the defect and placing the expected incisions within the relaxed skin tension lines where possible.    The area thus outlined was incised deep to adipose tissue with a #15 scalpel blade.  The skin margins were undermined to an appropriate distance in all directions utilizing iris scissors.  Hemostasis was achieved with electrocautery.  The flaps were then transposed into place, one clockwise and the other counterclockwise, and anchored with interrupted buried subcutaneous sutures. Double O-Z Plasty Text: The defect edges were debeveled with a #15 scalpel blade.  Given the location of the defect, shape of the defect and the proximity to free margins a Double O-Z plasty (double transposition flap) was deemed most appropriate.  Using a sterile surgical marker, the appropriate transposition flaps were drawn incorporating the defect and placing the expected incisions within the relaxed skin tension lines where possible. The area thus outlined was incised deep to adipose tissue with a #15 scalpel blade.  The skin margins were undermined to an appropriate distance in all directions utilizing iris scissors.  Hemostasis was achieved with electrocautery.  The flaps were then transposed into place, one clockwise and the other counterclockwise, and anchored with interrupted buried subcutaneous sutures. V-Y Plasty Text: The defect edges were debeveled with a #15 scalpel blade.  Given the location of the defect, shape of the defect and the proximity to free margins an V-Y advancement flap was deemed most appropriate.  Using a sterile surgical marker, an appropriate advancement flap was drawn incorporating the defect and placing the expected incisions within the relaxed skin tension lines where possible.    The area thus outlined was incised deep to adipose tissue with a #15 scalpel blade.  The skin margins were undermined to an appropriate distance in all directions utilizing iris scissors. H Plasty Text: Given the location of the defect, shape of the defect and the proximity to free margins a H-plasty was deemed most appropriate for repair.  Using a sterile surgical marker, the appropriate advancement arms of the H-plasty were drawn incorporating the defect and placing the expected incisions within the relaxed skin tension lines where possible. The area thus outlined was incised deep to adipose tissue with a #15 scalpel blade. The skin margins were undermined to an appropriate distance in all directions utilizing iris scissors.  The opposing advancement arms were then advanced into place in opposite direction and anchored with interrupted buried subcutaneous sutures. W Plasty Text: The lesion was extirpated to the level of the fat with a #15 scalpel blade.  Given the location of the defect, shape of the defect and the proximity to free margins a W-plasty was deemed most appropriate for repair.  Using a sterile surgical marker, the appropriate transposition arms of the W-plasty were drawn incorporating the defect and placing the expected incisions within the relaxed skin tension lines where possible.    The area thus outlined was incised deep to adipose tissue with a #15 scalpel blade.  The skin margins were undermined to an appropriate distance in all directions utilizing iris scissors.  The opposing transposition arms were then transposed into place in opposite direction and anchored with interrupted buried subcutaneous sutures. Z Plasty Text: The lesion was extirpated to the level of the fat with a #15 scalpel blade.  Given the location of the defect, shape of the defect and the proximity to free margins a Z-plasty was deemed most appropriate for repair.  Using a sterile surgical marker, the appropriate transposition arms of the Z-plasty were drawn incorporating the defect and placing the expected incisions within the relaxed skin tension lines where possible.    The area thus outlined was incised deep to adipose tissue with a #15 scalpel blade.  The skin margins were undermined to an appropriate distance in all directions utilizing iris scissors.  The opposing transposition arms were then transposed into place in opposite direction and anchored with interrupted buried subcutaneous sutures. Double Z Plasty Text: The lesion was extirpated to the level of the fat with a #15 scalpel blade. Given the location of the defect, shape of the defect and the proximity to free margins a double Z-plasty was deemed most appropriate for repair. Using a sterile surgical marker, the appropriate transposition arms of the double Z-plasty were drawn incorporating the defect and placing the expected incisions within the relaxed skin tension lines where possible. The area thus outlined was incised deep to adipose tissue with a #15 scalpel blade. The skin margins were undermined to an appropriate distance in all directions utilizing iris scissors. The opposing transposition arms were then transposed and carried over into place in opposite direction and anchored with interrupted buried subcutaneous sutures. Zygomaticofacial Flap Text: Given the location of the defect, shape of the defect and the proximity to free margins a zygomaticofacial flap was deemed most appropriate for repair.  Using a sterile surgical marker, the appropriate flap was drawn incorporating the defect and placing the expected incisions within the relaxed skin tension lines where possible. The area thus outlined was incised deep to adipose tissue with a #15 scalpel blade with preservation of a vascular pedicle.  The skin margins were undermined to an appropriate distance in all directions utilizing iris scissors.  The flap was then placed into the defect and anchored with interrupted buried subcutaneous sutures. Cheek Interpolation Flap Text: A decision was made to reconstruct the defect utilizing an interpolation axial flap and a staged reconstruction.  A telfa template was made of the defect.  This telfa template was then used to outline the Cheek Interpolation flap.  The donor area for the pedicle flap was then injected with anesthesia.  The flap was excised through the skin and subcutaneous tissue down to the layer of the underlying musculature.  The interpolation flap was carefully excised within this deep plane to maintain its blood supply.  The edges of the donor site were undermined.   The donor site was closed in a primary fashion.  The pedicle was then rotated into position and sutured.  Once the tube was sutured into place, adequate blood supply was confirmed with blanching and refill.  The pedicle was then wrapped with xeroform gauze and dressed appropriately with a telfa and gauze bandage to ensure continued blood supply and protect the attached pedicle. Cheek-To-Nose Interpolation Flap Text: A decision was made to reconstruct the defect utilizing an interpolation axial flap and a staged reconstruction.  A telfa template was made of the defect.  This telfa template was then used to outline the Cheek-To-Nose Interpolation flap.  The donor area for the pedicle flap was then injected with anesthesia.  The flap was excised through the skin and subcutaneous tissue down to the layer of the underlying musculature.  The interpolation flap was carefully excised within this deep plane to maintain its blood supply.  The edges of the donor site were undermined.   The donor site was closed in a primary fashion.  The pedicle was then rotated into position and sutured.  Once the tube was sutured into place, adequate blood supply was confirmed with blanching and refill.  The pedicle was then wrapped with xeroform gauze and dressed appropriately with a telfa and gauze bandage to ensure continued blood supply and protect the attached pedicle. Interpolation Flap Text: A decision was made to reconstruct the defect utilizing an interpolation axial flap and a staged reconstruction.  A telfa template was made of the defect.  This telfa template was then used to outline the interpolation flap.  The donor area for the pedicle flap was then injected with anesthesia.  The flap was excised through the skin and subcutaneous tissue down to the layer of the underlying musculature.  The interpolation flap was carefully excised within this deep plane to maintain its blood supply.  The edges of the donor site were undermined.   The donor site was closed in a primary fashion.  The pedicle was then rotated into position and sutured.  Once the tube was sutured into place, adequate blood supply was confirmed with blanching and refill.  The pedicle was then wrapped with xeroform gauze and dressed appropriately with a telfa and gauze bandage to ensure continued blood supply and protect the attached pedicle. Melolabial Interpolation Flap Text: A decision was made to reconstruct the defect utilizing an interpolation axial flap and a staged reconstruction.  A telfa template was made of the defect.  This telfa template was then used to outline the melolabial interpolation flap.  The donor area for the pedicle flap was then injected with anesthesia.  The flap was excised through the skin and subcutaneous tissue down to the layer of the underlying musculature.  The pedicle flap was carefully excised within this deep plane to maintain its blood supply.  The edges of the donor site were undermined.   The donor site was closed in a primary fashion.  The pedicle was then rotated into position and sutured.  Once the tube was sutured into place, adequate blood supply was confirmed with blanching and refill.  The pedicle was then wrapped with xeroform gauze and dressed appropriately with a telfa and gauze bandage to ensure continued blood supply and protect the attached pedicle. Mastoid Interpolation Flap Text: A decision was made to reconstruct the defect utilizing an interpolation axial flap and a staged reconstruction.  A telfa template was made of the defect.  This telfa template was then used to outline the mastoid interpolation flap.  The donor area for the pedicle flap was then injected with anesthesia.  The flap was excised through the skin and subcutaneous tissue down to the layer of the underlying musculature.  The pedicle flap was carefully excised within this deep plane to maintain its blood supply.  The edges of the donor site were undermined.   The donor site was closed in a primary fashion.  The pedicle was then rotated into position and sutured.  Once the tube was sutured into place, adequate blood supply was confirmed with blanching and refill.  The pedicle was then wrapped with xeroform gauze and dressed appropriately with a telfa and gauze bandage to ensure continued blood supply and protect the attached pedicle. Posterior Auricular Interpolation Flap Text: A decision was made to reconstruct the defect utilizing an interpolation axial flap and a staged reconstruction.  A telfa template was made of the defect.  This telfa template was then used to outline the posterior auricular interpolation flap.  The donor area for the pedicle flap was then injected with anesthesia.  The flap was excised through the skin and subcutaneous tissue down to the layer of the underlying musculature.  The pedicle flap was carefully excised within this deep plane to maintain its blood supply.  The edges of the donor site were undermined.   The donor site was closed in a primary fashion.  The pedicle was then rotated into position and sutured.  Once the tube was sutured into place, adequate blood supply was confirmed with blanching and refill.  The pedicle was then wrapped with xeroform gauze and dressed appropriately with a telfa and gauze bandage to ensure continued blood supply and protect the attached pedicle. Paramedian Forehead Flap Text: A decision was made to reconstruct the defect utilizing an interpolation axial flap and a staged reconstruction.  A telfa template was made of the defect.  This telfa template was then used to outline the paramedian forehead pedicle flap.  The donor area for the pedicle flap was then injected with anesthesia.  The flap was excised through the skin and subcutaneous tissue down to the layer of the underlying musculature.  The pedicle flap was carefully excised within this deep plane to maintain its blood supply.  The edges of the donor site were undermined.   The donor site was closed in a primary fashion.  The pedicle was then rotated into position and sutured.  Once the tube was sutured into place, adequate blood supply was confirmed with blanching and refill.  The pedicle was then wrapped with xeroform gauze and dressed appropriately with a telfa and gauze bandage to ensure continued blood supply and protect the attached pedicle. Abbe Flap (Upper To Lower Lip) Text: The defect of the lower lip was assessed and measured.  Given the location and size of the defect, an Abbe flap was deemed most appropriate.  Using a sterile surgical marker, an appropriate Abbe flap was measured and drawn on the upper lip. Local anesthesia was then infiltrated.  A scalpel was then used to incise the upper lip through and through the skin, vermilion, muscle and mucosa, leaving the flap pedicled on the opposite side.  The flap was then rotated and transferred to the lower lip defect.  The flap was then sutured into place with a three layer technique, closing the orbicularis oris muscle layer with subcutaneous buried sutures, followed by a mucosal layer and an epidermal layer. Abbe Flap (Lower To Upper Lip) Text: The defect of the upper lip was assessed and measured.  Given the location and size of the defect, an Abbe flap was deemed most appropriate.  Using a sterile surgical marker, an appropriate Abbe flap was measured and drawn on the lower lip. Local anesthesia was then infiltrated. A scalpel was then used to incise the upper lip through and through the skin, vermilion, muscle and mucosa, leaving the flap pedicled on the opposite side.  The flap was then rotated and transferred to the lower lip defect.  The flap was then sutured into place with a three layer technique, closing the orbicularis oris muscle layer with subcutaneous buried sutures, followed by a mucosal layer and an epidermal layer. Estlander Flap (Upper To Lower Lip) Text: The defect of the lower lip was assessed and measured.  Given the location and size of the defect, an Estlander flap was deemed most appropriate.  Using a sterile surgical marker, an appropriate Estlander flap was measured and drawn on the upper lip. Local anesthesia was then infiltrated. A scalpel was then used to incise the lateral aspect of the flap, through skin, muscle and mucosa, leaving the flap pedicled medially.  The flap was then rotated and positioned to fill the lower lip defect.  The flap was then sutured into place with a three layer technique, closing the orbicularis oris muscle layer with subcutaneous buried sutures, followed by a mucosal layer and an epidermal layer. Cheiloplasty (Less Than 50%) Text: A decision was made to reconstruct the defect with a  cheiloplasty.  The defect was undermined extensively.  Additional obicularis oris muscle was excised with a 15 blade scalpel.  The defect was converted into a full thickness wedge, of less than 50% of the vertical height of the lip, to facilite a better cosmetic result.  Small vessels were then tied off with 5-0 monocyrl. The obicularis oris, superficial fascia, adipose and dermis were then reapproximated.  After the deeper layers were approximated the epidermis was reapproximated with particular care given to realign the vermilion border. Cheiloplasty (Complex) Text: A decision was made to reconstruct the defect with a  cheiloplasty.  The defect was undermined extensively.  Additional obicularis oris muscle was excised with a 15 blade scalpel.  The defect was converted into a full thickness wedge to facilite a better cosmetic result.  Small vessels were then tied off with 5-0 monocyrl. The obicularis oris, superficial fascia, adipose and dermis were then reapproximated.  After the deeper layers were approximated the epidermis was reapproximated with particular care given to realign the vermilion border. Ear Wedge Repair Text: A wedge excision was completed by carrying down an excision through the full thickness of the ear and cartilage with an inward facing Burow's triangle. The wound was then closed in a layered fashion. Full Thickness Lip Wedge Repair (Flap) Text: Given the location of the defect and the proximity to free margins a full thickness wedge repair was deemed most appropriate.  Using a sterile surgical marker, the appropriate repair was drawn incorporating the defect and placing the expected incisions perpendicular to the vermilion border.  The vermilion border was also meticulously outlined to ensure appropriate reapproximation during the repair.  The area thus outlined was incised through and through with a #15 scalpel blade.  The muscularis and dermis were reaproximated with deep sutures following hemostasis. Care was taken to realign the vermilion border before proceeding with the superficial closure.  Once the vermilion was realigned the superfical and mucosal closure was finished. Ftsg Text: The defect edges were debeveled with a #15 scalpel blade.  Given the location of the defect, shape of the defect and the proximity to free margins a full thickness skin graft was deemed most appropriate.  Using a sterile surgical marker, the primary defect shape was transferred to the donor site. The area thus outlined was incised deep to adipose tissue with a #15 scalpel blade.  The harvested graft was then trimmed of adipose tissue until only dermis and epidermis was left.  The skin margins of the secondary defect were undermined to an appropriate distance in all directions utilizing iris scissors.  The secondary defect was closed with interrupted buried subcutaneous sutures.  The skin edges were then re-apposed with running  sutures.  The skin graft was then placed in the primary defect and oriented appropriately. Split-Thickness Skin Graft Text: The defect edges were debeveled with a #15 scalpel blade.  Given the location of the defect, shape of the defect and the proximity to free margins a split thickness skin graft was deemed most appropriate.  Using a sterile surgical marker, the primary defect shape was transferred to the donor site. The split thickness graft was then harvested.  The skin graft was then placed in the primary defect and oriented appropriately. Pinch Graft Text: The defect edges were debeveled with a #15 scalpel blade. Given the location of the defect, shape of the defect and the proximity to free margins a pinch graft was deemed most appropriate. Using a sterile surgical marker, the primary defect shape was transferred to the donor site. The area thus outlined was incised deep to adipose tissue with a #15 scalpel blade.  The harvested graft was then trimmed of adipose tissue until only dermis and epidermis was left. The skin margins of the secondary defect were undermined to an appropriate distance in all directions utilizing iris scissors.  The secondary defect was closed with interrupted buried subcutaneous sutures.  The skin edges were then re-apposed with running  sutures.  The skin graft was then placed in the primary defect and oriented appropriately. Burow's Graft Text: The defect edges were debeveled with a #15 scalpel blade.  Given the location of the defect, shape of the defect, the proximity to free margins and the presence of a standing cone deformity a Burow's skin graft was deemed most appropriate. The standing cone was removed and this tissue was then trimmed to the shape of the primary defect. The adipose tissue was also removed until only dermis and epidermis were left.  The skin margins of the secondary defect were undermined to an appropriate distance in all directions utilizing iris scissors.  The secondary defect was closed with interrupted buried subcutaneous sutures.  The skin edges were then re-apposed with running  sutures.  The skin graft was then placed in the primary defect and oriented appropriately. Cartilage Graft Text: The defect edges were debeveled with a #15 scalpel blade.  Given the location of the defect, shape of the defect, the fact the defect involved a full thickness cartilage defect a cartilage graft was deemed most appropriate.  An appropriate donor site was identified, cleansed, and anesthetized. The cartilage graft was then harvested and transferred to the recipient site, oriented appropriately and then sutured into place.  The secondary defect was then repaired using a primary closure. Composite Graft Text: The defect edges were debeveled with a #15 scalpel blade.  Given the location of the defect, shape of the defect, the proximity to free margins and the fact the defect was full thickness a composite graft was deemed most appropriate.  The defect was outline and then transferred to the donor site.  A full thickness graft was then excised from the donor site. The graft was then placed in the primary defect, oriented appropriately and then sutured into place.  The secondary defect was then repaired using a primary closure. Epidermal Autograft Text: The defect edges were debeveled with a #15 scalpel blade.  Given the location of the defect, shape of the defect and the proximity to free margins an epidermal autograft was deemed most appropriate.  Using a sterile surgical marker, the primary defect shape was transferred to the donor site. The epidermal graft was then harvested.  The skin graft was then placed in the primary defect and oriented appropriately. Dermal Autograft Text: The defect edges were debeveled with a #15 scalpel blade.  Given the location of the defect, shape of the defect and the proximity to free margins a dermal autograft was deemed most appropriate.  Using a sterile surgical marker, the primary defect shape was transferred to the donor site. The area thus outlined was incised deep to adipose tissue with a #15 scalpel blade.  The harvested graft was then trimmed of adipose and epidermal tissue until only dermis was left.  The skin graft was then placed in the primary defect and oriented appropriately. Skin Substitute Text: The defect edges were debeveled with a #15 scalpel blade.  Given the location of the defect, shape of the defect and the proximity to free margins a skin substitute graft was deemed most appropriate.  The graft material was trimmed to fit the size of the defect. The graft was then placed in the primary defect and oriented appropriately. Tissue Cultured Epidermal Autograft Text: The defect edges were debeveled with a #15 scalpel blade.  Given the location of the defect, shape of the defect and the proximity to free margins a tissue cultured epidermal autograft was deemed most appropriate.  The graft was then trimmed to fit the size of the defect.  The graft was then placed in the primary defect and oriented appropriately. Xenograft Text: The defect edges were debeveled with a #15 scalpel blade.  Given the location of the defect, shape of the defect and the proximity to free margins a xenograft was deemed most appropriate.  The graft was then trimmed to fit the size of the defect.  The graft was then placed in the primary defect and oriented appropriately. Purse String (Simple) Text: Given the location of the defect and the characteristics of the surrounding skin a purse string closure was deemed most appropriate.  Undermining was performed circumfirentially around the surgical defect.  A purse string suture was then placed and tightened. Purse String (Intermediate) Text: Given the location of the defect and the characteristics of the surrounding skin a purse string intermediate closure was deemed most appropriate.  Undermining was performed circumfirentially around the surgical defect.  A purse string suture was then placed and tightened. Partial Purse String (Simple) Text: Given the location of the defect and the characteristics of the surrounding skin a simple purse string closure was deemed most appropriate.  Undermining was performed circumfirentially around the surgical defect.  A purse string suture was then placed and tightened. Wound tension only allowed a partial closure of the circular defect. Partial Purse String (Intermediate) Text: Given the location of the defect and the characteristics of the surrounding skin an intermediate purse string closure was deemed most appropriate.  Undermining was performed circumfirentially around the surgical defect.  A purse string suture was then placed and tightened. Wound tension only allowed a partial closure of the circular defect. Localized Dermabrasion With Wire Brush Text: The patient was draped in routine manner.  Localized dermabrasion using 3 x 17 mm wire brush was performed in routine manner to papillary dermis. This spot dermabrasion is being performed to complete skin cancer reconstruction. It also will eliminate the other sun damaged precancerous cells that are known to be part of the regional effect of a lifetime's worth of sun exposure. This localized dermabrasion is therapeutic and should not be considered cosmetic in any regard. Localized Dermabrasion With Sand Papertext: The patient was draped in routine manner.  Localized dermabrasion using sterile sand paper was performed in routine manner to papillary dermis. This spot dermabrasion is being performed to complete skin cancer reconstruction. It also will eliminate the other sun damaged precancerous cells that are known to be part of the regional effect of a lifetime's worth of sun exposure. This localized dermabrasion is therapeutic and should not be considered cosmetic in any regard. Localized Dermabrasion With 15 Blade Text: The patient was draped in routine manner.  Localized dermabrasion using a 15 blade was performed in routine manner to papillary dermis. This spot dermabrasion is being performed to complete skin cancer reconstruction. It also will eliminate the other sun damaged precancerous cells that are known to be part of the regional effect of a lifetime's worth of sun exposure. This localized dermabrasion is therapeutic and should not be considered cosmetic in any regard. Tarsorrhaphy Text: A tarsorrhaphy was performed using Frost sutures. Intermediate Repair And Flap Additional Text (Will Appearing After The Standard Complex Repair Text): The intermediate repair was not sufficient to completely close the primary defect. The remaining additional defect was repaired with the flap mentioned below. Intermediate Repair And Graft Additional Text (Will Appearing After The Standard Complex Repair Text): The intermediate repair was not sufficient to completely close the primary defect. The remaining additional defect was repaired with the graft mentioned below. Complex Repair And Flap Additional Text (Will Appearing After The Standard Complex Repair Text): The complex repair was not sufficient to completely close the primary defect. The remaining additional defect was repaired with the flap mentioned below. Complex Repair And Graft Additional Text (Will Appearing After The Standard Complex Repair Text): The complex repair was not sufficient to completely close the primary defect. The remaining additional defect was repaired with the graft mentioned below. Eyelid Full Thickness Repair - 30058: The eyelid defect was full thickness which required a wedge repair of the eyelid. Special care was taken to ensure that the eyelid margin was realligned when placing sutures. Eyelid Partial Thickness Repair - 10812: The eyelid defect was partial thickness which required a wedge repair of the eyelid. Special care was taken to ensure that the eyelid margin was realligned when placing sutures. Manual Repair Warning Statement: We plan on removing the manually selected variable below in favor of our much easier automatic structured text blocks found in the previous tab. We decided to do this to help make the flow better and give you the full power of structured data. Manual selection is never going to be ideal in our platform and I would encourage you to avoid using manual selection from this point on, especially since I will be sunsetting this feature. It is important that you do one of two things with the customized text below. First, you can save all of the text in a word file so you can have it for future reference. Second, transfer the text to the appropriate area in the Library tab. Lastly, if there is a flap or graft type which we do not have you need to let us know right away so I can add it in before the variable is hidden. No need to panic, we plan to give you roughly 6 months to make the change. Same Histology In Subsequent Stages Text: The pattern and morphology of the tumor is as described in the first stage. No Residual Tumor Seen Histology Text: There were no malignant cells seen in the sections examined. Inflammation Suggestive Of Cancer Camouflage Histology Text: There was a dense lymphocytic infiltrate which prevented adequate histologic evaluation of adjacent structures. Bcc Histology Text: There were numerous aggregates of basaloid cells. Bcc Infiltrative Histology Text: There were numerous aggregates of basaloid cells demonstrating an infiltrative pattern. Mart-1 - Positive Histology Text: MART-1 staining demonstrates areas of higher density and clustering of melanocytes with Pagetoid spread upwards within the epidermis. The surgical margins are positive for tumor cells. Mart-1 - Negative Histology Text: MART-1 staining demonstrates a normal density and pattern of melanocytes along the dermal-epidermal junction. The surgical margins are negative for tumor cells. Information: Selecting Yes will display possible errors in your note based on the variables you have selected. This validation is only offered as a suggestion for you. PLEASE NOTE THAT THE VALIDATION TEXT WILL BE REMOVED WHEN YOU FINALIZE YOUR NOTE. IF YOU WANT TO FAX A PRELIMINARY NOTE YOU WILL NEED TO TOGGLE THIS TO 'NO' IF YOU DO NOT WANT IT IN YOUR FAXED NOTE.

## 2024-05-08 NOTE — ED ADULT NURSE NOTE - NSFALLRSKHARMRISK_ED_ALL_ED
Try calling patient but, no answer. Left a detailed vm to check with the patient if she wants to get blood work done prior to his next visit or during the visit per Dr. Montague.    Patient to return our call to let us know.    no

## 2024-05-29 NOTE — ED PROVIDER NOTE - PROGRESS NOTE DETAILS
present Urology saw pt - adjusted stent and suggested repeat KUB to chck placement. - Riaz Jaimes PA-C KUB showed stent in place. As per urology, sent pt home with cephalexin and follow up Wednesday. - Riaz Jaimes PA-C

## 2024-10-09 NOTE — PATIENT PROFILE ADULT - BILL PAYMENT
In 5-7 days Please follow up with your primary care doctor:  Cathie Chen MD   [address] [facility] [address] [address] / [facility] [address]   [phone]        Home, we will contact you to start Z-Wilian if strep is positive.  In any case Tylenol or Advil for pain or fever push fluids and avoid others until free of fever for 24 hours off fever reducers and symptoms improving.  Follow-up with your private physician.  Go to the emergency department for problems including chest pain or shortness of breath   no

## 2024-11-22 NOTE — OCCUPATIONAL THERAPY INITIAL EVALUATION ADULT - SIT-TO-STAND BALANCE
Medicare Preventive Visit Patient Instructions  Thank you for completing your Welcome to Medicare Visit or Medicare Annual Wellness Visit today. Your next wellness visit will be due in one year (11/23/2025).  The screening/preventive services that you may require over the next 5-10 years are detailed below. Some tests may not apply to you based off risk factors and/or age. Screening tests ordered at today's visit but not completed yet may show as past due. Also, please note that scanned in results may not display below.  Preventive Screenings:  Service Recommendations Previous Testing/Comments   Colorectal Cancer Screening  Colonoscopy    Fecal Occult Blood Test (FOBT)/Fecal Immunochemical Test (FIT)  Fecal DNA/Cologuard Test  Flexible Sigmoidoscopy Age: 45-75 years old   Colonoscopy: every 10 years (May be performed more frequently if at higher risk)  OR  FOBT/FIT: every 1 year  OR  Cologuard: every 3 years  OR  Sigmoidoscopy: every 5 years  Screening may be recommended earlier than age 45 if at higher risk for colorectal cancer. Also, an individualized decision between you and your healthcare provider will decide whether screening between the ages of 76-85 would be appropriate. Colonoscopy: Not on file  FOBT/FIT: Not on file  Cologuard: 11/14/2023  Sigmoidoscopy: Not on file    Screening Current     Prostate Cancer Screening Individualized decision between patient and health care provider in men between ages of 55-69   Medicare will cover every 12 months beginning on the day after your 50th birthday PSA: 1.11 ng/mL           Hepatitis C Screening Once for adults born between 1945 and 1965  More frequently in patients at high risk for Hepatitis C Hep C Antibody: 10/20/2023    Screening Current   Diabetes Screening 1-2 times per year if you're at risk for diabetes or have pre-diabetes Fasting glucose: 88 mg/dL (10/20/2023)  A1C: No results in last 5 years (No results in last 5 years)      Cholesterol Screening Once  every 5 years if you don't have a lipid disorder. May order more often based on risk factors. Lipid panel: 10/20/2023  Screening Current      Other Preventive Screenings Covered by Medicare:  Abdominal Aortic Aneurysm (AAA) Screening: covered once if your at risk. You're considered to be at risk if you have a family history of AAA or a male between the age of 65-75 who smoking at least 100 cigarettes in your lifetime.  Lung Cancer Screening: covers low dose CT scan once per year if you meet all of the following conditions: (1) Age 55-77; (2) No signs or symptoms of lung cancer; (3) Current smoker or have quit smoking within the last 15 years; (4) You have a tobacco smoking history of at least 20 pack years (packs per day x number of years you smoked); (5) You get a written order from a healthcare provider.  Glaucoma Screening: covered annually if you're considered high risk: (1) You have diabetes OR (2) Family history of glaucoma OR (3)  aged 50 and older OR (4)  American aged 65 and older  Osteoporosis Screening: covered every 2 years if you meet one of the following conditions: (1) Have a vertebral abnormality; (2) On glucocorticoid therapy for more than 3 months; (3) Have primary hyperparathyroidism; (4) On osteoporosis medications and need to assess response to drug therapy.  HIV Screening: covered annually if you're between the age of 15-65. Also covered annually if you are younger than 15 and older than 65 with risk factors for HIV infection. For pregnant patients, it is covered up to 3 times per pregnancy.    Immunizations:  Immunization Recommendations   Influenza Vaccine Annual influenza vaccination during flu season is recommended for all persons aged >= 6 months who do not have contraindications   Pneumococcal Vaccine   * Pneumococcal conjugate vaccine = PCV13 (Prevnar 13), PCV15 (Vaxneuvance), PCV20 (Prevnar 20)  * Pneumococcal polysaccharide vaccine = PPSV23 (Pneumovax) Adults  19-65 yo with certain risk factors or if 65+ yo  If never received any pneumonia vaccine: recommend Prevnar 20 (PCV20)  Give PCV20 if previously received 1 dose of PCV13 or PPSV23   Hepatitis B Vaccine 3 dose series if at intermediate or high risk (ex: diabetes, end stage renal disease, liver disease)   Respiratory syncytial virus (RSV) Vaccine - COVERED BY MEDICARE PART D  * RSVPreF3 (Arexvy) CDC recommends that adults 60 years of age and older may receive a single dose of RSV vaccine using shared clinical decision-making (SCDM)   Tetanus (Td) Vaccine - COST NOT COVERED BY MEDICARE PART B Following completion of primary series, a booster dose should be given every 10 years to maintain immunity against tetanus. Td may also be given as tetanus wound prophylaxis.   Tdap Vaccine - COST NOT COVERED BY MEDICARE PART B Recommended at least once for all adults. For pregnant patients, recommended with each pregnancy.   Shingles Vaccine (Shingrix) - COST NOT COVERED BY MEDICARE PART B  2 shot series recommended in those 19 years and older who have or will have weakened immune systems or those 50 years and older     Health Maintenance Due:      Topic Date Due   • Colorectal Cancer Screening  11/14/2026   • Hepatitis C Screening  Completed     Immunizations Due:      Topic Date Due   • Influenza Vaccine (1) 09/01/2024   • COVID-19 Vaccine (3 - 2024-25 season) 09/01/2024     Advance Directives   What are advance directives?  Advance directives are legal documents that state your wishes and plans for medical care. These plans are made ahead of time in case you lose your ability to make decisions for yourself. Advance directives can apply to any medical decision, such as the treatments you want, and if you want to donate organs.   What are the types of advance directives?  There are many types of advance directives, and each state has rules about how to use them. You may choose a combination of any of the following:  Living will:   This is a written record of the treatment you want. You can also choose which treatments you do not want, which to limit, and which to stop at a certain time. This includes surgery, medicine, IV fluid, and tube feedings.   Durable power of  for healthcare (DPAHC):  This is a written record that states who you want to make healthcare choices for you when you are unable to make them for yourself. This person, called a proxy, is usually a family member or a friend. You may choose more than 1 proxy.  Do not resuscitate (DNR) order:  A DNR order is used in case your heart stops beating or you stop breathing. It is a request not to have certain forms of treatment, such as CPR. A DNR order may be included in other types of advance directives.  Medical directive:  This covers the care that you want if you are in a coma, near death, or unable to make decisions for yourself. You can list the treatments you want for each condition. Treatment may include pain medicine, surgery, blood transfusions, dialysis, IV or tube feedings, and a ventilator (breathing machine).  Values history:  This document has questions about your views, beliefs, and how you feel and think about life. This information can help others choose the care that you would choose.  Why are advance directives important?  An advance directive helps you control your care. Although spoken wishes may be used, it is better to have your wishes written down. Spoken wishes can be misunderstood, or not followed. Treatments may be given even if you do not want them. An advance directive may make it easier for your family to make difficult choices about your care.       © Copyright [employer] 2018 Information is for End User's use only and may not be sold, redistributed or otherwise used for commercial purposes. All illustrations and images included in CareNotes® are the copyrighted property of [employer] or [employer]     fair plus

## 2025-01-30 NOTE — ED ADULT NURSE NOTE - NSFALLRISKASMTTYPE_ED_ALL_ED
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 Dutton Initial (On Arrival)

## 2025-07-06 NOTE — PROGRESS NOTE ADULT - REASON FOR ADMISSION
86 female sent from [facility] living for diarrhea x 3 days since starting antibiotics for UTI.   	Per patient she was being treated for UTI and began having diarrhea and RLQ pain.   	Denies fever, chills.   	Denies n/v.   	+ Abdominal pain , + Diarrhea  	Denies current urinary symptoms.  Denies CP, cough or SOBno abdominal distension, no blood in stool, no burning urination, no chills, no dysuria, no fever, no hematuria, no nausea, no vomiting++ RLQ abdominal pain, no bloating, no constipation, ++ diarrhea,   < from: CT Abdomen and Pelvis w/ IV Cont (01.16.22 @ 17:29) > Home